# Patient Record
Sex: MALE | Race: WHITE
[De-identification: names, ages, dates, MRNs, and addresses within clinical notes are randomized per-mention and may not be internally consistent; named-entity substitution may affect disease eponyms.]

---

## 2017-07-28 ENCOUNTER — RX RENEWAL (OUTPATIENT)
Age: 67
End: 2017-07-28

## 2018-03-26 ENCOUNTER — RX RENEWAL (OUTPATIENT)
Age: 68
End: 2018-03-26

## 2018-03-26 DIAGNOSIS — K51.90 ULCERATIVE COLITIS, UNSPECIFIED, W/OUT COMPLICATIONS: ICD-10-CM

## 2019-01-21 ENCOUNTER — RX RENEWAL (OUTPATIENT)
Age: 69
End: 2019-01-21

## 2019-11-23 ENCOUNTER — APPOINTMENT (OUTPATIENT)
Dept: CARDIOTHORACIC SURGERY | Facility: HOSPITAL | Age: 69
End: 2019-11-23
Payer: COMMERCIAL

## 2019-11-23 ENCOUNTER — RESULT REVIEW (OUTPATIENT)
Age: 69
End: 2019-11-23

## 2019-11-23 ENCOUNTER — INPATIENT (INPATIENT)
Facility: HOSPITAL | Age: 69
LOS: 32 days | Discharge: NURSING, REHAB OR LONG-TERM CARE | DRG: 219 | End: 2019-12-26
Attending: THORACIC SURGERY (CARDIOTHORACIC VASCULAR SURGERY) | Admitting: THORACIC SURGERY (CARDIOTHORACIC VASCULAR SURGERY)
Payer: COMMERCIAL

## 2019-11-23 VITALS
TEMPERATURE: 98 F | OXYGEN SATURATION: 96 % | WEIGHT: 184.97 LBS | HEART RATE: 64 BPM | DIASTOLIC BLOOD PRESSURE: 60 MMHG | RESPIRATION RATE: 16 BRPM | SYSTOLIC BLOOD PRESSURE: 95 MMHG

## 2019-11-23 LAB
ALBUMIN SERPL ELPH-MCNC: 2 G/DL — LOW (ref 3.3–5)
ALBUMIN SERPL ELPH-MCNC: 3 G/DL — LOW (ref 3.3–5)
ALBUMIN SERPL ELPH-MCNC: 3.5 G/DL — SIGNIFICANT CHANGE UP (ref 3.3–5)
ALBUMIN SERPL ELPH-MCNC: 3.6 G/DL — SIGNIFICANT CHANGE UP (ref 3.3–5)
ALP SERPL-CCNC: 24 U/L — LOW (ref 40–120)
ALP SERPL-CCNC: 33 U/L — LOW (ref 40–120)
ALP SERPL-CCNC: 36 U/L — LOW (ref 40–120)
ALP SERPL-CCNC: 54 U/L — SIGNIFICANT CHANGE UP (ref 40–120)
ALT FLD-CCNC: 15 U/L — SIGNIFICANT CHANGE UP (ref 10–45)
ALT FLD-CCNC: 33 U/L — SIGNIFICANT CHANGE UP (ref 10–45)
ALT FLD-CCNC: 46 U/L — HIGH (ref 10–45)
ALT FLD-CCNC: 55 U/L — HIGH (ref 10–45)
AMPHET UR-MCNC: NEGATIVE — SIGNIFICANT CHANGE UP
ANION GAP SERPL CALC-SCNC: 10 MMOL/L — SIGNIFICANT CHANGE UP (ref 5–17)
ANION GAP SERPL CALC-SCNC: 11 MMOL/L — SIGNIFICANT CHANGE UP (ref 5–17)
ANION GAP SERPL CALC-SCNC: 12 MMOL/L — SIGNIFICANT CHANGE UP (ref 5–17)
ANION GAP SERPL CALC-SCNC: 12 MMOL/L — SIGNIFICANT CHANGE UP (ref 5–17)
APTT BLD: 35 SEC — SIGNIFICANT CHANGE UP (ref 27.5–36.3)
APTT BLD: 39 SEC — HIGH (ref 27.5–36.3)
APTT BLD: 40.8 SEC — HIGH (ref 27.5–36.3)
APTT BLD: 61.3 SEC — HIGH (ref 27.5–36.3)
AST SERPL-CCNC: 126 U/L — HIGH (ref 10–40)
AST SERPL-CCNC: 144 U/L — HIGH (ref 10–40)
AST SERPL-CCNC: 33 U/L — SIGNIFICANT CHANGE UP (ref 10–40)
AST SERPL-CCNC: 86 U/L — HIGH (ref 10–40)
BARBITURATES UR SCN-MCNC: NEGATIVE — SIGNIFICANT CHANGE UP
BASE EXCESS BLDA CALC-SCNC: -10.6 MMOL/L — LOW (ref -2–3)
BASE EXCESS BLDA CALC-SCNC: -8.6 MMOL/L — LOW (ref -2–3)
BASOPHILS # BLD AUTO: 0.04 K/UL — SIGNIFICANT CHANGE UP (ref 0–0.2)
BASOPHILS NFR BLD AUTO: 0.3 % — SIGNIFICANT CHANGE UP (ref 0–2)
BENZODIAZ UR-MCNC: NEGATIVE — SIGNIFICANT CHANGE UP
BILIRUB SERPL-MCNC: 0.2 MG/DL — SIGNIFICANT CHANGE UP (ref 0.2–1.2)
BILIRUB SERPL-MCNC: 0.6 MG/DL — SIGNIFICANT CHANGE UP (ref 0.2–1.2)
BILIRUB SERPL-MCNC: 0.8 MG/DL — SIGNIFICANT CHANGE UP (ref 0.2–1.2)
BILIRUB SERPL-MCNC: 0.9 MG/DL — SIGNIFICANT CHANGE UP (ref 0.2–1.2)
BLD GP AB SCN SERPL QL: NEGATIVE — SIGNIFICANT CHANGE UP
BUN SERPL-MCNC: 20 MG/DL — SIGNIFICANT CHANGE UP (ref 7–23)
BUN SERPL-MCNC: 21 MG/DL — SIGNIFICANT CHANGE UP (ref 7–23)
BUN SERPL-MCNC: 22 MG/DL — SIGNIFICANT CHANGE UP (ref 7–23)
BUN SERPL-MCNC: 22 MG/DL — SIGNIFICANT CHANGE UP (ref 7–23)
CALCIUM SERPL-MCNC: 7.8 MG/DL — LOW (ref 8.4–10.5)
CALCIUM SERPL-MCNC: 7.8 MG/DL — LOW (ref 8.4–10.5)
CALCIUM SERPL-MCNC: 8.1 MG/DL — LOW (ref 8.4–10.5)
CALCIUM SERPL-MCNC: 8.4 MG/DL — SIGNIFICANT CHANGE UP (ref 8.4–10.5)
CHLORIDE SERPL-SCNC: 105 MMOL/L — SIGNIFICANT CHANGE UP (ref 96–108)
CHLORIDE SERPL-SCNC: 110 MMOL/L — HIGH (ref 96–108)
CHLORIDE SERPL-SCNC: 113 MMOL/L — HIGH (ref 96–108)
CHLORIDE SERPL-SCNC: 116 MMOL/L — HIGH (ref 96–108)
CO2 SERPL-SCNC: 22 MMOL/L — SIGNIFICANT CHANGE UP (ref 22–31)
CO2 SERPL-SCNC: 23 MMOL/L — SIGNIFICANT CHANGE UP (ref 22–31)
COCAINE METAB.OTHER UR-MCNC: NEGATIVE — SIGNIFICANT CHANGE UP
CREAT SERPL-MCNC: 1.26 MG/DL — SIGNIFICANT CHANGE UP (ref 0.5–1.3)
CREAT SERPL-MCNC: 1.29 MG/DL — SIGNIFICANT CHANGE UP (ref 0.5–1.3)
CREAT SERPL-MCNC: 1.44 MG/DL — HIGH (ref 0.5–1.3)
CREAT SERPL-MCNC: 1.53 MG/DL — HIGH (ref 0.5–1.3)
EOSINOPHIL # BLD AUTO: 0.07 K/UL — SIGNIFICANT CHANGE UP (ref 0–0.5)
EOSINOPHIL NFR BLD AUTO: 0.5 % — SIGNIFICANT CHANGE UP (ref 0–6)
GAS PNL BLDA: SIGNIFICANT CHANGE UP
GLUCOSE BLDC GLUCOMTR-MCNC: 118 MG/DL — HIGH (ref 70–99)
GLUCOSE BLDC GLUCOMTR-MCNC: 170 MG/DL — HIGH (ref 70–99)
GLUCOSE SERPL-MCNC: 170 MG/DL — HIGH (ref 70–99)
GLUCOSE SERPL-MCNC: 179 MG/DL — HIGH (ref 70–99)
GLUCOSE SERPL-MCNC: 181 MG/DL — HIGH (ref 70–99)
GLUCOSE SERPL-MCNC: 225 MG/DL — HIGH (ref 70–99)
HCO3 BLDA-SCNC: 16 MMOL/L — LOW (ref 21–28)
HCO3 BLDA-SCNC: 18 MMOL/L — LOW (ref 21–28)
HCT VFR BLD CALC: 29.3 % — LOW (ref 39–50)
HCT VFR BLD CALC: 34.3 % — LOW (ref 39–50)
HCT VFR BLD CALC: 36.1 % — LOW (ref 39–50)
HCT VFR BLD CALC: 38.1 % — LOW (ref 39–50)
HGB BLD-MCNC: 10.4 G/DL — LOW (ref 13–17)
HGB BLD-MCNC: 11.2 G/DL — LOW (ref 13–17)
HGB BLD-MCNC: 12.2 G/DL — LOW (ref 13–17)
HGB BLD-MCNC: 9.5 G/DL — LOW (ref 13–17)
IMM GRANULOCYTES NFR BLD AUTO: 0.9 % — SIGNIFICANT CHANGE UP (ref 0–1.5)
INR BLD: 1.18 — HIGH (ref 0.88–1.16)
INR BLD: 1.22 — HIGH (ref 0.88–1.16)
INR BLD: 1.34 — HIGH (ref 0.88–1.16)
INR BLD: 1.45 — HIGH (ref 0.88–1.16)
LACTATE SERPL-SCNC: 3.8 MMOL/L — HIGH (ref 0.5–2)
LACTATE SERPL-SCNC: 4.3 MMOL/L — CRITICAL HIGH (ref 0.5–2)
LACTATE SERPL-SCNC: 4.7 MMOL/L — CRITICAL HIGH (ref 0.5–2)
LACTATE SERPL-SCNC: 5.3 MMOL/L — CRITICAL HIGH (ref 0.5–2)
LIDOCAIN IGE QN: 41 U/L — SIGNIFICANT CHANGE UP (ref 7–60)
LYMPHOCYTES # BLD AUTO: 18.1 % — SIGNIFICANT CHANGE UP (ref 13–44)
LYMPHOCYTES # BLD AUTO: 2.47 K/UL — SIGNIFICANT CHANGE UP (ref 1–3.3)
MAGNESIUM SERPL-MCNC: 2.1 MG/DL — SIGNIFICANT CHANGE UP (ref 1.6–2.6)
MAGNESIUM SERPL-MCNC: 2.3 MG/DL — SIGNIFICANT CHANGE UP (ref 1.6–2.6)
MAGNESIUM SERPL-MCNC: 2.4 MG/DL — SIGNIFICANT CHANGE UP (ref 1.6–2.6)
MCHC RBC-ENTMCNC: 27 PG — SIGNIFICANT CHANGE UP (ref 27–34)
MCHC RBC-ENTMCNC: 27.2 PG — SIGNIFICANT CHANGE UP (ref 27–34)
MCHC RBC-ENTMCNC: 27.9 PG — SIGNIFICANT CHANGE UP (ref 27–34)
MCHC RBC-ENTMCNC: 27.9 PG — SIGNIFICANT CHANGE UP (ref 27–34)
MCHC RBC-ENTMCNC: 30.3 GM/DL — LOW (ref 32–36)
MCHC RBC-ENTMCNC: 31 GM/DL — LOW (ref 32–36)
MCHC RBC-ENTMCNC: 32 GM/DL — SIGNIFICANT CHANGE UP (ref 32–36)
MCHC RBC-ENTMCNC: 32.4 GM/DL — SIGNIFICANT CHANGE UP (ref 32–36)
MCV RBC AUTO: 86.2 FL — SIGNIFICANT CHANGE UP (ref 80–100)
MCV RBC AUTO: 87 FL — SIGNIFICANT CHANGE UP (ref 80–100)
MCV RBC AUTO: 87.2 FL — SIGNIFICANT CHANGE UP (ref 80–100)
MCV RBC AUTO: 89.8 FL — SIGNIFICANT CHANGE UP (ref 80–100)
METHADONE UR-MCNC: NEGATIVE — SIGNIFICANT CHANGE UP
MONOCYTES # BLD AUTO: 0.77 K/UL — SIGNIFICANT CHANGE UP (ref 0–0.9)
MONOCYTES NFR BLD AUTO: 5.7 % — SIGNIFICANT CHANGE UP (ref 2–14)
NEUTROPHILS # BLD AUTO: 10.15 K/UL — HIGH (ref 1.8–7.4)
NEUTROPHILS NFR BLD AUTO: 74.5 % — SIGNIFICANT CHANGE UP (ref 43–77)
NRBC # BLD: 0 /100 WBCS — SIGNIFICANT CHANGE UP (ref 0–0)
OPIATES UR-MCNC: NEGATIVE — SIGNIFICANT CHANGE UP
PCO2 BLDA: 32 MMHG — LOW (ref 35–48)
PCO2 BLDA: 51 MMHG — HIGH (ref 35–48)
PCP SPEC-MCNC: SIGNIFICANT CHANGE UP
PCP UR-MCNC: NEGATIVE — SIGNIFICANT CHANGE UP
PH BLDA: 7.16 — CRITICAL LOW (ref 7.35–7.45)
PH BLDA: 7.33 — LOW (ref 7.35–7.45)
PHOSPHATE SERPL-MCNC: 2 MG/DL — LOW (ref 2.5–4.5)
PHOSPHATE SERPL-MCNC: 2.9 MG/DL — SIGNIFICANT CHANGE UP (ref 2.5–4.5)
PHOSPHATE SERPL-MCNC: 3.3 MG/DL — SIGNIFICANT CHANGE UP (ref 2.5–4.5)
PLATELET # BLD AUTO: 133 K/UL — LOW (ref 150–400)
PLATELET # BLD AUTO: 61 K/UL — LOW (ref 150–400)
PLATELET # BLD AUTO: 83 K/UL — LOW (ref 150–400)
PLATELET # BLD AUTO: 90 K/UL — LOW (ref 150–400)
PO2 BLDA: 58 MMHG — CRITICAL LOW (ref 83–108)
PO2 BLDA: 68 MMHG — LOW (ref 83–108)
POTASSIUM SERPL-MCNC: 3.6 MMOL/L — SIGNIFICANT CHANGE UP (ref 3.5–5.3)
POTASSIUM SERPL-MCNC: 4.3 MMOL/L — SIGNIFICANT CHANGE UP (ref 3.5–5.3)
POTASSIUM SERPL-MCNC: 4.5 MMOL/L — SIGNIFICANT CHANGE UP (ref 3.5–5.3)
POTASSIUM SERPL-MCNC: 4.8 MMOL/L — SIGNIFICANT CHANGE UP (ref 3.5–5.3)
POTASSIUM SERPL-SCNC: 3.6 MMOL/L — SIGNIFICANT CHANGE UP (ref 3.5–5.3)
POTASSIUM SERPL-SCNC: 4.3 MMOL/L — SIGNIFICANT CHANGE UP (ref 3.5–5.3)
POTASSIUM SERPL-SCNC: 4.5 MMOL/L — SIGNIFICANT CHANGE UP (ref 3.5–5.3)
POTASSIUM SERPL-SCNC: 4.8 MMOL/L — SIGNIFICANT CHANGE UP (ref 3.5–5.3)
PROT SERPL-MCNC: 3.2 G/DL — LOW (ref 6–8.3)
PROT SERPL-MCNC: 3.9 G/DL — LOW (ref 6–8.3)
PROT SERPL-MCNC: 4.3 G/DL — LOW (ref 6–8.3)
PROT SERPL-MCNC: 5.1 G/DL — LOW (ref 6–8.3)
PROTHROM AB SERPL-ACNC: 13.4 SEC — HIGH (ref 10–12.9)
PROTHROM AB SERPL-ACNC: 13.9 SEC — HIGH (ref 10–12.9)
PROTHROM AB SERPL-ACNC: 15.3 SEC — HIGH (ref 10–12.9)
PROTHROM AB SERPL-ACNC: 16.6 SEC — HIGH (ref 10–12.9)
RBC # BLD: 3.4 M/UL — LOW (ref 4.2–5.8)
RBC # BLD: 3.82 M/UL — LOW (ref 4.2–5.8)
RBC # BLD: 4.15 M/UL — LOW (ref 4.2–5.8)
RBC # BLD: 4.37 M/UL — SIGNIFICANT CHANGE UP (ref 4.2–5.8)
RBC # FLD: 15.3 % — HIGH (ref 10.3–14.5)
RBC # FLD: 15.7 % — HIGH (ref 10.3–14.5)
RBC # FLD: 15.9 % — HIGH (ref 10.3–14.5)
RBC # FLD: 16.1 % — HIGH (ref 10.3–14.5)
RH IG SCN BLD-IMP: POSITIVE — SIGNIFICANT CHANGE UP
RH IG SCN BLD-IMP: POSITIVE — SIGNIFICANT CHANGE UP
SAO2 % BLDA: 86 % — LOW (ref 95–100)
SAO2 % BLDA: 87 % — LOW (ref 95–100)
SODIUM SERPL-SCNC: 140 MMOL/L — SIGNIFICANT CHANGE UP (ref 135–145)
SODIUM SERPL-SCNC: 144 MMOL/L — SIGNIFICANT CHANGE UP (ref 135–145)
SODIUM SERPL-SCNC: 147 MMOL/L — HIGH (ref 135–145)
SODIUM SERPL-SCNC: 149 MMOL/L — HIGH (ref 135–145)
THC UR QL: NEGATIVE — SIGNIFICANT CHANGE UP
TROPONIN T SERPL-MCNC: <0.01 NG/ML — SIGNIFICANT CHANGE UP (ref 0–0.01)
WBC # BLD: 11.5 K/UL — HIGH (ref 3.8–10.5)
WBC # BLD: 12.72 K/UL — HIGH (ref 3.8–10.5)
WBC # BLD: 13.62 K/UL — HIGH (ref 3.8–10.5)
WBC # BLD: 8.06 K/UL — SIGNIFICANT CHANGE UP (ref 3.8–10.5)
WBC # FLD AUTO: 11.5 K/UL — HIGH (ref 3.8–10.5)
WBC # FLD AUTO: 12.72 K/UL — HIGH (ref 3.8–10.5)
WBC # FLD AUTO: 13.62 K/UL — HIGH (ref 3.8–10.5)
WBC # FLD AUTO: 8.06 K/UL — SIGNIFICANT CHANGE UP (ref 3.8–10.5)

## 2019-11-23 PROCEDURE — 33866 AORTIC HEMIARCH GRAFT: CPT | Mod: 59

## 2019-11-23 PROCEDURE — 33863 ASCENDING AORTIC GRAFT: CPT

## 2019-11-23 PROCEDURE — 99291 CRITICAL CARE FIRST HOUR: CPT | Mod: 25

## 2019-11-23 PROCEDURE — 99292 CRITICAL CARE ADDL 30 MIN: CPT

## 2019-11-23 PROCEDURE — 93010 ELECTROCARDIOGRAM REPORT: CPT

## 2019-11-23 PROCEDURE — 71045 X-RAY EXAM CHEST 1 VIEW: CPT | Mod: 26

## 2019-11-23 PROCEDURE — 36620 INSERTION CATHETER ARTERY: CPT

## 2019-11-23 PROCEDURE — 31500 INSERT EMERGENCY AIRWAY: CPT

## 2019-11-23 PROCEDURE — 74174 CTA ABD&PLVS W/CONTRAST: CPT | Mod: 26

## 2019-11-23 PROCEDURE — 33880 EVASC RPR TA NDGFT COV LSA: CPT

## 2019-11-23 PROCEDURE — 99291 CRITICAL CARE FIRST HOUR: CPT

## 2019-11-23 PROCEDURE — 33880 EVASC RPR TA NDGFT COV LSA: CPT | Mod: 80,59

## 2019-11-23 PROCEDURE — 88305 TISSUE EXAM BY PATHOLOGIST: CPT | Mod: 26

## 2019-11-23 PROCEDURE — 71045 X-RAY EXAM CHEST 1 VIEW: CPT | Mod: 26,77

## 2019-11-23 PROCEDURE — 70498 CT ANGIOGRAPHY NECK: CPT | Mod: 26

## 2019-11-23 PROCEDURE — 33863 ASCENDING AORTIC GRAFT: CPT | Mod: 80,22

## 2019-11-23 PROCEDURE — 71275 CT ANGIOGRAPHY CHEST: CPT | Mod: 26

## 2019-11-23 PROCEDURE — 93010 ELECTROCARDIOGRAM REPORT: CPT | Mod: 59

## 2019-11-23 RX ORDER — SODIUM BICARBONATE 1 MEQ/ML
50 SYRINGE (ML) INTRAVENOUS
Refills: 0 | Status: COMPLETED | OUTPATIENT
Start: 2019-11-23 | End: 2019-11-23

## 2019-11-23 RX ORDER — EPINEPHRINE 0.3 MG/.3ML
0.04 INJECTION INTRAMUSCULAR; SUBCUTANEOUS
Qty: 4 | Refills: 0 | Status: DISCONTINUED | OUTPATIENT
Start: 2019-11-23 | End: 2019-11-24

## 2019-11-23 RX ORDER — PROPOFOL 10 MG/ML
30 INJECTION, EMULSION INTRAVENOUS
Qty: 1000 | Refills: 0 | Status: DISCONTINUED | OUTPATIENT
Start: 2019-11-23 | End: 2019-11-25

## 2019-11-23 RX ORDER — VANCOMYCIN HCL 1 G
1000 VIAL (EA) INTRAVENOUS EVERY 12 HOURS
Refills: 0 | Status: COMPLETED | OUTPATIENT
Start: 2019-11-23 | End: 2019-11-25

## 2019-11-23 RX ORDER — ALBUMIN HUMAN 25 %
250 VIAL (ML) INTRAVENOUS
Refills: 0 | Status: COMPLETED | OUTPATIENT
Start: 2019-11-23 | End: 2019-11-23

## 2019-11-23 RX ORDER — NOREPINEPHRINE BITARTRATE/D5W 8 MG/250ML
0.05 PLASTIC BAG, INJECTION (ML) INTRAVENOUS
Qty: 8 | Refills: 0 | Status: DISCONTINUED | OUTPATIENT
Start: 2019-11-23 | End: 2019-11-24

## 2019-11-23 RX ORDER — PANTOPRAZOLE SODIUM 20 MG/1
40 TABLET, DELAYED RELEASE ORAL DAILY
Refills: 0 | Status: DISCONTINUED | OUTPATIENT
Start: 2019-11-23 | End: 2019-12-02

## 2019-11-23 RX ORDER — SODIUM CHLORIDE 9 MG/ML
1000 INJECTION INTRAMUSCULAR; INTRAVENOUS; SUBCUTANEOUS ONCE
Refills: 0 | Status: COMPLETED | OUTPATIENT
Start: 2019-11-23 | End: 2019-11-23

## 2019-11-23 RX ORDER — INSULIN HUMAN 100 [IU]/ML
1 INJECTION, SOLUTION SUBCUTANEOUS
Qty: 50 | Refills: 0 | Status: DISCONTINUED | OUTPATIENT
Start: 2019-11-23 | End: 2019-11-25

## 2019-11-23 RX ORDER — FUROSEMIDE 40 MG
20 TABLET ORAL ONCE
Refills: 0 | Status: COMPLETED | OUTPATIENT
Start: 2019-11-23 | End: 2019-11-23

## 2019-11-23 RX ORDER — ROCURONIUM BROMIDE 10 MG/ML
50 VIAL (ML) INTRAVENOUS ONCE
Refills: 0 | Status: COMPLETED | OUTPATIENT
Start: 2019-11-23 | End: 2019-11-23

## 2019-11-23 RX ORDER — HEPARIN SODIUM 5000 [USP'U]/ML
5000 INJECTION INTRAVENOUS; SUBCUTANEOUS EVERY 8 HOURS
Refills: 0 | Status: DISCONTINUED | OUTPATIENT
Start: 2019-11-23 | End: 2019-11-28

## 2019-11-23 RX ORDER — PROPOFOL 10 MG/ML
5 INJECTION, EMULSION INTRAVENOUS
Qty: 1000 | Refills: 0 | Status: DISCONTINUED | OUTPATIENT
Start: 2019-11-23 | End: 2019-11-25

## 2019-11-23 RX ORDER — SODIUM CHLORIDE 9 MG/ML
1000 INJECTION INTRAMUSCULAR; INTRAVENOUS; SUBCUTANEOUS
Refills: 0 | Status: DISCONTINUED | OUTPATIENT
Start: 2019-11-23 | End: 2019-12-26

## 2019-11-23 RX ORDER — CHLORHEXIDINE GLUCONATE 213 G/1000ML
1 SOLUTION TOPICAL DAILY
Refills: 0 | Status: DISCONTINUED | OUTPATIENT
Start: 2019-11-23 | End: 2019-12-26

## 2019-11-23 RX ORDER — MORPHINE SULFATE 50 MG/1
2 CAPSULE, EXTENDED RELEASE ORAL ONCE
Refills: 0 | Status: DISCONTINUED | OUTPATIENT
Start: 2019-11-23 | End: 2019-11-23

## 2019-11-23 RX ORDER — ASPIRIN/CALCIUM CARB/MAGNESIUM 324 MG
81 TABLET ORAL DAILY
Refills: 0 | Status: DISCONTINUED | OUTPATIENT
Start: 2019-11-23 | End: 2019-12-03

## 2019-11-23 RX ORDER — TAMSULOSIN HYDROCHLORIDE 0.4 MG/1
0.4 CAPSULE ORAL AT BEDTIME
Refills: 0 | Status: DISCONTINUED | OUTPATIENT
Start: 2019-11-23 | End: 2019-12-03

## 2019-11-23 RX ORDER — ROCURONIUM BROMIDE 10 MG/ML
70 VIAL (ML) INTRAVENOUS ONCE
Refills: 0 | Status: COMPLETED | OUTPATIENT
Start: 2019-11-23 | End: 2019-11-23

## 2019-11-23 RX ORDER — PHENYLEPHRINE HYDROCHLORIDE 10 MG/ML
0.2 INJECTION INTRAVENOUS
Qty: 40 | Refills: 0 | Status: DISCONTINUED | OUTPATIENT
Start: 2019-11-23 | End: 2019-11-26

## 2019-11-23 RX ORDER — DEXTROSE 50 % IN WATER 50 %
50 SYRINGE (ML) INTRAVENOUS
Refills: 0 | Status: DISCONTINUED | OUTPATIENT
Start: 2019-11-23 | End: 2019-12-19

## 2019-11-23 RX ORDER — PHENYLEPHRINE HYDROCHLORIDE 10 MG/ML
0.5 INJECTION INTRAVENOUS
Qty: 40 | Refills: 0 | Status: DISCONTINUED | OUTPATIENT
Start: 2019-11-23 | End: 2019-11-23

## 2019-11-23 RX ORDER — CHLORHEXIDINE GLUCONATE 213 G/1000ML
15 SOLUTION TOPICAL EVERY 12 HOURS
Refills: 0 | Status: DISCONTINUED | OUTPATIENT
Start: 2019-11-23 | End: 2019-12-02

## 2019-11-23 RX ORDER — VASOPRESSIN 20 [USP'U]/ML
0.03 INJECTION INTRAVENOUS
Qty: 50 | Refills: 0 | Status: COMPLETED | OUTPATIENT
Start: 2019-11-23 | End: 2019-11-23

## 2019-11-23 RX ORDER — CALCIUM GLUCONATE 100 MG/ML
2 VIAL (ML) INTRAVENOUS ONCE
Refills: 0 | Status: COMPLETED | OUTPATIENT
Start: 2019-11-23 | End: 2019-11-23

## 2019-11-23 RX ORDER — LIDOCAINE HCL 20 MG/ML
2 VIAL (ML) INJECTION
Qty: 2 | Refills: 0 | Status: DISCONTINUED | OUTPATIENT
Start: 2019-11-23 | End: 2019-11-24

## 2019-11-23 RX ORDER — ETOMIDATE 2 MG/ML
20 INJECTION INTRAVENOUS ONCE
Refills: 0 | Status: COMPLETED | OUTPATIENT
Start: 2019-11-23 | End: 2019-11-23

## 2019-11-23 RX ADMIN — Medication 20 MILLIGRAM(S): at 23:35

## 2019-11-23 RX ADMIN — MORPHINE SULFATE 2 MILLIGRAM(S): 50 CAPSULE, EXTENDED RELEASE ORAL at 02:40

## 2019-11-23 RX ADMIN — CHLORHEXIDINE GLUCONATE 15 MILLILITER(S): 213 SOLUTION TOPICAL at 19:02

## 2019-11-23 RX ADMIN — ETOMIDATE 20 MILLIGRAM(S): 2 INJECTION INTRAVENOUS at 03:05

## 2019-11-23 RX ADMIN — Medication 15 MG/MIN: at 16:32

## 2019-11-23 RX ADMIN — HEPARIN SODIUM 5000 UNIT(S): 5000 INJECTION INTRAVENOUS; SUBCUTANEOUS at 22:17

## 2019-11-23 RX ADMIN — PROPOFOL 15.1 MICROGRAM(S)/KG/MIN: 10 INJECTION, EMULSION INTRAVENOUS at 16:34

## 2019-11-23 RX ADMIN — Medication 125 MILLILITER(S): at 15:42

## 2019-11-23 RX ADMIN — HEPARIN SODIUM 5000 UNIT(S): 5000 INJECTION INTRAVENOUS; SUBCUTANEOUS at 16:30

## 2019-11-23 RX ADMIN — MORPHINE SULFATE 2 MILLIGRAM(S): 50 CAPSULE, EXTENDED RELEASE ORAL at 03:10

## 2019-11-23 RX ADMIN — Medication 125 MILLILITER(S): at 19:03

## 2019-11-23 RX ADMIN — Medication 125 MILLILITER(S): at 15:16

## 2019-11-23 RX ADMIN — SODIUM CHLORIDE 1000 MILLILITER(S): 9 INJECTION INTRAMUSCULAR; INTRAVENOUS; SUBCUTANEOUS at 02:10

## 2019-11-23 RX ADMIN — Medication 125 MILLILITER(S): at 19:14

## 2019-11-23 RX ADMIN — Medication 125 MILLILITER(S): at 15:43

## 2019-11-23 RX ADMIN — Medication 50 MILLIEQUIVALENT(S): at 04:06

## 2019-11-23 RX ADMIN — Medication 100 MILLIGRAM(S): at 17:26

## 2019-11-23 RX ADMIN — Medication 125 MILLILITER(S): at 19:39

## 2019-11-23 RX ADMIN — Medication 7.87 MICROGRAM(S)/KG/MIN: at 03:42

## 2019-11-23 RX ADMIN — PHENYLEPHRINE HYDROCHLORIDE 15.73 MICROGRAM(S)/KG/MIN: 10 INJECTION INTRAVENOUS at 03:42

## 2019-11-23 RX ADMIN — PROPOFOL 2.52 MICROGRAM(S)/KG/MIN: 10 INJECTION, EMULSION INTRAVENOUS at 03:42

## 2019-11-23 RX ADMIN — Medication 200 GRAM(S): at 21:00

## 2019-11-23 RX ADMIN — Medication 125 MILLILITER(S): at 19:00

## 2019-11-23 RX ADMIN — VASOPRESSIN 1.8 UNIT(S)/MIN: 20 INJECTION INTRAVENOUS at 16:34

## 2019-11-23 RX ADMIN — Medication 70 MILLIGRAM(S): at 03:07

## 2019-11-23 RX ADMIN — Medication 125 MILLILITER(S): at 15:41

## 2019-11-23 RX ADMIN — Medication 50 MILLIGRAM(S): at 07:53

## 2019-11-23 RX ADMIN — Medication 7.87 MICROGRAM(S)/KG/MIN: at 16:34

## 2019-11-23 NOTE — ED ADULT NURSE NOTE - CHPI ED NUR SYMPTOMS NEG
no syncope/no nausea/no vomiting/no diaphoresis/no fever/no congestion/no dizziness/no shortness of breath/no chills

## 2019-11-23 NOTE — PROGRESS NOTE ADULT - SUBJECTIVE AND OBJECTIVE BOX
History and exam noted.  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  Presented in extremis with systolic blood pressures recorded as low as in the 40s.  Now on high dose levophed and neosynephrine to maintain even a marginal blood pressure.  Emergently intubated in the ER for acute hypoxia and profund cyanosis.  No radiologic or clinical evidence of malperfusion at this point, although note that innominate artery, base of left common carotid artery, left subclavian artery all dissected on CT.    Long discussion with patient's wife.  Explained that, without surgery, mortality is 100%.  Explained that, even with urgent surgery, there is a high risk of perioperative mortality and major morbidity, not only including death, but also stroke, renal failure, other organ failure, massive transfusion, infection, etc.  Combined risk of perioperative mortality and major morbidity quoted as high as 40-50%.    Patient's wife understands gravity of clinical situation and consents for patient to proceed with emergent salvage surgical intervention.    Plan:    To OR for salvage type A dissection repair ASAP.

## 2019-11-23 NOTE — CONSULT NOTE ADULT - SUBJECTIVE AND OBJECTIVE BOX
Renal Consult placed for FELY risk reduction    69 year old male, with PMHx of colitis, prostate surgery @ [facility], BIBA to [facility] ED complaining of back pain and profound hypotension. Per patient's wife patient began complaining of left sided neck pain radiating to his back at around 1:00 AM with bilateral weakness and inability to get out of bed. Upon arriving to the ED patient was profoundly hypotensive SBP 40s and cyanotic. He was emergently rushed to  CT scan which confirmed Type A aortic dissection and CT surgery was called. Arterial line was placed and patient was stabilized on Levophed and Phenylephrine with marginal SBP 80-90s. He was intubated for hypoxia with cyanosis and emergently brought to the OR for salvage Type A dissection repair. (23 Nov 2019 05:08)    Patient was seen at bedside, s/p procedure. Patient remains intubated on vent.      PAST MEDICAL & SURGICAL HISTORY:  Benign prostatic hypertrophy without lower urinary tract symptoms: BPH (benign prostatic hypertrophy)  Ulcerative colitis: Ulcerative colitis  States following surgery of eye and adnexa: straightened right eye  Other postprocedural status: History of hernia repair  Hemorrhoids: Hemorrhoids  Acute appendicitis with generalized peritonitis: Ruptured appendix      Allergies    penicillin (Unknown)    Intolerances        FAMILY HISTORY:      SOCIAL HISTORY:      MEDICATIONS  (STANDING):  aspirin enteric coated 81 milliGRAM(s) Oral daily  chlorhexidine 0.12% Liquid 15 milliLiter(s) Oral Mucosa every 12 hours  chlorhexidine 2% Cloths 1 Application(s) Topical daily  dextrose 50% Injectable 50 milliLiter(s) IV Push every 15 minutes  heparin  Injectable 5000 Unit(s) SubCutaneous every 8 hours  insulin regular Infusion 1 Unit(s)/Hr (1 mL/Hr) IV Continuous <Continuous>  norepinephrine Infusion 0.05 MICROgram(s)/kG/Min (7.866 mL/Hr) IV Continuous <Continuous>  pantoprazole  Injectable 40 milliGRAM(s) IV Push daily  phenylephrine    Infusion 0.5 MICROgram(s)/kG/Min (15.731 mL/Hr) IV Continuous <Continuous>  propofol Infusion 5 MICROgram(s)/kG/Min (2.517 mL/Hr) IV Continuous <Continuous>  sodium bicarbonate  Injectable 50 milliEquivalent(s) IV Push every 5 minutes  sodium chloride 0.9%. 1000 milliLiter(s) (10 mL/Hr) IV Continuous <Continuous>  vancomycin  IVPB 1000 milliGRAM(s) IV Intermittent every 12 hours  vasopressin Infusion 0.03 Unit(s)/Min (1.8 mL/Hr) IV Continuous <Continuous>    MEDICATIONS  (PRN):      Vital Signs Last 24 Hrs  T(C): 36.1 (23 Nov 2019 02:52), Max: 36.4 (23 Nov 2019 02:06)  T(F): 96.9 (23 Nov 2019 02:52), Max: 97.6 (23 Nov 2019 02:06)  HR: 96 (23 Nov 2019 13:45) (64 - 117)  BP: 102/71 (23 Nov 2019 04:10) (49/30 - 130/71)  BP(mean): --  RR: 14 (23 Nov 2019 13:45) (14 - 22)  SpO2: 98% (23 Nov 2019 13:45) (83% - 99%)    REVIEW OF SYSTEMS:  sedated      PHYSICAL EXAM:  GENERAL: sedated, intubated on vent, chest drain in place  NECK: Neck supple, No JVD  CHEST/LUNG: Clear to auscultation bilaterally; No wheeze, no rales, no crepitations  HEART: Regular rate and rhythm. BEKAH II/VI at LPSB, No gallop, no rub   ABDOMEN: Soft, Nontender, BS+nt, No flank tenderness.   EXTREMITIES: No clubbing, cyanosis, or edema, no calf tenderness  Neurology: sedated  SKIN: No rashes or lesions      CAPILLARY BLOOD GLUCOSE      POCT Blood Glucose.: 125 mg/dL (23 Nov 2019 02:09)      I&O's Summary    23 Nov 2019 07:01  -  23 Nov 2019 13:58  --------------------------------------------------------  IN: 8.2 mL / OUT: 185 mL / NET: -176.8 mL          LABS:                            10.4   13.62 )-----------( 133      ( 23 Nov 2019 02:46 )             34.3       PT/INR - ( 23 Nov 2019 02:46 )   PT: 16.6 sec;   INR: 1.45          PTT - ( 23 Nov 2019 02:46 )  PTT:39.0 sec  CARDIAC MARKERS ( 23 Nov 2019 03:29 )  x     / <0.01 ng/mL / x     / x     / x              RADIOLOGY & ADDITIONAL TESTS:

## 2019-11-23 NOTE — ED PROVIDER NOTE - CLINICAL SUMMARY MEDICAL DECISION MAKING FREE TEXT BOX
upper back pain radiating to neck, pale/cyanotic, hypotense, levophed started in CT. pt taken immediately to CT for concern for dissection.  +wide mediastinum on CT .    +dissection on CT   -check labs, ekg  -CT Surg c/s  -ivf continued  -levophed

## 2019-11-23 NOTE — H&P ADULT - NSICDXPASTSURGICALHX_GEN_ALL_CORE_FT
PAST SURGICAL HISTORY:  Acute appendicitis with generalized peritonitis Ruptured appendix    Hemorrhoids Hemorrhoids    Other postprocedural status History of hernia repair    States following surgery of eye and adnexa straightened right eye

## 2019-11-23 NOTE — H&P ADULT - HISTORY OF PRESENT ILLNESS
69 year old male, with PMHx of colitis, prostate surgery @ [facility], BIBA to [facility] ED complaining of back pain and profound hypotension. Per patient's wife patient began complaining of left sided neck pain radiating to his back at around 1:00 AM with bilateral weakness and inability to get out of bed. Upon arriving to the ED patient was profoundly hypotensive SBP 40s and cyanotic. He was emergently rushed to  CT scan which confirmed Type A aortic dissection and CT surgery was called. Arterial line was placed and patient was stabilized on Levophed and Phenylephrine with marginal SBP 80-90s. He was intubated for hypoxia with cyanosis and emergently brought to the OR for salvage Type A dissection repair.

## 2019-11-23 NOTE — PROGRESS NOTE ADULT - SUBJECTIVE AND OBJECTIVE BOX
CTICU  CRITICAL  CARE  attending     Hand off received 					   Pertinent clinical, laboratory, radiographic, hemodynamic, echocardiographic, respiratory data, microbiologic data and chart were reviewed and analyzed frequently throughout the course of the day and night    Patient seen and examined with CTS/ SH attending at bedside  69 years old male with H/O colitis. He started having pain in the anterior cervical area around  1:00 AM. Simultaneously he had lower back pain. He felt dizzy and diaphoretic. No loss of consciousness.  He was brought in to [facility] emergency room by an ambulance. His BP was 60 by palpation and appeared cyanotic.     CT angio  showed   1. 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  2. Moderate hemopericardium.  3. Dissection extending into the right brachiocephalic artery though the right carotid artery and right subclavian artery remain patent off true lumen.  4. Dissection involving the proximal left subclavian artery which remains patent more distally.  5. The left carotid artery comes off the true lumen of the arch.      He was hypoxemic and hypotensive in the ER and he was  intubated.  OR team called.  Code fusion called.              FAMILY HISTORY:  PAST MEDICAL & SURGICAL HISTORY:  Benign prostatic hypertrophy without lower urinary tract symptoms: BPH (benign prostatic hypertrophy)  Ulcerative colitis: Ulcerative colitis  States following surgery of eye and adnexa: straightened right eye  Other postprocedural status: History of hernia repair  Hemorrhoids: Hemorrhoids  Acute appendicitis with generalized peritonitis: Ruptured appendix    Patient is a 69y old  Male who presents with  Acute Sung Type A dissection.    14 system review was unremarkable    Vital signs, hemodynamic and respiratory parameters were reviewed from the bedside nursing flow sheet.    ICU Vital Signs Last 24 Hrs  T(C): 36.4 (23 Nov 2019 02:06), Max: 36.4 (23 Nov 2019 02:06)  T(F): 97.6 (23 Nov 2019 02:06), Max: 97.6 (23 Nov 2019 02:06)  HR: 89 (23 Nov 2019 02:15) (64 - 89)  BP: 130/71 (23 Nov 2019 02:15) (49/30 - 130/71)  BP(mean): --  ABP: --  ABP(mean): --  RR: 20 (23 Nov 2019 02:15) (16 - 20)  SpO2: 99% (23 Nov 2019 02:15) (96% - 99%)    Adult Advanced Hemodynamics Last 24 Hrs  CVP(mm Hg): --  CVP(cm H2O): --  CO: --  CI: --  PA: --  PA(mean): --  PCWP: --  SVR: --  SVRI: --  PVR: --  PVRI: --, ABG - ( 23 Nov 2019 04:32 )  pH, Arterial: 7.25  pH, Blood: x     /  pCO2: 59    /  pO2: 89    / HCO3: 25    / Base Excess: -3.0  /  SaO2: 94          Neuro: Awake, Alert and Oriented. Reflexes 2+. Strength 5/5. Follows commands. Moves all 4 extremities.  Neck: Mild JVD. + Cyanosis of lips and oral mucosa  CVS: S1, S1, No S3. 3/6 systolic murmur.  Lungs: Good air entry bilaterally. No rales.  Abd: Soft. No tenderness. + Bowel sounds.  Vascular: Palpable  DP on both sides.  Extremities: No edema.  Lymphatic: Normal.  Skin: Peripheral cyanosis.      labs  CBC Full  -  ( 23 Nov 2019 02:46 )  WBC Count : 13.62 K/uL  RBC Count : 3.82 M/uL  Hemoglobin : 10.4 g/dL  Hematocrit : 34.3 %  Platelet Count - Automated : 133 K/uL  Mean Cell Volume : 89.8 fl  Mean Cell Hemoglobin : 27.2 pg  Mean Cell Hemoglobin Concentration : 30.3 gm/dL  Auto Neutrophil # : 10.15 K/uL  Auto Lymphocyte # : 2.47 K/uL  Auto Monocyte # : 0.77 K/uL  Auto Eosinophil # : 0.07 K/uL  Auto Basophil # : 0.04 K/uL  Auto Neutrophil % : 74.5 %  Auto Lymphocyte % : 18.1 %  Auto Monocyte % : 5.7 %  Auto Eosinophil % : 0.5 %  Auto Basophil % : 0.3 %    11-23    140  |  105  |  22  ----------------------------<  181<H>  3.6   |  23  |  1.26    Ca    8.4          TPro  5.1<L>  /  Alb  3.5  /  TBili  0.2  /  DBili  x   /  AST  33  /  ALT  15  /  AlkPhos  54  11-23    PT/INR - ( 23 Nov 2019 02:46 )   PT: 16.6 sec;   INR: 1.45          PTT - ( 23 Nov 2019 02:46 )  PTT:39.0 sec  The current medications were reviewed   MEDICATIONS  (STANDING):  norepinephrine Infusion 0.05 MICROgram(s)/kG/Min (7.866 mL/Hr) IV Continuous <Continuous>  phenylephrine    Infusion 0.5 MICROgram(s)/kG/Min (15.731 mL/Hr) IV Continuous <Continuous>  propofol Infusion 5 MICROgram(s)/kG/Min (2.517 mL/Hr) IV Continuous <Continuous>    69 years old male with Acute Type A aortic dissection starting from the aortic root and extending to the iliacs and carotid. Partial rupture and hemopericardium.  Patient intubated.  Arterial line and peripheral lines placed.  The patient needs emergent dissection repair and replacement of ascending aorta.  The family was updated about the course and plan.  OR team arrived.  Patient taken to the operating room.          CRITICAL CARE TIME SPENT in evaluation and management, reassessments, review and interpretation of labs and x-rays, ventilator and hemodynamic management, formulating a plan and coordinating care: ___90____ MIN.  Time does not include procedural time.  CTICU ATTENDING     					    Giovanni Reis MD

## 2019-11-23 NOTE — H&P ADULT - NSICDXPASTMEDICALHX_GEN_ALL_CORE_FT
PAST MEDICAL HISTORY:  Benign prostatic hypertrophy without lower urinary tract symptoms BPH (benign prostatic hypertrophy)    Ulcerative colitis Ulcerative colitis

## 2019-11-23 NOTE — BRIEF OPERATIVE NOTE - OPERATION/FINDINGS
Operation: Repair Type A Dissection: Aortic Root Replacement/Reconstruction (Biological Bentall - 23mm Magna in 32mm graft), Cabrol reconstruction of coronary ostia, Replacement Ascending Aorta/HemiArch, Frozen Elephant trunk     EF 60%  CPB: 297 min  Cross Clamp: 132 min  Circ Arrest: 28 min  ACP: 22 min    I first assisted for the entirety of the case, including but not limited to cannulation, reconstruction of aortic arch, ascending aorta, Cabrol procedure, Frozen elephant trunk procedure, decannulation, weaning from cardiopulmonary bypass, and closure of chest

## 2019-11-23 NOTE — PROGRESS NOTE ADULT - SUBJECTIVE AND OBJECTIVE BOX
CTICU  CRITICAL  CARE  attending     Hand off received 					   Pertinent clinical, laboratory, radiographic, hemodynamic, echocardiographic, respiratory data, microbiologic data and chart were reviewed and analyzed frequently throughout the course of the day and night    Patient seen and examined with CTS/ SH attending at bedside    Patient seen and examined with CTS/ SH attending at bedside  69 years old male with H/O colitis. He started having pain in the anterior cervical area around  1:00 AM. Simultaneously he had lower back pain. He felt dizzy and diaphoretic. He felt weakness in both lower extremities. No loss of consciousness.  He was brought in to [facility] emergency room by an ambulance. His BP was 60 by palpation and appeared cyanotic.     CT angio  showed   1. 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  2. Moderate hemopericardium.  3. Dissection extending into the right brachiocephalic artery though the right carotid artery and right subclavian artery remain patent off true lumen.  4. Dissection involving the proximal left subclavian artery which remains patent more distally.  5. The left carotid artery comes off the true lumen of the arch      FAMILY HISTORY:  PAST MEDICAL & SURGICAL HISTORY:  Benign prostatic hypertrophy without lower urinary tract symptoms: BPH (benign prostatic hypertrophy)  Ulcerative colitis: Ulcerative colitis  States following surgery of eye and adnexa: straightened right eye  Other postprocedural status: History of hernia repair  Hemorrhoids: Hemorrhoids  Acute appendicitis with generalized peritonitis: Ruptured appendix    Patient is a 69y old  Male who presented with Type A Aortic dissection and tamponade.    14 system review was unremarkable    Vital signs, hemodynamic and respiratory parameters were reviewed from the bedside nursing flow sheet.  ICU Vital Signs Last 24 Hrs  T(C): 36.6 (23 Nov 2019 17:00), Max: 36.6 (23 Nov 2019 17:00)  T(F): 97.9 (23 Nov 2019 17:00), Max: 97.9 (23 Nov 2019 17:00)  HR: 74 (23 Nov 2019 20:00) (64 - 117)  BP: 102/71 (23 Nov 2019 04:10) (49/30 - 130/71)  BP(mean): --  ABP: 103/63 (23 Nov 2019 20:00) (74/54 - 108/74)  ABP(mean): 75 (23 Nov 2019 20:00) (62 - 90)  RR: 24 (23 Nov 2019 20:00) (14 - 24)  SpO2: 96% (23 Nov 2019 20:00) (83% - 100%)    Adult Advanced Hemodynamics Last 24 Hrs  CVP(mm Hg): 25 (23 Nov 2019 20:00) (-5 - 25)  CVP(cm H2O): --  CO: --  CI: --  PA: --  PA(mean): --  PCWP: --  SVR: --  SVRI: --  PVR: --  PVRI: --, ABG - ( 23 Nov 2019 18:13 )  pH, Arterial: 7.37  pH, Blood: x     /  pCO2: 36    /  pO2: 115   / HCO3: 20    / Base Excess: -4.3  /  SaO2: 98                Mode: AC/ CMV (Assist Control/ Continuous Mandatory Ventilation)  RR (machine): 14  TV (machine): 650  FiO2: 100  PEEP: 8  ITime: 1  MAP: 12  PIP: 24    Intake and output was reviewed and the fluid balance was calculated  Daily     Daily   I&O's Summary    23 Nov 2019 07:01  -  23 Nov 2019 20:12  --------------------------------------------------------  IN: 3274.5 mL / OUT: 1525 mL / NET: 1749.5 mL        All lines and drain sites were assessed    Neuro: No change in the mental status from the baseline. Moves all 4 extremities.  Neck: No JVD.  CVS: S1, S1, No S3.  Lungs: Good air entry bilaterally.  Abd: Soft. Diminished distension. No tenderness. + Bowel sounds.  Vascular: + DP/PT.  Extremities: No edema.  Lymphatic: Normal.  Skin: No abnormalities.      labs  CBC Full  -  ( 23 Nov 2019 18:14 )  WBC Count : 11.50 K/uL  RBC Count : 4.15 M/uL  Hemoglobin : 11.2 g/dL  Hematocrit : 36.1 %  Platelet Count - Automated : 83 K/uL  Mean Cell Volume : 87.0 fl  Mean Cell Hemoglobin : 27.0 pg  Mean Cell Hemoglobin Concentration : 31.0 gm/dL  Auto Neutrophil # : x  Auto Lymphocyte # : x  Auto Monocyte # : x  Auto Eosinophil # : x  Auto Basophil # : x  Auto Neutrophil % : x  Auto Lymphocyte % : x  Auto Monocyte % : x  Auto Eosinophil % : x  Auto Basophil % : x    11-23    147<H>  |  113<H>  |  21  ----------------------------<  225<H>  4.5   |  22  |  1.44<H>    Ca    7.8<L>      23 Nov 2019 18:14  Phos  2.9     11-23  Mg     2.3     11-23    TPro  3.9<L>  /  Alb  3.0<L>  /  TBili  0.8  /  DBili  x   /  AST  126<H>  /  ALT  46<H>  /  AlkPhos  33<L>  11-23    PT/INR - ( 23 Nov 2019 18:14 )   PT: 13.9 sec;   INR: 1.22          PTT - ( 23 Nov 2019 18:14 )  PTT:40.8 sec  The current medications were reviewed   MEDICATIONS  (STANDING):  aspirin enteric coated 81 milliGRAM(s) Oral daily  chlorhexidine 0.12% Liquid 15 milliLiter(s) Oral Mucosa every 12 hours  chlorhexidine 2% Cloths 1 Application(s) Topical daily  dextrose 50% Injectable 50 milliLiter(s) IV Push every 15 minutes  EPINEPHrine    Infusion 0.04 MICROgram(s)/kG/Min (12.585 mL/Hr) IV Continuous <Continuous>  heparin  Injectable 5000 Unit(s) SubCutaneous every 8 hours  insulin regular Infusion 1 Unit(s)/Hr (1 mL/Hr) IV Continuous <Continuous>  lidocaine   Infusion 2 mG/Min (15 mL/Hr) IV Continuous <Continuous>  norepinephrine Infusion 0.05 MICROgram(s)/kG/Min (7.866 mL/Hr) IV Continuous <Continuous>  pantoprazole  Injectable 40 milliGRAM(s) IV Push daily  phenylephrine    Infusion 0.5 MICROgram(s)/kG/Min (15.731 mL/Hr) IV Continuous <Continuous>  propofol Infusion 5 MICROgram(s)/kG/Min (2.517 mL/Hr) IV Continuous <Continuous>  propofol Infusion 30 MICROgram(s)/kG/Min (15.102 mL/Hr) IV Continuous <Continuous>  sodium bicarbonate  Injectable 50 milliEquivalent(s) IV Push every 5 minutes  sodium chloride 0.9%. 1000 milliLiter(s) (10 mL/Hr) IV Continuous <Continuous>  vancomycin  IVPB 1000 milliGRAM(s) IV Intermittent every 12 hours      Patient is a 69y old  Male who presents with cardiogenic shock due to acute Type A aortic dissection with rupture and cardiac tamponade.  S/P Aortic Root Replacement  S/P BioBental and CABROL.  S/P replacement of ascending aorta and hemiarch.  Metabolic acidosis.  Hemodynamically stable.  Good oxygenation.  Diuresing well.      My plan includes :  WEAN Vent as tolerated.  D/C Nor epinephrine.  OPTIMIZE Glycemic control.  Close hemodynamic, ventilatory and drain monitoring and management  Monitor for arrhythmias and monitor parameters for organ perfusion  Monitor neurologic status  Monitor renal function.  Head of the bed should remain elevated to 45 deg .   Chest PT and IS will be encouraged  Monitor  adequacy of oxygenation and ventilation and attempt to wean oxygen  Nutritional goals will be met using po eventually , ensure adequate caloric intake and monitor the same  Stress ulcer and VTE prophylaxis will be achieved    Electrolytes have been repleted as necessary and wound care has been carried out. Pain control has been achieved.   Aggressive physical therapy and early mobility and ambulation goals will be met   The family was updated about the course and plan  CRITICAL CARE TIME SPENT in evaluation and management, reassessments, review and interpretation of labs and x-rays, ventilator and hemodynamic management, formulating a plan and coordinating care: ___60____ MIN.  Time does not include procedural time.  CTICU ATTENDING     					    Giovanni Reis MD

## 2019-11-23 NOTE — ED ADULT TRIAGE NOTE - CHIEF COMPLAINT QUOTE
BIBA with neck /back pain tonight; per EMS pt was pale and diaphoretic, initial BP was 60 palpatory, HR was 53; NS 1 L bolus in progress, 0.5 mg Atropine given by EMS

## 2019-11-23 NOTE — ED PROVIDER NOTE - PROGRESS NOTE DETAILS
CT surgery consulted - +dissection on CT scan   code fusion called PRBCs infusing, CT surgery PA at bedside - placed R radial a-line  awaiting OR team for acute surgical intervention phenylephrine added persistent cyanosis.  given RSI - intubated with glidescope, propofol for sedation.  Dr. Latif at bedside

## 2019-11-23 NOTE — BRIEF OPERATIVE NOTE - NSICDXBRIEFPROCEDURE_GEN_ALL_CORE_FT
PROCEDURES:  Cabrol procedure for replacement of aortic valve, aortic root, and ascending aorta 23-Nov-2019 14:09:01  Alex Lipscomb  Repair, aortic arch, using frozen elephant trunk technique 23-Nov-2019 14:03:08  Alex Lipscomb  Replacement, ascending aorta and hemiarch 23-Nov-2019 14:02:58  Alex Lipscomb  Aortic root replacement 23-Nov-2019 14:02:14 Biological Trish, 23mm Magna in 32mm Graft Alex Lipscomb

## 2019-11-23 NOTE — BRIEF OPERATIVE NOTE - NSICDXBRIEFPREOP_GEN_ALL_CORE_FT
PRE-OP DIAGNOSIS:  Cardiogenic shock 23-Nov-2019 14:04:06  Alex Lipscomb  Cardiac tamponade 23-Nov-2019 14:03:56  Alex Lipscomb  Aortic dissection 23-Nov-2019 14:03:46  Alex Lipscomb

## 2019-11-23 NOTE — ED ADULT NURSE NOTE - NSIMPLEMENTINTERV_GEN_ALL_ED
Implemented All Fall Risk Interventions:  [place] to call system. Call bell, personal items and telephone within reach. Instruct patient to call for assistance. Room bathroom lighting operational. Non-slip footwear when patient is off stretcher. Physically safe environment: no spills, clutter or unnecessary equipment. Stretcher in lowest position, wheels locked, appropriate side rails in place. Provide visual cue, wrist band, yellow gown, etc. Monitor gait and stability. Monitor for mental status changes and reorient to person, place, and time. Review medications for side effects contributing to fall risk. Reinforce activity limits and safety measures with patient and family.

## 2019-11-23 NOTE — BRIEF OPERATIVE NOTE - COMMENTS
Dr. Latif was the first assistant for this case including but not limited to cannulation, replacement of aortic root/ascending aorta, decannulation, and chest closure.    I was present for this procedure and participated as first assistant as described by the PA above, unless otherwise noted below.

## 2019-11-23 NOTE — PROGRESS NOTE ADULT - SUBJECTIVE AND OBJECTIVE BOX
CTICU  CRITICAL  CARE  attending     Hand off received 					   Pertinent clinical, laboratory, radiographic, hemodynamic, echocardiographic, respiratory data, microbiologic data and chart were reviewed and analyzed frequently throughout the course of the day and night  Patient seen and examined with CTS/ SH attending at bedside  Pt is a 69y , Male, HEALTH ISSUES - PROBLEM Dx:      , FAMILY HISTORY:  PAST MEDICAL & SURGICAL HISTORY:  Benign prostatic hypertrophy without lower urinary tract symptoms: BPH (benign prostatic hypertrophy)  Ulcerative colitis: Ulcerative colitis  States following surgery of eye and adnexa: straightened right eye  Other postprocedural status: History of hernia repair  Hemorrhoids: Hemorrhoids  Acute appendicitis with generalized peritonitis: Ruptured appendix    Patient is a 69y old  Male who presents with a chief complaint of Aortic dissection (23 Nov 2019 20:12)      14 system review was unremarkable    Vital signs, hemodynamic and respiratory parameters were reviewed from the bedside nursing flowsheet.  ICU Vital Signs Last 24 Hrs  T(C): 36.6 (23 Nov 2019 17:00), Max: 36.6 (23 Nov 2019 17:00)  T(F): 97.9 (23 Nov 2019 17:00), Max: 97.9 (23 Nov 2019 17:00)  HR: 74 (23 Nov 2019 20:00) (64 - 117)  BP: 102/71 (23 Nov 2019 04:10) (49/30 - 130/71)  BP(mean): --  ABP: 103/63 (23 Nov 2019 20:00) (74/54 - 108/74)  ABP(mean): 75 (23 Nov 2019 20:00) (62 - 90)  RR: 24 (23 Nov 2019 20:00) (14 - 24)  SpO2: 96% (23 Nov 2019 20:00) (83% - 100%)    Adult Advanced Hemodynamics Last 24 Hrs  CVP(mm Hg): 25 (23 Nov 2019 20:00) (-5 - 25)  CVP(cm H2O): --  CO: --  CI: --  PA: --  PA(mean): --  PCWP: --  SVR: --  SVRI: --  PVR: --  PVRI: --, ABG - ( 23 Nov 2019 20:11 )  pH, Arterial: 7.40  pH, Blood: x     /  pCO2: 35    /  pO2: 92    / HCO3: 21    / Base Excess: -3.1  /  SaO2: 97                Mode: AC/ CMV (Assist Control/ Continuous Mandatory Ventilation)  RR (machine): 14  TV (machine): 650  FiO2: 100  PEEP: 8  ITime: 1  MAP: 12  PIP: 24    Intake and output was reviewed and the fluid balance was calculated  Daily     Daily   I&O's Summary    23 Nov 2019 07:01  -  23 Nov 2019 20:53  --------------------------------------------------------  IN: 3274.5 mL / OUT: 1525 mL / NET: 1749.5 mL        All lines and drain sites were assessed  [facility]      POCT Blood Glucose.: 118 mg/dL (23 Nov 2019 16:40)    No acute change in mental status  Auscultation of the chest reveals equal bs  Abdomen is soft  Extremities are warm and well perfused  Wounds appear clean and unremarkable  Antibiotics are periop    labs  CBC Full  -  ( 23 Nov 2019 18:14 )  WBC Count : 11.50 K/uL  RBC Count : 4.15 M/uL  Hemoglobin : 11.2 g/dL  Hematocrit : 36.1 %  Platelet Count - Automated : 83 K/uL  Mean Cell Volume : 87.0 fl  Mean Cell Hemoglobin : 27.0 pg  Mean Cell Hemoglobin Concentration : 31.0 gm/dL  Auto Neutrophil # : x  Auto Lymphocyte # : x  Auto Monocyte # : x  Auto Eosinophil # : x  Auto Basophil # : x  Auto Neutrophil % : x  Auto Lymphocyte % : x  Auto Monocyte % : x  Auto Eosinophil % : x  Auto Basophil % : x    11-23    147<H>  |  113<H>  |  21  ----------------------------<  225<H>  4.5   |  22  |  1.44<H>    Ca    7.8<L>      23 Nov 2019 18:14  Phos  2.9     11-23  Mg     2.3     11-23    TPro  3.9<L>  /  Alb  3.0<L>  /  TBili  0.8  /  DBili  x   /  AST  126<H>  /  ALT  46<H>  /  AlkPhos  33<L>  11-23    PT/INR - ( 23 Nov 2019 18:14 )   PT: 13.9 sec;   INR: 1.22          PTT - ( 23 Nov 2019 18:14 )  PTT:40.8 sec  The current medications were reviewed   MEDICATIONS  (STANDING):  aspirin enteric coated 81 milliGRAM(s) Oral daily  calcium gluconate IVPB 2 Gram(s) IV Intermittent once  chlorhexidine 0.12% Liquid 15 milliLiter(s) Oral Mucosa every 12 hours  chlorhexidine 2% Cloths 1 Application(s) Topical daily  dextrose 50% Injectable 50 milliLiter(s) IV Push every 15 minutes  EPINEPHrine    Infusion 0.04 MICROgram(s)/kG/Min (12.585 mL/Hr) IV Continuous <Continuous>  heparin  Injectable 5000 Unit(s) SubCutaneous every 8 hours  insulin regular Infusion 1 Unit(s)/Hr (1 mL/Hr) IV Continuous <Continuous>  lidocaine   Infusion 2 mG/Min (15 mL/Hr) IV Continuous <Continuous>  norepinephrine Infusion 0.05 MICROgram(s)/kG/Min (7.866 mL/Hr) IV Continuous <Continuous>  pantoprazole  Injectable 40 milliGRAM(s) IV Push daily  phenylephrine    Infusion 0.5 MICROgram(s)/kG/Min (15.731 mL/Hr) IV Continuous <Continuous>  propofol Infusion 5 MICROgram(s)/kG/Min (2.517 mL/Hr) IV Continuous <Continuous>  propofol Infusion 30 MICROgram(s)/kG/Min (15.102 mL/Hr) IV Continuous <Continuous>  sodium bicarbonate  Injectable 50 milliEquivalent(s) IV Push every 5 minutes  sodium chloride 0.9%. 1000 milliLiter(s) (10 mL/Hr) IV Continuous <Continuous>  tamsulosin 0.4 milliGRAM(s) Oral at bedtime  vancomycin  IVPB 1000 milliGRAM(s) IV Intermittent every 12 hours    MEDICATIONS  (PRN):       PROBLEM LIST/ ASSESSMENT:  HEALTH ISSUES - PROBLEM Dx:      ,   Patient is a 69y old  Male who presents with a chief complaint of Aortic dissection (23 Nov 2019 20:12)     s/p cardiac surgery              My plan includes :  close hemodynamic, ventilatory and drain monitoring and management per post op routine    Monitor for arrhythmias and monitor parameters for organ perfusion  beta blockade not administered due to hemodynamic instability and bradycardia  monitor neurologic status  Head of the bed should remain elevated to 45 deg .   chest PT and IS will be encouraged  monitor adequacy of oxygenation and ventilation and attempt to wean oxygen  antibiotic regimen will be tailored to the clinical, laboratory and microbiologic data  Nutritional goals will be met using po eventually , ensure adequate caloric intake and montior the same  Stress ulcer and VTE prophylaxis will be achieved    Glycemic control is satisfactory  Electrolytes have been repleted as necessary and wound care has been carried out. Pain control has been achieved.   agressive physical therapy and early mobility and ambulation goals will be met   The family was updated about the course and plan  CRITICAL CARE TIME personally provided by me  in evaluation and management, reassessments, review and interpretation of labs and x-rays, ventilator and hemodynamic management, formulating a plan and coordinating care: ___90____ MIN.  Time does not include procedural time.  CTICU ATTENDING     					    Jaret Hebert MD

## 2019-11-23 NOTE — ED PROVIDER NOTE - OBJECTIVE STATEMENT
69M hx BPH, colitis, c/o upper back and neck pain.  pt states started suddenly tonight. no chest pain. no SOB. no vomiting, no HA. upon EMS arrival pt found to be hypotensive and bradycardic to 50s.  2L NS started and pt given atropine with improvement of heart rate to 80s.  upon ED arrival pt pale and diaphoretic. 69M hx BPH, colitis, mitral valve prolapse c/o upper back and neck pain.  pt states started suddenly tonight. no chest pain. no SOB. no vomiting, no HA. upon EMS arrival pt found to be hypotensive and bradycardic to 50s.  2L NS started and pt given atropine with improvement of heart rate to 80s.  upon ED arrival pt pale and diaphoretic.

## 2019-11-23 NOTE — PROGRESS NOTE ADULT - ATTENDING COMMENTS
As above.  Acute [place] type A dissection, in extremis, presented in circulatory collapse, now in profound shock, to OR for salvage surgical repair.

## 2019-11-23 NOTE — H&P ADULT - ASSESSMENT
A/P: 69 year old male, with PMHx of colitis, prostate surgery @ [facility], BIBA to [facility] ED complaining of back pain and profound hypotension. Per patient's wife patient began complaining of left sided neck pain radiating to his back at around 1:00 AM with bilateral weakness and inability to get out of bed. Upon arriving to the ED patient was profoundly hypotensive SBP 40s and cyanotic. He was emergently rushed to  CT scan which confirmed Type A aortic dissection and CT surgery was called. Arterial line was placed and patient was stabilized on Levophed and Phenylephrine with marginal SBP 80-90s. He was intubated for hypoxia with cyanosis and emergently brought to the OR for salvage Type A dissection repair.     - Consent obtained from patient's spouse. Quoted 50% intraop and post operatively morbidity and mortality   - Type & Screen x 2   - Bloods on hold   - OR for emergent type A dissection repair

## 2019-11-23 NOTE — ED ADULT NURSE NOTE - PMH
Benign prostatic hypertrophy without lower urinary tract symptoms  BPH (benign prostatic hypertrophy)  Ulcerative colitis  Ulcerative colitis

## 2019-11-23 NOTE — H&P ADULT - NSHPLABSRESULTS_GEN_ALL_CORE
< from: CT Angio Abdomen and Pelvis w/ IV Cont (11.23.19 @ 02:31) >    1. Dissection extending down the abdominal aorta and bilateral common   iliac arteries. There is  extension into the proximal left external iliac artery through the true   lumen markedly narrowed  however more distally fully patent.  2. Mild dissection extension to the proximal SMA which remains patent   more distally.  3. The right renal artery and DON come off the patent false lumen.  Thank you for allowing us to participate in the care of your patient.  Dictated and Authenticated by: Juliet Bhardwaj MD  11/23/2019 3:10 AM Eastern Time ([place] & [place])  The above report was submitted by the [facility] attending radiologist and   copied to PowerScribe by resident Dr. Saeed.    Resident addendum: The results of this examination were verbally   communicated with read back to the physician, Dr. Rubi, on 11/23/2019   at approximately 2:36 AM.  The right vertebral artery appears to arise from the true lumen    < end of copied text >

## 2019-11-23 NOTE — ED PROVIDER NOTE - PSH
Acute appendicitis with generalized peritonitis  Ruptured appendix  Hemorrhoids  Hemorrhoids  Other postprocedural status  History of hernia repair  States following surgery of eye and adnexa  straightened right eye

## 2019-11-23 NOTE — ED ADULT NURSE NOTE - OBJECTIVE STATEMENT
Patient brought in by EMS from home, patient c/o severe back/jaw pain, and dizziness/lightheadedness. EMS called and pt was hypotensive upon EMS arrival. Upgraded and was seen by MD.

## 2019-11-23 NOTE — H&P ADULT - NSHPPHYSICALEXAM_GEN_ALL_CORE
Physical Exam  CONSTITUTIONAL:   Patient lying in ED stretcher, tachypneic and in acute distress   NEURO:  Alert and oriented. UE and LE strength 5/5 bilaterally. Moves all extremities to command.                 EYES: WNL  ENMT:  WNL  CV:   S1S2, distant heart sounds   RESPIRATORY:  Tachypneic, shallow breath sounds appreciated bilaterally   GI: Abdomen soft, distended and non tender   : No solo, exam deferred   MUSKULOSKELETAL:  AVALOS, ROM intact   SKIN / BREAST: Peripheral and central cyanosis appreciated   Extremities: No edema bilaterally. bilateral UE and LE cyanosis   Vascular: 2+ R DP, Radial and PT pulses. 1+ L DP, PT and Radial  2+ bilateral femoral pulses

## 2019-11-24 DIAGNOSIS — N17.9 ACUTE KIDNEY FAILURE, UNSPECIFIED: ICD-10-CM

## 2019-11-24 LAB
ALBUMIN SERPL ELPH-MCNC: 2.9 G/DL — LOW (ref 3.3–5)
ALBUMIN SERPL ELPH-MCNC: 3.2 G/DL — LOW (ref 3.3–5)
ALBUMIN SERPL ELPH-MCNC: 3.4 G/DL — SIGNIFICANT CHANGE UP (ref 3.3–5)
ALBUMIN SERPL ELPH-MCNC: 3.6 G/DL — SIGNIFICANT CHANGE UP (ref 3.3–5)
ALBUMIN SERPL ELPH-MCNC: 3.8 G/DL — SIGNIFICANT CHANGE UP (ref 3.3–5)
ALP SERPL-CCNC: 25 U/L — LOW (ref 40–120)
ALP SERPL-CCNC: 26 U/L — LOW (ref 40–120)
ALP SERPL-CCNC: 27 U/L — LOW (ref 40–120)
ALP SERPL-CCNC: 30 U/L — LOW (ref 40–120)
ALP SERPL-CCNC: 39 U/L — LOW (ref 40–120)
ALT FLD-CCNC: 29 U/L — SIGNIFICANT CHANGE UP (ref 10–45)
ALT FLD-CCNC: 29 U/L — SIGNIFICANT CHANGE UP (ref 10–45)
ALT FLD-CCNC: 30 U/L — SIGNIFICANT CHANGE UP (ref 10–45)
ALT FLD-CCNC: 33 U/L — SIGNIFICANT CHANGE UP (ref 10–45)
ALT FLD-CCNC: 34 U/L — SIGNIFICANT CHANGE UP (ref 10–45)
AMYLASE P1 CFR SERPL: 29 U/L — SIGNIFICANT CHANGE UP (ref 25–125)
ANION GAP SERPL CALC-SCNC: 11 MMOL/L — SIGNIFICANT CHANGE UP (ref 5–17)
ANION GAP SERPL CALC-SCNC: 8 MMOL/L — SIGNIFICANT CHANGE UP (ref 5–17)
ANION GAP SERPL CALC-SCNC: 9 MMOL/L — SIGNIFICANT CHANGE UP (ref 5–17)
APTT BLD: 31.2 SEC — SIGNIFICANT CHANGE UP (ref 27.5–36.3)
APTT BLD: 31.3 SEC — SIGNIFICANT CHANGE UP (ref 27.5–36.3)
APTT BLD: 33 SEC — SIGNIFICANT CHANGE UP (ref 27.5–36.3)
APTT BLD: 33.1 SEC — SIGNIFICANT CHANGE UP (ref 27.5–36.3)
APTT BLD: 33.3 SEC — SIGNIFICANT CHANGE UP (ref 27.5–36.3)
AST SERPL-CCNC: 53 U/L — HIGH (ref 10–40)
AST SERPL-CCNC: 59 U/L — HIGH (ref 10–40)
AST SERPL-CCNC: 65 U/L — HIGH (ref 10–40)
AST SERPL-CCNC: 72 U/L — HIGH (ref 10–40)
AST SERPL-CCNC: 81 U/L — HIGH (ref 10–40)
BILIRUB SERPL-MCNC: 0.4 MG/DL — SIGNIFICANT CHANGE UP (ref 0.2–1.2)
BILIRUB SERPL-MCNC: 0.5 MG/DL — SIGNIFICANT CHANGE UP (ref 0.2–1.2)
BUN SERPL-MCNC: 23 MG/DL — SIGNIFICANT CHANGE UP (ref 7–23)
BUN SERPL-MCNC: 26 MG/DL — HIGH (ref 7–23)
BUN SERPL-MCNC: 28 MG/DL — HIGH (ref 7–23)
BUN SERPL-MCNC: 30 MG/DL — HIGH (ref 7–23)
BUN SERPL-MCNC: 33 MG/DL — HIGH (ref 7–23)
CALCIUM SERPL-MCNC: 8.4 MG/DL — SIGNIFICANT CHANGE UP (ref 8.4–10.5)
CALCIUM SERPL-MCNC: 8.6 MG/DL — SIGNIFICANT CHANGE UP (ref 8.4–10.5)
CALCIUM SERPL-MCNC: 8.8 MG/DL — SIGNIFICANT CHANGE UP (ref 8.4–10.5)
CHLORIDE SERPL-SCNC: 109 MMOL/L — HIGH (ref 96–108)
CHLORIDE SERPL-SCNC: 110 MMOL/L — HIGH (ref 96–108)
CHLORIDE SERPL-SCNC: 111 MMOL/L — HIGH (ref 96–108)
CHLORIDE SERPL-SCNC: 111 MMOL/L — HIGH (ref 96–108)
CHLORIDE SERPL-SCNC: 112 MMOL/L — HIGH (ref 96–108)
CK MB CFR SERPL CALC: 22.8 NG/ML — HIGH (ref 0–6.7)
CK MB CFR SERPL CALC: 24.5 NG/ML — HIGH (ref 0–6.7)
CK SERPL-CCNC: 614 U/L — HIGH (ref 30–200)
CK SERPL-CCNC: 622 U/L — HIGH (ref 30–200)
CO2 SERPL-SCNC: 25 MMOL/L — SIGNIFICANT CHANGE UP (ref 22–31)
CO2 SERPL-SCNC: 26 MMOL/L — SIGNIFICANT CHANGE UP (ref 22–31)
CO2 SERPL-SCNC: 27 MMOL/L — SIGNIFICANT CHANGE UP (ref 22–31)
CREAT SERPL-MCNC: 1.73 MG/DL — HIGH (ref 0.5–1.3)
CREAT SERPL-MCNC: 1.88 MG/DL — HIGH (ref 0.5–1.3)
CREAT SERPL-MCNC: 1.95 MG/DL — HIGH (ref 0.5–1.3)
CREAT SERPL-MCNC: 2.14 MG/DL — HIGH (ref 0.5–1.3)
CREAT SERPL-MCNC: 2.27 MG/DL — HIGH (ref 0.5–1.3)
FIBRINOGEN PPP-MCNC: 242 MG/DL — LOW (ref 258–438)
GAS PNL BLDA: SIGNIFICANT CHANGE UP
GAS PNL BLDV: SIGNIFICANT CHANGE UP
GGT SERPL-CCNC: 15 U/L — SIGNIFICANT CHANGE UP (ref 9–50)
GLUCOSE BLDC GLUCOMTR-MCNC: 100 MG/DL — HIGH (ref 70–99)
GLUCOSE BLDC GLUCOMTR-MCNC: 121 MG/DL — HIGH (ref 70–99)
GLUCOSE BLDC GLUCOMTR-MCNC: 123 MG/DL — HIGH (ref 70–99)
GLUCOSE BLDC GLUCOMTR-MCNC: 151 MG/DL — HIGH (ref 70–99)
GLUCOSE BLDC GLUCOMTR-MCNC: 85 MG/DL — SIGNIFICANT CHANGE UP (ref 70–99)
GLUCOSE BLDC GLUCOMTR-MCNC: 94 MG/DL — SIGNIFICANT CHANGE UP (ref 70–99)
GLUCOSE BLDC GLUCOMTR-MCNC: 97 MG/DL — SIGNIFICANT CHANGE UP (ref 70–99)
GLUCOSE SERPL-MCNC: 101 MG/DL — HIGH (ref 70–99)
GLUCOSE SERPL-MCNC: 103 MG/DL — HIGH (ref 70–99)
GLUCOSE SERPL-MCNC: 107 MG/DL — HIGH (ref 70–99)
GLUCOSE SERPL-MCNC: 121 MG/DL — HIGH (ref 70–99)
GLUCOSE SERPL-MCNC: 126 MG/DL — HIGH (ref 70–99)
HCT VFR BLD CALC: 26.8 % — LOW (ref 39–50)
HCT VFR BLD CALC: 27 % — LOW (ref 39–50)
HCT VFR BLD CALC: 28.5 % — LOW (ref 39–50)
HCT VFR BLD CALC: 30.2 % — LOW (ref 39–50)
HCT VFR BLD CALC: 31 % — LOW (ref 39–50)
HCV AB S/CO SERPL IA: 0.08 S/CO — SIGNIFICANT CHANGE UP
HCV AB SERPL-IMP: SIGNIFICANT CHANGE UP
HGB BLD-MCNC: 10.1 G/DL — LOW (ref 13–17)
HGB BLD-MCNC: 8.9 G/DL — LOW (ref 13–17)
HGB BLD-MCNC: 9 G/DL — LOW (ref 13–17)
HGB BLD-MCNC: 9.1 G/DL — LOW (ref 13–17)
HGB BLD-MCNC: 9.7 G/DL — LOW (ref 13–17)
INR BLD: 1.3 — HIGH (ref 0.88–1.16)
INR BLD: 1.32 — HIGH (ref 0.88–1.16)
INR BLD: 1.32 — HIGH (ref 0.88–1.16)
INR BLD: 1.36 — HIGH (ref 0.88–1.16)
INR BLD: 1.37 — HIGH (ref 0.88–1.16)
LACTATE SERPL-SCNC: 1.2 MMOL/L — SIGNIFICANT CHANGE UP (ref 0.5–2)
LACTATE SERPL-SCNC: 1.3 MMOL/L — SIGNIFICANT CHANGE UP (ref 0.5–2)
LACTATE SERPL-SCNC: 1.5 MMOL/L — SIGNIFICANT CHANGE UP (ref 0.5–2)
LACTATE SERPL-SCNC: 1.6 MMOL/L — SIGNIFICANT CHANGE UP (ref 0.5–2)
LACTATE SERPL-SCNC: 2.3 MMOL/L — HIGH (ref 0.5–2)
LACTATE SERPL-SCNC: 3.1 MMOL/L — HIGH (ref 0.5–2)
LIDOCAIN IGE QN: 20 U/L — SIGNIFICANT CHANGE UP (ref 7–60)
MAGNESIUM SERPL-MCNC: 2.2 MG/DL — SIGNIFICANT CHANGE UP (ref 1.6–2.6)
MAGNESIUM SERPL-MCNC: 2.2 MG/DL — SIGNIFICANT CHANGE UP (ref 1.6–2.6)
MAGNESIUM SERPL-MCNC: 2.3 MG/DL — SIGNIFICANT CHANGE UP (ref 1.6–2.6)
MCHC RBC-ENTMCNC: 27.7 PG — SIGNIFICANT CHANGE UP (ref 27–34)
MCHC RBC-ENTMCNC: 27.8 PG — SIGNIFICANT CHANGE UP (ref 27–34)
MCHC RBC-ENTMCNC: 28.1 PG — SIGNIFICANT CHANGE UP (ref 27–34)
MCHC RBC-ENTMCNC: 28.2 PG — SIGNIFICANT CHANGE UP (ref 27–34)
MCHC RBC-ENTMCNC: 28.8 PG — SIGNIFICANT CHANGE UP (ref 27–34)
MCHC RBC-ENTMCNC: 31.9 GM/DL — LOW (ref 32–36)
MCHC RBC-ENTMCNC: 32.1 GM/DL — SIGNIFICANT CHANGE UP (ref 32–36)
MCHC RBC-ENTMCNC: 32.6 GM/DL — SIGNIFICANT CHANGE UP (ref 32–36)
MCHC RBC-ENTMCNC: 33 GM/DL — SIGNIFICANT CHANGE UP (ref 32–36)
MCHC RBC-ENTMCNC: 33.6 GM/DL — SIGNIFICANT CHANGE UP (ref 32–36)
MCV RBC AUTO: 85.4 FL — SIGNIFICANT CHANGE UP (ref 80–100)
MCV RBC AUTO: 85.4 FL — SIGNIFICANT CHANGE UP (ref 80–100)
MCV RBC AUTO: 85.6 FL — SIGNIFICANT CHANGE UP (ref 80–100)
MCV RBC AUTO: 86.3 FL — SIGNIFICANT CHANGE UP (ref 80–100)
MCV RBC AUTO: 88 FL — SIGNIFICANT CHANGE UP (ref 80–100)
NRBC # BLD: 0 /100 WBCS — SIGNIFICANT CHANGE UP (ref 0–0)
PHOSPHATE SERPL-MCNC: 3.1 MG/DL — SIGNIFICANT CHANGE UP (ref 2.5–4.5)
PHOSPHATE SERPL-MCNC: 3.3 MG/DL — SIGNIFICANT CHANGE UP (ref 2.5–4.5)
PHOSPHATE SERPL-MCNC: 3.4 MG/DL — SIGNIFICANT CHANGE UP (ref 2.5–4.5)
PHOSPHATE SERPL-MCNC: 3.6 MG/DL — SIGNIFICANT CHANGE UP (ref 2.5–4.5)
PHOSPHATE SERPL-MCNC: 3.6 MG/DL — SIGNIFICANT CHANGE UP (ref 2.5–4.5)
PLATELET # BLD AUTO: 61 K/UL — LOW (ref 150–400)
PLATELET # BLD AUTO: 64 K/UL — LOW (ref 150–400)
PLATELET # BLD AUTO: 65 K/UL — LOW (ref 150–400)
PLATELET # BLD AUTO: 69 K/UL — LOW (ref 150–400)
PLATELET # BLD AUTO: 72 K/UL — LOW (ref 150–400)
POTASSIUM SERPL-MCNC: 3.9 MMOL/L — SIGNIFICANT CHANGE UP (ref 3.5–5.3)
POTASSIUM SERPL-MCNC: 4 MMOL/L — SIGNIFICANT CHANGE UP (ref 3.5–5.3)
POTASSIUM SERPL-MCNC: 4.4 MMOL/L — SIGNIFICANT CHANGE UP (ref 3.5–5.3)
POTASSIUM SERPL-MCNC: 4.7 MMOL/L — SIGNIFICANT CHANGE UP (ref 3.5–5.3)
POTASSIUM SERPL-MCNC: 4.7 MMOL/L — SIGNIFICANT CHANGE UP (ref 3.5–5.3)
POTASSIUM SERPL-SCNC: 3.9 MMOL/L — SIGNIFICANT CHANGE UP (ref 3.5–5.3)
POTASSIUM SERPL-SCNC: 4 MMOL/L — SIGNIFICANT CHANGE UP (ref 3.5–5.3)
POTASSIUM SERPL-SCNC: 4.4 MMOL/L — SIGNIFICANT CHANGE UP (ref 3.5–5.3)
POTASSIUM SERPL-SCNC: 4.7 MMOL/L — SIGNIFICANT CHANGE UP (ref 3.5–5.3)
POTASSIUM SERPL-SCNC: 4.7 MMOL/L — SIGNIFICANT CHANGE UP (ref 3.5–5.3)
PROT SERPL-MCNC: 4.2 G/DL — LOW (ref 6–8.3)
PROT SERPL-MCNC: 4.5 G/DL — LOW (ref 6–8.3)
PROT SERPL-MCNC: 4.7 G/DL — LOW (ref 6–8.3)
PROTHROM AB SERPL-ACNC: 14.8 SEC — HIGH (ref 10–12.9)
PROTHROM AB SERPL-ACNC: 15 SEC — HIGH (ref 10–12.9)
PROTHROM AB SERPL-ACNC: 15.1 SEC — HIGH (ref 10–12.9)
PROTHROM AB SERPL-ACNC: 15.5 SEC — HIGH (ref 10–12.9)
PROTHROM AB SERPL-ACNC: 15.6 SEC — HIGH (ref 10–12.9)
RBC # BLD: 3.13 M/UL — LOW (ref 4.2–5.8)
RBC # BLD: 3.16 M/UL — LOW (ref 4.2–5.8)
RBC # BLD: 3.24 M/UL — LOW (ref 4.2–5.8)
RBC # BLD: 3.5 M/UL — LOW (ref 4.2–5.8)
RBC # BLD: 3.63 M/UL — LOW (ref 4.2–5.8)
RBC # FLD: 16.1 % — HIGH (ref 10.3–14.5)
RBC # FLD: 16.2 % — HIGH (ref 10.3–14.5)
RBC # FLD: 16.5 % — HIGH (ref 10.3–14.5)
SODIUM SERPL-SCNC: 145 MMOL/L — SIGNIFICANT CHANGE UP (ref 135–145)
SODIUM SERPL-SCNC: 145 MMOL/L — SIGNIFICANT CHANGE UP (ref 135–145)
SODIUM SERPL-SCNC: 146 MMOL/L — HIGH (ref 135–145)
SODIUM SERPL-SCNC: 146 MMOL/L — HIGH (ref 135–145)
SODIUM SERPL-SCNC: 147 MMOL/L — HIGH (ref 135–145)
TROPONIN T SERPL-MCNC: 2.88 NG/ML — CRITICAL HIGH (ref 0–0.01)
TROPONIN T SERPL-MCNC: 2.89 NG/ML — CRITICAL HIGH (ref 0–0.01)
WBC # BLD: 10.66 K/UL — HIGH (ref 3.8–10.5)
WBC # BLD: 11.02 K/UL — HIGH (ref 3.8–10.5)
WBC # BLD: 12.1 K/UL — HIGH (ref 3.8–10.5)
WBC # BLD: 12.11 K/UL — HIGH (ref 3.8–10.5)
WBC # BLD: 8.52 K/UL — SIGNIFICANT CHANGE UP (ref 3.8–10.5)
WBC # FLD AUTO: 10.66 K/UL — HIGH (ref 3.8–10.5)
WBC # FLD AUTO: 11.02 K/UL — HIGH (ref 3.8–10.5)
WBC # FLD AUTO: 12.1 K/UL — HIGH (ref 3.8–10.5)
WBC # FLD AUTO: 12.11 K/UL — HIGH (ref 3.8–10.5)
WBC # FLD AUTO: 8.52 K/UL — SIGNIFICANT CHANGE UP (ref 3.8–10.5)

## 2019-11-24 PROCEDURE — 99233 SBSQ HOSP IP/OBS HIGH 50: CPT

## 2019-11-24 PROCEDURE — 99291 CRITICAL CARE FIRST HOUR: CPT

## 2019-11-24 PROCEDURE — ZZZZZ: CPT

## 2019-11-24 PROCEDURE — 43752 NASAL/OROGASTRIC W/TUBE PLMT: CPT

## 2019-11-24 PROCEDURE — 71045 X-RAY EXAM CHEST 1 VIEW: CPT | Mod: 26,77,76

## 2019-11-24 PROCEDURE — 74018 RADEX ABDOMEN 1 VIEW: CPT | Mod: 26

## 2019-11-24 PROCEDURE — 71045 X-RAY EXAM CHEST 1 VIEW: CPT | Mod: 26,59

## 2019-11-24 RX ORDER — FUROSEMIDE 40 MG
20 TABLET ORAL ONCE
Refills: 0 | Status: COMPLETED | OUTPATIENT
Start: 2019-11-24 | End: 2019-11-24

## 2019-11-24 RX ORDER — ACETAMINOPHEN 500 MG
1000 TABLET ORAL ONCE
Refills: 0 | Status: COMPLETED | OUTPATIENT
Start: 2019-11-24 | End: 2019-11-24

## 2019-11-24 RX ORDER — FUROSEMIDE 40 MG
5 TABLET ORAL
Qty: 500 | Refills: 0 | Status: DISCONTINUED | OUTPATIENT
Start: 2019-11-24 | End: 2019-11-26

## 2019-11-24 RX ORDER — IPRATROPIUM/ALBUTEROL SULFATE 18-103MCG
3 AEROSOL WITH ADAPTER (GRAM) INHALATION ONCE
Refills: 0 | Status: COMPLETED | OUTPATIENT
Start: 2019-11-24 | End: 2019-11-24

## 2019-11-24 RX ORDER — BUDESONIDE, MICRONIZED 100 %
0.5 POWDER (GRAM) MISCELLANEOUS ONCE
Refills: 0 | Status: COMPLETED | OUTPATIENT
Start: 2019-11-24 | End: 2019-11-24

## 2019-11-24 RX ORDER — FUROSEMIDE 40 MG
20 TABLET ORAL ONCE
Refills: 0 | Status: DISCONTINUED | OUTPATIENT
Start: 2019-11-24 | End: 2019-11-24

## 2019-11-24 RX ADMIN — HEPARIN SODIUM 5000 UNIT(S): 5000 INJECTION INTRAVENOUS; SUBCUTANEOUS at 14:00

## 2019-11-24 RX ADMIN — CHLORHEXIDINE GLUCONATE 15 MILLILITER(S): 213 SOLUTION TOPICAL at 06:55

## 2019-11-24 RX ADMIN — PROPOFOL 15.1 MICROGRAM(S)/KG/MIN: 10 INJECTION, EMULSION INTRAVENOUS at 21:42

## 2019-11-24 RX ADMIN — Medication 0.5 MILLIGRAM(S): at 09:27

## 2019-11-24 RX ADMIN — PANTOPRAZOLE SODIUM 40 MILLIGRAM(S): 20 TABLET, DELAYED RELEASE ORAL at 11:27

## 2019-11-24 RX ADMIN — Medication 20 MILLIGRAM(S): at 11:24

## 2019-11-24 RX ADMIN — Medication 1000 MILLIGRAM(S): at 22:45

## 2019-11-24 RX ADMIN — Medication 100 MILLIGRAM(S): at 18:34

## 2019-11-24 RX ADMIN — Medication 100 MILLIGRAM(S): at 06:54

## 2019-11-24 RX ADMIN — Medication 400 MILLIGRAM(S): at 22:35

## 2019-11-24 RX ADMIN — HEPARIN SODIUM 5000 UNIT(S): 5000 INJECTION INTRAVENOUS; SUBCUTANEOUS at 21:41

## 2019-11-24 RX ADMIN — Medication 81 MILLIGRAM(S): at 11:27

## 2019-11-24 RX ADMIN — Medication 3 MILLILITER(S): at 09:27

## 2019-11-24 RX ADMIN — Medication 2.5 MG/HR: at 19:56

## 2019-11-24 RX ADMIN — TAMSULOSIN HYDROCHLORIDE 0.4 MILLIGRAM(S): 0.4 CAPSULE ORAL at 21:41

## 2019-11-24 RX ADMIN — CHLORHEXIDINE GLUCONATE 1 APPLICATION(S): 213 SOLUTION TOPICAL at 11:29

## 2019-11-24 RX ADMIN — CHLORHEXIDINE GLUCONATE 15 MILLILITER(S): 213 SOLUTION TOPICAL at 18:35

## 2019-11-24 RX ADMIN — PROPOFOL 15.1 MICROGRAM(S)/KG/MIN: 10 INJECTION, EMULSION INTRAVENOUS at 13:00

## 2019-11-24 RX ADMIN — PROPOFOL 15.1 MICROGRAM(S)/KG/MIN: 10 INJECTION, EMULSION INTRAVENOUS at 06:32

## 2019-11-24 RX ADMIN — PHENYLEPHRINE HYDROCHLORIDE 6.29 MICROGRAM(S)/KG/MIN: 10 INJECTION INTRAVENOUS at 09:00

## 2019-11-24 NOTE — CHART NOTE - NSCHARTNOTEFT_GEN_A_CORE
12Fr DuoTube placed via right nares without incident or complication.  Indication: intubation, respiratory distress, nutrition.  Patient tolerated procedure well.  CXR performed post procedure and DuoTube in stomach.

## 2019-11-24 NOTE — PROGRESS NOTE ADULT - SUBJECTIVE AND OBJECTIVE BOX
CTICU  CRITICAL  CARE  attending     Hand off received 					   Pertinent clinical, laboratory, radiographic, hemodynamic, echocardiographic, respiratory data, microbiologic data and chart were reviewed and analyzed frequently throughout the course of the day and night  Patient seen and examined with CTS/ SH attending at bedside    Pt is a 69y , Male, post op day # 1 s/p emergent repair of Type A Dissection: Aortic Root Replacement/Reconstruction (Biological Bentall - 23mm Magna in 32mm graft), Cabrol reconstruction of coronary ostia, Replacement of Ascending Aorta/HemiArch, Frozen Elephant trunk     post op:    remains fully vent supported  on low dose pressor support  hypoxemia; A-a gradient  Fluid overloaded  Acute post H'gic anemia  Thrombocytopenia  FELY    FELY (acute kidney injury): FELY (acute kidney injury)      , FAMILY HISTORY:  PAST MEDICAL & SURGICAL HISTORY:  Benign prostatic hypertrophy without lower urinary tract symptoms: BPH (benign prostatic hypertrophy)  Ulcerative colitis: Ulcerative colitis  States following surgery of eye and adnexa: straightened right eye  Other postprocedural status: History of hernia repair  Hemorrhoids: Hemorrhoids  Acute appendicitis with generalized peritonitis: Ruptured appendix    Patient is a 69y old  Male who presents with a chief complaint of Aortic dissection (24 Nov 2019 07:36)      14 system review was unremarkable  acute changes include acute respiratory failure  Vital signs, hemodynamic and respiratory parameters were reviewed from the bedside nursing flowsheet.  ICU Vital Signs Last 24 Hrs  T(C): 37.1 (24 Nov 2019 13:30), Max: 37.5 (24 Nov 2019 05:01)  T(F): 98.8 (24 Nov 2019 13:30), Max: 99.5 (24 Nov 2019 05:01)  HR: 68 (24 Nov 2019 14:00) (68 - 92)  BP: --  BP(mean): --  ABP: 114/52 (24 Nov 2019 14:00) (80/60 - 124/64)  ABP(mean): 68 (24 Nov 2019 14:00) (62 - 90)  RR: 12 (24 Nov 2019 13:00) (12 - 24)  SpO2: 99% (24 Nov 2019 14:00) (90% - 100%)    Adult Advanced Hemodynamics Last 24 Hrs  CVP(mm Hg): 12 (24 Nov 2019 14:00) (11 - 27)  CVP(cm H2O): --  CO: --  CI: --  PA: --  PA(mean): --  PCWP: --  SVR: --  SVRI: --  PVR: --  PVRI: --, ABG - ( 24 Nov 2019 10:07 )  pH, Arterial: 7.49  pH, Blood: x     /  pCO2: 36    /  pO2: 72    / HCO3: 27    / Base Excess: 3.3   /  SaO2: 94                Mode: AC/ CMV (Assist Control/ Continuous Mandatory Ventilation)  RR (machine): 12  TV (machine): 700  FiO2: 100  PEEP: 8  ITime: 1  MAP: 11  PIP: 23    Intake and output was reviewed and the fluid balance was calculated  Daily     Daily   I&O's Summary    23 Nov 2019 07:01  -  24 Nov 2019 07:00  --------------------------------------------------------  IN: 4748.6 mL / OUT: 3555 mL / NET: 1193.6 mL    24 Nov 2019 07:01  -  24 Nov 2019 14:38  --------------------------------------------------------  IN: 375 mL / OUT: 1138 mL / NET: -763 mL        All lines and drain sites were assessed  Glycemic trend was reviewedCAPILLARY BLOOD GLUCOSE      POCT Blood Glucose.: 94 mg/dL (24 Nov 2019 11:09)    No acute change in mental status  Auscultation of the chest reveals equal bs  Abdomen is soft  Extremities are warm and well perfused  Wounds appear clean and unremarkable  Antibiotics are periop    labs  CBC Full  -  ( 24 Nov 2019 10:10 )  WBC Count : 12.11 K/uL  RBC Count : 3.16 M/uL  Hemoglobin : 8.9 g/dL  Hematocrit : 27.0 %  Platelet Count - Automated : 64 K/uL  Mean Cell Volume : 85.4 fl  Mean Cell Hemoglobin : 28.2 pg  Mean Cell Hemoglobin Concentration : 33.0 gm/dL  Auto Neutrophil # : x  Auto Lymphocyte # : x  Auto Monocyte # : x  Auto Eosinophil # : x  Auto Basophil # : x  Auto Neutrophil % : x  Auto Lymphocyte % : x  Auto Monocyte % : x  Auto Eosinophil % : x  Auto Basophil % : x    11-24    147<H>  |  112<H>  |  28<H>  ----------------------------<  103<H>  4.4   |  26  |  1.95<H>    Ca    8.4      24 Nov 2019 10:10  Phos  3.3     11-24  Mg     2.3     11-24    TPro  4.2<L>  /  Alb  3.4  /  TBili  0.4  /  DBili  x   /  AST  65<H>  /  ALT  30  /  AlkPhos  27<L>  11-24    PT/INR - ( 24 Nov 2019 10:10 )   PT: 15.6 sec;   INR: 1.37          PTT - ( 24 Nov 2019 10:10 )  PTT:33.0 sec  The current medications were reviewed   MEDICATIONS  (STANDING):  aspirin enteric coated 81 milliGRAM(s) Oral daily  chlorhexidine 0.12% Liquid 15 milliLiter(s) Oral Mucosa every 12 hours  chlorhexidine 2% Cloths 1 Application(s) Topical daily  dextrose 50% Injectable 50 milliLiter(s) IV Push every 15 minutes  EPINEPHrine    Infusion 0.04 MICROgram(s)/kG/Min (12.585 mL/Hr) IV Continuous <Continuous>  heparin  Injectable 5000 Unit(s) SubCutaneous every 8 hours  insulin regular Infusion 1 Unit(s)/Hr (1 mL/Hr) IV Continuous <Continuous>  lidocaine   Infusion 2 mG/Min (15 mL/Hr) IV Continuous <Continuous>  norepinephrine Infusion 0.05 MICROgram(s)/kG/Min (7.866 mL/Hr) IV Continuous <Continuous>  pantoprazole  Injectable 40 milliGRAM(s) IV Push daily  phenylephrine    Infusion 0.2 MICROgram(s)/kG/Min (6.293 mL/Hr) IV Continuous <Continuous>  propofol Infusion 5 MICROgram(s)/kG/Min (2.517 mL/Hr) IV Continuous <Continuous>  propofol Infusion 30 MICROgram(s)/kG/Min (15.102 mL/Hr) IV Continuous <Continuous>  sodium chloride 0.9%. 1000 milliLiter(s) (10 mL/Hr) IV Continuous <Continuous>  tamsulosin 0.4 milliGRAM(s) Oral at bedtime  vancomycin  IVPB 1000 milliGRAM(s) IV Intermittent every 12 hours    MEDICATIONS  (PRN):       PROBLEM LIST/ ASSESSMENT:  HEALTH ISSUES - PROBLEM Dx:    acute resp failure with hypoxia  hemodynamic instability  hypoxemia; A-a gradient  Fluid overloaded  Acute post H'gic anemia  Thrombocytopenia  FELY  type A dissection repair      FELY (acute kidney injury): FELY (acute kidney injury)      ,   Patient is a 69y old  Male who presents with a chief complaint of Aortic dissection (24 Nov 2019 07:36)     s/p  emergent repair of Type A Dissection: Aortic Root Replacement/Reconstruction (Biological Bentall - 23mm Magna in 32mm graft), Cabrol reconstruction of coronary ostia, Replacement of Ascending Aorta/HemiArch, Frozen Elephant trunk     acute changes include acute respiratory failure    My plan includes :  close hemodynamic, ventilatory and drain monitoring and management per post op routine    Monitor for arrhythmias and monitor parameters for organ perfusion  monitor neurologic status  Head of the bed should remain elevated to 45 deg .   chest PT and IS will be encouraged  monitor adequacy of oxygenation and ventilation and attempt to wean oxygen  Nutritional goals will be met using po eventually , ensure adequate caloric intake and montior the same  Stress ulcer and VTE prophylaxis will be achieved    Glycemic control is satisfactory  Electrolytes have been repleted as necessary and wound care has been carried out. Pain control has been achieved.   agressive physical therapy and early mobility and ambulation goals will be met   The family was updated about the course and plan  CRITICAL CARE TIME SPENT in evaluation and management, reassessments, review and interpretation of labs and x-rays, ventilator and hemodynamic management, formulating a plan and coordinating care: ___90____ MIN.  Time does not include procedural time.  CTICU ATTENDING     					    Luis Loving MD

## 2019-11-24 NOTE — PROGRESS NOTE ADULT - ASSESSMENT
69 year old male, with PMHx of colitis, prostate surgery @ [facility], BIBA to [facility] ED complaining of back pain and profound hypotension, emergently brought to the OR for salvage Type A dissection repair  s/p aortic arch repair and ascending arch replacement

## 2019-11-24 NOTE — PROGRESS NOTE ADULT - SUBJECTIVE AND OBJECTIVE BOX
CTICU  CRITICAL  CARE  attending     Hand off received 					   Pertinent clinical, laboratory, radiographic, hemodynamic, echocardiographic, respiratory data, microbiologic data and chart were reviewed and analyzed frequently throughout the course of the day and night    Patient seen and examined with CTS/ SH attending at bedside    69 years old male with H/O colitis. He started having pain in the anterior cervical area around  1:00 AM. Simultaneously he had lower back pain. He felt dizzy and diaphoretic. He felt weakness in both lower extremities. No loss of consciousness.  He was brought in to [facility] emergency room by an ambulance. His BP was 60 by palpation and appeared cyanotic.     CT angio  showed   1. 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  2. Moderate hemopericardium.  3. Dissection extending into the right brachiocephalic artery though the right carotid artery and right subclavian artery remain patent off true lumen.  4. Dissection involving the proximal left subclavian artery which remains patent more distally.  5. The left carotid artery comes off the true lumen of the arch  He was emergently taken to the operating room and cardiopulmonary bypass instituted. Blood evacuated from the pericardium.  Ascending aorta and aortic root were replaced and coronary buttons were reimplanted.      FAMILY HISTORY:  PAST MEDICAL & SURGICAL HISTORY:  Benign prostatic hypertrophy without lower urinary tract symptoms: BPH (benign prostatic hypertrophy)  Ulcerative colitis: Ulcerative colitis  States following surgery of eye and adnexa: straightened right eye  Other postprocedural status: History of hernia repair  Hemorrhoids: Hemorrhoids  Acute appendicitis with generalized peritonitis: Ruptured appendix      14 system review was unremarkable    Vital signs, hemodynamic and respiratory parameters were reviewed from the bedside nursing flow sheet.  ICU Vital Signs Last 24 Hrs  T(C): 37.7 (24 Nov 2019 18:01), Max: 37.7 (24 Nov 2019 18:01)  T(F): 99.8 (24 Nov 2019 18:01), Max: 99.8 (24 Nov 2019 18:01)  HR: 70 (24 Nov 2019 21:00) (66 - 77)  BP: --  BP(mean): --  ABP: 114/48 (24 Nov 2019 21:00) (82/56 - 124/64)  ABP(mean): 64 (24 Nov 2019 21:00) (62 - 80)  RR: 17 (24 Nov 2019 21:00) (12 - 17)  SpO2: 98% (24 Nov 2019 21:00) (93% - 99%)    Adult Advanced Hemodynamics Last 24 Hrs  CVP(mm Hg): 12 (24 Nov 2019 21:00) (11 - 27)  CVP(cm H2O): --  CO: --  CI: --  PA: --  PA(mean): --  PCWP: --  SVR: --  SVRI: --  PVR: --  PVRI: --, ABG - ( 24 Nov 2019 16:03 )  pH, Arterial: 7.48  pH, Blood: x     /  pCO2: 32    /  pO2: 77    / HCO3: 24    / Base Excess: 0.8   /  SaO2: 95                Mode: AC/ CMV (Assist Control/ Continuous Mandatory Ventilation)  RR (machine): 12  TV (machine): 700  FiO2: 80  PEEP: 8  ITime: 1  MAP: 12  PIP: 24    Intake and output was reviewed and the fluid balance was calculated  Daily     Daily   I&O's Summary    23 Nov 2019 07:01  -  24 Nov 2019 07:00  --------------------------------------------------------  IN: 4748.6 mL / OUT: 3555 mL / NET: 1193.6 mL    24 Nov 2019 07:01  -  24 Nov 2019 22:00  --------------------------------------------------------  IN: 1186.4 mL / OUT: 1918 mL / NET: -731.6 mL        All lines and drain sites were assessed    Neuro: Pupils 3 mm (Reactive). Moves all 4 extremities.  Neck: No JVD.  CVS: S1, S1, No S3.  Lungs: Good air entry bilaterally.   Abd: Soft. Mild distension. No tenderness. + Bowel sounds.  Vascular: + DP/PT.  Extremities: No edema.  Lymphatic: Normal.  Skin: No abnormalities.      labs  CBC Full  -  ( 24 Nov 2019 16:04 )  WBC Count : 12.10 K/uL  RBC Count : 3.13 M/uL  Hemoglobin : 9.0 g/dL  Hematocrit : 26.8 %  Platelet Count - Automated : 61 K/uL  Mean Cell Volume : 85.6 fl  Mean Cell Hemoglobin : 28.8 pg  Mean Cell Hemoglobin Concentration : 33.6 gm/dL  Auto Neutrophil # : x  Auto Lymphocyte # : x  Auto Monocyte # : x  Auto Eosinophil # : x  Auto Basophil # : x  Auto Neutrophil % : x  Auto Lymphocyte % : x  Auto Monocyte % : x  Auto Eosinophil % : x  Auto Basophil % : x    11-24    145  |  110<H>  |  30<H>  ----------------------------<  107<H>  3.9   |  26  |  2.14<H>    Ca    8.4      24 Nov 2019 16:04  Phos  3.6     11-24  Mg     2.2     11-24    TPro  4.2<L>  /  Alb  3.2<L>  /  TBili  0.4  /  DBili  x   /  AST  59<H>  /  ALT  29  /  AlkPhos  30<L>  11-24    PT/INR - ( 24 Nov 2019 16:04 )   PT: 15.5 sec;   INR: 1.36          PTT - ( 24 Nov 2019 16:04 )  PTT:31.3 sec  The current medications were reviewed   MEDICATIONS  (STANDING):  aspirin enteric coated 81 milliGRAM(s) Oral daily  chlorhexidine 0.12% Liquid 15 milliLiter(s) Oral Mucosa every 12 hours  chlorhexidine 2% Cloths 1 Application(s) Topical daily  dextrose 50% Injectable 50 milliLiter(s) IV Push every 15 minutes  furosemide Infusion 5 mG/Hr (2.5 mL/Hr) IV Continuous <Continuous>  heparin  Injectable 5000 Unit(s) SubCutaneous every 8 hours  insulin regular Infusion 1 Unit(s)/Hr (1 mL/Hr) IV Continuous <Continuous>  pantoprazole  Injectable 40 milliGRAM(s) IV Push daily  phenylephrine    Infusion 0.2 MICROgram(s)/kG/Min (6.293 mL/Hr) IV Continuous <Continuous>  propofol Infusion 5 MICROgram(s)/kG/Min (2.517 mL/Hr) IV Continuous <Continuous>  propofol Infusion 30 MICROgram(s)/kG/Min (15.102 mL/Hr) IV Continuous <Continuous>  sodium chloride 0.9%. 1000 milliLiter(s) (10 mL/Hr) IV Continuous <Continuous>  tamsulosin 0.4 milliGRAM(s) Oral at bedtime  vancomycin  IVPB 1000 milliGRAM(s) IV Intermittent every 12 hours    MEDICATIONS  (PRN):        PROBLEM LIST/ ASSESSMENT:  HEALTH ISSUES - PROBLEM Dx:  FELY (acute kidney injury): FELY (acute kidney injury)          Patient is a 69y old  Male who presents with cardiogenic shock due to acute Type A aortic dissection with rupture and cardiac tamponade.  S/P Aortic Root Replacement  S/P BioBental and CABROL.  S/P replacement of ascending aorta and hemiarch.  Hemodynamically stable.  Good oxygenation.  Diuresing well.  Overall satisfactory progress.        My plan includes :  Decrease inhaled nitric oxide.  Wean off phenylephrine.  Continue vent support.  Close hemodynamic, ventilatory and drain monitoring and management  Monitor for arrhythmias and monitor parameters for organ perfusion  Monitor neurologic status  Monitor renal function.  Head of the bed should remain elevated to 45 deg .   Chest PT and IS will be encouraged  Monitor  adequacy of oxygenation and ventilation and attempt to wean oxygen  Nutritional goals will be met using po eventually , ensure adequate caloric intake and monitor the same  Stress ulcer and VTE prophylaxis will be achieved    Glycemic control is satisfactory  Electrolytes have been repleted as necessary and wound care has been carried out. Pain control has been achieved.   Aggressive physical therapy and early mobility and ambulation goals will be met   The family was updated about the course and plan  CRITICAL CARE TIME SPENT in evaluation and management, reassessments, review and interpretation of labs and x-rays, ventilator and hemodynamic management, formulating a plan and coordinating care: ___60____ MIN.  Time does not include procedural time.  CTICU ATTENDING     					    Giovanni Reis MD

## 2019-11-24 NOTE — CHART NOTE - NSCHARTNOTEFT_GEN_A_CORE
Left femoral venous Quintine catheter HD line removed and manual compression held x 30 minutes.  Hemostasis achieved   Left femoral arterial line removed and manual compression held x 30 minutes.  Hemostasis achieved   Patient tolerated procedure well

## 2019-11-24 NOTE — PROGRESS NOTE ADULT - SUBJECTIVE AND OBJECTIVE BOX
CC: DISSECTION AORTA      INTERVAL HISTORY:  intubated  weaned off pressors      ROS: No chest pain, no sob, no abd pain. No n/v/d    PAST MEDICAL & SURGICAL HISTORY:  Benign prostatic hypertrophy without lower urinary tract symptoms: BPH (benign prostatic hypertrophy)  Ulcerative colitis: Ulcerative colitis  States following surgery of eye and adnexa: straightened right eye  Other postprocedural status: History of hernia repair  Hemorrhoids: Hemorrhoids  Acute appendicitis with generalized peritonitis: Ruptured appendix      PHYSICAL EXAM:  T(C): 37.5 (11-24-19 @ 05:01), Max: 37.5 (11-24-19 @ 05:01)  HR: 72 (11-24-19 @ 07:00)  BP: --  RR: 12 (11-24-19 @ 07:00)  SpO2: 97% (11-24-19 @ 07:00)  Wt(kg): --  I&O's Summary    23 Nov 2019 07:01  -  24 Nov 2019 07:00  --------------------------------------------------------  IN: 4564.6 mL / OUT: 3555 mL / NET: 1009.6 mL      Weight 83.9 (11-23 @ 02:06)  General: ,  NAD.  HEENT: moist mucous membranes, no pallor/cyanosis.  Neck: no JVD visible.  Cardiac: S1, S2. RRR. No murmurs   Respratory:rhonchi; + chest tubes  Abdomen: soft. nontender. nondistended  Skin: no rashes.  Extremities: no LE edema b/l  Access:       DATA:                        9.7<L>  11.02<H> )-----------( 72<L>    ( 24 Nov 2019 06:03 )             30.2<L>        146<H>  |  111<H>  |  26<H>  ----------------------------<  101<H>  Ca:8.8   (24 Nov 2019 06:03)  4.7     |  27     |  1.88<H>      eGFR if Non : 36 <L>  eGFR if : 42 <L>    TPro  4.5<L>  /  Alb  3.6    /  TBili  0.4    /  DBili  x      /  AST  72<H>  /  ALT  33     /  AlkPhos  26<L>  24 Nov 2019 06:03                    MEDICATIONS  (STANDING):  albuterol/ipratropium for Nebulization. 3 milliLiter(s) Nebulizer once  aspirin enteric coated 81 milliGRAM(s) Oral daily  buDESOnide    Inhalation Suspension 0.5 milliGRAM(s) Inhalation once  chlorhexidine 0.12% Liquid 15 milliLiter(s) Oral Mucosa every 12 hours  chlorhexidine 2% Cloths 1 Application(s) Topical daily  dextrose 50% Injectable 50 milliLiter(s) IV Push every 15 minutes  EPINEPHrine    Infusion 0.04 MICROgram(s)/kG/Min (12.585 mL/Hr) IV Continuous <Continuous>  heparin  Injectable 5000 Unit(s) SubCutaneous every 8 hours  insulin regular Infusion 1 Unit(s)/Hr (1 mL/Hr) IV Continuous <Continuous>  lidocaine   Infusion 2 mG/Min (15 mL/Hr) IV Continuous <Continuous>  norepinephrine Infusion 0.05 MICROgram(s)/kG/Min (7.866 mL/Hr) IV Continuous <Continuous>  pantoprazole  Injectable 40 milliGRAM(s) IV Push daily  phenylephrine    Infusion 0.2 MICROgram(s)/kG/Min (6.293 mL/Hr) IV Continuous <Continuous>  propofol Infusion 5 MICROgram(s)/kG/Min (2.517 mL/Hr) IV Continuous <Continuous>  propofol Infusion 30 MICROgram(s)/kG/Min (15.102 mL/Hr) IV Continuous <Continuous>  sodium chloride 0.9%. 1000 milliLiter(s) (10 mL/Hr) IV Continuous <Continuous>  tamsulosin 0.4 milliGRAM(s) Oral at bedtime  vancomycin  IVPB 1000 milliGRAM(s) IV Intermittent every 12 hours    MEDICATIONS  (PRN):

## 2019-11-25 DIAGNOSIS — R50.82 POSTPROCEDURAL FEVER: ICD-10-CM

## 2019-11-25 LAB
ALBUMIN SERPL ELPH-MCNC: 2.9 G/DL — LOW (ref 3.3–5)
ALBUMIN SERPL ELPH-MCNC: 2.9 G/DL — LOW (ref 3.3–5)
ALBUMIN SERPL ELPH-MCNC: 3 G/DL — LOW (ref 3.3–5)
ALBUMIN SERPL ELPH-MCNC: 3 G/DL — LOW (ref 3.3–5)
ALP SERPL-CCNC: 42 U/L — SIGNIFICANT CHANGE UP (ref 40–120)
ALP SERPL-CCNC: 48 U/L — SIGNIFICANT CHANGE UP (ref 40–120)
ALP SERPL-CCNC: 50 U/L — SIGNIFICANT CHANGE UP (ref 40–120)
ALP SERPL-CCNC: 51 U/L — SIGNIFICANT CHANGE UP (ref 40–120)
ALT FLD-CCNC: 25 U/L — SIGNIFICANT CHANGE UP (ref 10–45)
ALT FLD-CCNC: 26 U/L — SIGNIFICANT CHANGE UP (ref 10–45)
ALT FLD-CCNC: 26 U/L — SIGNIFICANT CHANGE UP (ref 10–45)
ALT FLD-CCNC: 28 U/L — SIGNIFICANT CHANGE UP (ref 10–45)
ANION GAP SERPL CALC-SCNC: 11 MMOL/L — SIGNIFICANT CHANGE UP (ref 5–17)
ANION GAP SERPL CALC-SCNC: 9 MMOL/L — SIGNIFICANT CHANGE UP (ref 5–17)
APTT BLD: 30.1 SEC — SIGNIFICANT CHANGE UP (ref 27.5–36.3)
APTT BLD: 31.5 SEC — SIGNIFICANT CHANGE UP (ref 27.5–36.3)
APTT BLD: 31.8 SEC — SIGNIFICANT CHANGE UP (ref 27.5–36.3)
APTT BLD: 32.2 SEC — SIGNIFICANT CHANGE UP (ref 27.5–36.3)
AST SERPL-CCNC: 35 U/L — SIGNIFICANT CHANGE UP (ref 10–40)
AST SERPL-CCNC: 39 U/L — SIGNIFICANT CHANGE UP (ref 10–40)
AST SERPL-CCNC: 42 U/L — HIGH (ref 10–40)
AST SERPL-CCNC: 49 U/L — HIGH (ref 10–40)
BILIRUB SERPL-MCNC: 0.4 MG/DL — SIGNIFICANT CHANGE UP (ref 0.2–1.2)
BILIRUB SERPL-MCNC: 0.5 MG/DL — SIGNIFICANT CHANGE UP (ref 0.2–1.2)
BILIRUB SERPL-MCNC: 0.5 MG/DL — SIGNIFICANT CHANGE UP (ref 0.2–1.2)
BILIRUB SERPL-MCNC: 0.6 MG/DL — SIGNIFICANT CHANGE UP (ref 0.2–1.2)
BUN SERPL-MCNC: 34 MG/DL — HIGH (ref 7–23)
BUN SERPL-MCNC: 36 MG/DL — HIGH (ref 7–23)
BUN SERPL-MCNC: 39 MG/DL — HIGH (ref 7–23)
BUN SERPL-MCNC: 39 MG/DL — HIGH (ref 7–23)
CALCIUM SERPL-MCNC: 8.2 MG/DL — LOW (ref 8.4–10.5)
CALCIUM SERPL-MCNC: 8.3 MG/DL — LOW (ref 8.4–10.5)
CALCIUM SERPL-MCNC: 8.9 MG/DL — SIGNIFICANT CHANGE UP (ref 8.4–10.5)
CALCIUM SERPL-MCNC: 9.3 MG/DL — SIGNIFICANT CHANGE UP (ref 8.4–10.5)
CHLORIDE SERPL-SCNC: 108 MMOL/L — SIGNIFICANT CHANGE UP (ref 96–108)
CHLORIDE SERPL-SCNC: 110 MMOL/L — HIGH (ref 96–108)
CHLORIDE SERPL-SCNC: 110 MMOL/L — HIGH (ref 96–108)
CHLORIDE SERPL-SCNC: 111 MMOL/L — HIGH (ref 96–108)
CO2 SERPL-SCNC: 26 MMOL/L — SIGNIFICANT CHANGE UP (ref 22–31)
CO2 SERPL-SCNC: 26 MMOL/L — SIGNIFICANT CHANGE UP (ref 22–31)
CO2 SERPL-SCNC: 27 MMOL/L — SIGNIFICANT CHANGE UP (ref 22–31)
CO2 SERPL-SCNC: 27 MMOL/L — SIGNIFICANT CHANGE UP (ref 22–31)
CREAT SERPL-MCNC: 2.33 MG/DL — HIGH (ref 0.5–1.3)
CREAT SERPL-MCNC: 2.39 MG/DL — HIGH (ref 0.5–1.3)
CREAT SERPL-MCNC: 2.42 MG/DL — HIGH (ref 0.5–1.3)
CREAT SERPL-MCNC: 2.43 MG/DL — HIGH (ref 0.5–1.3)
GAS PNL BLDA: SIGNIFICANT CHANGE UP
GAS PNL BLDV: SIGNIFICANT CHANGE UP
GLUCOSE BLDC GLUCOMTR-MCNC: 135 MG/DL — HIGH (ref 70–99)
GLUCOSE SERPL-MCNC: 132 MG/DL — HIGH (ref 70–99)
GLUCOSE SERPL-MCNC: 132 MG/DL — HIGH (ref 70–99)
GLUCOSE SERPL-MCNC: 133 MG/DL — HIGH (ref 70–99)
GLUCOSE SERPL-MCNC: 140 MG/DL — HIGH (ref 70–99)
HBA1C BLD-MCNC: 4.9 % — SIGNIFICANT CHANGE UP (ref 4–5.6)
HCT VFR BLD CALC: 28.6 % — LOW (ref 39–50)
HCT VFR BLD CALC: 29.9 % — LOW (ref 39–50)
HCT VFR BLD CALC: 30 % — LOW (ref 39–50)
HCT VFR BLD CALC: 30.2 % — LOW (ref 39–50)
HGB BLD-MCNC: 9.3 G/DL — LOW (ref 13–17)
HGB BLD-MCNC: 9.4 G/DL — LOW (ref 13–17)
HGB BLD-MCNC: 9.5 G/DL — LOW (ref 13–17)
HGB BLD-MCNC: 9.7 G/DL — LOW (ref 13–17)
INR BLD: 1.15 — SIGNIFICANT CHANGE UP (ref 0.88–1.16)
INR BLD: 1.18 — HIGH (ref 0.88–1.16)
INR BLD: 1.19 — HIGH (ref 0.88–1.16)
INR BLD: 1.32 — HIGH (ref 0.88–1.16)
LACTATE SERPL-SCNC: 1.1 MMOL/L — SIGNIFICANT CHANGE UP (ref 0.5–2)
LACTATE SERPL-SCNC: 1.2 MMOL/L — SIGNIFICANT CHANGE UP (ref 0.5–2)
LACTATE SERPL-SCNC: 1.4 MMOL/L — SIGNIFICANT CHANGE UP (ref 0.5–2)
LACTATE SERPL-SCNC: 1.5 MMOL/L — SIGNIFICANT CHANGE UP (ref 0.5–2)
MAGNESIUM SERPL-MCNC: 2.2 MG/DL — SIGNIFICANT CHANGE UP (ref 1.6–2.6)
MAGNESIUM SERPL-MCNC: 2.3 MG/DL — SIGNIFICANT CHANGE UP (ref 1.6–2.6)
MCHC RBC-ENTMCNC: 28.1 PG — SIGNIFICANT CHANGE UP (ref 27–34)
MCHC RBC-ENTMCNC: 28.3 PG — SIGNIFICANT CHANGE UP (ref 27–34)
MCHC RBC-ENTMCNC: 28.4 PG — SIGNIFICANT CHANGE UP (ref 27–34)
MCHC RBC-ENTMCNC: 28.8 PG — SIGNIFICANT CHANGE UP (ref 27–34)
MCHC RBC-ENTMCNC: 31.3 GM/DL — LOW (ref 32–36)
MCHC RBC-ENTMCNC: 31.8 GM/DL — LOW (ref 32–36)
MCHC RBC-ENTMCNC: 32.1 GM/DL — SIGNIFICANT CHANGE UP (ref 32–36)
MCHC RBC-ENTMCNC: 32.5 GM/DL — SIGNIFICANT CHANGE UP (ref 32–36)
MCV RBC AUTO: 88.5 FL — SIGNIFICANT CHANGE UP (ref 80–100)
MCV RBC AUTO: 88.6 FL — SIGNIFICANT CHANGE UP (ref 80–100)
MCV RBC AUTO: 89 FL — SIGNIFICANT CHANGE UP (ref 80–100)
MCV RBC AUTO: 89.8 FL — SIGNIFICANT CHANGE UP (ref 80–100)
NRBC # BLD: 0 /100 WBCS — SIGNIFICANT CHANGE UP (ref 0–0)
PHOSPHATE SERPL-MCNC: 3.3 MG/DL — SIGNIFICANT CHANGE UP (ref 2.5–4.5)
PHOSPHATE SERPL-MCNC: 3.4 MG/DL — SIGNIFICANT CHANGE UP (ref 2.5–4.5)
PHOSPHATE SERPL-MCNC: 3.5 MG/DL — SIGNIFICANT CHANGE UP (ref 2.5–4.5)
PHOSPHATE SERPL-MCNC: 3.6 MG/DL — SIGNIFICANT CHANGE UP (ref 2.5–4.5)
PLATELET # BLD AUTO: 59 K/UL — LOW (ref 150–400)
PLATELET # BLD AUTO: 62 K/UL — LOW (ref 150–400)
PLATELET # BLD AUTO: 66 K/UL — LOW (ref 150–400)
PLATELET # BLD AUTO: 67 K/UL — LOW (ref 150–400)
POTASSIUM SERPL-MCNC: 3.2 MMOL/L — LOW (ref 3.5–5.3)
POTASSIUM SERPL-MCNC: 3.4 MMOL/L — LOW (ref 3.5–5.3)
POTASSIUM SERPL-MCNC: 3.8 MMOL/L — SIGNIFICANT CHANGE UP (ref 3.5–5.3)
POTASSIUM SERPL-MCNC: 3.8 MMOL/L — SIGNIFICANT CHANGE UP (ref 3.5–5.3)
POTASSIUM SERPL-SCNC: 3.2 MMOL/L — LOW (ref 3.5–5.3)
POTASSIUM SERPL-SCNC: 3.4 MMOL/L — LOW (ref 3.5–5.3)
POTASSIUM SERPL-SCNC: 3.8 MMOL/L — SIGNIFICANT CHANGE UP (ref 3.5–5.3)
POTASSIUM SERPL-SCNC: 3.8 MMOL/L — SIGNIFICANT CHANGE UP (ref 3.5–5.3)
PROT SERPL-MCNC: 4.4 G/DL — LOW (ref 6–8.3)
PROT SERPL-MCNC: 4.7 G/DL — LOW (ref 6–8.3)
PROT SERPL-MCNC: 4.7 G/DL — LOW (ref 6–8.3)
PROT SERPL-MCNC: 4.8 G/DL — LOW (ref 6–8.3)
PROTHROM AB SERPL-ACNC: 13.1 SEC — HIGH (ref 10–12.9)
PROTHROM AB SERPL-ACNC: 13.4 SEC — HIGH (ref 10–12.9)
PROTHROM AB SERPL-ACNC: 13.5 SEC — HIGH (ref 10–12.9)
PROTHROM AB SERPL-ACNC: 15.1 SEC — HIGH (ref 10–12.9)
RBC # BLD: 3.23 M/UL — LOW (ref 4.2–5.8)
RBC # BLD: 3.34 M/UL — LOW (ref 4.2–5.8)
RBC # BLD: 3.36 M/UL — LOW (ref 4.2–5.8)
RBC # BLD: 3.41 M/UL — LOW (ref 4.2–5.8)
RBC # FLD: 16 % — HIGH (ref 10.3–14.5)
RBC # FLD: 16.2 % — HIGH (ref 10.3–14.5)
SODIUM SERPL-SCNC: 144 MMOL/L — SIGNIFICANT CHANGE UP (ref 135–145)
SODIUM SERPL-SCNC: 145 MMOL/L — SIGNIFICANT CHANGE UP (ref 135–145)
SODIUM SERPL-SCNC: 147 MMOL/L — HIGH (ref 135–145)
SODIUM SERPL-SCNC: 147 MMOL/L — HIGH (ref 135–145)
T3 SERPL-MCNC: 30 NG/DL — LOW (ref 80–200)
T4 AB SER-ACNC: <3 UG/DL — LOW (ref 3.17–11.72)
TSH SERPL-MCNC: 0.86 UIU/ML — SIGNIFICANT CHANGE UP (ref 0.35–4.94)
VANCOMYCIN TROUGH SERPL-MCNC: 23.9 UG/ML — HIGH (ref 10–20)
WBC # BLD: 10.27 K/UL — SIGNIFICANT CHANGE UP (ref 3.8–10.5)
WBC # BLD: 12.29 K/UL — HIGH (ref 3.8–10.5)
WBC # BLD: 8.24 K/UL — SIGNIFICANT CHANGE UP (ref 3.8–10.5)
WBC # BLD: 8.43 K/UL — SIGNIFICANT CHANGE UP (ref 3.8–10.5)
WBC # FLD AUTO: 10.27 K/UL — SIGNIFICANT CHANGE UP (ref 3.8–10.5)
WBC # FLD AUTO: 12.29 K/UL — HIGH (ref 3.8–10.5)
WBC # FLD AUTO: 8.24 K/UL — SIGNIFICANT CHANGE UP (ref 3.8–10.5)
WBC # FLD AUTO: 8.43 K/UL — SIGNIFICANT CHANGE UP (ref 3.8–10.5)

## 2019-11-25 PROCEDURE — 99292 CRITICAL CARE ADDL 30 MIN: CPT | Mod: 25

## 2019-11-25 PROCEDURE — 99232 SBSQ HOSP IP/OBS MODERATE 35: CPT | Mod: GC

## 2019-11-25 PROCEDURE — 99291 CRITICAL CARE FIRST HOUR: CPT | Mod: 25

## 2019-11-25 PROCEDURE — 31500 INSERT EMERGENCY AIRWAY: CPT

## 2019-11-25 PROCEDURE — ZZZZZ: CPT

## 2019-11-25 PROCEDURE — 71045 X-RAY EXAM CHEST 1 VIEW: CPT | Mod: 26

## 2019-11-25 RX ORDER — POTASSIUM CHLORIDE 20 MEQ
20 PACKET (EA) ORAL
Refills: 0 | Status: COMPLETED | OUTPATIENT
Start: 2019-11-25 | End: 2019-11-25

## 2019-11-25 RX ORDER — CISATRACURIUM BESYLATE 2 MG/ML
10 INJECTION INTRAVENOUS ONCE
Refills: 0 | Status: COMPLETED | OUTPATIENT
Start: 2019-11-25 | End: 2019-11-25

## 2019-11-25 RX ORDER — ACETAMINOPHEN 500 MG
1000 TABLET ORAL ONCE
Refills: 0 | Status: COMPLETED | OUTPATIENT
Start: 2019-11-25 | End: 2019-11-25

## 2019-11-25 RX ORDER — POTASSIUM CHLORIDE 20 MEQ
20 PACKET (EA) ORAL ONCE
Refills: 0 | Status: COMPLETED | OUTPATIENT
Start: 2019-11-25 | End: 2019-11-25

## 2019-11-25 RX ORDER — ALBUMIN HUMAN 25 %
50 VIAL (ML) INTRAVENOUS ONCE
Refills: 0 | Status: COMPLETED | OUTPATIENT
Start: 2019-11-25 | End: 2019-11-25

## 2019-11-25 RX ORDER — DEXMEDETOMIDINE HYDROCHLORIDE IN 0.9% SODIUM CHLORIDE 4 UG/ML
0.5 INJECTION INTRAVENOUS
Qty: 200 | Refills: 0 | Status: DISCONTINUED | OUTPATIENT
Start: 2019-11-25 | End: 2019-12-01

## 2019-11-25 RX ORDER — MEROPENEM 1 G/30ML
1000 INJECTION INTRAVENOUS EVERY 12 HOURS
Refills: 0 | Status: DISCONTINUED | OUTPATIENT
Start: 2019-11-25 | End: 2019-12-01

## 2019-11-25 RX ORDER — MEROPENEM 1 G/30ML
1000 INJECTION INTRAVENOUS ONCE
Refills: 0 | Status: COMPLETED | OUTPATIENT
Start: 2019-11-25 | End: 2019-11-25

## 2019-11-25 RX ORDER — CALCIUM GLUCONATE 100 MG/ML
2 VIAL (ML) INTRAVENOUS ONCE
Refills: 0 | Status: COMPLETED | OUTPATIENT
Start: 2019-11-25 | End: 2019-11-25

## 2019-11-25 RX ORDER — MEROPENEM 1 G/30ML
INJECTION INTRAVENOUS
Refills: 0 | Status: DISCONTINUED | OUTPATIENT
Start: 2019-11-25 | End: 2019-12-01

## 2019-11-25 RX ADMIN — Medication 50 MILLIEQUIVALENT(S): at 15:30

## 2019-11-25 RX ADMIN — DEXMEDETOMIDINE HYDROCHLORIDE IN 0.9% SODIUM CHLORIDE 10.49 MICROGRAM(S)/KG/HR: 4 INJECTION INTRAVENOUS at 12:20

## 2019-11-25 RX ADMIN — Medication 1 DROP(S): at 05:37

## 2019-11-25 RX ADMIN — DEXMEDETOMIDINE HYDROCHLORIDE IN 0.9% SODIUM CHLORIDE 10.49 MICROGRAM(S)/KG/HR: 4 INJECTION INTRAVENOUS at 14:45

## 2019-11-25 RX ADMIN — DEXMEDETOMIDINE HYDROCHLORIDE IN 0.9% SODIUM CHLORIDE 10.49 MICROGRAM(S)/KG/HR: 4 INJECTION INTRAVENOUS at 08:37

## 2019-11-25 RX ADMIN — HEPARIN SODIUM 5000 UNIT(S): 5000 INJECTION INTRAVENOUS; SUBCUTANEOUS at 13:17

## 2019-11-25 RX ADMIN — Medication 1 DROP(S): at 05:01

## 2019-11-25 RX ADMIN — Medication 1 DROP(S): at 21:53

## 2019-11-25 RX ADMIN — Medication 100 MILLIEQUIVALENT(S): at 21:35

## 2019-11-25 RX ADMIN — PROPOFOL 15.1 MICROGRAM(S)/KG/MIN: 10 INJECTION, EMULSION INTRAVENOUS at 02:15

## 2019-11-25 RX ADMIN — HEPARIN SODIUM 5000 UNIT(S): 5000 INJECTION INTRAVENOUS; SUBCUTANEOUS at 21:52

## 2019-11-25 RX ADMIN — Medication 50 MILLIEQUIVALENT(S): at 13:06

## 2019-11-25 RX ADMIN — HEPARIN SODIUM 5000 UNIT(S): 5000 INJECTION INTRAVENOUS; SUBCUTANEOUS at 05:03

## 2019-11-25 RX ADMIN — PHENYLEPHRINE HYDROCHLORIDE 6.29 MICROGRAM(S)/KG/MIN: 10 INJECTION INTRAVENOUS at 19:48

## 2019-11-25 RX ADMIN — Medication 50 MILLILITER(S): at 01:02

## 2019-11-25 RX ADMIN — CISATRACURIUM BESYLATE 10 MILLIGRAM(S): 2 INJECTION INTRAVENOUS at 12:30

## 2019-11-25 RX ADMIN — TAMSULOSIN HYDROCHLORIDE 0.4 MILLIGRAM(S): 0.4 CAPSULE ORAL at 21:52

## 2019-11-25 RX ADMIN — PANTOPRAZOLE SODIUM 40 MILLIGRAM(S): 20 TABLET, DELAYED RELEASE ORAL at 13:02

## 2019-11-25 RX ADMIN — Medication 1 DROP(S): at 08:00

## 2019-11-25 RX ADMIN — Medication 81 MILLIGRAM(S): at 13:02

## 2019-11-25 RX ADMIN — DEXMEDETOMIDINE HYDROCHLORIDE IN 0.9% SODIUM CHLORIDE 10.49 MICROGRAM(S)/KG/HR: 4 INJECTION INTRAVENOUS at 07:11

## 2019-11-25 RX ADMIN — Medication 1 DROP(S): at 02:36

## 2019-11-25 RX ADMIN — Medication 1 DROP(S): at 18:30

## 2019-11-25 RX ADMIN — MEROPENEM 100 MILLIGRAM(S): 1 INJECTION INTRAVENOUS at 13:07

## 2019-11-25 RX ADMIN — DEXMEDETOMIDINE HYDROCHLORIDE IN 0.9% SODIUM CHLORIDE 10.49 MICROGRAM(S)/KG/HR: 4 INJECTION INTRAVENOUS at 18:30

## 2019-11-25 RX ADMIN — Medication 1 DROP(S): at 14:00

## 2019-11-25 RX ADMIN — Medication 100 MILLIEQUIVALENT(S): at 05:03

## 2019-11-25 RX ADMIN — Medication 100 MILLIGRAM(S): at 05:53

## 2019-11-25 RX ADMIN — Medication 1 DROP(S): at 12:00

## 2019-11-25 RX ADMIN — Medication 200 GRAM(S): at 15:09

## 2019-11-25 RX ADMIN — Medication 1 DROP(S): at 10:00

## 2019-11-25 RX ADMIN — Medication 100 MILLIEQUIVALENT(S): at 20:31

## 2019-11-25 RX ADMIN — CHLORHEXIDINE GLUCONATE 15 MILLILITER(S): 213 SOLUTION TOPICAL at 05:37

## 2019-11-25 RX ADMIN — Medication 1000 MILLIGRAM(S): at 18:31

## 2019-11-25 RX ADMIN — Medication 1 DROP(S): at 16:28

## 2019-11-25 RX ADMIN — Medication 1 DROP(S): at 19:48

## 2019-11-25 RX ADMIN — CHLORHEXIDINE GLUCONATE 15 MILLILITER(S): 213 SOLUTION TOPICAL at 17:29

## 2019-11-25 RX ADMIN — PROPOFOL 15.1 MICROGRAM(S)/KG/MIN: 10 INJECTION, EMULSION INTRAVENOUS at 12:20

## 2019-11-25 RX ADMIN — Medication 400 MILLIGRAM(S): at 17:54

## 2019-11-25 NOTE — PROCEDURE NOTE - ADDITIONAL PROCEDURE DETAILS
CTICU  CRITICAL  CARE  PROCEDURE  NOTE      				     Name of procedure – Flexible fiberoptic bronchoscopy    Indication –   RUL atelectasis     Flexible fiberoptic bronchoscopy was performed under propofol sedation while the patient was intubated for controlled mechanical ventilation  Pre-procedural assessment reveals no risk of Tb  Ventilator settings were adjusted to reduce airway pressures to reduce the risk of ptx  The scope was advanced past the ett which was noted to be 2 cm the lorie   The lorie was sharp  Right LL and RML air way were patent with minimal secretions   RUL noted to be closed, despite irrigation unable to pass bronchoscope   Left UL and LLL airways patent with minimal secretions   CXR confirmed no pneumothorax post bronch  There were no complications  The patient tolerated the procedure well
ett exchanged from 7.5 to 8.5 for cuff leak and ease of ventilation

## 2019-11-25 NOTE — CONSULT NOTE ADULT - SUBJECTIVE AND OBJECTIVE BOX
HPI:  69 year old male, with PMHx of colitis, prostate surgery @ [facility], BIBA to [facility] ED complaining of back pain and profound hypotension. Per patient's wife patient began complaining of left sided neck pain radiating to his back at around 1:00 AM with bilateral weakness and inability to get out of bed. Upon arriving to the ED patient was profoundly hypotensive SBP 40s and cyanotic. He was emergently rushed to  CT scan which confirmed Type A aortic dissection and CT surgery was called. Arterial line was placed and patient was stabilized on Levophed and Phenylephrine with marginal SBP 80-90s. He was intubated for hypoxia with cyanosis and emergently brought to the OR for salvage Type A dissection repair. (23 Nov 2019 05:08)    Unable to obtain ROS, patient intubated, sedated, in ICU, off pressors, febrile.      PAST MEDICAL & SURGICAL HISTORY:  Benign prostatic hypertrophy without lower urinary tract symptoms: BPH (benign prostatic hypertrophy)  Ulcerative colitis: Ulcerative colitis  States following surgery of eye and adnexa: straightened right eye  Other postprocedural status: History of hernia repair  Hemorrhoids: Hemorrhoids  Acute appendicitis with generalized peritonitis: Ruptured appendix        ANTIBIOTICS:  MEDICATIONS  (STANDING):  artificial  tears Solution 1 Drop(s) Both EYES every 2 hours  aspirin enteric coated 81 milliGRAM(s) Oral daily  chlorhexidine 0.12% Liquid 15 milliLiter(s) Oral Mucosa every 12 hours  chlorhexidine 2% Cloths 1 Application(s) Topical daily  dexMEDEtomidine Infusion 0.5 MICROgram(s)/kG/Hr (10.488 mL/Hr) IV Continuous <Continuous>  dextrose 50% Injectable 50 milliLiter(s) IV Push every 15 minutes  furosemide Infusion 5 mG/Hr (2.5 mL/Hr) IV Continuous <Continuous>  heparin  Injectable 5000 Unit(s) SubCutaneous every 8 hours  meropenem  IVPB 1000 milliGRAM(s) IV Intermittent every 12 hours  meropenem  IVPB      pantoprazole  Injectable 40 milliGRAM(s) IV Push daily  phenylephrine    Infusion 0.2 MICROgram(s)/kG/Min (6.293 mL/Hr) IV Continuous <Continuous>  sodium chloride 0.9%. 1000 milliLiter(s) (10 mL/Hr) IV Continuous <Continuous>  tamsulosin 0.4 milliGRAM(s) Oral at bedtime    MEDICATIONS  (PRN):      Allergies: penicillin (Unknown)      SOCIAL HISTORY:no ETOH, no smoking    FAMILY HISTORY: n/c      Vital Signs Last 24 Hrs  T(C): 36.7 (25 Nov 2019 21:11), Max: 38.6 (24 Nov 2019 22:20)  T(F): 98.1 (25 Nov 2019 21:11), Max: 101.4 (24 Nov 2019 22:20)  HR: 72 (25 Nov 2019 21:00) (68 - 78)  BP: 107/62 (25 Nov 2019 17:00) (107/62 - 119/71)  BP(mean): 77 (25 Nov 2019 17:00) (77 - 88)  RR: 13 (25 Nov 2019 21:00) (12 - 17)  SpO2: 96% (25 Nov 2019 21:00) (93% - 97%)    11-24-19 @ 07:01  -  11-25-19 @ 07:00  --------------------------------------------------------  IN: 2887 mL / OUT: 4273 mL / NET: -1386 mL    11-25-19 @ 07:01  -  11-25-19 @ 21:55  --------------------------------------------------------  IN: 1465.3 mL / OUT: 3270 mL / NET: -1804.7 mL        PHYSICAL EXAM:  Constitutional: Intubated , sedated, off pressors  Eyes: SANTANA, EOMI  Ear/Nose/Throat: no oral lesion, no sinus tenderness on percussion	  Neck: no JVD, no lymphadenopathy, supple  Respiratory: decreased BS adilene bases,  Midsternal wound with dressing  Cardiovascular: S1S2 RRR, no murmurs  Gastrointestinal: soft, (+) BS, no HSM  Extremities: no e/e/c  Vascular: DP Pulse: right normal; left normal      LABS:  Vancomycin Level, Trough (11.25.19 @ 19:15)    Vancomycin Level, Trough: 23.9:             9.5    8.43  )-----------( 66       ( 25 Nov 2019 21:19 )             29.9     11-25    147<H>  |  111<H>  |  39<H>  ----------------------------<  140<H>  3.8   |  27  |  2.39<H>    Ca    8.9      25 Nov 2019 21:19  Phos  3.5     11-25  Mg     2.2     11-25    TPro  4.8<L>  /  Alb  2.9<L>  /  TBili  0.6  /  DBili  x   /  AST  35  /  ALT  25  /  AlkPhos  51  11-25    PT/INR - ( 25 Nov 2019 21:19 )   PT: 13.1 sec;   INR: 1.15          PTT - ( 25 Nov 2019 21:19 )  PTT:30.1 sec      MICROBIOLOGY:    RADIOLOGY & ADDITIONAL STUDIES:   Xray Chest 1 View-PORTABLE IMMEDIATE (11.25.19 @ 12:40)     EXAM:  XR CHEST PORTABLE IMMED 1V                          PROCEDURE DATE:  11/25/2019          INTERPRETATION:  Clinical history/reason for exam: Postop.    2 frontal views.    Comparison: November 25, 2019 time 3:41 AM.    Findings/impression: Support devices in place. Heart size within normal limits   status post median sternotomy, heart valve replacement and aortic stent.   Stable lung pathology.

## 2019-11-25 NOTE — PROGRESS NOTE ADULT - SUBJECTIVE AND OBJECTIVE BOX
CTICU  CRITICAL  CARE  attending     Hand off received 					   Pertinent clinical, laboratory, radiographic, hemodynamic, echocardiographic, respiratory data, microbiologic data and chart were reviewed and analyzed frequently throughout the course of the day and night  Patient seen and examined with CTS/ SH attending at bedside  Pt is a 69y , Male, HEALTH ISSUES - PROBLEM Dx:  FELY (acute kidney injury): FELY (acute kidney injury)      , FAMILY HISTORY:  PAST MEDICAL & SURGICAL HISTORY:  Benign prostatic hypertrophy without lower urinary tract symptoms: BPH (benign prostatic hypertrophy)  Ulcerative colitis: Ulcerative colitis  States following surgery of eye and adnexa: straightened right eye  Other postprocedural status: History of hernia repair  Hemorrhoids: Hemorrhoids  Acute appendicitis with generalized peritonitis: Ruptured appendix    Patient is a 69y old  Male who presents with a chief complaint of Aortic dissection (25 Nov 2019 15:28)      14 system review was unremarkable    Vital signs, hemodynamic and respiratory parameters were reviewed from the bedside nursing flowsheet.  ICU Vital Signs Last 24 Hrs  T(C): 38.4 (25 Nov 2019 14:08), Max: 38.6 (24 Nov 2019 22:20)  T(F): 101.2 (25 Nov 2019 14:08), Max: 101.4 (24 Nov 2019 22:20)  HR: 74 (25 Nov 2019 17:00) (68 - 78)  BP: 107/62 (25 Nov 2019 17:00) (107/62 - 119/71)  BP(mean): 77 (25 Nov 2019 17:00) (77 - 88)  ABP: 110/60 (25 Nov 2019 17:00) (90/40 - 134/60)  ABP(mean): 78 (25 Nov 2019 17:00) (54 - 88)  RR: 14 (25 Nov 2019 17:00) (12 - 17)  SpO2: 94% (25 Nov 2019 17:00) (93% - 99%)    Adult Advanced Hemodynamics Last 24 Hrs  CVP(mm Hg): 12 (25 Nov 2019 17:00) (4 - 23)  CVP(cm H2O): --  CO: --  CI: --  PA: --  PA(mean): --  PCWP: --  SVR: --  SVRI: --  PVR: --  PVRI: --, ABG - ( 25 Nov 2019 16:50 )  pH, Arterial: 7.49  pH, Blood: x     /  pCO2: 38    /  pO2: 83    / HCO3: 28    / Base Excess: 4.5   /  SaO2: 96                Mode: AC/ CMV (Assist Control/ Continuous Mandatory Ventilation)  RR (machine): 12  TV (machine): 700  FiO2: 100  PEEP: 8  ITime: 1  MAP: 13  PIP: 24    Intake and output was reviewed and the fluid balance was calculated  Daily     Daily   I&O's Summary    24 Nov 2019 07:01  -  25 Nov 2019 07:00  --------------------------------------------------------  IN: 2887 mL / OUT: 4273 mL / NET: -1386 mL    25 Nov 2019 07:01  -  25 Nov 2019 17:29  --------------------------------------------------------  IN: 876.8 mL / OUT: 2450 mL / NET: -1573.2 mL        All lines and drain sites were assessed  [facility]      POCT Blood Glucose.: 100 mg/dL (24 Nov 2019 15:57)    No acute change in mental status  Auscultation of the chest reveals equal bs  Abdomen is soft  Extremities are warm and well perfused  Wounds appear clean and unremarkable  Antibiotics are periop    labs  CBC Full  -  ( 25 Nov 2019 16:49 )  WBC Count : 8.24 K/uL  RBC Count : 3.23 M/uL  Hemoglobin : 9.3 g/dL  Hematocrit : 28.6 %  Platelet Count - Automated : 59 K/uL  Mean Cell Volume : 88.5 fl  Mean Cell Hemoglobin : 28.8 pg  Mean Cell Hemoglobin Concentration : 32.5 gm/dL  Auto Neutrophil # : x  Auto Lymphocyte # : x  Auto Monocyte # : x  Auto Eosinophil # : x  Auto Basophil # : x  Auto Neutrophil % : x  Auto Lymphocyte % : x  Auto Monocyte % : x  Auto Eosinophil % : x  Auto Basophil % : x    11-25    144  |  108  |  36<H>  ----------------------------<  132<H>  3.4<L>   |  27  |  2.42<H>    Ca    8.3<L>      25 Nov 2019 10:51  Phos  3.3     11-25  Mg     2.2     11-25    TPro  4.7<L>  /  Alb  3.0<L>  /  TBili  0.5  /  DBili  x   /  AST  42<H>  /  ALT  26  /  AlkPhos  48  11-25    PT/INR - ( 25 Nov 2019 16:49 )   PT: 13.4 sec;   INR: 1.18          PTT - ( 25 Nov 2019 16:49 )  PTT:31.8 sec  The current medications were reviewed   MEDICATIONS  (STANDING):  acetaminophen  IVPB .. 1000 milliGRAM(s) IV Intermittent once  artificial  tears Solution 1 Drop(s) Both EYES every 2 hours  aspirin enteric coated 81 milliGRAM(s) Oral daily  chlorhexidine 0.12% Liquid 15 milliLiter(s) Oral Mucosa every 12 hours  chlorhexidine 2% Cloths 1 Application(s) Topical daily  dexMEDEtomidine Infusion 0.5 MICROgram(s)/kG/Hr (10.488 mL/Hr) IV Continuous <Continuous>  dextrose 50% Injectable 50 milliLiter(s) IV Push every 15 minutes  furosemide Infusion 5 mG/Hr (2.5 mL/Hr) IV Continuous <Continuous>  heparin  Injectable 5000 Unit(s) SubCutaneous every 8 hours  meropenem  IVPB 1000 milliGRAM(s) IV Intermittent every 12 hours  meropenem  IVPB      pantoprazole  Injectable 40 milliGRAM(s) IV Push daily  phenylephrine    Infusion 0.2 MICROgram(s)/kG/Min (6.293 mL/Hr) IV Continuous <Continuous>  sodium chloride 0.9%. 1000 milliLiter(s) (10 mL/Hr) IV Continuous <Continuous>  tamsulosin 0.4 milliGRAM(s) Oral at bedtime    MEDICATIONS  (PRN):       PROBLEM LIST/ ASSESSMENT:  HEALTH ISSUES - PROBLEM Dx:  FELY (acute kidney injury): FELY (acute kidney injury)      ,   Patient is a 69y old  Male who presents with a chief complaint of Aortic dissection (25 Nov 2019 15:28)     s/p cardiac surgery              My plan includes :  close hemodynamic, ventilatory and drain monitoring and management per post op routine    Monitor for arrhythmias and monitor parameters for organ perfusion  beta blockade not administered due to hemodynamic instability and bradycardia  monitor neurologic status  Head of the bed should remain elevated to 45 deg .   chest PT and IS will be encouraged  monitor adequacy of oxygenation and ventilation and attempt to wean oxygen  antibiotic regimen will be tailored to the clinical, laboratory and microbiologic data  Nutritional goals will be met using po eventually , ensure adequate caloric intake and montior the same  Stress ulcer and VTE prophylaxis will be achieved    Glycemic control is satisfactory  Electrolytes have been repleted as necessary and wound care has been carried out. Pain control has been achieved.   agressive physical therapy and early mobility and ambulation goals will be met   The family was updated about the course and plan  CRITICAL CARE TIME personally provided by me  in evaluation and management, reassessments, review and interpretation of labs and x-rays, ventilator and hemodynamic management, formulating a plan and coordinating care: ___90____ MIN.  Time does not include procedural time.  CTICU ATTENDING     					    Jaret Hebert MD

## 2019-11-25 NOTE — PROGRESS NOTE ADULT - SUBJECTIVE AND OBJECTIVE BOX
O/N Events: SAFIA  Subjective:  intubated and sedated/ still on low dose of kenya, Diuresis titrated down to 2mg/hr of lasix   Cr continues to go up 2.4 today  low 3.4 otherwise no electrolyte abnormalities, UOP ~ 3 L with FB -1.5 L for the hospital stay  requires High FiO2 80% on inhaled NO    VITALS  Vital Signs Last 24 Hrs  T(C): 38.4 (25 Nov 2019 14:08), Max: 38.6 (24 Nov 2019 22:20)  T(F): 101.2 (25 Nov 2019 14:08), Max: 101.4 (24 Nov 2019 22:20)  HR: 72 (25 Nov 2019 15:00) (68 - 78)  BP: 114/68 (25 Nov 2019 10:00) (114/68 - 119/71)  BP(mean): 85 (25 Nov 2019 10:00) (84 - 88)  RR: 14 (25 Nov 2019 14:00) (12 - 17)  SpO2: 96% (25 Nov 2019 15:00) (93% - 99%)    PHYSICAL EXAM  General: intubated and sedated   Neck: enlarged neck habitus hard to assess for JVP  Cardiac: S1, S2. RRR. No murmurs   Respiratory: coarse breathing sounds diffuse, anterior filed, CT present   Abdomen: soft, distended, non tympanic   Extremities: no LE edema b/l, UE and sacral edema present     MEDICATIONS  (STANDING):  artificial  tears Solution 1 Drop(s) Both EYES every 2 hours  aspirin enteric coated 81 milliGRAM(s) Oral daily  chlorhexidine 0.12% Liquid 15 milliLiter(s) Oral Mucosa every 12 hours  chlorhexidine 2% Cloths 1 Application(s) Topical daily  dexMEDEtomidine Infusion 0.5 MICROgram(s)/kG/Hr (10.488 mL/Hr) IV Continuous <Continuous>  dextrose 50% Injectable 50 milliLiter(s) IV Push every 15 minutes  furosemide Infusion 5 mG/Hr (2.5 mL/Hr) IV Continuous <Continuous>  heparin  Injectable 5000 Unit(s) SubCutaneous every 8 hours  meropenem  IVPB 1000 milliGRAM(s) IV Intermittent every 12 hours  meropenem  IVPB      pantoprazole  Injectable 40 milliGRAM(s) IV Push daily  phenylephrine    Infusion 0.2 MICROgram(s)/kG/Min (6.293 mL/Hr) IV Continuous <Continuous>  potassium chloride  20 mEq/100 mL IVPB 20 milliEquivalent(s) IV Intermittent every 2 hours  sodium chloride 0.9%. 1000 milliLiter(s) (10 mL/Hr) IV Continuous <Continuous>  tamsulosin 0.4 milliGRAM(s) Oral at bedtime    LABS                        9.4    10.27 )-----------( 62       ( 25 Nov 2019 10:51 )             30.0     11-25    144  |  108  |  36<H>  ----------------------------<  132<H>  3.4<L>   |  27  |  2.42<H>    Ca    8.3<L>      25 Nov 2019 10:51  Phos  3.4     11-25  Mg     2.3     11-25    TPro  4.7<L>  /  Alb  3.0<L>  /  TBili  0.5  /  DBili  x   /  AST  42<H>  /  ALT  26  /  AlkPhos  48  11-25    LIVER FUNCTIONS - ( 25 Nov 2019 10:51 )  Alb: 3.0 g/dL / Pro: 4.7 g/dL / ALK PHOS: 48 U/L / ALT: 26 U/L / AST: 42 U/L / GGT: x           PT/INR - ( 25 Nov 2019 10:51 )   PT: 13.5 sec;   INR: 1.19          PTT - ( 25 Nov 2019 10:51 )  PTT:31.5 sec    CARDIAC MARKERS ( 24 Nov 2019 06:03 )  x     / 2.88 ng/mL / 614 U/L / x     / 22.8 ng/mL  CARDIAC MARKERS ( 24 Nov 2019 04:59 )  x     / 2.89 ng/mL / 622 U/L / x     / 24.5 ng/mL

## 2019-11-25 NOTE — CONSULT NOTE ADULT - PROBLEM SELECTOR RECOMMENDATION 9
1) If persistent fever will send Blood , Urine and Sputum culture in AM  2) Continue Meropenem 1gr IV q12h  3) Hold Vancomycin until trough < 17, then re-dose

## 2019-11-25 NOTE — PROGRESS NOTE ADULT - ATTENDING COMMENTS
FELY due to hypoperfusion -- now with excellent uo. slight increase in creat still  agree with wean lasix drip -- keep  negative though as still fluid overload   replete K   follow uo, chem

## 2019-11-25 NOTE — PROGRESS NOTE ADULT - ASSESSMENT
A/p  70 y/o M with PMHx of Ulcerative colitis BIBA to [facility] ED complaining of back pain and profound hypotension, emergently brought to the OR for salvage Type A dissection repair  s/p aortic arch repair and ascending arch replacement. nephrology consulted for FELY

## 2019-11-25 NOTE — CHART NOTE - NSCHARTNOTEFT_GEN_A_CORE
CTICU  CRITICAL  CARE  PROCEDURE  NOTE      				     Name of procedure – Flexible fiberoptic bronchoscopy    Indication –   Respiratory failure    Flexible fiberoptic bronchoscopy was performed under propofol and fentanyl sedation while the patient was intubated for controlled mechanical ventilation  Pre-procedural assessment reveals no risk of Tb  Ventilator settings were adjusted to reduce airway pressures to reduce the risk of ptx  The scope was advanced past the ett which was noted to be 2 cm the lorie   The lorie was sharp  Right LL and RML air way were patent , thin secretions noted throuhgout.    Lavaged and sent for culture  Left LL air way  with thin secretions, lavaged, and sent for culture  CXR confirmed no pneumothorax post bronch  There were no complications  The patient tolerated the procedure well

## 2019-11-25 NOTE — PROGRESS NOTE ADULT - SUBJECTIVE AND OBJECTIVE BOX
CTICU  CRITICAL  CARE  attending     Hand off received 					   Pertinent clinical, laboratory, radiographic, hemodynamic, echocardiographic, respiratory data, microbiologic data and chart were reviewed and analyzed frequently throughout the course of the day and night  Patient seen and examined with CTS/ SH attending at bedside    Pt is a 69y , Male, post op day # 2 s/p emergent repair of Type A Dissection: Aortic Root Replacement/Reconstruction (Biological Bentall - 23mm Magna in 32mm graft), Cabrol reconstruction of coronary ostia, Replacement of Ascending Aorta/HemiArch, Frozen Elephant trunk     post op:    remains fully vent supported  on low dose pressor support  hypoxemia; A-a gradient  Fluid overloaded  Acute post H'gic anemia  Thrombocytopenia  FELY      Fever, postprocedural: Fever, postprocedural  FELY (acute kidney injury): FELY (acute kidney injury)      , FAMILY HISTORY:  PAST MEDICAL & SURGICAL HISTORY:  Benign prostatic hypertrophy without lower urinary tract symptoms: BPH (benign prostatic hypertrophy)  Ulcerative colitis: Ulcerative colitis  States following surgery of eye and adnexa: straightened right eye  Other postprocedural status: History of hernia repair  Hemorrhoids: Hemorrhoids  Acute appendicitis with generalized peritonitis: Ruptured appendix    Patient is a 69y old  Male who presents with a chief complaint of Aortic dissection (25 Nov 2019 21:54)      14 system review was unremarkable  acute changes include acute respiratory failure  Vital signs, hemodynamic and respiratory parameters were reviewed from the bedside nursing flowsheet.  ICU Vital Signs Last 24 Hrs  T(C): 36.7 (25 Nov 2019 21:11), Max: 38.4 (25 Nov 2019 14:08)  T(F): 98.1 (25 Nov 2019 21:11), Max: 101.2 (25 Nov 2019 14:08)  HR: 70 (26 Nov 2019 01:00) (68 - 78)  BP: 107/62 (25 Nov 2019 17:00) (107/62 - 119/71)  BP(mean): 77 (25 Nov 2019 17:00) (77 - 88)  ABP: 132/66 (26 Nov 2019 01:00) (104/56 - 134/66)  ABP(mean): 90 (26 Nov 2019 01:00) (70 - 90)  RR: 13 (26 Nov 2019 01:00) (12 - 17)  SpO2: 98% (26 Nov 2019 01:00) (93% - 98%)    Adult Advanced Hemodynamics Last 24 Hrs  CVP(mm Hg): 7 (26 Nov 2019 01:00) (4 - 14)  CVP(cm H2O): --  CO: --  CI: --  PA: --  PA(mean): --  PCWP: --  SVR: --  SVRI: --  PVR: --  PVRI: --, ABG - ( 25 Nov 2019 21:19 )  pH, Arterial: 7.51  pH, Blood: x     /  pCO2: 35    /  pO2: 100   / HCO3: 27    / Base Excess: 4.3   /  SaO2: 98                Mode: AC/ CMV (Assist Control/ Continuous Mandatory Ventilation)  RR (machine): 12  TV (machine): 700  FiO2: 70  PEEP: 8  ITime: 1  MAP: 13  PIP: 24    Intake and output was reviewed and the fluid balance was calculated  Daily     Daily   I&O's Summary    24 Nov 2019 07:01  -  25 Nov 2019 07:00  --------------------------------------------------------  IN: 2887 mL / OUT: 4273 mL / NET: -1386 mL    25 Nov 2019 07:01  -  26 Nov 2019 01:28  --------------------------------------------------------  IN: 1705.2 mL / OUT: 3975 mL / NET: -2269.8 mL        All lines and drain sites were assessed  [facility]      POCT Blood Glucose.: 135 mg/dL (25 Nov 2019 23:07)    No acute change in mental status  Auscultation of the chest reveals equal bs  Abdomen is soft  Extremities are warm and well perfused  Wounds appear clean and unremarkable  Antibiotics are periop    labs  CBC Full  -  ( 25 Nov 2019 21:19 )  WBC Count : 8.43 K/uL  RBC Count : 3.36 M/uL  Hemoglobin : 9.5 g/dL  Hematocrit : 29.9 %  Platelet Count - Automated : 66 K/uL  Mean Cell Volume : 89.0 fl  Mean Cell Hemoglobin : 28.3 pg  Mean Cell Hemoglobin Concentration : 31.8 gm/dL  Auto Neutrophil # : x  Auto Lymphocyte # : x  Auto Monocyte # : x  Auto Eosinophil # : x  Auto Basophil # : x  Auto Neutrophil % : x  Auto Lymphocyte % : x  Auto Monocyte % : x  Auto Eosinophil % : x  Auto Basophil % : x    11-25    147<H>  |  111<H>  |  39<H>  ----------------------------<  140<H>  3.8   |  27  |  2.39<H>    Ca    8.9      25 Nov 2019 21:19  Phos  3.5     11-25  Mg     2.2     11-25    TPro  4.8<L>  /  Alb  2.9<L>  /  TBili  0.6  /  DBili  x   /  AST  35  /  ALT  25  /  AlkPhos  51  11-25    PT/INR - ( 25 Nov 2019 21:19 )   PT: 13.1 sec;   INR: 1.15          PTT - ( 25 Nov 2019 21:19 )  PTT:30.1 sec  The current medications were reviewed   MEDICATIONS  (STANDING):  artificial  tears Solution 1 Drop(s) Both EYES every 2 hours  aspirin enteric coated 81 milliGRAM(s) Oral daily  chlorhexidine 0.12% Liquid 15 milliLiter(s) Oral Mucosa every 12 hours  chlorhexidine 2% Cloths 1 Application(s) Topical daily  dexMEDEtomidine Infusion 0.5 MICROgram(s)/kG/Hr (10.488 mL/Hr) IV Continuous <Continuous>  dextrose 50% Injectable 50 milliLiter(s) IV Push every 15 minutes  furosemide Infusion 5 mG/Hr (2.5 mL/Hr) IV Continuous <Continuous>  heparin  Injectable 5000 Unit(s) SubCutaneous every 8 hours  meropenem  IVPB 1000 milliGRAM(s) IV Intermittent every 12 hours  meropenem  IVPB      pantoprazole  Injectable 40 milliGRAM(s) IV Push daily  phenylephrine    Infusion 0.2 MICROgram(s)/kG/Min (6.293 mL/Hr) IV Continuous <Continuous>  sodium chloride 0.9%. 1000 milliLiter(s) (10 mL/Hr) IV Continuous <Continuous>  tamsulosin 0.4 milliGRAM(s) Oral at bedtime    MEDICATIONS  (PRN):       PROBLEM LIST/ ASSESSMENT:  HEALTH ISSUES - PROBLEM Dx:  acute resp failure with hypoxia  hemodynamic instability  hypoxemia; A-a gradient  Fluid overloaded  Acute post H'gic anemia  Thrombocytopenia  FELY  type A dissection repair      Fever, postprocedural: Fever, postprocedural  FELY (acute kidney injury): FELY (acute kidney injury)      ,   Patient is a 69y old  Male who presents with a chief complaint of Aortic dissection (25 Nov 2019 21:54)     s/p emergent repair of Type A Dissection: Aortic Root Replacement/Reconstruction (Biological Bentall - 23mm Magna in 32mm graft), Cabrol reconstruction of coronary ostia, Replacement of Ascending Aorta/HemiArch, Frozen Elephant trunk       acute changes include acute respiratory failure    My plan includes :  close hemodynamic, ventilatory and drain monitoring and management per post op routine    Monitor for arrhythmias and monitor parameters for organ perfusion  monitor neurologic status  Head of the bed should remain elevated to 45 deg .   chest PT and IS will be encouraged  monitor adequacy of oxygenation and ventilation and attempt to wean oxygen  Nutritional goals will be met using po eventually , ensure adequate caloric intake and montior the same  Stress ulcer and VTE prophylaxis will be achieved    Glycemic control is satisfactory  Electrolytes have been repleted as necessary and wound care has been carried out. Pain control has been achieved.   agressive physical therapy and early mobility and ambulation goals will be met   The family was updated about the course and plan  CRITICAL CARE TIME SPENT in evaluation and management, reassessments, review and interpretation of labs and x-rays, ventilator and hemodynamic management, formulating a plan and coordinating care: __30___ MIN.  Time does not include procedural time.  CTICU ATTENDING     					    Luis Loving MD

## 2019-11-26 LAB
ALBUMIN SERPL ELPH-MCNC: 2.6 G/DL — LOW (ref 3.3–5)
ALBUMIN SERPL ELPH-MCNC: 2.7 G/DL — LOW (ref 3.3–5)
ALBUMIN SERPL ELPH-MCNC: 2.8 G/DL — LOW (ref 3.3–5)
ALBUMIN SERPL ELPH-MCNC: 2.8 G/DL — LOW (ref 3.3–5)
ALP SERPL-CCNC: 45 U/L — SIGNIFICANT CHANGE UP (ref 40–120)
ALP SERPL-CCNC: 45 U/L — SIGNIFICANT CHANGE UP (ref 40–120)
ALP SERPL-CCNC: 46 U/L — SIGNIFICANT CHANGE UP (ref 40–120)
ALP SERPL-CCNC: 56 U/L — SIGNIFICANT CHANGE UP (ref 40–120)
ALT FLD-CCNC: 20 U/L — SIGNIFICANT CHANGE UP (ref 10–45)
ALT FLD-CCNC: 23 U/L — SIGNIFICANT CHANGE UP (ref 10–45)
ANION GAP SERPL CALC-SCNC: 10 MMOL/L — SIGNIFICANT CHANGE UP (ref 5–17)
ANION GAP SERPL CALC-SCNC: 10 MMOL/L — SIGNIFICANT CHANGE UP (ref 5–17)
ANION GAP SERPL CALC-SCNC: 8 MMOL/L — SIGNIFICANT CHANGE UP (ref 5–17)
ANION GAP SERPL CALC-SCNC: 9 MMOL/L — SIGNIFICANT CHANGE UP (ref 5–17)
APTT BLD: 27.2 SEC — LOW (ref 27.5–36.3)
APTT BLD: 29.2 SEC — SIGNIFICANT CHANGE UP (ref 27.5–36.3)
APTT BLD: 30.1 SEC — SIGNIFICANT CHANGE UP (ref 27.5–36.3)
APTT BLD: 31.4 SEC — SIGNIFICANT CHANGE UP (ref 27.5–36.3)
AST SERPL-CCNC: 27 U/L — SIGNIFICANT CHANGE UP (ref 10–40)
AST SERPL-CCNC: 28 U/L — SIGNIFICANT CHANGE UP (ref 10–40)
AST SERPL-CCNC: 29 U/L — SIGNIFICANT CHANGE UP (ref 10–40)
AST SERPL-CCNC: 29 U/L — SIGNIFICANT CHANGE UP (ref 10–40)
BILIRUB SERPL-MCNC: 0.4 MG/DL — SIGNIFICANT CHANGE UP (ref 0.2–1.2)
BILIRUB SERPL-MCNC: 0.5 MG/DL — SIGNIFICANT CHANGE UP (ref 0.2–1.2)
BILIRUB SERPL-MCNC: 0.5 MG/DL — SIGNIFICANT CHANGE UP (ref 0.2–1.2)
BILIRUB SERPL-MCNC: 0.6 MG/DL — SIGNIFICANT CHANGE UP (ref 0.2–1.2)
BUN SERPL-MCNC: 42 MG/DL — HIGH (ref 7–23)
BUN SERPL-MCNC: 44 MG/DL — HIGH (ref 7–23)
BUN SERPL-MCNC: 45 MG/DL — HIGH (ref 7–23)
BUN SERPL-MCNC: 49 MG/DL — HIGH (ref 7–23)
CALCIUM SERPL-MCNC: 8.3 MG/DL — LOW (ref 8.4–10.5)
CALCIUM SERPL-MCNC: 8.6 MG/DL — SIGNIFICANT CHANGE UP (ref 8.4–10.5)
CHLORIDE SERPL-SCNC: 111 MMOL/L — HIGH (ref 96–108)
CHLORIDE SERPL-SCNC: 111 MMOL/L — HIGH (ref 96–108)
CHLORIDE SERPL-SCNC: 113 MMOL/L — HIGH (ref 96–108)
CHLORIDE SERPL-SCNC: 113 MMOL/L — HIGH (ref 96–108)
CO2 SERPL-SCNC: 28 MMOL/L — SIGNIFICANT CHANGE UP (ref 22–31)
CO2 SERPL-SCNC: 28 MMOL/L — SIGNIFICANT CHANGE UP (ref 22–31)
CO2 SERPL-SCNC: 29 MMOL/L — SIGNIFICANT CHANGE UP (ref 22–31)
CO2 SERPL-SCNC: 30 MMOL/L — SIGNIFICANT CHANGE UP (ref 22–31)
CREAT SERPL-MCNC: 2 MG/DL — HIGH (ref 0.5–1.3)
CREAT SERPL-MCNC: 2.09 MG/DL — HIGH (ref 0.5–1.3)
CREAT SERPL-MCNC: 2.16 MG/DL — HIGH (ref 0.5–1.3)
CREAT SERPL-MCNC: 2.41 MG/DL — HIGH (ref 0.5–1.3)
GAS PNL BLDA: SIGNIFICANT CHANGE UP
GLUCOSE BLDC GLUCOMTR-MCNC: 142 MG/DL — HIGH (ref 70–99)
GLUCOSE SERPL-MCNC: 124 MG/DL — HIGH (ref 70–99)
GLUCOSE SERPL-MCNC: 134 MG/DL — HIGH (ref 70–99)
GLUCOSE SERPL-MCNC: 142 MG/DL — HIGH (ref 70–99)
GLUCOSE SERPL-MCNC: 143 MG/DL — HIGH (ref 70–99)
GRAM STN FLD: SIGNIFICANT CHANGE UP
HCT VFR BLD CALC: 26.9 % — LOW (ref 39–50)
HCT VFR BLD CALC: 28.1 % — LOW (ref 39–50)
HCT VFR BLD CALC: 28.2 % — LOW (ref 39–50)
HCT VFR BLD CALC: 28.3 % — LOW (ref 39–50)
HGB BLD-MCNC: 8.4 G/DL — LOW (ref 13–17)
HGB BLD-MCNC: 8.6 G/DL — LOW (ref 13–17)
HGB BLD-MCNC: 8.8 G/DL — LOW (ref 13–17)
HGB BLD-MCNC: 8.8 G/DL — LOW (ref 13–17)
INR BLD: 1.09 — SIGNIFICANT CHANGE UP (ref 0.88–1.16)
INR BLD: 1.1 — SIGNIFICANT CHANGE UP (ref 0.88–1.16)
INR BLD: 1.13 — SIGNIFICANT CHANGE UP (ref 0.88–1.16)
INR BLD: 1.18 — HIGH (ref 0.88–1.16)
LACTATE SERPL-SCNC: 0.8 MMOL/L — SIGNIFICANT CHANGE UP (ref 0.5–2)
LACTATE SERPL-SCNC: 1.1 MMOL/L — SIGNIFICANT CHANGE UP (ref 0.5–2)
MAGNESIUM SERPL-MCNC: 2.2 MG/DL — SIGNIFICANT CHANGE UP (ref 1.6–2.6)
MAGNESIUM SERPL-MCNC: 2.3 MG/DL — SIGNIFICANT CHANGE UP (ref 1.6–2.6)
MAGNESIUM SERPL-MCNC: 2.4 MG/DL — SIGNIFICANT CHANGE UP (ref 1.6–2.6)
MAGNESIUM SERPL-MCNC: 2.5 MG/DL — SIGNIFICANT CHANGE UP (ref 1.6–2.6)
MCHC RBC-ENTMCNC: 27.7 PG — SIGNIFICANT CHANGE UP (ref 27–34)
MCHC RBC-ENTMCNC: 27.8 PG — SIGNIFICANT CHANGE UP (ref 27–34)
MCHC RBC-ENTMCNC: 28.1 PG — SIGNIFICANT CHANGE UP (ref 27–34)
MCHC RBC-ENTMCNC: 28.6 PG — SIGNIFICANT CHANGE UP (ref 27–34)
MCHC RBC-ENTMCNC: 30.5 GM/DL — LOW (ref 32–36)
MCHC RBC-ENTMCNC: 31.1 GM/DL — LOW (ref 32–36)
MCHC RBC-ENTMCNC: 31.2 GM/DL — LOW (ref 32–36)
MCHC RBC-ENTMCNC: 31.3 GM/DL — LOW (ref 32–36)
MCV RBC AUTO: 89.3 FL — SIGNIFICANT CHANGE UP (ref 80–100)
MCV RBC AUTO: 89.8 FL — SIGNIFICANT CHANGE UP (ref 80–100)
MCV RBC AUTO: 91 FL — SIGNIFICANT CHANGE UP (ref 80–100)
MCV RBC AUTO: 91.5 FL — SIGNIFICANT CHANGE UP (ref 80–100)
NRBC # BLD: 0 /100 WBCS — SIGNIFICANT CHANGE UP (ref 0–0)
PHOSPHATE SERPL-MCNC: 3.1 MG/DL — SIGNIFICANT CHANGE UP (ref 2.5–4.5)
PHOSPHATE SERPL-MCNC: 3.8 MG/DL — SIGNIFICANT CHANGE UP (ref 2.5–4.5)
PHOSPHATE SERPL-MCNC: 3.8 MG/DL — SIGNIFICANT CHANGE UP (ref 2.5–4.5)
PHOSPHATE SERPL-MCNC: 3.9 MG/DL — SIGNIFICANT CHANGE UP (ref 2.5–4.5)
PLATELET # BLD AUTO: 59 K/UL — LOW (ref 150–400)
PLATELET # BLD AUTO: 64 K/UL — LOW (ref 150–400)
PLATELET # BLD AUTO: 69 K/UL — LOW (ref 150–400)
PLATELET # BLD AUTO: 78 K/UL — LOW (ref 150–400)
POTASSIUM SERPL-MCNC: 3.4 MMOL/L — LOW (ref 3.5–5.3)
POTASSIUM SERPL-MCNC: 3.6 MMOL/L — SIGNIFICANT CHANGE UP (ref 3.5–5.3)
POTASSIUM SERPL-MCNC: 3.6 MMOL/L — SIGNIFICANT CHANGE UP (ref 3.5–5.3)
POTASSIUM SERPL-MCNC: 3.9 MMOL/L — SIGNIFICANT CHANGE UP (ref 3.5–5.3)
POTASSIUM SERPL-SCNC: 3.4 MMOL/L — LOW (ref 3.5–5.3)
POTASSIUM SERPL-SCNC: 3.6 MMOL/L — SIGNIFICANT CHANGE UP (ref 3.5–5.3)
POTASSIUM SERPL-SCNC: 3.6 MMOL/L — SIGNIFICANT CHANGE UP (ref 3.5–5.3)
POTASSIUM SERPL-SCNC: 3.9 MMOL/L — SIGNIFICANT CHANGE UP (ref 3.5–5.3)
PROT SERPL-MCNC: 4.6 G/DL — LOW (ref 6–8.3)
PROT SERPL-MCNC: 4.7 G/DL — LOW (ref 6–8.3)
PROTHROM AB SERPL-ACNC: 12.3 SEC — SIGNIFICANT CHANGE UP (ref 10–12.9)
PROTHROM AB SERPL-ACNC: 12.5 SEC — SIGNIFICANT CHANGE UP (ref 10–12.9)
PROTHROM AB SERPL-ACNC: 12.8 SEC — SIGNIFICANT CHANGE UP (ref 10–12.9)
PROTHROM AB SERPL-ACNC: 13.4 SEC — HIGH (ref 10–12.9)
RBC # BLD: 2.94 M/UL — LOW (ref 4.2–5.8)
RBC # BLD: 3.1 M/UL — LOW (ref 4.2–5.8)
RBC # BLD: 3.13 M/UL — LOW (ref 4.2–5.8)
RBC # BLD: 3.17 M/UL — LOW (ref 4.2–5.8)
RBC # FLD: 16 % — HIGH (ref 10.3–14.5)
RBC # FLD: 16.1 % — HIGH (ref 10.3–14.5)
RBC # FLD: 16.2 % — HIGH (ref 10.3–14.5)
RBC # FLD: 16.3 % — HIGH (ref 10.3–14.5)
SODIUM SERPL-SCNC: 148 MMOL/L — HIGH (ref 135–145)
SODIUM SERPL-SCNC: 149 MMOL/L — HIGH (ref 135–145)
SODIUM SERPL-SCNC: 151 MMOL/L — HIGH (ref 135–145)
SODIUM SERPL-SCNC: 152 MMOL/L — HIGH (ref 135–145)
SPECIMEN SOURCE: SIGNIFICANT CHANGE UP
WBC # BLD: 7.07 K/UL — SIGNIFICANT CHANGE UP (ref 3.8–10.5)
WBC # BLD: 7.24 K/UL — SIGNIFICANT CHANGE UP (ref 3.8–10.5)
WBC # BLD: 7.68 K/UL — SIGNIFICANT CHANGE UP (ref 3.8–10.5)
WBC # BLD: 7.94 K/UL — SIGNIFICANT CHANGE UP (ref 3.8–10.5)
WBC # FLD AUTO: 7.07 K/UL — SIGNIFICANT CHANGE UP (ref 3.8–10.5)
WBC # FLD AUTO: 7.24 K/UL — SIGNIFICANT CHANGE UP (ref 3.8–10.5)
WBC # FLD AUTO: 7.68 K/UL — SIGNIFICANT CHANGE UP (ref 3.8–10.5)
WBC # FLD AUTO: 7.94 K/UL — SIGNIFICANT CHANGE UP (ref 3.8–10.5)

## 2019-11-26 PROCEDURE — 71045 X-RAY EXAM CHEST 1 VIEW: CPT | Mod: 26

## 2019-11-26 PROCEDURE — 99291 CRITICAL CARE FIRST HOUR: CPT

## 2019-11-26 PROCEDURE — 99292 CRITICAL CARE ADDL 30 MIN: CPT

## 2019-11-26 PROCEDURE — 99232 SBSQ HOSP IP/OBS MODERATE 35: CPT | Mod: GC

## 2019-11-26 RX ORDER — FENTANYL CITRATE 50 UG/ML
25 INJECTION INTRAVENOUS ONCE
Refills: 0 | Status: DISCONTINUED | OUTPATIENT
Start: 2019-11-26 | End: 2019-11-26

## 2019-11-26 RX ORDER — FUROSEMIDE 40 MG
40 TABLET ORAL ONCE
Refills: 0 | Status: COMPLETED | OUTPATIENT
Start: 2019-11-26 | End: 2019-11-26

## 2019-11-26 RX ORDER — POTASSIUM CHLORIDE 20 MEQ
20 PACKET (EA) ORAL
Refills: 0 | Status: COMPLETED | OUTPATIENT
Start: 2019-11-26 | End: 2019-11-26

## 2019-11-26 RX ORDER — CALCIUM GLUCONATE 100 MG/ML
2 VIAL (ML) INTRAVENOUS ONCE
Refills: 0 | Status: COMPLETED | OUTPATIENT
Start: 2019-11-26 | End: 2019-11-26

## 2019-11-26 RX ORDER — ACETAMINOPHEN 500 MG
1000 TABLET ORAL ONCE
Refills: 0 | Status: COMPLETED | OUTPATIENT
Start: 2019-11-26 | End: 2019-11-26

## 2019-11-26 RX ORDER — LIDOCAINE 4 G/100G
1 CREAM TOPICAL DAILY
Refills: 0 | Status: DISCONTINUED | OUTPATIENT
Start: 2019-11-26 | End: 2019-12-26

## 2019-11-26 RX ORDER — POTASSIUM CHLORIDE 20 MEQ
20 PACKET (EA) ORAL ONCE
Refills: 0 | Status: COMPLETED | OUTPATIENT
Start: 2019-11-26 | End: 2019-11-26

## 2019-11-26 RX ADMIN — Medication 81 MILLIGRAM(S): at 12:26

## 2019-11-26 RX ADMIN — Medication 1 DROP(S): at 12:26

## 2019-11-26 RX ADMIN — HEPARIN SODIUM 5000 UNIT(S): 5000 INJECTION INTRAVENOUS; SUBCUTANEOUS at 14:32

## 2019-11-26 RX ADMIN — Medication 1 DROP(S): at 10:00

## 2019-11-26 RX ADMIN — LIDOCAINE 1 PATCH: 4 CREAM TOPICAL at 20:07

## 2019-11-26 RX ADMIN — Medication 1 DROP(S): at 00:43

## 2019-11-26 RX ADMIN — FENTANYL CITRATE 25 MICROGRAM(S): 50 INJECTION INTRAVENOUS at 12:24

## 2019-11-26 RX ADMIN — MEROPENEM 100 MILLIGRAM(S): 1 INJECTION INTRAVENOUS at 13:10

## 2019-11-26 RX ADMIN — Medication 1000 MILLIGRAM(S): at 03:10

## 2019-11-26 RX ADMIN — Medication 1 DROP(S): at 08:23

## 2019-11-26 RX ADMIN — CHLORHEXIDINE GLUCONATE 15 MILLILITER(S): 213 SOLUTION TOPICAL at 06:00

## 2019-11-26 RX ADMIN — PANTOPRAZOLE SODIUM 40 MILLIGRAM(S): 20 TABLET, DELAYED RELEASE ORAL at 12:26

## 2019-11-26 RX ADMIN — HEPARIN SODIUM 5000 UNIT(S): 5000 INJECTION INTRAVENOUS; SUBCUTANEOUS at 06:37

## 2019-11-26 RX ADMIN — FENTANYL CITRATE 25 MICROGRAM(S): 50 INJECTION INTRAVENOUS at 13:03

## 2019-11-26 RX ADMIN — FENTANYL CITRATE 25 MICROGRAM(S): 50 INJECTION INTRAVENOUS at 18:20

## 2019-11-26 RX ADMIN — Medication 1 DROP(S): at 14:33

## 2019-11-26 RX ADMIN — Medication 200 GRAM(S): at 06:15

## 2019-11-26 RX ADMIN — Medication 1 DROP(S): at 04:30

## 2019-11-26 RX ADMIN — DEXMEDETOMIDINE HYDROCHLORIDE IN 0.9% SODIUM CHLORIDE 10.49 MICROGRAM(S)/KG/HR: 4 INJECTION INTRAVENOUS at 09:48

## 2019-11-26 RX ADMIN — FENTANYL CITRATE 25 MICROGRAM(S): 50 INJECTION INTRAVENOUS at 19:21

## 2019-11-26 RX ADMIN — Medication 1 DROP(S): at 18:29

## 2019-11-26 RX ADMIN — Medication 1 DROP(S): at 06:38

## 2019-11-26 RX ADMIN — Medication 1 DROP(S): at 22:13

## 2019-11-26 RX ADMIN — FENTANYL CITRATE 25 MICROGRAM(S): 50 INJECTION INTRAVENOUS at 17:19

## 2019-11-26 RX ADMIN — Medication 1 DROP(S): at 03:00

## 2019-11-26 RX ADMIN — Medication 1 DROP(S): at 20:45

## 2019-11-26 RX ADMIN — HEPARIN SODIUM 5000 UNIT(S): 5000 INJECTION INTRAVENOUS; SUBCUTANEOUS at 22:13

## 2019-11-26 RX ADMIN — MEROPENEM 100 MILLIGRAM(S): 1 INJECTION INTRAVENOUS at 00:43

## 2019-11-26 RX ADMIN — Medication 400 MILLIGRAM(S): at 02:13

## 2019-11-26 RX ADMIN — Medication 40 MILLIGRAM(S): at 23:31

## 2019-11-26 RX ADMIN — CHLORHEXIDINE GLUCONATE 15 MILLILITER(S): 213 SOLUTION TOPICAL at 18:29

## 2019-11-26 RX ADMIN — Medication 50 MILLIEQUIVALENT(S): at 17:11

## 2019-11-26 RX ADMIN — FENTANYL CITRATE 25 MICROGRAM(S): 50 INJECTION INTRAVENOUS at 17:47

## 2019-11-26 RX ADMIN — Medication 50 MILLIEQUIVALENT(S): at 18:29

## 2019-11-26 RX ADMIN — CHLORHEXIDINE GLUCONATE 1 APPLICATION(S): 213 SOLUTION TOPICAL at 06:00

## 2019-11-26 RX ADMIN — FENTANYL CITRATE 25 MICROGRAM(S): 50 INJECTION INTRAVENOUS at 23:30

## 2019-11-26 RX ADMIN — Medication 100 MILLIEQUIVALENT(S): at 04:30

## 2019-11-26 RX ADMIN — Medication 1 DROP(S): at 16:05

## 2019-11-26 NOTE — PROGRESS NOTE ADULT - SUBJECTIVE AND OBJECTIVE BOX
INTERVAL HPI/OVERNIGHT EVENTS: Patient still intubated, on minimal Epi, Pleural and mediastinal tube in place  Per his nurse when off sedation moves all extremities  ANTIBIOTICS/RELEVANT:    MEDICATIONS  (STANDING):  artificial  tears Solution 1 Drop(s) Both EYES every 2 hours  aspirin enteric coated 81 milliGRAM(s) Oral daily  chlorhexidine 0.12% Liquid 15 milliLiter(s) Oral Mucosa every 12 hours  chlorhexidine 2% Cloths 1 Application(s) Topical daily  dexMEDEtomidine Infusion 0.5 MICROgram(s)/kG/Hr (10.488 mL/Hr) IV Continuous <Continuous>  dextrose 50% Injectable 50 milliLiter(s) IV Push every 15 minutes  fentaNYL    Injectable 25 MICROGram(s) IV Push once  heparin  Injectable 5000 Unit(s) SubCutaneous every 8 hours  lidocaine   Patch 1 Patch Transdermal daily  meropenem  IVPB 1000 milliGRAM(s) IV Intermittent every 12 hours  meropenem  IVPB      pantoprazole  Injectable 40 milliGRAM(s) IV Push daily  potassium chloride  20 mEq/100 mL IVPB 20 milliEquivalent(s) IV Intermittent every 2 hours  sodium chloride 0.9%. 1000 milliLiter(s) (10 mL/Hr) IV Continuous <Continuous>  tamsulosin 0.4 milliGRAM(s) Oral at bedtime    MEDICATIONS  (PRN):        Vital Signs Last 24 Hrs  T(C): 37.1 (26 Nov 2019 17:44), Max: 38.4 (26 Nov 2019 05:01)  T(F): 98.8 (26 Nov 2019 17:44), Max: 101.1 (26 Nov 2019 05:01)  HR: 72 (26 Nov 2019 18:00) (64 - 74)  BP: --  BP(mean): --  RR: 17 (26 Nov 2019 18:00) (12 - 18)  SpO2: 96% (26 Nov 2019 18:00) (94% - 100%)    11-25-19 @ 07:01  -  11-26-19 @ 07:00  --------------------------------------------------------  IN: 2401.8 mL / OUT: 4835 mL / NET: -2433.2 mL    11-26-19 @ 07:01  -  11-26-19 @ 18:17  --------------------------------------------------------  IN: 892.8 mL / OUT: 1110 mL / NET: -217.2 mL      PHYSICAL EXAM:  Constitutional: Intubated , sedated, on/off pressors  Eyes: SANTANA, EOMI  Ear/Nose/Throat: no oral lesion, no sinus tenderness on percussion	  Neck: no JVD, no lymphadenopathy, supple  Respiratory: decreased BS adilene bases,  Midsternal wound with dressing. Pleural and mediastinal drains in place  Cardiovascular: S1S2 RRR, no murmurs  Gastrointestinal: soft, (+) BS, no HSM  Extremities: no e/e/c  Vascular: DP Pulse: right normal; left normal    LABS:                        8.6    7.94  )-----------( 69       ( 26 Nov 2019 15:38 )             28.2     11-26    148<H>  |  111<H>  |  45<H>  ----------------------------<  143<H>  3.4<L>   |  29  |  2.09<H>    Ca    8.3<L>      26 Nov 2019 15:37  Phos  3.8     11-26  Mg     2.3     11-26    TPro  4.7<L>  /  Alb  2.7<L>  /  TBili  0.5  /  DBili  x   /  AST  28  /  ALT  20  /  AlkPhos  46  11-26    PT/INR - ( 26 Nov 2019 15:38 )   PT: 12.5 sec;   INR: 1.10          PTT - ( 26 Nov 2019 15:38 )  PTT:29.2 sec    Vancomycin Level, Trough (11.25.19 @ 19:15)    Vancomycin Level, Trough: 23.9:     MICROBIOLOGY:  Culture - Sputum . (11.25.19 @ 16:51)    Gram Stain:   Rare epithelial cells  Numerous White blood cells  Few Gram Negative Rods  Few Gram positive cocci in pairs  Few Gram Negative Diplococci    Specimen Source: .Sputum Sputum    Culture Results:   Few-moderate Klebsiella species  Identification and susceptibility to follow.  Accompanied by normal respiratory jazmín        RADIOLOGY & ADDITIONAL STUDIES:

## 2019-11-26 NOTE — PROGRESS NOTE ADULT - SUBJECTIVE AND OBJECTIVE BOX
O/N Events: remained on lasix gtt 2 mg/hr   Subjective:  Cr trending down slowly, UOP ~ 4 L FB -2.5 L for the past 24 hrs/ CVP 5  still high O2 requirement, FiO2 ~ 70%    VITALS  Vital Signs Last 24 Hrs  T(C): 37.1 (26 Nov 2019 17:44), Max: 38.4 (26 Nov 2019 05:01)  T(F): 98.8 (26 Nov 2019 17:44), Max: 101.1 (26 Nov 2019 05:01)  HR: 72 (26 Nov 2019 18:00) (64 - 73)  BP: 107/72 mmhg  RR: 17 (26 Nov 2019 18:00) (12 - 18)  SpO2: 96% (26 Nov 2019 18:00) (95% - 100%)    PHYSICAL EXAM  General: intubated and sedated   Neck: enlarged neck habitus hard to assess for JVP  Cardiac: S1, S2. RRR. No murmurs   Respiratory: coarse breathing sounds diffuse, anterior filed, blakes x 3 present   Abdomen: soft, distended, non tympanic   Extremities: no LE edema b/l, UE edema improving     MEDICATIONS  (STANDING):  artificial  tears Solution 1 Drop(s) Both EYES every 2 hours  aspirin enteric coated 81 milliGRAM(s) Oral daily  chlorhexidine 0.12% Liquid 15 milliLiter(s) Oral Mucosa every 12 hours  chlorhexidine 2% Cloths 1 Application(s) Topical daily  dexMEDEtomidine Infusion 0.5 MICROgram(s)/kG/Hr (10.488 mL/Hr) IV Continuous <Continuous>  dextrose 50% Injectable 50 milliLiter(s) IV Push every 15 minutes  fentaNYL    Injectable 25 MICROGram(s) IV Push once  heparin  Injectable 5000 Unit(s) SubCutaneous every 8 hours  lidocaine   Patch 1 Patch Transdermal daily  meropenem  IVPB 1000 milliGRAM(s) IV Intermittent every 12 hours  meropenem  IVPB      pantoprazole  Injectable 40 milliGRAM(s) IV Push daily  potassium chloride  20 mEq/100 mL IVPB 20 milliEquivalent(s) IV Intermittent every 2 hours  sodium chloride 0.9%. 1000 milliLiter(s) (10 mL/Hr) IV Continuous <Continuous>  tamsulosin 0.4 milliGRAM(s) Oral at bedtime    MEDICATIONS  (PRN):      LABS                        8.6    7.94  )-----------( 69       ( 26 Nov 2019 15:38 )             28.2     11-26    148<H>  |  111<H>  |  45<H>  ----------------------------<  143<H>  3.4<L>   |  29  |  2.09<H>    Ca    8.3<L>      26 Nov 2019 15:37  Phos  3.8     11-26  Mg     2.3     11-26    TPro  4.7<L>  /  Alb  2.7<L>  /  TBili  0.5  /  DBili  x   /  AST  28  /  ALT  20  /  AlkPhos  46  11-26    LIVER FUNCTIONS - ( 26 Nov 2019 15:37 )  Alb: 2.7 g/dL / Pro: 4.7 g/dL / ALK PHOS: 46 U/L / ALT: 20 U/L / AST: 28 U/L / GGT: x           PT/INR - ( 26 Nov 2019 15:38 )   PT: 12.5 sec;   INR: 1.10          PTT - ( 26 Nov 2019 15:38 )  PTT:29.2 sec

## 2019-11-26 NOTE — PROGRESS NOTE ADULT - ATTENDING COMMENTS
FELY -- creat plateaued and diuresed well   no longer seems overloaded and can use diuretics PRN only   unclear cause of high FIO2 requirement as doesnt seem to be volume?-- d/w team. if needs contrast may be necessary

## 2019-11-26 NOTE — PROGRESS NOTE ADULT - ASSESSMENT
A/p  68 y/o M with PMHx of Ulcerative colitis BIBA to [facility] ED complaining of back pain and profound hypotension, emergently brought to the OR for salvage Type A dissection repair  s/p aortic arch repair and ascending arch replacement. nephrology consulted for FELY

## 2019-11-26 NOTE — PROGRESS NOTE ADULT - SUBJECTIVE AND OBJECTIVE BOX
CTICU  CRITICAL  CARE  attending     Hand off received 					   Pertinent clinical, laboratory, radiographic, hemodynamic, echocardiographic, respiratory data, microbiologic data and chart were reviewed and analyzed frequently throughout the course of the day and night  Patient seen and examined with CTS/ SH attending at bedside    Pt is a 69y , Male,  post op day # 3 s/p emergent repair of Type A Dissection: Aortic Root Replacement/Reconstruction (Biological Bentall - 23mm Magna in 32mm graft), Cabrol reconstruction of coronary ostia, Replacement of Ascending Aorta/HemiArch, Frozen Elephant trunk     post op:    remains fully vent supported  on low dose pressor support  hypoxemia; A-a gradient  Fluid overloaded  Acute post H'gic anemia  Thrombocytopenia  FELY      Fever, postprocedural: Fever, postprocedural  FELY (acute kidney injury): FELY (acute kidney injury)      , FAMILY HISTORY:  PAST MEDICAL & SURGICAL HISTORY:  Benign prostatic hypertrophy without lower urinary tract symptoms: BPH (benign prostatic hypertrophy)  Ulcerative colitis: Ulcerative colitis  States following surgery of eye and adnexa: straightened right eye  Other postprocedural status: History of hernia repair  Hemorrhoids: Hemorrhoids  Acute appendicitis with generalized peritonitis: Ruptured appendix    Patient is a 69y old  Male who presents with a chief complaint of Aortic dissection (26 Nov 2019 18:25)      14 system review was unremarkable  acute changes include acute respiratory failure  Vital signs, hemodynamic and respiratory parameters were reviewed from the bedside nursing flowsheet.  ICU Vital Signs Last 24 Hrs  T(C): 37.7 (27 Nov 2019 01:01), Max: 38.4 (26 Nov 2019 05:01)  T(F): 99.8 (27 Nov 2019 01:01), Max: 101.1 (26 Nov 2019 05:01)  HR: 72 (27 Nov 2019 01:00) (64 - 78)  BP: --  BP(mean): --  ABP: 96/50 (27 Nov 2019 01:00) (96/50 - 150/66)  ABP(mean): 68 (27 Nov 2019 01:00) (68 - 96)  RR: 19 (27 Nov 2019 01:00) (12 - 19)  SpO2: 96% (27 Nov 2019 01:00) (95% - 100%)    Adult Advanced Hemodynamics Last 24 Hrs  CVP(mm Hg): 7 (27 Nov 2019 01:00) (5 - 12)  CVP(cm H2O): --  CO: --  CI: --  PA: --  PA(mean): --  PCWP: --  SVR: --  SVRI: --  PVR: --  PVRI: --, ABG - ( 26 Nov 2019 22:30 )  pH, Arterial: 7.47  pH, Blood: x     /  pCO2: 43    /  pO2: 80    / HCO3: 31    / Base Excess: 6.5   /  SaO2: 96                Mode: AC/ CMV (Assist Control/ Continuous Mandatory Ventilation)  RR (machine): 12  TV (machine): 550  FiO2: 70  PEEP: 8  ITime: 1  MAP: 11  PIP: 17    Intake and output was reviewed and the fluid balance was calculated  Daily     Daily   I&O's Summary    25 Nov 2019 07:01  -  26 Nov 2019 07:00  --------------------------------------------------------  IN: 2401.8 mL / OUT: 4835 mL / NET: -2433.2 mL    26 Nov 2019 07:01  -  27 Nov 2019 01:57  --------------------------------------------------------  IN: 1553.3 mL / OUT: 2620 mL / NET: -1066.7 mL        All lines and drain sites were assessed  Glycemic trend was reviewedCAPILLARY BLOOD GLUCOSE      POCT Blood Glucose.: 142 mg/dL (26 Nov 2019 06:32)    No acute change in mental status  Auscultation of the chest reveals equal bs  Abdomen is soft  Extremities are warm and well perfused  Wounds appear clean and unremarkable  Antibiotics are periop    labs  CBC Full  -  ( 26 Nov 2019 22:29 )  WBC Count : 7.68 K/uL  RBC Count : 2.94 M/uL  Hemoglobin : 8.4 g/dL  Hematocrit : 26.9 %  Platelet Count - Automated : 78 K/uL  Mean Cell Volume : 91.5 fl  Mean Cell Hemoglobin : 28.6 pg  Mean Cell Hemoglobin Concentration : 31.2 gm/dL  Auto Neutrophil # : x  Auto Lymphocyte # : x  Auto Monocyte # : x  Auto Eosinophil # : x  Auto Basophil # : x  Auto Neutrophil % : x  Auto Lymphocyte % : x  Auto Monocyte % : x  Auto Eosinophil % : x  Auto Basophil % : x    11-26    152<H>  |  113<H>  |  49<H>  ----------------------------<  134<H>  3.9   |  30  |  2.00<H>    Ca    8.6      26 Nov 2019 22:29  Phos  3.1     11-26  Mg     2.5     11-26    TPro  4.6<L>  /  Alb  2.6<L>  /  TBili  0.4  /  DBili  x   /  AST  27  /  ALT  20  /  AlkPhos  45  11-26    PT/INR - ( 26 Nov 2019 22:29 )   PT: 12.3 sec;   INR: 1.09          PTT - ( 26 Nov 2019 22:29 )  PTT:27.2 sec  The current medications were reviewed   MEDICATIONS  (STANDING):  artificial  tears Solution 1 Drop(s) Both EYES every 2 hours  aspirin enteric coated 81 milliGRAM(s) Oral daily  chlorhexidine 0.12% Liquid 15 milliLiter(s) Oral Mucosa every 12 hours  chlorhexidine 2% Cloths 1 Application(s) Topical daily  dexMEDEtomidine Infusion 0.5 MICROgram(s)/kG/Hr (10.488 mL/Hr) IV Continuous <Continuous>  dextrose 50% Injectable 50 milliLiter(s) IV Push every 15 minutes  heparin  Injectable 5000 Unit(s) SubCutaneous every 8 hours  lidocaine   Patch 1 Patch Transdermal daily  meropenem  IVPB 1000 milliGRAM(s) IV Intermittent every 12 hours  meropenem  IVPB      pantoprazole  Injectable 40 milliGRAM(s) IV Push daily  potassium chloride  20 mEq/100 mL IVPB 20 milliEquivalent(s) IV Intermittent once  propofol Infusion 5 MICROgram(s)/kG/Min (2.517 mL/Hr) IV Continuous <Continuous>  sodium chloride 0.9%. 1000 milliLiter(s) (10 mL/Hr) IV Continuous <Continuous>  tamsulosin 0.4 milliGRAM(s) Oral at bedtime    MEDICATIONS  (PRN):       PROBLEM LIST/ ASSESSMENT:  HEALTH ISSUES - PROBLEM Dx:  acute resp failure with hypoxia  hemodynamic instability  hypoxemia; A-a gradient  Fluid overloaded  Acute post H'gic anemia  Thrombocytopenia  FELY  type A dissection repair    Fever, postprocedural: Fever, postprocedural  FELY (acute kidney injury): FELY (acute kidney injury)      ,   Patient is a 69y old  Male who presents with a chief complaint of Aortic dissection (26 Nov 2019 18:25)     s/p  emergent repair of Type A Dissection: Aortic Root Replacement/Reconstruction (Biological Bentall - 23mm Magna in 32mm graft), Cabrol reconstruction of coronary ostia, Replacement of Ascending Aorta/HemiArch, Frozen Elephant trunk       acute changes include acute respiratory failure    My plan includes :  close hemodynamic, ventilatory and drain monitoring and management per post op routine    Monitor for arrhythmias and monitor parameters for organ perfusion  monitor neurologic status  Head of the bed should remain elevated to 45 deg .   chest PT and IS will be encouraged  monitor adequacy of oxygenation and ventilation and attempt to wean oxygen  Nutritional goals will be met using po eventually , ensure adequate caloric intake and montior the same  Stress ulcer and VTE prophylaxis will be achieved    Glycemic control is satisfactory  Electrolytes have been repleted as necessary and wound care has been carried out. Pain control has been achieved.   agressive physical therapy and early mobility and ambulation goals will be met   The family was updated about the course and plan  CRITICAL CARE TIME SPENT in evaluation and management, reassessments, review and interpretation of labs and x-rays, ventilator and hemodynamic management, formulating a plan and coordinating care: _30___ MIN.  Time does not include procedural time.  CTICU ATTENDING     					    Luis Loving MD

## 2019-11-26 NOTE — PROGRESS NOTE ADULT - SUBJECTIVE AND OBJECTIVE BOX
INTERVAL HPI/OVERNIGHT EVENTS:    11/23: emergent salvage AVR(Bio)/root-hemiarch replacement  EF 60%    70yo Male with Hx Ulcerative colitis, BPH/prostate surgery presenting with back pain/neck pain and weakness  and profound weakness     found profoundly hypotensive on assessment - SBP 40's with cyanosis     CT scan: Type A aortic dissection  pressors started/intubated - CT surgery was called and emergently transferred - taken to OR    to OR 11/23:   intraop: 2.5 L Crystalloid/6 U pRBC/4 FFP/10 Cryo/1000 FEIBA  arrived on Epi/levo/vaso    atel on xray - bronch performed 11/24  LA normalized  and pressors titrate down     fever noted post-op - Cx sent - started presumptively on Meropenem 11/25. ID (Zlantanic)  ongoing diuresis - lasix infusion started - held this am     remains on low dose Pepe 0.2  Bronch performed overnight for atelectatic RLL - hyperemic airways reported     tolerating enteral feeds and tolerating    PMHx includes but is not limited to: Benign prostatic hypertrophy without lower urinary tract symptoms: BPH (benign prostatic hypertrophy)  Ulcerative colitis: Ulcerative colitis  States following surgery of eye and adnexa: straightened right eye  Other postprocedural status: History of hernia repair  Hemorrhoids: Hemorrhoids  Acute appendicitis with generalized peritonitis: Ruptured appendix    ICU Vital Signs Last 24 Hrs  T(C): 38.4 (26 Nov 2019 05:01), Max: 38.4 (25 Nov 2019 14:08)  T(F): 101.1 (26 Nov 2019 05:01), Max: 101.2 (25 Nov 2019 14:08)  HR: 64 (26 Nov 2019 10:00) (64 - 78) sinus  BP: 107/62 (25 Nov 2019 17:00) (107/62 - 107/62)  BP(mean): 77 (25 Nov 2019 17:00) (77 - 77)  ABP: 102/52 (26 Nov 2019 10:00) (102/52 - 138/66)  ABP(mean): 70 (26 Nov 2019 10:00) (70 - 92)  SpO2: 96% (26 Nov 2019 10:00) (93% - 99%) Fi02 70%/PEEP 8; Juan M 15PPM  CVP 5    Qtts:   lasix qtt - held    I&O's Summary    25 Nov 2019 07:01  -  26 Nov 2019 07:00  --------------------------------------------------------  IN: 2401.8 mL / OUT: 4835 mL / NET: -2433.2 mL    26 Nov 2019 07:01  -  26 Nov 2019 10:14  --------------------------------------------------------  IN: 235.6 mL / OUT: 425 mL / NET: -189.4 mL    Vent settings: AV 12/550/70/8    Physical Exam    Heart  Lungs  Abd  Ext  Chest  Neuro  Skin    LABS:                        8.8    7.07  )-----------( 64       ( 26 Nov 2019 09:51 )             28.1     11-26    149<H>  |  111<H>  |  42<H>  ----------------------------<  124<H>  3.6   |  28  |  2.41<H>    Ca    8.6      26 Nov 2019 03:27  Phos  3.8     11-26  Mg     2.2     11-26    TPro  4.6<L>  /  Alb  2.8<L>  /  TBili  0.5  /  DBili  x   /  AST  29  /  ALT  23  /  AlkPhos  56  11-26    PT/INR - ( 26 Nov 2019 09:51 )   PT: 12.8 sec;   INR: 1.13          PTT - ( 26 Nov 2019 09:51 )  PTT:30.1 sec    ABG - ( 26 Nov 2019 09:49 )  pH, Arterial: 7.46  pH, Blood: x     /  pCO2: 38    /  pO2: 97    / HCO3: 26    / Base Excess: 2.2   /  SaO2: 97                  RADIOLOGY & ADDITIONAL STUDIES:    I have spent/provided stated minutes of critical care time to this patient: INTERVAL HPI/OVERNIGHT EVENTS:    11/23: emergent salvage AVR(Bio)/root-hemiarch replacement  EF 60%    68yo Male with Hx Ulcerative colitis, BPH/prostate surgery presenting with back pain/neck pain and weakness  and profound weakness     found profoundly hypotensive on assessment - SBP 40's with cyanosis     CT scan: Type A aortic dissection  pressors started/intubated - CT surgery was called and emergently transferred - taken to OR    to OR 11/23:   intraop: 2.5 L Crystalloid/6 U pRBC/4 FFP/10 Cryo/1000 FEIBA  arrived on Epi/levo/vaso    atel on xray - bronch performed 11/24  LA normalized  and pressors titrate down     fever noted post-op - Cx sent - started presumptively on Meropenem 11/25. ID (Zlantanic)  ongoing diuresis - lasix infusion started - held this am     remains on low dose Pepe 0.2  Bronch performed overnight for atelectatic RLL - hyperemic airways reported     tolerating enteral feeds and tolerating    PMHx includes but is not limited to: Benign prostatic hypertrophy without lower urinary tract symptoms: BPH (benign prostatic hypertrophy)  Ulcerative colitis: Ulcerative colitis  States following surgery of eye and adnexa: straightened right eye  Other postprocedural status: History of hernia repair  Hemorrhoids: Hemorrhoids  Acute appendicitis with generalized peritonitis: Ruptured appendix    ICU Vital Signs Last 24 Hrs  T(C): 38.4 (26 Nov 2019 05:01), Max: 38.4 (25 Nov 2019 14:08)  T(F): 101.1 (26 Nov 2019 05:01), Max: 101.2 (25 Nov 2019 14:08)  HR: 64 (26 Nov 2019 10:00) (64 - 78) sinus  BP: 107/62 (25 Nov 2019 17:00) (107/62 - 107/62)  BP(mean): 77 (25 Nov 2019 17:00) (77 - 77)  ABP: 102/52 (26 Nov 2019 10:00) (102/52 - 138/66)  ABP(mean): 70 (26 Nov 2019 10:00) (70 - 92)  SpO2: 96% (26 Nov 2019 10:00) (93% - 99%) Fi02 70%/PEEP 8; Juan M 15PPM  CVP 5    Qtts:   lasix qtt - held  Pepe 0.1  Precedex    I&O's Summary    25 Nov 2019 07:01  -  26 Nov 2019 07:00  --------------------------------------------------------  IN: 2401.8 mL / OUT: 4835 mL / NET: -2433.2 mL    26 Nov 2019 07:01  -  26 Nov 2019 10:14  --------------------------------------------------------  IN: 235.6 mL / OUT: 425 mL / NET: -189.4 mL    Vent settings: AV 12/550/70/8    Physical Exam    Heart - regular (-)rub/gallop  Lungs - BS appreciated bilaterally - dec BS bases - no rhonchi/wheeze  Abd - (+)BS soft NTND (-)r/r/g  Ext - warm to touch - no cyanosis/clubbing   Chest - op bandage in place - to be taken down - POD#3 - no erythema/discharge  Neuro - pupils reactive - otherwise unable at this time  Skin - no rash     LABS:                        8.8    7.07  )-----------( 64       ( 26 Nov 2019 09:51 )             28.1     11-26    149<H>  |  111<H>  |  42<H>  ----------------------------<  124<H>  3.6   |  28  |  2.41<H>    Ca    8.6      26 Nov 2019 03:27  Phos  3.8     11-26  Mg     2.2     11-26    TPro  4.6<L>  /  Alb  2.8<L>  /  TBili  0.5  /  DBili  x   /  AST  29  /  ALT  23  /  AlkPhos  56  11-26    PT/INR - ( 26 Nov 2019 09:51 )   PT: 12.8 sec;   INR: 1.13     PTT - ( 26 Nov 2019 09:51 )  PTT:30.1 sec    ABG - ( 26 Nov 2019 09:49 ) 7.46/38/97/97    RADIOLOGY & ADDITIONAL STUDIES: reviewed     Patient s/p emergent/salvage aortic dissection repair - now POD# 3    1. CV  sinus rhythm   pressors being titrated down - on minimal Pepe 0.1 at this time  LA normal 1.1    2. Pulm   persistent atelectasis RLL -   several bronch performed without improvement  plan recruitment maneuvers/PEEP valve. suctioning and repeat xray  adjust TV to lung protective strategy - dec to 550  serial ABG to optimize oxygenation and ventilation  titrate vent and Juan M as able  anticipate some chest tube removal today based on output    3. Renal       I have spent/provided stated minutes of critical care time to this patient: 90 min INTERVAL HPI/OVERNIGHT EVENTS:    11/23: emergent salvage AVR(Bio)/root-hemiarch replacement  EF 60%    70yo Male with Hx Ulcerative colitis, BPH/prostate surgery presenting with back pain/neck pain and weakness  and profound weakness     found profoundly hypotensive on assessment - SBP 40's with cyanosis     CT scan: Type A aortic dissection  pressors started/intubated - CT surgery was called and emergently transferred - taken to OR    to OR 11/23:   intraop: 2.5 L Crystalloid/6 U pRBC/4 FFP/10 Cryo/1000 FEIBA  arrived on Epi/levo/vaso    atel on xray - bronch performed 11/24  LA normalized  and pressors titrate down     fever noted post-op - Cx sent - started presumptively on Meropenem 11/25. ID (Zlantanic)  ongoing diuresis - lasix infusion started - held this am     remains on low dose Pepe 0.2  Bronch performed overnight for atelectatic RLL - hyperemic airways reported     tolerating enteral feeds and tolerating    PMHx includes but is not limited to: Benign prostatic hypertrophy without lower urinary tract symptoms: BPH (benign prostatic hypertrophy)  Ulcerative colitis: Ulcerative colitis  States following surgery of eye and adnexa: straightened right eye  Other postprocedural status: History of hernia repair  Hemorrhoids: Hemorrhoids  Acute appendicitis with generalized peritonitis: Ruptured appendix    ICU Vital Signs Last 24 Hrs  T(C): 38.4 (26 Nov 2019 05:01), Max: 38.4 (25 Nov 2019 14:08)  T(F): 101.1 (26 Nov 2019 05:01), Max: 101.2 (25 Nov 2019 14:08)  HR: 64 (26 Nov 2019 10:00) (64 - 78) sinus  BP: 107/62 (25 Nov 2019 17:00) (107/62 - 107/62)  BP(mean): 77 (25 Nov 2019 17:00) (77 - 77)  ABP: 102/52 (26 Nov 2019 10:00) (102/52 - 138/66)  ABP(mean): 70 (26 Nov 2019 10:00) (70 - 92)  SpO2: 96% (26 Nov 2019 10:00) (93% - 99%) Fi02 70%/PEEP 8; Juan M 15PPM  CVP 5    Qtts:   lasix qtt - held  Pepe 0.1  Precedex    I&O's Summary    25 Nov 2019 07:01  -  26 Nov 2019 07:00  --------------------------------------------------------  IN: 2401.8 mL / OUT: 4835 mL / NET: -2433.2 mL    26 Nov 2019 07:01  -  26 Nov 2019 10:14  --------------------------------------------------------  IN: 235.6 mL / OUT: 425 mL / NET: -189.4 mL    Vent settings: AV 12/550/70/8    Physical Exam    Heart - regular (-)rub/gallop  Lungs - BS appreciated bilaterally - dec BS bases - no rhonchi/wheeze  Abd - (+)BS soft NTND (-)r/r/g  Ext - warm to touch - no cyanosis/clubbing   Chest - op bandage in place - to be taken down - POD#3 - no erythema/discharge  Neuro - pupils reactive - otherwise unable at this time  Skin - no rash     LABS:                        8.8    7.07  )-----------( 64       ( 26 Nov 2019 09:51 )             28.1     11-26    149<H>  |  111<H>  |  42<H>  ----------------------------<  124<H>  3.6   |  28  |  2.41<H>    Ca    8.6      26 Nov 2019 03:27  Phos  3.8     11-26  Mg     2.2     11-26    TPro  4.6<L>  /  Alb  2.8<L>  /  TBili  0.5  /  DBili  x   /  AST  29  /  ALT  23  /  AlkPhos  56  11-26    PT/INR - ( 26 Nov 2019 09:51 )   PT: 12.8 sec;   INR: 1.13     PTT - ( 26 Nov 2019 09:51 )  PTT:30.1 sec    ABG - ( 26 Nov 2019 09:49 ) 7.46/38/97/97    RADIOLOGY & ADDITIONAL STUDIES: reviewed   Hep C NR    Patient s/p emergent/salvage aortic dissection repair - now POD# 3    1. CV  sinus rhythm   pressors being titrated down - on minimal Pepe 0.1 at this time - shut off - maintain MAP 65; SBP less than 120  LA normal 1.1    2. Pulm   persistent atelectasis RLL -   several bronch performed without improvement  plan recruitment maneuvers/PEEP valve. suctioning and repeat xray  adjust TV to lung protective strategy - dec to 550  serial ABG to optimize oxygenation and ventilation  titrate vent and Juan M as able  anticipate some chest tube removal today based on output    3. Renal   emergent type A repair  Cr 2.16  lasix infusion held this am (2.2 L negative)  CVP 5 and hypernatremia 151 - start free water  monitor UO/Lytes and Cr   CTa Abd 11/23: Right renal artery: Originates from the false lumen, diminished opacification. No occlusive thrombus versus dissection extending into the origin. Left renal artery: Originates from the true lumen and patent.    4. Neuro  post-op assessment - responsive and following simple commands  non-focal    5. ID  fever noted  ID following   meropenem presumptively started pending culture results in light of temp - WBC 7  sputum Cx 11/25 - polymicrobial - may herald normal jazmín - f/u Cx    DVT and GI prophylaxis    d/w family present at bedside; staff; renal attending; ID attending and CTS    I have spent/provided stated minutes of critical care time to this patient: 90 min

## 2019-11-26 NOTE — PROGRESS NOTE ADULT - ASSESSMENT
69 year old male, with PMHx of colitis, prostate surgery @ [facility], BIBA to [facility] ED complaining of back pain and profound hypotension, emergently brought to the OR for salvage Type A dissection repair ,s/p AVR, aortic arch repair and ascending arch replacement, now with fever, susp Aspiration pneumonia

## 2019-11-27 LAB
-  AMPICILLIN/SULBACTAM: SIGNIFICANT CHANGE UP
-  AMPICILLIN: SIGNIFICANT CHANGE UP
-  CEFAZOLIN: SIGNIFICANT CHANGE UP
-  CEFTRIAXONE: SIGNIFICANT CHANGE UP
-  GENTAMICIN: SIGNIFICANT CHANGE UP
-  PIPERACILLIN/TAZOBACTAM: SIGNIFICANT CHANGE UP
-  TOBRAMYCIN: SIGNIFICANT CHANGE UP
-  TRIMETHOPRIM/SULFAMETHOXAZOLE: SIGNIFICANT CHANGE UP
ALBUMIN SERPL ELPH-MCNC: 2.7 G/DL — LOW (ref 3.3–5)
ALBUMIN SERPL ELPH-MCNC: 2.7 G/DL — LOW (ref 3.3–5)
ALBUMIN SERPL ELPH-MCNC: 2.8 G/DL — LOW (ref 3.3–5)
ALP SERPL-CCNC: 48 U/L — SIGNIFICANT CHANGE UP (ref 40–120)
ALP SERPL-CCNC: 49 U/L — SIGNIFICANT CHANGE UP (ref 40–120)
ALP SERPL-CCNC: 49 U/L — SIGNIFICANT CHANGE UP (ref 40–120)
ALT FLD-CCNC: 23 U/L — SIGNIFICANT CHANGE UP (ref 10–45)
ALT FLD-CCNC: 23 U/L — SIGNIFICANT CHANGE UP (ref 10–45)
ALT FLD-CCNC: 25 U/L — SIGNIFICANT CHANGE UP (ref 10–45)
ANION GAP SERPL CALC-SCNC: 9 MMOL/L — SIGNIFICANT CHANGE UP (ref 5–17)
APTT BLD: 25.9 SEC — LOW (ref 27.5–36.3)
APTT BLD: 27.2 SEC — LOW (ref 27.5–36.3)
APTT BLD: 28.5 SEC — SIGNIFICANT CHANGE UP (ref 27.5–36.3)
AST SERPL-CCNC: 28 U/L — SIGNIFICANT CHANGE UP (ref 10–40)
AST SERPL-CCNC: 29 U/L — SIGNIFICANT CHANGE UP (ref 10–40)
AST SERPL-CCNC: 30 U/L — SIGNIFICANT CHANGE UP (ref 10–40)
BASE EXCESS BLDA CALC-SCNC: 0.5 MMOL/L — SIGNIFICANT CHANGE UP (ref -2–3)
BASE EXCESS BLDA CALC-SCNC: 2.6 MMOL/L — SIGNIFICANT CHANGE UP (ref -2–3)
BILIRUB SERPL-MCNC: 0.4 MG/DL — SIGNIFICANT CHANGE UP (ref 0.2–1.2)
BILIRUB SERPL-MCNC: 0.4 MG/DL — SIGNIFICANT CHANGE UP (ref 0.2–1.2)
BILIRUB SERPL-MCNC: 0.5 MG/DL — SIGNIFICANT CHANGE UP (ref 0.2–1.2)
BUN SERPL-MCNC: 47 MG/DL — HIGH (ref 7–23)
BUN SERPL-MCNC: 47 MG/DL — HIGH (ref 7–23)
BUN SERPL-MCNC: 49 MG/DL — HIGH (ref 7–23)
CALCIUM SERPL-MCNC: 8.2 MG/DL — LOW (ref 8.4–10.5)
CALCIUM SERPL-MCNC: 8.3 MG/DL — LOW (ref 8.4–10.5)
CALCIUM SERPL-MCNC: 8.6 MG/DL — SIGNIFICANT CHANGE UP (ref 8.4–10.5)
CHLORIDE SERPL-SCNC: 111 MMOL/L — HIGH (ref 96–108)
CHLORIDE SERPL-SCNC: 114 MMOL/L — HIGH (ref 96–108)
CHLORIDE SERPL-SCNC: 115 MMOL/L — HIGH (ref 96–108)
CO2 SERPL-SCNC: 27 MMOL/L — SIGNIFICANT CHANGE UP (ref 22–31)
CO2 SERPL-SCNC: 29 MMOL/L — SIGNIFICANT CHANGE UP (ref 22–31)
CO2 SERPL-SCNC: 30 MMOL/L — SIGNIFICANT CHANGE UP (ref 22–31)
CREAT SERPL-MCNC: 1.68 MG/DL — HIGH (ref 0.5–1.3)
CREAT SERPL-MCNC: 1.75 MG/DL — HIGH (ref 0.5–1.3)
CREAT SERPL-MCNC: 1.99 MG/DL — HIGH (ref 0.5–1.3)
CULTURE RESULTS: SIGNIFICANT CHANGE UP
GAS PNL BLDA: SIGNIFICANT CHANGE UP
GLUCOSE SERPL-MCNC: 130 MG/DL — HIGH (ref 70–99)
GLUCOSE SERPL-MCNC: 135 MG/DL — HIGH (ref 70–99)
GLUCOSE SERPL-MCNC: 143 MG/DL — HIGH (ref 70–99)
HCO3 BLDA-SCNC: 24 MMOL/L — SIGNIFICANT CHANGE UP (ref 21–28)
HCO3 BLDA-SCNC: 27 MMOL/L — SIGNIFICANT CHANGE UP (ref 21–28)
HCT VFR BLD CALC: 29.3 % — LOW (ref 39–50)
HCT VFR BLD CALC: 29.4 % — LOW (ref 39–50)
HCT VFR BLD CALC: 29.8 % — LOW (ref 39–50)
HGB BLD-MCNC: 8.8 G/DL — LOW (ref 13–17)
HGB BLD-MCNC: 8.9 G/DL — LOW (ref 13–17)
HGB BLD-MCNC: 9 G/DL — LOW (ref 13–17)
INR BLD: 0.99 — SIGNIFICANT CHANGE UP (ref 0.88–1.16)
INR BLD: 1.03 — SIGNIFICANT CHANGE UP (ref 0.88–1.16)
INR BLD: 1.07 — SIGNIFICANT CHANGE UP (ref 0.88–1.16)
LACTATE SERPL-SCNC: 0.8 MMOL/L — SIGNIFICANT CHANGE UP (ref 0.5–2)
LACTATE SERPL-SCNC: 0.8 MMOL/L — SIGNIFICANT CHANGE UP (ref 0.5–2)
LACTATE SERPL-SCNC: 1.1 MMOL/L — SIGNIFICANT CHANGE UP (ref 0.5–2)
MAGNESIUM SERPL-MCNC: 2.4 MG/DL — SIGNIFICANT CHANGE UP (ref 1.6–2.6)
MAGNESIUM SERPL-MCNC: 2.5 MG/DL — SIGNIFICANT CHANGE UP (ref 1.6–2.6)
MAGNESIUM SERPL-MCNC: 2.5 MG/DL — SIGNIFICANT CHANGE UP (ref 1.6–2.6)
MCHC RBC-ENTMCNC: 27.7 PG — SIGNIFICANT CHANGE UP (ref 27–34)
MCHC RBC-ENTMCNC: 27.8 PG — SIGNIFICANT CHANGE UP (ref 27–34)
MCHC RBC-ENTMCNC: 27.9 PG — SIGNIFICANT CHANGE UP (ref 27–34)
MCHC RBC-ENTMCNC: 29.5 GM/DL — LOW (ref 32–36)
MCHC RBC-ENTMCNC: 30.3 GM/DL — LOW (ref 32–36)
MCHC RBC-ENTMCNC: 30.7 GM/DL — LOW (ref 32–36)
MCV RBC AUTO: 90.7 FL — SIGNIFICANT CHANGE UP (ref 80–100)
MCV RBC AUTO: 91.9 FL — SIGNIFICANT CHANGE UP (ref 80–100)
MCV RBC AUTO: 93.7 FL — SIGNIFICANT CHANGE UP (ref 80–100)
METHOD TYPE: SIGNIFICANT CHANGE UP
NRBC # BLD: 0 /100 WBCS — SIGNIFICANT CHANGE UP (ref 0–0)
ORGANISM # SPEC MICROSCOPIC CNT: SIGNIFICANT CHANGE UP
ORGANISM # SPEC MICROSCOPIC CNT: SIGNIFICANT CHANGE UP
PCO2 BLDA: 35 MMHG — SIGNIFICANT CHANGE UP (ref 35–48)
PCO2 BLDA: 38 MMHG — SIGNIFICANT CHANGE UP (ref 35–48)
PH BLDA: 7.46 — HIGH (ref 7.35–7.45)
PH BLDA: 7.46 — HIGH (ref 7.35–7.45)
PHOSPHATE SERPL-MCNC: 2.9 MG/DL — SIGNIFICANT CHANGE UP (ref 2.5–4.5)
PHOSPHATE SERPL-MCNC: 3 MG/DL — SIGNIFICANT CHANGE UP (ref 2.5–4.5)
PHOSPHATE SERPL-MCNC: 3 MG/DL — SIGNIFICANT CHANGE UP (ref 2.5–4.5)
PLATELET # BLD AUTO: 101 K/UL — LOW (ref 150–400)
PLATELET # BLD AUTO: 94 K/UL — LOW (ref 150–400)
PLATELET # BLD AUTO: 98 K/UL — LOW (ref 150–400)
PO2 BLDA: 65 MMHG — LOW (ref 83–108)
PO2 BLDA: 80 MMHG — LOW (ref 83–108)
POTASSIUM SERPL-MCNC: 3.5 MMOL/L — SIGNIFICANT CHANGE UP (ref 3.5–5.3)
POTASSIUM SERPL-MCNC: 3.6 MMOL/L — SIGNIFICANT CHANGE UP (ref 3.5–5.3)
POTASSIUM SERPL-MCNC: 3.8 MMOL/L — SIGNIFICANT CHANGE UP (ref 3.5–5.3)
POTASSIUM SERPL-SCNC: 3.5 MMOL/L — SIGNIFICANT CHANGE UP (ref 3.5–5.3)
POTASSIUM SERPL-SCNC: 3.6 MMOL/L — SIGNIFICANT CHANGE UP (ref 3.5–5.3)
POTASSIUM SERPL-SCNC: 3.8 MMOL/L — SIGNIFICANT CHANGE UP (ref 3.5–5.3)
PROT SERPL-MCNC: 4.8 G/DL — LOW (ref 6–8.3)
PROT SERPL-MCNC: 4.8 G/DL — LOW (ref 6–8.3)
PROT SERPL-MCNC: 5 G/DL — LOW (ref 6–8.3)
PROTHROM AB SERPL-ACNC: 11.2 SEC — SIGNIFICANT CHANGE UP (ref 10–12.9)
PROTHROM AB SERPL-ACNC: 11.6 SEC — SIGNIFICANT CHANGE UP (ref 10–12.9)
PROTHROM AB SERPL-ACNC: 12.1 SEC — SIGNIFICANT CHANGE UP (ref 10–12.9)
RBC # BLD: 3.18 M/UL — LOW (ref 4.2–5.8)
RBC # BLD: 3.2 M/UL — LOW (ref 4.2–5.8)
RBC # BLD: 3.23 M/UL — LOW (ref 4.2–5.8)
RBC # FLD: 16 % — HIGH (ref 10.3–14.5)
RBC # FLD: 16.1 % — HIGH (ref 10.3–14.5)
RBC # FLD: 16.2 % — HIGH (ref 10.3–14.5)
SAO2 % BLDA: 92 % — LOW (ref 95–100)
SAO2 % BLDA: 95 % — SIGNIFICANT CHANGE UP (ref 95–100)
SODIUM SERPL-SCNC: 150 MMOL/L — HIGH (ref 135–145)
SODIUM SERPL-SCNC: 150 MMOL/L — HIGH (ref 135–145)
SODIUM SERPL-SCNC: 153 MMOL/L — HIGH (ref 135–145)
SPECIMEN SOURCE: SIGNIFICANT CHANGE UP
VANCOMYCIN FLD-MCNC: 11.9 UG/ML — SIGNIFICANT CHANGE UP
WBC # BLD: 8.98 K/UL — SIGNIFICANT CHANGE UP (ref 3.8–10.5)
WBC # BLD: 9.85 K/UL — SIGNIFICANT CHANGE UP (ref 3.8–10.5)
WBC # BLD: 9.89 K/UL — SIGNIFICANT CHANGE UP (ref 3.8–10.5)
WBC # FLD AUTO: 8.98 K/UL — SIGNIFICANT CHANGE UP (ref 3.8–10.5)
WBC # FLD AUTO: 9.85 K/UL — SIGNIFICANT CHANGE UP (ref 3.8–10.5)
WBC # FLD AUTO: 9.89 K/UL — SIGNIFICANT CHANGE UP (ref 3.8–10.5)

## 2019-11-27 PROCEDURE — 99233 SBSQ HOSP IP/OBS HIGH 50: CPT

## 2019-11-27 PROCEDURE — 99292 CRITICAL CARE ADDL 30 MIN: CPT

## 2019-11-27 PROCEDURE — 93971 EXTREMITY STUDY: CPT | Mod: 26,LT

## 2019-11-27 PROCEDURE — 99291 CRITICAL CARE FIRST HOUR: CPT

## 2019-11-27 PROCEDURE — 71045 X-RAY EXAM CHEST 1 VIEW: CPT | Mod: 26

## 2019-11-27 PROCEDURE — 99232 SBSQ HOSP IP/OBS MODERATE 35: CPT | Mod: GC

## 2019-11-27 RX ORDER — ALBUMIN HUMAN 25 %
50 VIAL (ML) INTRAVENOUS ONCE
Refills: 0 | Status: COMPLETED | OUTPATIENT
Start: 2019-11-27 | End: 2019-11-27

## 2019-11-27 RX ORDER — PROPOFOL 10 MG/ML
5 INJECTION, EMULSION INTRAVENOUS
Qty: 1000 | Refills: 0 | Status: DISCONTINUED | OUTPATIENT
Start: 2019-11-27 | End: 2019-11-28

## 2019-11-27 RX ORDER — FENTANYL CITRATE 50 UG/ML
25 INJECTION INTRAVENOUS ONCE
Refills: 0 | Status: DISCONTINUED | OUTPATIENT
Start: 2019-11-27 | End: 2019-11-27

## 2019-11-27 RX ORDER — ACETAZOLAMIDE 250 MG/1
250 TABLET ORAL
Refills: 0 | Status: DISCONTINUED | OUTPATIENT
Start: 2019-11-27 | End: 2019-11-30

## 2019-11-27 RX ORDER — POTASSIUM CHLORIDE 20 MEQ
20 PACKET (EA) ORAL ONCE
Refills: 0 | Status: COMPLETED | OUTPATIENT
Start: 2019-11-27 | End: 2019-11-27

## 2019-11-27 RX ORDER — PHENYLEPHRINE HYDROCHLORIDE 10 MG/ML
0.5 INJECTION INTRAVENOUS
Qty: 160 | Refills: 0 | Status: DISCONTINUED | OUTPATIENT
Start: 2019-11-27 | End: 2019-11-29

## 2019-11-27 RX ORDER — ACETAMINOPHEN 500 MG
1000 TABLET ORAL ONCE
Refills: 0 | Status: COMPLETED | OUTPATIENT
Start: 2019-11-27 | End: 2019-11-27

## 2019-11-27 RX ORDER — POTASSIUM CHLORIDE 20 MEQ
20 PACKET (EA) ORAL
Refills: 0 | Status: COMPLETED | OUTPATIENT
Start: 2019-11-27 | End: 2019-11-28

## 2019-11-27 RX ORDER — FUROSEMIDE 40 MG
20 TABLET ORAL ONCE
Refills: 0 | Status: COMPLETED | OUTPATIENT
Start: 2019-11-27 | End: 2019-11-27

## 2019-11-27 RX ADMIN — Medication 400 MILLIGRAM(S): at 22:37

## 2019-11-27 RX ADMIN — Medication 1 DROP(S): at 00:07

## 2019-11-27 RX ADMIN — CHLORHEXIDINE GLUCONATE 15 MILLILITER(S): 213 SOLUTION TOPICAL at 06:24

## 2019-11-27 RX ADMIN — Medication 81 MILLIGRAM(S): at 11:38

## 2019-11-27 RX ADMIN — CHLORHEXIDINE GLUCONATE 1 APPLICATION(S): 213 SOLUTION TOPICAL at 06:25

## 2019-11-27 RX ADMIN — FENTANYL CITRATE 25 MICROGRAM(S): 50 INJECTION INTRAVENOUS at 00:00

## 2019-11-27 RX ADMIN — Medication 20 MILLIGRAM(S): at 16:30

## 2019-11-27 RX ADMIN — Medication 1 DROP(S): at 21:02

## 2019-11-27 RX ADMIN — Medication 50 MILLILITER(S): at 21:16

## 2019-11-27 RX ADMIN — LIDOCAINE 1 PATCH: 4 CREAM TOPICAL at 06:18

## 2019-11-27 RX ADMIN — Medication 1 DROP(S): at 02:30

## 2019-11-27 RX ADMIN — Medication 1000 MILLIGRAM(S): at 23:08

## 2019-11-27 RX ADMIN — ACETAZOLAMIDE 105 MILLIGRAM(S): 250 TABLET ORAL at 19:17

## 2019-11-27 RX ADMIN — DEXMEDETOMIDINE HYDROCHLORIDE IN 0.9% SODIUM CHLORIDE 10.49 MICROGRAM(S)/KG/HR: 4 INJECTION INTRAVENOUS at 00:03

## 2019-11-27 RX ADMIN — LIDOCAINE 1 PATCH: 4 CREAM TOPICAL at 11:38

## 2019-11-27 RX ADMIN — HEPARIN SODIUM 5000 UNIT(S): 5000 INJECTION INTRAVENOUS; SUBCUTANEOUS at 14:12

## 2019-11-27 RX ADMIN — PANTOPRAZOLE SODIUM 40 MILLIGRAM(S): 20 TABLET, DELAYED RELEASE ORAL at 11:35

## 2019-11-27 RX ADMIN — Medication 1 DROP(S): at 10:00

## 2019-11-27 RX ADMIN — Medication 1 DROP(S): at 16:09

## 2019-11-27 RX ADMIN — MEROPENEM 100 MILLIGRAM(S): 1 INJECTION INTRAVENOUS at 11:36

## 2019-11-27 RX ADMIN — Medication 100 MILLIEQUIVALENT(S): at 03:51

## 2019-11-27 RX ADMIN — TAMSULOSIN HYDROCHLORIDE 0.4 MILLIGRAM(S): 0.4 CAPSULE ORAL at 21:02

## 2019-11-27 RX ADMIN — Medication 50 MILLILITER(S): at 21:11

## 2019-11-27 RX ADMIN — CHLORHEXIDINE GLUCONATE 15 MILLILITER(S): 213 SOLUTION TOPICAL at 17:41

## 2019-11-27 RX ADMIN — Medication 100 MILLIEQUIVALENT(S): at 02:30

## 2019-11-27 RX ADMIN — LIDOCAINE 1 PATCH: 4 CREAM TOPICAL at 23:08

## 2019-11-27 RX ADMIN — Medication 1 DROP(S): at 08:13

## 2019-11-27 RX ADMIN — PHENYLEPHRINE HYDROCHLORIDE 7.87 MICROGRAM(S)/KG/MIN: 10 INJECTION INTRAVENOUS at 21:29

## 2019-11-27 RX ADMIN — HEPARIN SODIUM 5000 UNIT(S): 5000 INJECTION INTRAVENOUS; SUBCUTANEOUS at 21:02

## 2019-11-27 RX ADMIN — Medication 50 MILLIEQUIVALENT(S): at 23:09

## 2019-11-27 RX ADMIN — Medication 1 DROP(S): at 20:45

## 2019-11-27 RX ADMIN — LIDOCAINE 1 PATCH: 4 CREAM TOPICAL at 19:15

## 2019-11-27 RX ADMIN — DEXMEDETOMIDINE HYDROCHLORIDE IN 0.9% SODIUM CHLORIDE 10.49 MICROGRAM(S)/KG/HR: 4 INJECTION INTRAVENOUS at 19:17

## 2019-11-27 RX ADMIN — Medication 4 MILLIGRAM(S): at 19:16

## 2019-11-27 RX ADMIN — Medication 100 MILLIEQUIVALENT(S): at 14:11

## 2019-11-27 RX ADMIN — Medication 1 DROP(S): at 14:11

## 2019-11-27 RX ADMIN — Medication 1 DROP(S): at 11:33

## 2019-11-27 RX ADMIN — ACETAZOLAMIDE 105 MILLIGRAM(S): 250 TABLET ORAL at 06:24

## 2019-11-27 RX ADMIN — FENTANYL CITRATE 25 MICROGRAM(S): 50 INJECTION INTRAVENOUS at 05:27

## 2019-11-27 RX ADMIN — MEROPENEM 100 MILLIGRAM(S): 1 INJECTION INTRAVENOUS at 00:07

## 2019-11-27 RX ADMIN — FENTANYL CITRATE 25 MICROGRAM(S): 50 INJECTION INTRAVENOUS at 04:57

## 2019-11-27 RX ADMIN — Medication 1 DROP(S): at 23:30

## 2019-11-27 RX ADMIN — LIDOCAINE 1 PATCH: 4 CREAM TOPICAL at 08:00

## 2019-11-27 RX ADMIN — Medication 1 DROP(S): at 06:25

## 2019-11-27 RX ADMIN — HEPARIN SODIUM 5000 UNIT(S): 5000 INJECTION INTRAVENOUS; SUBCUTANEOUS at 06:25

## 2019-11-27 RX ADMIN — Medication 1 DROP(S): at 17:41

## 2019-11-27 RX ADMIN — Medication 1 DROP(S): at 04:53

## 2019-11-27 RX ADMIN — DEXMEDETOMIDINE HYDROCHLORIDE IN 0.9% SODIUM CHLORIDE 10.49 MICROGRAM(S)/KG/HR: 4 INJECTION INTRAVENOUS at 14:11

## 2019-11-27 NOTE — PROGRESS NOTE ADULT - ASSESSMENT
69 year old male, with PMHx of colitis, prostate surgery @ [facility], BIBA to [facility] ED complaining of back pain and profound hypotension, emergently brought to the OR for salvage Type A dissection repair ,s/p AVR, aortic arch repair and ascending arch replacement, now with fever, susp Aspiration pneumonia, Sputum culture grew Klebsiella

## 2019-11-27 NOTE — DIETITIAN INITIAL EVALUATION ADULT. - ADD RECOMMEND
Recommend Jevity 1.5 @47ml/hr +prostat 3x/day via NGT (1128ml TV, 1992kcal, 116g pro, 857ml FW). Monitor for s/s intolerance.Maintain aspiration precautions at all times. If able to safely extubate, perform dysphagia screen vs formal S&S prior to PO. Please update height in EMR.

## 2019-11-27 NOTE — DIETITIAN INITIAL EVALUATION ADULT. - OTHER INFO
68yo M w/ PMH ulcerative colitis, BIBA to [facility] w/ c/o back pain and profound hypotension. Was emergently taken to the OR for salvage Type A dissection repair. Pt now s/p aortic arch repair and ascending arch replacement, Cabrol reconstruction of coronary ostia, replacement of ascending aorta/maynor arch, and frozen elephant trunk (11/23). Nephrology following for FELY likely perfusion related- including ATN in the setting of cardiogenic shock. Pt seen in room, intubated on CMV mode. Propofol d/c'd this am, started on precedex gtt. Requiring Pepe for for BP support- MAP 92. Tubefeeds running at ordered goal, receiving Glucerna 1.2 @40ml/hr x24hrs via NGT. Pt is tolerating feeds well per RN, however has yet to have a post-op BM. No weight documented in EMR, RN plans to ask Wife later today. Estimated height: 5'10" to be used for EN calculations. No family at bedside- unable to obtain subjective information at this time. Skin: beth score 14, MSI; GI: distended, no BM per RN. RD to follow. See EN recs below.

## 2019-11-27 NOTE — PROGRESS NOTE ADULT - SUBJECTIVE AND OBJECTIVE BOX
O/N Events: SAFIA  Subjective:  off kenya, continued to have great UOP off diuretics ~ 3.2 L/ FB -1.2 L for the past 24 hr  becoming more hypernatremic SNa 150, currently on 60 % FiO2, awake and alert, follows commands   edema in improving     VITALS  Vital Signs Last 24 Hrs  T(C): 37.2 (27 Nov 2019 13:34), Max: 37.7 (27 Nov 2019 01:01)  T(F): 99 (27 Nov 2019 13:34), Max: 99.8 (27 Nov 2019 01:01)  HR: 64 (27 Nov 2019 13:00) (62 - 78)  BP: 112/56 mmhg  MAP: 72 mmhg  RR: 20 (27 Nov 2019 13:00) (12 - 20)  SpO2: 96% (27 Nov 2019 13:00) (92% - 99%)    PHYSICAL EXAM  General: intubated, off sedation awake  Neck: enlarged neck habitus hard to assess for JVP  Cardiac: S1, S2. RRR. No murmurs   Respiratory: coarse breathing sounds diffuse, anterior filed, blakes x 3 present   Abdomen: soft, distended, non tympanic   Extremities: no LE edema b/l, UE edema improving   Neuro: alert and awake, eyes open, follows commands, AVALOS    MEDICATIONS  (STANDING):  acetaZOLAMIDE  IVPB 250 milliGRAM(s) IV Intermittent two times a day  artificial  tears Solution 1 Drop(s) Both EYES every 2 hours  aspirin enteric coated 81 milliGRAM(s) Oral daily  chlorhexidine 0.12% Liquid 15 milliLiter(s) Oral Mucosa every 12 hours  chlorhexidine 2% Cloths 1 Application(s) Topical daily  dexMEDEtomidine Infusion 0.5 MICROgram(s)/kG/Hr (10.488 mL/Hr) IV Continuous <Continuous>  dextrose 50% Injectable 50 milliLiter(s) IV Push every 15 minutes  heparin  Injectable 5000 Unit(s) SubCutaneous every 8 hours  lidocaine   Patch 1 Patch Transdermal daily  meropenem  IVPB 1000 milliGRAM(s) IV Intermittent every 12 hours  meropenem  IVPB      pantoprazole  Injectable 40 milliGRAM(s) IV Push daily  phenylephrine    Infusion 0.5 MICROgram(s)/kG/Min (7.866 mL/Hr) IV Continuous <Continuous>  potassium chloride  20 mEq/100 mL IVPB 20 milliEquivalent(s) IV Intermittent once  propofol Infusion 5 MICROgram(s)/kG/Min (2.517 mL/Hr) IV Continuous <Continuous>  sodium chloride 0.9%. 1000 milliLiter(s) (10 mL/Hr) IV Continuous <Continuous>  tamsulosin 0.4 milliGRAM(s) Oral at bedtime    MEDICATIONS  (PRN):      LABS                        8.9    8.98  )-----------( 94       ( 27 Nov 2019 12:36 )             29.4     11-27    153<H>  |  115<H>  |  47<H>  ----------------------------<  143<H>  3.5   |  29  |  1.75<H>    Ca    8.2<L>      27 Nov 2019 12:36  Phos  2.9     11-27  Mg     2.5     11-27    TPro  4.8<L>  /  Alb  2.7<L>  /  TBili  0.4  /  DBili  x   /  AST  29  /  ALT  23  /  AlkPhos  48  11-27    LIVER FUNCTIONS - ( 27 Nov 2019 12:36 )  Alb: 2.7 g/dL / Pro: 4.8 g/dL / ALK PHOS: 48 U/L / ALT: 23 U/L / AST: 29 U/L / GGT: x           PT/INR - ( 27 Nov 2019 12:36 )   PT: 11.2 sec;   INR: 0.99          PTT - ( 27 Nov 2019 12:36 )  PTT:25.9 sec

## 2019-11-27 NOTE — PROGRESS NOTE ADULT - ASSESSMENT
A/p  70 y/o M with PMHx of Ulcerative colitis BIBA to [facility] ED complaining of back pain and profound hypotension, emergently brought to the OR for salvage Type A dissection repair  s/p aortic arch repair and ascending arch replacement. nephrology consulted for FELY

## 2019-11-27 NOTE — PROGRESS NOTE ADULT - SUBJECTIVE AND OBJECTIVE BOX
CTICU  CRITICAL  CARE  attending     Hand off received 					   Pertinent clinical, laboratory, radiographic, hemodynamic, echocardiographic, respiratory data, microbiologic data and chart were reviewed and analyzed frequently throughout the course of the day and night  Patient seen and examined with CTS/ SH attending at bedside  Pt is a 69y , Male, HEALTH ISSUES - PROBLEM Dx:  Fever, postprocedural: Fever, postprocedural  FELY (acute kidney injury): FELY (acute kidney injury)      , FAMILY HISTORY:  PAST MEDICAL & SURGICAL HISTORY:  Benign prostatic hypertrophy without lower urinary tract symptoms: BPH (benign prostatic hypertrophy)  Ulcerative colitis: Ulcerative colitis  States following surgery of eye and adnexa: straightened right eye  Other postprocedural status: History of hernia repair  Hemorrhoids: Hemorrhoids  Acute appendicitis with generalized peritonitis: Ruptured appendix    Patient is a 69y old  Male who presents with a chief complaint of Aortic dissection (2019 13:54)      14 system review was unremarkable    Vital signs, hemodynamic and respiratory parameters were reviewed from the bedside nursing flowsheet.  ICU Vital Signs Last 24 Hrs  T(C): 37.7 (2019 17:34), Max: 37.7 (2019 01:01)  T(F): 99.8 (2019 17:34), Max: 99.8 (2019 01:01)  HR: 72 (2019 20:00) (62 - 78)  BP: --  BP(mean): --  ABP: 122/58 (2019 20:00) (96/50 - 144/66)  ABP(mean): 80 (2019 20:00) (68 - 94)  RR: 16 (2019 20:00) (15 - 20)  SpO2: 90% (2019 20:00) (89% - 99%)    Adult Advanced Hemodynamics Last 24 Hrs  CVP(mm Hg): 4 (2019 20:00) (2 - 11)  CVP(cm H2O): --  CO: --  CI: --  PA: --  PA(mean): --  PCWP: --  SVR: --  SVRI: --  PVR: --  PVRI: --, ABG - ( 2019 20:43 )  pH, Arterial: 7.47  pH, Blood: x     /  pCO2: 37    /  pO2: 66    / HCO3: 26    / Base Excess: 2.5   /  SaO2: 93                Mode: AC/ CMV (Assist Control/ Continuous Mandatory Ventilation)  RR (machine): 12  TV (machine): 650  FiO2: 60  PEEP: 8  ITime: 1.4  MAP: 11  PIP: 18    Intake and output was reviewed and the fluid balance was calculated  Daily     Daily Weight in k.2 (2019 10:17)  I&O's Summary    2019 07:  -  2019 07:00  --------------------------------------------------------  IN: 2254.7 mL / OUT: 3505 mL / NET: -1250.3 mL    2019 07:  -  2019 20:46  --------------------------------------------------------  IN: 1424.7 mL / OUT: 2375 mL / NET: -950.3 mL        All lines and drain sites were assessed  [facility]      POCT Blood Glucose.: 142 mg/dL (2019 06:32)    No acute change in mental status  Auscultation of the chest reveals equal bs  Abdomen is soft  Extremities are warm and well perfused  Wounds appear clean and unremarkable  Antibiotics are periop    labs  CBC Full  -  ( 2019 12:36 )  WBC Count : 8.98 K/uL  RBC Count : 3.20 M/uL  Hemoglobin : 8.9 g/dL  Hematocrit : 29.4 %  Platelet Count - Automated : 94 K/uL  Mean Cell Volume : 91.9 fl  Mean Cell Hemoglobin : 27.8 pg  Mean Cell Hemoglobin Concentration : 30.3 gm/dL  Auto Neutrophil # : x  Auto Lymphocyte # : x  Auto Monocyte # : x  Auto Eosinophil # : x  Auto Basophil # : x  Auto Neutrophil % : x  Auto Lymphocyte % : x  Auto Monocyte % : x  Auto Eosinophil % : x  Auto Basophil % : x        153<H>  |  115<H>  |  47<H>  ----------------------------<  143<H>  3.5   |  29  |  1.75<H>    Ca    8.2<L>      2019 12:36  Phos  2.9       Mg     2.5         TPro  4.8<L>  /  Alb  2.7<L>  /  TBili  0.4  /  DBili  x   /  AST  29  /  ALT  23  /  AlkPhos  48  11-    PT/INR - ( 2019 12:36 )   PT: 11.2 sec;   INR: 0.99          PTT - ( 2019 12:36 )  PTT:25.9 sec  The current medications were reviewed   MEDICATIONS  (STANDING):  acetaZOLAMIDE  IVPB 250 milliGRAM(s) IV Intermittent two times a day  artificial  tears Solution 1 Drop(s) Both EYES every 2 hours  aspirin enteric coated 81 milliGRAM(s) Oral daily  chlorhexidine 0.12% Liquid 15 milliLiter(s) Oral Mucosa every 12 hours  chlorhexidine 2% Cloths 1 Application(s) Topical daily  dexMEDEtomidine Infusion 0.5 MICROgram(s)/kG/Hr (10.488 mL/Hr) IV Continuous <Continuous>  dextrose 50% Injectable 50 milliLiter(s) IV Push every 15 minutes  heparin  Injectable 5000 Unit(s) SubCutaneous every 8 hours  lidocaine   Patch 1 Patch Transdermal daily  meropenem  IVPB 1000 milliGRAM(s) IV Intermittent every 12 hours  meropenem  IVPB      pantoprazole  Injectable 40 milliGRAM(s) IV Push daily  phenylephrine    Infusion 0.5 MICROgram(s)/kG/Min (7.866 mL/Hr) IV Continuous <Continuous>  propofol Infusion 5 MICROgram(s)/kG/Min (2.517 mL/Hr) IV Continuous <Continuous>  sodium chloride 0.9%. 1000 milliLiter(s) (10 mL/Hr) IV Continuous <Continuous>  tamsulosin 0.4 milliGRAM(s) Oral at bedtime    MEDICATIONS  (PRN):       PROBLEM LIST/ ASSESSMENT:  HEALTH ISSUES - PROBLEM Dx:  Fever, postprocedural: Fever, postprocedural  FELY (acute kidney injury): FELY (acute kidney injury)      ,   Patient is a 69y old  Male who presents with a chief complaint of Aortic dissection (2019 13:54)     s/p cardiac surgery              My plan includes :  close hemodynamic, ventilatory and drain monitoring and management per post op routine    Monitor for arrhythmias and monitor parameters for organ perfusion  beta blockade not administered due to hemodynamic instability and bradycardia  monitor neurologic status  Head of the bed should remain elevated to 45 deg .   chest PT and IS will be encouraged  monitor adequacy of oxygenation and ventilation and attempt to wean oxygen  antibiotic regimen will be tailored to the clinical, laboratory and microbiologic data  Nutritional goals will be met using po eventually , ensure adequate caloric intake and montior the same  Stress ulcer and VTE prophylaxis will be achieved    Glycemic control is satisfactory  Electrolytes have been repleted as necessary and wound care has been carried out. Pain control has been achieved.   agressive physical therapy and early mobility and ambulation goals will be met   The family was updated about the course and plan  CRITICAL CARE TIME personally provided by me  in evaluation and management, reassessments, review and interpretation of labs and x-rays, ventilator and hemodynamic management, formulating a plan and coordinating care: ___90____ MIN.  Time does not include procedural time.  CTICU ATTENDING     					    Jaret Hebert MD

## 2019-11-27 NOTE — PROGRESS NOTE ADULT - ATTENDING COMMENTS
FELY improving  polyuric likely post ATN-- and free waterr depleted  suggest D5W  as above -- follow lytes  does not appear to need acetazolamide

## 2019-11-27 NOTE — PROGRESS NOTE ADULT - SUBJECTIVE AND OBJECTIVE BOX
INTERVAL HPI/OVERNIGHT EVENTS: Today afebrile, off pressors        ANTIBIOTICS/RELEVANT:    MEDICATIONS  (STANDING):  acetaZOLAMIDE  IVPB 250 milliGRAM(s) IV Intermittent two times a day  artificial  tears Solution 1 Drop(s) Both EYES every 2 hours  aspirin enteric coated 81 milliGRAM(s) Oral daily  chlorhexidine 0.12% Liquid 15 milliLiter(s) Oral Mucosa every 12 hours  chlorhexidine 2% Cloths 1 Application(s) Topical daily  dexMEDEtomidine Infusion 0.5 MICROgram(s)/kG/Hr (10.488 mL/Hr) IV Continuous <Continuous>  dextrose 50% Injectable 50 milliLiter(s) IV Push every 15 minutes  heparin  Injectable 5000 Unit(s) SubCutaneous every 8 hours  lidocaine   Patch 1 Patch Transdermal daily  meropenem  IVPB 1000 milliGRAM(s) IV Intermittent every 12 hours  meropenem  IVPB      pantoprazole  Injectable 40 milliGRAM(s) IV Push daily  phenylephrine    Infusion 0.5 MICROgram(s)/kG/Min (7.866 mL/Hr) IV Continuous <Continuous>  propofol Infusion 5 MICROgram(s)/kG/Min (2.517 mL/Hr) IV Continuous <Continuous>  sodium chloride 0.9%. 1000 milliLiter(s) (10 mL/Hr) IV Continuous <Continuous>  tamsulosin 0.4 milliGRAM(s) Oral at bedtime    MEDICATIONS  (PRN):        Vital Signs Last 24 Hrs  T(C): 37.2 (27 Nov 2019 10:20), Max: 37.7 (27 Nov 2019 01:01)  T(F): 99 (27 Nov 2019 10:20), Max: 99.8 (27 Nov 2019 01:01)  HR: 64 (27 Nov 2019 13:00) (62 - 78)  BP: --  BP(mean): --  RR: 20 (27 Nov 2019 13:00) (12 - 20)  SpO2: 96% (27 Nov 2019 13:00) (92% - 99%)    11-26-19 @ 07:01  -  11-27-19 @ 07:00  --------------------------------------------------------  IN: 2254.7 mL / OUT: 3505 mL / NET: -1250.3 mL    11-27-19 @ 07:01  -  11-27-19 @ 13:00  --------------------------------------------------------  IN: 657.5 mL / OUT: 800 mL / NET: -142.5 mL      PHYSICAL EXAM:  Constitutional: Intubated , sedated, off pressors  Eyes: SANTANA, EOMI  Ear/Nose/Throat: no oral lesion, no sinus tenderness on percussion	  Neck: no JVD, no lymphadenopathy, supple  Respiratory: decreased BS adilene bases,  Midsternal wound with dressing. Pleural and mediastinal drains in place  Cardiovascular: S1S2 RRR, no murmurs  Gastrointestinal: soft, (+) BS, no HSM  Extremities: no e/e/c  Vascular: DP Pulse: right normal; left normal    LABS:  Vancomycin Level, Random (11.27.19 @ 03:00)    Vancomycin Level, Random: 11.9          	             8.9    8.98  )-----------( 94       ( 27 Nov 2019 12:36 )             29.4     11-27    150<H>  |  111<H>  |  49<H>  ----------------------------<  130<H>  3.8   |  30  |  1.99<H>    Ca    8.6      27 Nov 2019 03:00  Phos  3.0     11-27  Mg     2.4     11-27    TPro  4.8<L>  /  Alb  2.7<L>  /  TBili  0.4  /  DBili  x   /  AST  30  /  ALT  23  /  AlkPhos  49  11-27    PT/INR - ( 27 Nov 2019 12:36 )   PT: 11.2 sec;   INR: 0.99          PTT - ( 27 Nov 2019 12:36 )  PTT:25.9 sec      MICROBIOLOGY:  Culture - Sputum . (11.25.19 @ 16:51)    Gram Stain:   Rare epithelial cells  Numerous White blood cells  Few Gram Negative Rods  Few Gram positive cocci in pairs  Few Gram Negative Diplococci    -  Ampicillin: R >16 These ampicillin results predict results for amoxicillin    -  Ampicillin/Sulbactam: S 8/4 Enterobacter, Citrobacter, and Serratia may develop resistance during prolonged therapy (3-4 days)    -  Cefazolin: R >16 Enterobacter, Citrobacter, and Serratia may develop resistance during prolonged therapy (3-4 days)    -  Ceftriaxone: S <=1 Enterobacter, Citrobacter, and Serratia may develop resistance during prolonged therapy    -  Gentamicin: S <=1    -  Piperacillin/Tazobactam: S <=8    -  Tobramycin: S <=2    -  Trimethoprim/Sulfamethoxazole: S <=0.5/9.5    Specimen Source: .Sputum Sputum    Culture Results:   Few-moderate Klebsiella oxytoca  Accompanied by normal respiratory jazmín    Organism Identification: Klebsiella oxytoca    Organism: Klebsiella oxytoca    Method Type: CHERY      RADIOLOGY & ADDITIONAL STUDIES:

## 2019-11-27 NOTE — DIETITIAN INITIAL EVALUATION ADULT. - ENERGY NEEDS
Height: 5'10" (estimated), Weight: 185lbs, IBW 166lbs+/-10%, %%, BMI 26  IBW used for calculations as pt 2/2 vent dependant

## 2019-11-27 NOTE — PROGRESS NOTE ADULT - SUBJECTIVE AND OBJECTIVE BOX
CTICU  CRITICAL  CARE  attending     Hand off received 					   Pertinent clinical, laboratory, radiographic, hemodynamic, echocardiographic, respiratory data, microbiologic data and chart were reviewed and analyzed frequently throughout the course of the day and night      Patient seen and examined with CTS/ SH attending at bedside    69 years old male with H/O colitis. He started having pain in the anterior cervical area around  1:00 AM. Simultaneously he had lower back pain. He felt dizzy and diaphoretic. He felt weakness in both lower extremities. No loss of consciousness.  He was brought in to [facility] emergency room by an ambulance. His BP was 60 by palpation and appeared cyanotic.     CT angio  showed   1. 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  2. Moderate hemopericardium.  3. Dissection extending into the right brachiocephalic artery though the right carotid artery and right subclavian artery remain patent off true lumen.  4. Dissection involving the proximal left subclavian artery which remains patent more distally.  5. The left carotid artery comes off the true lumen of the arch  He was emergently taken to the operating room and cardiopulmonary bypass instituted. Blood evacuated from the pericardium.  Ascending aorta and aortic root were replaced and coronary buttons were reimplanted.      FAMILY HISTORY:  PAST MEDICAL & SURGICAL HISTORY:  Benign prostatic hypertrophy without lower urinary tract symptoms: BPH (benign prostatic hypertrophy)  Ulcerative colitis: Ulcerative colitis  States following surgery of eye and adnexa: straightened right eye  Other postprocedural status: History of hernia repair  Hemorrhoids: Hemorrhoids  Acute appendicitis with generalized peritonitis: Ruptured appendix    Patient is a 69y old  Male who presents with a chief complaint of Aortic dissection (2019 20:46)    14 system review was unremarkable    Vital signs, hemodynamic and respiratory parameters were reviewed from the bedside nursing flow sheet.  ICU Vital Signs Last 24 Hrs  T(C): 37.9 (2019 23:00), Max: 38.1 (2019 21:18)  T(F): 100.3 (2019 23:00), Max: 100.6 (2019 21:18)  HR: 68 (2019 23:00) (62 - 78)  BP: --  BP(mean): --  ABP: 118/52 (2019 23:00) (92/48 - 144/66)  ABP(mean): 76 (2019 23:00) (64 - 94)  RR: 15 (2019 23:00) (14 - 20)  SpO2: 91% (2019 23:00) (89% - 99%)    Adult Advanced Hemodynamics Last 24 Hrs  CVP(mm Hg): 4 (2019 23:00) (2 - 17)  CVP(cm H2O): --  CO: --  CI: --  PA: --  PA(mean): --  PCWP: --  SVR: --  SVRI: --  PVR: --  PVRI: --, ABG - ( 2019 22:23 )  pH, Arterial: 7.50  pH, Blood: x     /  pCO2: 38    /  pO2: 85    / HCO3: 28    / Base Excess: 4.9   /  SaO2: 97            Mode: AC/ CMV (Assist Control/ Continuous Mandatory Ventilation)  RR (machine): 12  TV (machine): 650  FiO2: 60  PEEP: 8  ITime: 1.4  MAP: 11  PIP: 18    Intake and output was reviewed and the fluid balance was calculated  Daily     Daily Weight in k.2 (2019 10:17)  I&O's Summary    2019 07:  -  2019 07:00  --------------------------------------------------------  IN: 2254.7 mL / OUT: 3505 mL / NET: -1250.3 mL    2019 07:01  -  2019 23:58  --------------------------------------------------------  IN: 1955.1 mL / OUT: 2745 mL / NET: -789.9 mL        All lines and drain sites were assessed    Neuro: Pupils 2-3 mm (Reactive). Moves all 4 extremities. Follows commands.  Neck: No JVD.  CVS: S1, S1, No S3.  Lungs: Good air entry bilaterally.   Abd: Soft. No tenderness. + Bowel sounds.  Vascular: + DP/PT.  Extremities: No edema.  Lymphatic: Normal.  Skin: No abnormalities.      labs  CBC Full  -  ( 2019 22:25 )  WBC Count : 9.89 K/uL  RBC Count : 3.18 M/uL  Hemoglobin : 8.8 g/dL  Hematocrit : 29.8 %  Platelet Count - Automated : 98 K/uL  Mean Cell Volume : 93.7 fl  Mean Cell Hemoglobin : 27.7 pg  Mean Cell Hemoglobin Concentration : 29.5 gm/dL  Auto Neutrophil # : x  Auto Lymphocyte # : x  Auto Monocyte # : x  Auto Eosinophil # : x  Auto Basophil # : x  Auto Neutrophil % : x  Auto Lymphocyte % : x  Auto Monocyte % : x  Auto Eosinophil % : x  Auto Basophil % : x    11-    150<H>  |  114<H>  |  47<H>  ----------------------------<  135<H>  3.6   |  27  |  1.68<H>    Ca    8.3<L>      2019 22:24  Phos  3.0       Mg     2.5         TPro  5.0<L>  /  Alb  2.8<L>  /  TBili  0.5  /  DBili  x   /  AST  28  /  ALT  25  /  AlkPhos  49      PT/INR - ( 2019 22:24 )   PT: 11.6 sec;   INR: 1.03          PTT - ( 2019 22:24 )  PTT:27.2 sec  The current medications were reviewed   MEDICATIONS  (STANDING):  acetaZOLAMIDE  IVPB 250 milliGRAM(s) IV Intermittent two times a day  artificial  tears Solution 1 Drop(s) Both EYES every 2 hours  aspirin enteric coated 81 milliGRAM(s) Oral daily  chlorhexidine 0.12% Liquid 15 milliLiter(s) Oral Mucosa every 12 hours  chlorhexidine 2% Cloths 1 Application(s) Topical daily  dexMEDEtomidine Infusion 0.5 MICROgram(s)/kG/Hr (10.488 mL/Hr) IV Continuous <Continuous>  dextrose 50% Injectable 50 milliLiter(s) IV Push every 15 minutes  heparin  Injectable 5000 Unit(s) SubCutaneous every 8 hours  lidocaine   Patch 1 Patch Transdermal daily  meropenem  IVPB 1000 milliGRAM(s) IV Intermittent every 12 hours  meropenem  IVPB      pantoprazole  Injectable 40 milliGRAM(s) IV Push daily  phenylephrine    Infusion 0.5 MICROgram(s)/kG/Min (7.866 mL/Hr) IV Continuous <Continuous>  potassium chloride  20 mEq/100 mL IVPB 20 milliEquivalent(s) IV Intermittent every 2 hours  propofol Infusion 5 MICROgram(s)/kG/Min (2.517 mL/Hr) IV Continuous <Continuous>  sodium chloride 0.9%. 1000 milliLiter(s) (10 mL/Hr) IV Continuous <Continuous>  tamsulosin 0.4 milliGRAM(s) Oral at bedtime      PROBLEM LIST/ ASSESSMENT:  HEALTH ISSUES - PROBLEM Dx:  Fever, postprocedural: Fever, postprocedural  FELY (acute kidney injury): FELY (acute kidney injury)        Patient is a 69y old  Male who presents with cardiogenic shock due to acute Type A aortic dissection with rupture and cardiac tamponade.  S/P Aortic Root Replacement  S/P BioBental and CABROL.  S/P replacement of ascending aorta and hemiarch.  Resolving renal insufficiency.  Metabolic alkalosis.  Hypernatremia.  Hemodynamically stable.  Good oxygenation.  Fair urine out put.  Overall doing well.      My plan includes :  WEAN to Extubate.  Statin and Betablocker.  Close hemodynamic, ventilatory and drain monitoring and management  Monitor for arrhythmias and monitor parameters for organ perfusion  Monitor neurologic status  Monitor renal function.  Head of the bed should remain elevated to 45 deg .   Chest PT and IS will be encouraged  Monitor adequacy of oxygenation and ventilation and attempt to wean oxygen  Nutritional goals will be met using po eventually , ensure adequate caloric intake and monitor the same  Stress ulcer and VTE prophylaxis will be achieved    Glycemic control is satisfactory  Electrolytes have been repleted as necessary and wound care has been carried out. Pain control has been achieved.   Aggressive physical therapy and early mobility and ambulation goals will be met   The family was updated about the course and plan  CRITICAL CARE TIME SPENT in evaluation and management, reassessments, review and interpretation of labs and x-rays, ventilator and hemodynamic management, formulating a plan and coordinating care: ___60____ MIN.  Time does not include procedural time.  CTICU ATTENDING     					    Giovanni Reis MD

## 2019-11-28 LAB
ALBUMIN SERPL ELPH-MCNC: 2.9 G/DL — LOW (ref 3.3–5)
ALBUMIN SERPL ELPH-MCNC: 3 G/DL — LOW (ref 3.3–5)
ALBUMIN SERPL ELPH-MCNC: 3.1 G/DL — LOW (ref 3.3–5)
ALP SERPL-CCNC: 49 U/L — SIGNIFICANT CHANGE UP (ref 40–120)
ALP SERPL-CCNC: 51 U/L — SIGNIFICANT CHANGE UP (ref 40–120)
ALP SERPL-CCNC: 68 U/L — SIGNIFICANT CHANGE UP (ref 40–120)
ALT FLD-CCNC: 26 U/L — SIGNIFICANT CHANGE UP (ref 10–45)
ALT FLD-CCNC: 29 U/L — SIGNIFICANT CHANGE UP (ref 10–45)
ALT FLD-CCNC: 33 U/L — SIGNIFICANT CHANGE UP (ref 10–45)
ANION GAP SERPL CALC-SCNC: 11 MMOL/L — SIGNIFICANT CHANGE UP (ref 5–17)
ANION GAP SERPL CALC-SCNC: 9 MMOL/L — SIGNIFICANT CHANGE UP (ref 5–17)
ANION GAP SERPL CALC-SCNC: 9 MMOL/L — SIGNIFICANT CHANGE UP (ref 5–17)
APTT BLD: 23.8 SEC — LOW (ref 27.5–36.3)
APTT BLD: 28 SEC — SIGNIFICANT CHANGE UP (ref 27.5–36.3)
APTT BLD: 33.2 SEC — SIGNIFICANT CHANGE UP (ref 27.5–36.3)
AST SERPL-CCNC: 30 U/L — SIGNIFICANT CHANGE UP (ref 10–40)
AST SERPL-CCNC: 31 U/L — SIGNIFICANT CHANGE UP (ref 10–40)
AST SERPL-CCNC: 36 U/L — SIGNIFICANT CHANGE UP (ref 10–40)
BASE EXCESS BLDA CALC-SCNC: 2.5 MMOL/L — SIGNIFICANT CHANGE UP (ref -2–3)
BILIRUB SERPL-MCNC: 0.4 MG/DL — SIGNIFICANT CHANGE UP (ref 0.2–1.2)
BILIRUB SERPL-MCNC: 0.5 MG/DL — SIGNIFICANT CHANGE UP (ref 0.2–1.2)
BILIRUB SERPL-MCNC: 0.6 MG/DL — SIGNIFICANT CHANGE UP (ref 0.2–1.2)
BUN SERPL-MCNC: 46 MG/DL — HIGH (ref 7–23)
BUN SERPL-MCNC: 47 MG/DL — HIGH (ref 7–23)
BUN SERPL-MCNC: 50 MG/DL — HIGH (ref 7–23)
CALCIUM SERPL-MCNC: 8.5 MG/DL — SIGNIFICANT CHANGE UP (ref 8.4–10.5)
CALCIUM SERPL-MCNC: 8.6 MG/DL — SIGNIFICANT CHANGE UP (ref 8.4–10.5)
CALCIUM SERPL-MCNC: 8.7 MG/DL — SIGNIFICANT CHANGE UP (ref 8.4–10.5)
CHLORIDE SERPL-SCNC: 116 MMOL/L — HIGH (ref 96–108)
CHLORIDE SERPL-SCNC: 119 MMOL/L — HIGH (ref 96–108)
CHLORIDE SERPL-SCNC: 121 MMOL/L — HIGH (ref 96–108)
CO2 SERPL-SCNC: 23 MMOL/L — SIGNIFICANT CHANGE UP (ref 22–31)
CO2 SERPL-SCNC: 26 MMOL/L — SIGNIFICANT CHANGE UP (ref 22–31)
CO2 SERPL-SCNC: 26 MMOL/L — SIGNIFICANT CHANGE UP (ref 22–31)
CREAT SERPL-MCNC: 1.54 MG/DL — HIGH (ref 0.5–1.3)
CREAT SERPL-MCNC: 1.61 MG/DL — HIGH (ref 0.5–1.3)
CREAT SERPL-MCNC: 1.69 MG/DL — HIGH (ref 0.5–1.3)
GAS PNL BLDA: SIGNIFICANT CHANGE UP
GLUCOSE BLDC GLUCOMTR-MCNC: 130 MG/DL — HIGH (ref 70–99)
GLUCOSE SERPL-MCNC: 139 MG/DL — HIGH (ref 70–99)
GLUCOSE SERPL-MCNC: 140 MG/DL — HIGH (ref 70–99)
GLUCOSE SERPL-MCNC: 147 MG/DL — HIGH (ref 70–99)
HCO3 BLDA-SCNC: 26 MMOL/L — SIGNIFICANT CHANGE UP (ref 21–28)
HCT VFR BLD CALC: 26.7 % — LOW (ref 39–50)
HCT VFR BLD CALC: 28.4 % — LOW (ref 39–50)
HCT VFR BLD CALC: 29.9 % — LOW (ref 39–50)
HGB BLD-MCNC: 8.1 G/DL — LOW (ref 13–17)
HGB BLD-MCNC: 8.4 G/DL — LOW (ref 13–17)
HGB BLD-MCNC: 8.9 G/DL — LOW (ref 13–17)
INR BLD: 1.06 — SIGNIFICANT CHANGE UP (ref 0.88–1.16)
INR BLD: 1.14 — SIGNIFICANT CHANGE UP (ref 0.88–1.16)
INR BLD: 1.19 — HIGH (ref 0.88–1.16)
LACTATE SERPL-SCNC: 0.8 MMOL/L — SIGNIFICANT CHANGE UP (ref 0.5–2)
LACTATE SERPL-SCNC: 1.2 MMOL/L — SIGNIFICANT CHANGE UP (ref 0.5–2)
MAGNESIUM SERPL-MCNC: 2.5 MG/DL — SIGNIFICANT CHANGE UP (ref 1.6–2.6)
MAGNESIUM SERPL-MCNC: 2.6 MG/DL — SIGNIFICANT CHANGE UP (ref 1.6–2.6)
MAGNESIUM SERPL-MCNC: 2.7 MG/DL — HIGH (ref 1.6–2.6)
MCHC RBC-ENTMCNC: 27.5 PG — SIGNIFICANT CHANGE UP (ref 27–34)
MCHC RBC-ENTMCNC: 27.8 PG — SIGNIFICANT CHANGE UP (ref 27–34)
MCHC RBC-ENTMCNC: 28.1 PG — SIGNIFICANT CHANGE UP (ref 27–34)
MCHC RBC-ENTMCNC: 29.6 GM/DL — LOW (ref 32–36)
MCHC RBC-ENTMCNC: 29.8 GM/DL — LOW (ref 32–36)
MCHC RBC-ENTMCNC: 30.3 GM/DL — LOW (ref 32–36)
MCV RBC AUTO: 91.8 FL — SIGNIFICANT CHANGE UP (ref 80–100)
MCV RBC AUTO: 93.1 FL — SIGNIFICANT CHANGE UP (ref 80–100)
MCV RBC AUTO: 94.3 FL — SIGNIFICANT CHANGE UP (ref 80–100)
NRBC # BLD: 0 /100 WBCS — SIGNIFICANT CHANGE UP (ref 0–0)
PCO2 BLDA: 36 MMHG — SIGNIFICANT CHANGE UP (ref 35–48)
PH BLDA: 7.48 — HIGH (ref 7.35–7.45)
PHOSPHATE SERPL-MCNC: 2.9 MG/DL — SIGNIFICANT CHANGE UP (ref 2.5–4.5)
PHOSPHATE SERPL-MCNC: 3 MG/DL — SIGNIFICANT CHANGE UP (ref 2.5–4.5)
PHOSPHATE SERPL-MCNC: 3.1 MG/DL — SIGNIFICANT CHANGE UP (ref 2.5–4.5)
PLATELET # BLD AUTO: 112 K/UL — LOW (ref 150–400)
PLATELET # BLD AUTO: 140 K/UL — LOW (ref 150–400)
PLATELET # BLD AUTO: 96 K/UL — LOW (ref 150–400)
PO2 BLDA: 78 MMHG — LOW (ref 83–108)
POTASSIUM SERPL-MCNC: 3.6 MMOL/L — SIGNIFICANT CHANGE UP (ref 3.5–5.3)
POTASSIUM SERPL-MCNC: 3.8 MMOL/L — SIGNIFICANT CHANGE UP (ref 3.5–5.3)
POTASSIUM SERPL-MCNC: 3.9 MMOL/L — SIGNIFICANT CHANGE UP (ref 3.5–5.3)
POTASSIUM SERPL-SCNC: 3.6 MMOL/L — SIGNIFICANT CHANGE UP (ref 3.5–5.3)
POTASSIUM SERPL-SCNC: 3.8 MMOL/L — SIGNIFICANT CHANGE UP (ref 3.5–5.3)
POTASSIUM SERPL-SCNC: 3.9 MMOL/L — SIGNIFICANT CHANGE UP (ref 3.5–5.3)
PROT SERPL-MCNC: 4.8 G/DL — LOW (ref 6–8.3)
PROT SERPL-MCNC: 4.9 G/DL — LOW (ref 6–8.3)
PROT SERPL-MCNC: 5.1 G/DL — LOW (ref 6–8.3)
PROTHROM AB SERPL-ACNC: 12 SEC — SIGNIFICANT CHANGE UP (ref 10–12.9)
PROTHROM AB SERPL-ACNC: 12.9 SEC — SIGNIFICANT CHANGE UP (ref 10–12.9)
PROTHROM AB SERPL-ACNC: 13.5 SEC — HIGH (ref 10–12.9)
RBC # BLD: 2.91 M/UL — LOW (ref 4.2–5.8)
RBC # BLD: 3.05 M/UL — LOW (ref 4.2–5.8)
RBC # BLD: 3.17 M/UL — LOW (ref 4.2–5.8)
RBC # FLD: 16.1 % — HIGH (ref 10.3–14.5)
RBC # FLD: 16.2 % — HIGH (ref 10.3–14.5)
RBC # FLD: 16.4 % — HIGH (ref 10.3–14.5)
SAO2 % BLDA: 95 % — SIGNIFICANT CHANGE UP (ref 95–100)
SODIUM SERPL-SCNC: 151 MMOL/L — HIGH (ref 135–145)
SODIUM SERPL-SCNC: 154 MMOL/L — HIGH (ref 135–145)
SODIUM SERPL-SCNC: 155 MMOL/L — HIGH (ref 135–145)
WBC # BLD: 11.94 K/UL — HIGH (ref 3.8–10.5)
WBC # BLD: 8.8 K/UL — SIGNIFICANT CHANGE UP (ref 3.8–10.5)
WBC # BLD: 9.33 K/UL — SIGNIFICANT CHANGE UP (ref 3.8–10.5)
WBC # FLD AUTO: 11.94 K/UL — HIGH (ref 3.8–10.5)
WBC # FLD AUTO: 8.8 K/UL — SIGNIFICANT CHANGE UP (ref 3.8–10.5)
WBC # FLD AUTO: 9.33 K/UL — SIGNIFICANT CHANGE UP (ref 3.8–10.5)

## 2019-11-28 PROCEDURE — 71045 X-RAY EXAM CHEST 1 VIEW: CPT | Mod: 26

## 2019-11-28 PROCEDURE — 99292 CRITICAL CARE ADDL 30 MIN: CPT

## 2019-11-28 PROCEDURE — 99291 CRITICAL CARE FIRST HOUR: CPT

## 2019-11-28 PROCEDURE — 99232 SBSQ HOSP IP/OBS MODERATE 35: CPT | Mod: GC

## 2019-11-28 RX ORDER — ACETAMINOPHEN 500 MG
1000 TABLET ORAL ONCE
Refills: 0 | Status: DISCONTINUED | OUTPATIENT
Start: 2019-11-28 | End: 2019-11-28

## 2019-11-28 RX ORDER — POTASSIUM CHLORIDE 20 MEQ
20 PACKET (EA) ORAL
Refills: 0 | Status: COMPLETED | OUTPATIENT
Start: 2019-11-28 | End: 2019-11-28

## 2019-11-28 RX ORDER — ACETAMINOPHEN 500 MG
650 TABLET ORAL ONCE
Refills: 0 | Status: COMPLETED | OUTPATIENT
Start: 2019-11-28 | End: 2019-11-28

## 2019-11-28 RX ORDER — POTASSIUM CHLORIDE 20 MEQ
20 PACKET (EA) ORAL ONCE
Refills: 0 | Status: COMPLETED | OUTPATIENT
Start: 2019-11-28 | End: 2019-11-28

## 2019-11-28 RX ORDER — HEPARIN SODIUM 5000 [USP'U]/ML
600 INJECTION INTRAVENOUS; SUBCUTANEOUS
Qty: 25000 | Refills: 0 | Status: DISCONTINUED | OUTPATIENT
Start: 2019-11-28 | End: 2019-11-29

## 2019-11-28 RX ORDER — CHLOROTHIAZIDE 500 MG
500 TABLET ORAL EVERY 12 HOURS
Refills: 0 | Status: COMPLETED | OUTPATIENT
Start: 2019-11-28 | End: 2019-11-29

## 2019-11-28 RX ADMIN — LIDOCAINE 1 PATCH: 4 CREAM TOPICAL at 12:49

## 2019-11-28 RX ADMIN — Medication 50 MILLIEQUIVALENT(S): at 17:17

## 2019-11-28 RX ADMIN — Medication 10 MILLIGRAM(S): at 12:51

## 2019-11-28 RX ADMIN — DEXMEDETOMIDINE HYDROCHLORIDE IN 0.9% SODIUM CHLORIDE 10.49 MICROGRAM(S)/KG/HR: 4 INJECTION INTRAVENOUS at 20:02

## 2019-11-28 RX ADMIN — Medication 1 DROP(S): at 01:45

## 2019-11-28 RX ADMIN — HEPARIN SODIUM 5000 UNIT(S): 5000 INJECTION INTRAVENOUS; SUBCUTANEOUS at 05:19

## 2019-11-28 RX ADMIN — ACETAZOLAMIDE 105 MILLIGRAM(S): 250 TABLET ORAL at 05:23

## 2019-11-28 RX ADMIN — PANTOPRAZOLE SODIUM 40 MILLIGRAM(S): 20 TABLET, DELAYED RELEASE ORAL at 12:49

## 2019-11-28 RX ADMIN — Medication 1 DROP(S): at 12:48

## 2019-11-28 RX ADMIN — Medication 1 DROP(S): at 14:26

## 2019-11-28 RX ADMIN — Medication 650 MILLIGRAM(S): at 20:03

## 2019-11-28 RX ADMIN — Medication 1 DROP(S): at 05:15

## 2019-11-28 RX ADMIN — Medication 50 MILLIEQUIVALENT(S): at 01:08

## 2019-11-28 RX ADMIN — MEROPENEM 100 MILLIGRAM(S): 1 INJECTION INTRAVENOUS at 00:15

## 2019-11-28 RX ADMIN — Medication 1 DROP(S): at 18:00

## 2019-11-28 RX ADMIN — DEXMEDETOMIDINE HYDROCHLORIDE IN 0.9% SODIUM CHLORIDE 10.49 MICROGRAM(S)/KG/HR: 4 INJECTION INTRAVENOUS at 01:31

## 2019-11-28 RX ADMIN — CHLORHEXIDINE GLUCONATE 15 MILLILITER(S): 213 SOLUTION TOPICAL at 17:17

## 2019-11-28 RX ADMIN — DEXMEDETOMIDINE HYDROCHLORIDE IN 0.9% SODIUM CHLORIDE 10.49 MICROGRAM(S)/KG/HR: 4 INJECTION INTRAVENOUS at 17:08

## 2019-11-28 RX ADMIN — DEXMEDETOMIDINE HYDROCHLORIDE IN 0.9% SODIUM CHLORIDE 10.49 MICROGRAM(S)/KG/HR: 4 INJECTION INTRAVENOUS at 14:27

## 2019-11-28 RX ADMIN — Medication 4 MILLIGRAM(S): at 19:17

## 2019-11-28 RX ADMIN — Medication 1 DROP(S): at 07:43

## 2019-11-28 RX ADMIN — Medication 650 MILLIGRAM(S): at 20:49

## 2019-11-28 RX ADMIN — CHLORHEXIDINE GLUCONATE 15 MILLILITER(S): 213 SOLUTION TOPICAL at 05:16

## 2019-11-28 RX ADMIN — Medication 4 MILLIGRAM(S): at 07:43

## 2019-11-28 RX ADMIN — MEROPENEM 100 MILLIGRAM(S): 1 INJECTION INTRAVENOUS at 14:26

## 2019-11-28 RX ADMIN — Medication 50 MILLIEQUIVALENT(S): at 19:25

## 2019-11-28 RX ADMIN — Medication 1 DROP(S): at 10:45

## 2019-11-28 RX ADMIN — Medication 100 MILLIEQUIVALENT(S): at 05:18

## 2019-11-28 RX ADMIN — LIDOCAINE 1 PATCH: 4 CREAM TOPICAL at 19:18

## 2019-11-28 RX ADMIN — Medication 81 MILLIGRAM(S): at 12:49

## 2019-11-28 RX ADMIN — Medication 1 DROP(S): at 17:18

## 2019-11-28 RX ADMIN — CHLORHEXIDINE GLUCONATE 1 APPLICATION(S): 213 SOLUTION TOPICAL at 05:16

## 2019-11-28 RX ADMIN — Medication 1 DROP(S): at 05:16

## 2019-11-28 RX ADMIN — Medication 1 DROP(S): at 19:29

## 2019-11-28 RX ADMIN — HEPARIN SODIUM 600 UNIT(S)/HR: 5000 INJECTION INTRAVENOUS; SUBCUTANEOUS at 14:26

## 2019-11-28 RX ADMIN — Medication 100 MILLIGRAM(S): at 19:25

## 2019-11-28 RX ADMIN — DEXMEDETOMIDINE HYDROCHLORIDE IN 0.9% SODIUM CHLORIDE 10.49 MICROGRAM(S)/KG/HR: 4 INJECTION INTRAVENOUS at 06:24

## 2019-11-28 NOTE — PROGRESS NOTE ADULT - SUBJECTIVE AND OBJECTIVE BOX
CTICU  CRITICAL  CARE  attending     Hand off received 					   Pertinent clinical, laboratory, radiographic, hemodynamic, echocardiographic, respiratory data, microbiologic data and chart were reviewed and analyzed frequently throughout the course of the day and night    Patient seen and examined with CTS/ SH attending at bedside    ars old male with H/O colitis. He started having pain in the anterior cervical area around  1:00 AM. Simultaneously he had lower back pain. He felt dizzy and diaphoretic. He felt weakness in both lower extremities. No loss of consciousness.  He was brought in to [facility] emergency room by an ambulance. His BP was 60 by palpation and appeared cyanotic.     CT angio  showed   1. 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  2. Moderate hemopericardium.  3. Dissection extending into the right brachiocephalic artery though the right carotid artery and right subclavian artery remain patent off true lumen.  4. Dissection involving the proximal left subclavian artery which remains patent more distally.  5. The left carotid artery comes off the true lumen of the arch  He was emergently taken to the operating room and cardiopulmonary bypass instituted. Blood evacuated from the pericardium.  Ascending aorta and aortic root were replaced and coronary buttons were reimplanted.    FAMILY HISTORY:  PAST MEDICAL & SURGICAL HISTORY:  Benign prostatic hypertrophy without lower urinary tract symptoms: BPH (benign prostatic hypertrophy)  Ulcerative colitis: Ulcerative colitis  States following surgery of eye and adnexa: straightened right eye  Other postprocedural status: History of hernia repair  Hemorrhoids: Hemorrhoids  Acute appendicitis with generalized peritonitis: Ruptured appendix         14 system review was unremarkable    Vital signs, hemodynamic and respiratory parameters were reviewed from the bedside nursing flow sheet.  ICU Vital Signs Last 24 Hrs  T(C): 38.3 (28 Nov 2019 21:25), Max: 38.4 (28 Nov 2019 17:32)  T(F): 100.9 (28 Nov 2019 21:25), Max: 101.1 (28 Nov 2019 17:32)  HR: 71 (29 Nov 2019 00:21) (64 - 80)  BP: --  BP(mean): --  ABP: 110/52 (28 Nov 2019 23:00) (104/50 - 154/62)  ABP(mean): 72 (28 Nov 2019 23:00) (70 - 100)  RR: 16 (28 Nov 2019 23:00) (13 - 18)  SpO2: 93% (29 Nov 2019 00:21) (90% - 94%)    Adult Advanced Hemodynamics Last 24 Hrs  CVP(mm Hg): 7 (28 Nov 2019 23:00) (3 - 12)  CVP(cm H2O): --  CO: --  CI: --  PA: --  PA(mean): --  PCWP: --  SVR: --  SVRI: --  PVR: --  PVRI: --, ABG - ( 28 Nov 2019 21:23 )  pH, Arterial: 7.47  pH, Blood: x     /  pCO2: 35    /  pO2: 115   / HCO3: 25    / Base Excess: 1.0   /  SaO2: 98                Mode: AC/ CMV (Assist Control/ Continuous Mandatory Ventilation)  RR (machine): 12  TV (machine): 650  FiO2: 50  PEEP: 8  ITime: 1.4  MAP: 12  PIP: 23    Intake and output was reviewed and the fluid balance was calculated  Daily     Daily   I&O's Summary    27 Nov 2019 07:01  -  28 Nov 2019 07:00  --------------------------------------------------------  IN: 2974.4 mL / OUT: 3870 mL / NET: -895.6 mL    28 Nov 2019 07:01  -  29 Nov 2019 00:54  --------------------------------------------------------  IN: 2222.5 mL / OUT: 2750 mL / NET: -527.5 mL        All lines and drain sites were assessed    Neuro: No change in the mental status from the baseline. Moves all 4 extremities. Follows command.  Neck: No JVD.  CVS: S1, S1, No S3.  Lungs: Good air entry bilaterally.   Abd: Soft. No tenderness. + Bowel sounds.  Vascular: + DP/PT.  Extremities: No edema.  Lymphatic: Normal.  Skin: No abnormalities.      labs  CBC Full  -  ( 28 Nov 2019 21:25 )  WBC Count : 11.94 K/uL  RBC Count : 3.17 M/uL  Hemoglobin : 8.9 g/dL  Hematocrit : 29.9 %  Platelet Count - Automated : 140 K/uL  Mean Cell Volume : 94.3 fl  Mean Cell Hemoglobin : 28.1 pg  Mean Cell Hemoglobin Concentration : 29.8 gm/dL  Auto Neutrophil # : x  Auto Lymphocyte # : x  Auto Monocyte # : x  Auto Eosinophil # : x  Auto Basophil # : x  Auto Neutrophil % : x  Auto Lymphocyte % : x  Auto Monocyte % : x  Auto Eosinophil % : x  Auto Basophil % : x    11-28    155<H>  |  121<H>  |  46<H>  ----------------------------<  147<H>  3.8   |  23  |  1.54<H>    Ca    8.7      28 Nov 2019 21:25  Phos  3.1     11-28  Mg     2.6     11-28    TPro  5.1<L>  /  Alb  3.0<L>  /  TBili  0.6  /  DBili  x   /  AST  36  /  ALT  33  /  AlkPhos  68  11-28    PT/INR - ( 28 Nov 2019 21:25 )   PT: 13.5 sec;   INR: 1.19          PTT - ( 28 Nov 2019 21:25 )  PTT:33.2 sec  The current medications were reviewed   MEDICATIONS  (STANDING):  acetaZOLAMIDE  IVPB 250 milliGRAM(s) IV Intermittent two times a day  artificial  tears Solution 1 Drop(s) Both EYES every 2 hours  aspirin enteric coated 81 milliGRAM(s) Oral daily  chlorhexidine 0.12% Liquid 15 milliLiter(s) Oral Mucosa every 12 hours  chlorhexidine 2% Cloths 1 Application(s) Topical daily  chlorothiazide IVPB 500 milliGRAM(s) IV Intermittent every 12 hours  dexMEDEtomidine Infusion 0.5 MICROgram(s)/kG/Hr (10.488 mL/Hr) IV Continuous <Continuous>  dextrose 50% Injectable 50 milliLiter(s) IV Push every 15 minutes  heparin  Infusion. 750 Unit(s)/Hr (7.5 mL/Hr) IV Continuous <Continuous>  lidocaine   Patch 1 Patch Transdermal daily  meropenem  IVPB 1000 milliGRAM(s) IV Intermittent every 12 hours  meropenem  IVPB      pantoprazole  Injectable 40 milliGRAM(s) IV Push daily  phenylephrine    Infusion 0.5 MICROgram(s)/kG/Min (7.866 mL/Hr) IV Continuous <Continuous>  sodium chloride 0.9%. 1000 milliLiter(s) (10 mL/Hr) IV Continuous <Continuous>  tamsulosin 0.4 milliGRAM(s) Oral at bedtime    MEDICATIONS  (PRN):  bisacodyl Suppository 10 milliGRAM(s) Rectal daily PRN Constipation        Patient is a 69y old  Male who presents with cardiogenic shock due to acute Type A aortic dissection with rupture and cardiac tamponade.  S/P Aortic Root Replacement  S/P BioBental and CABROL.  S/P replacement of ascending aorta and hemiarch.  Post operative course complicated by  renal insufficiency, Metabolic alkalosis, hypernatremia.   Hemodynamically stable.  Good oxygenation.  Fair urine out put.  Overall doing well.    My plan includes :  WEAN Vent support as tolerated.  Close hemodynamic, ventilatory and drain monitoring and management  Monitor for arrhythmias and monitor parameters for organ perfusion  Monitor neurologic status  Monitor renal function.  Head of the bed should remain elevated to 45 deg .   Chest PT and IS will be encouraged  Monitor  adequacy of oxygenation and ventilation and attempt to wean oxygen  Nutritional goals will be met using po eventually , ensure adequate caloric intake and monitor the same  Stress ulcer and VTE prophylaxis will be achieved    Glycemic control is satisfactory  Electrolytes have been repleted as necessary and wound care has been carried out. Pain control has been achieved.   Aggressive physical therapy and early mobility and ambulation goals will be met   The family was updated about the course and plan  CRITICAL CARE TIME SPENT in evaluation and management, reassessments, review and interpretation of labs and x-rays, ventilator and hemodynamic management, formulating a plan and coordinating care: ___90____ MIN.  Time does not include procedural time.  CTICU ATTENDING     					    Giovanni Reis MD

## 2019-11-28 NOTE — PROGRESS NOTE ADULT - SUBJECTIVE AND OBJECTIVE BOX
Patient is a 69y Male seen and evaluated at bedside.   continued to have great UOP 3.8 L/ 24 hr, off diuretics  becoming more hypernatremic Na 154, FWD 4.2 L  currently on 60 % FiO2, stable  awake and alert, following commands   edema improving       Meds:  acetaZOLAMIDE  IVPB 250 two times a day  artificial  tears Solution 1 every 2 hours  aspirin enteric coated 81 daily  bisacodyl Suppository 10 daily PRN  chlorhexidine 0.12% Liquid 15 every 12 hours  chlorhexidine 2% Cloths 1 daily  chlorothiazide IVPB 500 every 12 hours  dexMEDEtomidine Infusion 0.5 <Continuous>  dextrose 50% Injectable 50 every 15 minutes  heparin  Infusion. 600 <Continuous>  lidocaine   Patch 1 daily  meropenem  IVPB 1000 every 12 hours  meropenem  IVPB    pantoprazole  Injectable 40 daily  phenylephrine    Infusion 0.5 <Continuous>  potassium chloride  20 mEq/100 mL IVPB 20 every 2 hours  propofol Infusion 5 <Continuous>  sodium chloride 0.9%. 1000 <Continuous>  tamsulosin 0.4 at bedtime      T(C): , Max: 38.1 (11-27-19 @ 21:18)  T(F): , Max: 100.6 (11-27-19 @ 21:18)  HR: 66 (11-28-19 @ 15:00)  BP: 126/58  BP(mean): --  RR: 18 (11-28-19 @ 14:00)  SpO2: 93% (11-28-19 @ 15:00)  Wt(kg): --    11-27 @ 07:01  -  11-28 @ 07:00  --------------------------------------------------------  IN: 2974.4 mL / OUT: 3870 mL / NET: -895.6 mL    11-28 @ 07:01  -  11-28 @ 15:28  --------------------------------------------------------  IN: 735.8 mL / OUT: 1075 mL / NET: -339.2 mL          PHYSICAL EXAM  General: intubated, off sedation awake  Neck: enlarged neck habitus hard to assess for JVP  Cardiac: S1, S2. RRR. No murmurs   Respiratory: coarse breathing sounds diffuse  Abdomen: soft, distended, non tympanic, +BS  Extremities: no LE edema b/l, UE edema improving   Neuro: alert and awake, eyes open, follows commands, AVALOS        LABS:                        8.4    9.33  )-----------( 112      ( 28 Nov 2019 10:56 )             28.4     11-28    154<H>  |  119<H>  |  47<H>  ----------------------------<  139<H>  3.6   |  26  |  1.61<H>    Ca    8.6      28 Nov 2019 10:56  Phos  3.0     11-28  Mg     2.7     11-28    TPro  4.9<L>  /  Alb  3.1<L>  /  TBili  0.4  /  DBili  x   /  AST  31  /  ALT  29  /  AlkPhos  49  11-28      PT/INR - ( 28 Nov 2019 10:56 )   PT: 12.9 sec;   INR: 1.14          PTT - ( 28 Nov 2019 10:56 )  PTT:28.0 sec          RADIOLOGY & ADDITIONAL STUDIES:      < from: Xray Chest 1 View- PORTABLE-Routine (11.28.19 @ 04:41) >    EXAM:  XR CHEST PORTABLE ROUTINE 1V                          PROCEDURE DATE:  11/28/2019          INTERPRETATION:  CHEST AP PORTABLE    HISTORY: For follow-up    Comparison: 11/27/2019    FINDINGS AND   IMPRESSION:    Support Devices:  Unchanged.  Lungs/Airways: Possible layering of small pleural effusion bilaterally.   Bibasilar atelectases. Left retrocardiac opacity, unchanged since prior   study which may reflect atelectasis/pleural effusion versus   consolidation. Prominent vascular markings, unchanged.  Cardiomediastinal: Stable.  Bones and soft tissues: Unchanged.    < end of copied text >

## 2019-11-28 NOTE — PROGRESS NOTE ADULT - ASSESSMENT
68 y/o M with PMHx of Ulcerative colitis BIBA to [facility] ED complaining of back pain and profound hypotension, emergently brought to the OR for salvage Type A dissection repair, s/p aortic arch repair and ascending arch replacement.   Nephrology consulted for FELY.      # Non oliguric FELY  - most likely perfusion related, including ATN in the setting of cardiogenic shock  - Cr plateaued with good UOP, off diuretics  - volume status improving, does not appear to need acetazolamide   - reaching polyuria associated with hypernatremia, FWD 4.2 L   - please start D5W IV at  cc/hr   - increase 250 ml of free water to Q4hr via NGT  - follow lytes       Discussed with attending Dr Jones.

## 2019-11-28 NOTE — PROGRESS NOTE ADULT - ATTENDING COMMENTS
FELY resolving   polyuric likely due to post ATN diuresis   free water depleted  free water increased via NGT -- would also add IV d5w at 50 -100 cc/hour and follow na (and can give bolus 500 cc)

## 2019-11-29 DIAGNOSIS — J15.9 UNSPECIFIED BACTERIAL PNEUMONIA: ICD-10-CM

## 2019-11-29 LAB
ALBUMIN SERPL ELPH-MCNC: 2.6 G/DL — LOW (ref 3.3–5)
ALBUMIN SERPL ELPH-MCNC: 2.9 G/DL — LOW (ref 3.3–5)
ALBUMIN SERPL ELPH-MCNC: 3 G/DL — LOW (ref 3.3–5)
ALP SERPL-CCNC: 65 U/L — SIGNIFICANT CHANGE UP (ref 40–120)
ALP SERPL-CCNC: 65 U/L — SIGNIFICANT CHANGE UP (ref 40–120)
ALP SERPL-CCNC: 66 U/L — SIGNIFICANT CHANGE UP (ref 40–120)
ALT FLD-CCNC: 36 U/L — SIGNIFICANT CHANGE UP (ref 10–45)
ALT FLD-CCNC: 38 U/L — SIGNIFICANT CHANGE UP (ref 10–45)
ALT FLD-CCNC: 38 U/L — SIGNIFICANT CHANGE UP (ref 10–45)
ANION GAP SERPL CALC-SCNC: 10 MMOL/L — SIGNIFICANT CHANGE UP (ref 5–17)
ANION GAP SERPL CALC-SCNC: 11 MMOL/L — SIGNIFICANT CHANGE UP (ref 5–17)
ANION GAP SERPL CALC-SCNC: 12 MMOL/L — SIGNIFICANT CHANGE UP (ref 5–17)
ANION GAP SERPL CALC-SCNC: 9 MMOL/L — SIGNIFICANT CHANGE UP (ref 5–17)
APTT BLD: 36.3 SEC — SIGNIFICANT CHANGE UP (ref 27.5–36.3)
APTT BLD: 39.9 SEC — HIGH (ref 27.5–36.3)
APTT BLD: 40.6 SEC — HIGH (ref 27.5–36.3)
APTT BLD: 43.1 SEC — HIGH (ref 27.5–36.3)
AST SERPL-CCNC: 35 U/L — SIGNIFICANT CHANGE UP (ref 10–40)
AST SERPL-CCNC: 36 U/L — SIGNIFICANT CHANGE UP (ref 10–40)
AST SERPL-CCNC: 37 U/L — SIGNIFICANT CHANGE UP (ref 10–40)
BILIRUB SERPL-MCNC: 0.4 MG/DL — SIGNIFICANT CHANGE UP (ref 0.2–1.2)
BILIRUB SERPL-MCNC: 0.5 MG/DL — SIGNIFICANT CHANGE UP (ref 0.2–1.2)
BILIRUB SERPL-MCNC: 0.5 MG/DL — SIGNIFICANT CHANGE UP (ref 0.2–1.2)
BUN SERPL-MCNC: 37 MG/DL — HIGH (ref 7–23)
BUN SERPL-MCNC: 39 MG/DL — HIGH (ref 7–23)
BUN SERPL-MCNC: 43 MG/DL — HIGH (ref 7–23)
BUN SERPL-MCNC: 44 MG/DL — HIGH (ref 7–23)
CALCIUM SERPL-MCNC: 8.4 MG/DL — SIGNIFICANT CHANGE UP (ref 8.4–10.5)
CALCIUM SERPL-MCNC: 8.6 MG/DL — SIGNIFICANT CHANGE UP (ref 8.4–10.5)
CALCIUM SERPL-MCNC: 8.7 MG/DL — SIGNIFICANT CHANGE UP (ref 8.4–10.5)
CALCIUM SERPL-MCNC: 8.7 MG/DL — SIGNIFICANT CHANGE UP (ref 8.4–10.5)
CHLORIDE SERPL-SCNC: 117 MMOL/L — HIGH (ref 96–108)
CHLORIDE SERPL-SCNC: 118 MMOL/L — HIGH (ref 96–108)
CHLORIDE SERPL-SCNC: 118 MMOL/L — HIGH (ref 96–108)
CHLORIDE SERPL-SCNC: 120 MMOL/L — HIGH (ref 96–108)
CO2 SERPL-SCNC: 21 MMOL/L — LOW (ref 22–31)
CO2 SERPL-SCNC: 22 MMOL/L — SIGNIFICANT CHANGE UP (ref 22–31)
CO2 SERPL-SCNC: 22 MMOL/L — SIGNIFICANT CHANGE UP (ref 22–31)
CO2 SERPL-SCNC: 24 MMOL/L — SIGNIFICANT CHANGE UP (ref 22–31)
CREAT SERPL-MCNC: 1.34 MG/DL — HIGH (ref 0.5–1.3)
CREAT SERPL-MCNC: 1.35 MG/DL — HIGH (ref 0.5–1.3)
CREAT SERPL-MCNC: 1.42 MG/DL — HIGH (ref 0.5–1.3)
CREAT SERPL-MCNC: 1.48 MG/DL — HIGH (ref 0.5–1.3)
GAS PNL BLDA: SIGNIFICANT CHANGE UP
GLUCOSE SERPL-MCNC: 134 MG/DL — HIGH (ref 70–99)
GLUCOSE SERPL-MCNC: 142 MG/DL — HIGH (ref 70–99)
GLUCOSE SERPL-MCNC: 147 MG/DL — HIGH (ref 70–99)
GLUCOSE SERPL-MCNC: 151 MG/DL — HIGH (ref 70–99)
HCT VFR BLD CALC: 28.4 % — LOW (ref 39–50)
HCT VFR BLD CALC: 28.9 % — LOW (ref 39–50)
HCT VFR BLD CALC: 30 % — LOW (ref 39–50)
HCT VFR BLD CALC: 30 % — LOW (ref 39–50)
HGB BLD-MCNC: 8.6 G/DL — LOW (ref 13–17)
HGB BLD-MCNC: 8.8 G/DL — LOW (ref 13–17)
HGB BLD-MCNC: 9.1 G/DL — LOW (ref 13–17)
HGB BLD-MCNC: 9.1 G/DL — LOW (ref 13–17)
INR BLD: 1.18 — HIGH (ref 0.88–1.16)
INR BLD: 1.19 — HIGH (ref 0.88–1.16)
INR BLD: 1.2 — HIGH (ref 0.88–1.16)
INR BLD: 1.23 — HIGH (ref 0.88–1.16)
LACTATE SERPL-SCNC: 0.7 MMOL/L — SIGNIFICANT CHANGE UP (ref 0.5–2)
LACTATE SERPL-SCNC: 0.8 MMOL/L — SIGNIFICANT CHANGE UP (ref 0.5–2)
LACTATE SERPL-SCNC: 0.8 MMOL/L — SIGNIFICANT CHANGE UP (ref 0.5–2)
LACTATE SERPL-SCNC: 1.6 MMOL/L — SIGNIFICANT CHANGE UP (ref 0.5–2)
MAGNESIUM SERPL-MCNC: 2.5 MG/DL — SIGNIFICANT CHANGE UP (ref 1.6–2.6)
MAGNESIUM SERPL-MCNC: 2.5 MG/DL — SIGNIFICANT CHANGE UP (ref 1.6–2.6)
MAGNESIUM SERPL-MCNC: 2.6 MG/DL — SIGNIFICANT CHANGE UP (ref 1.6–2.6)
MAGNESIUM SERPL-MCNC: 2.6 MG/DL — SIGNIFICANT CHANGE UP (ref 1.6–2.6)
MCHC RBC-ENTMCNC: 28.1 PG — SIGNIFICANT CHANGE UP (ref 27–34)
MCHC RBC-ENTMCNC: 28.2 PG — SIGNIFICANT CHANGE UP (ref 27–34)
MCHC RBC-ENTMCNC: 28.2 PG — SIGNIFICANT CHANGE UP (ref 27–34)
MCHC RBC-ENTMCNC: 28.5 PG — SIGNIFICANT CHANGE UP (ref 27–34)
MCHC RBC-ENTMCNC: 29.8 GM/DL — LOW (ref 32–36)
MCHC RBC-ENTMCNC: 30.3 GM/DL — LOW (ref 32–36)
MCHC RBC-ENTMCNC: 30.3 GM/DL — LOW (ref 32–36)
MCHC RBC-ENTMCNC: 31 GM/DL — LOW (ref 32–36)
MCV RBC AUTO: 91.9 FL — SIGNIFICANT CHANGE UP (ref 80–100)
MCV RBC AUTO: 92.9 FL — SIGNIFICANT CHANGE UP (ref 80–100)
MCV RBC AUTO: 92.9 FL — SIGNIFICANT CHANGE UP (ref 80–100)
MCV RBC AUTO: 94.4 FL — SIGNIFICANT CHANGE UP (ref 80–100)
NRBC # BLD: 0 /100 WBCS — SIGNIFICANT CHANGE UP (ref 0–0)
PHOSPHATE SERPL-MCNC: 2.7 MG/DL — SIGNIFICANT CHANGE UP (ref 2.5–4.5)
PHOSPHATE SERPL-MCNC: 2.9 MG/DL — SIGNIFICANT CHANGE UP (ref 2.5–4.5)
PHOSPHATE SERPL-MCNC: 3 MG/DL — SIGNIFICANT CHANGE UP (ref 2.5–4.5)
PHOSPHATE SERPL-MCNC: 3.1 MG/DL — SIGNIFICANT CHANGE UP (ref 2.5–4.5)
PLATELET # BLD AUTO: 147 K/UL — LOW (ref 150–400)
PLATELET # BLD AUTO: 164 K/UL — SIGNIFICANT CHANGE UP (ref 150–400)
PLATELET # BLD AUTO: 244 K/UL — SIGNIFICANT CHANGE UP (ref 150–400)
PLATELET # BLD AUTO: 264 K/UL — SIGNIFICANT CHANGE UP (ref 150–400)
POTASSIUM SERPL-MCNC: 3.4 MMOL/L — LOW (ref 3.5–5.3)
POTASSIUM SERPL-MCNC: 3.6 MMOL/L — SIGNIFICANT CHANGE UP (ref 3.5–5.3)
POTASSIUM SERPL-MCNC: 3.8 MMOL/L — SIGNIFICANT CHANGE UP (ref 3.5–5.3)
POTASSIUM SERPL-MCNC: 4.1 MMOL/L — SIGNIFICANT CHANGE UP (ref 3.5–5.3)
POTASSIUM SERPL-SCNC: 3.4 MMOL/L — LOW (ref 3.5–5.3)
POTASSIUM SERPL-SCNC: 3.6 MMOL/L — SIGNIFICANT CHANGE UP (ref 3.5–5.3)
POTASSIUM SERPL-SCNC: 3.8 MMOL/L — SIGNIFICANT CHANGE UP (ref 3.5–5.3)
POTASSIUM SERPL-SCNC: 4.1 MMOL/L — SIGNIFICANT CHANGE UP (ref 3.5–5.3)
PROT SERPL-MCNC: 5 G/DL — LOW (ref 6–8.3)
PROT SERPL-MCNC: 5.1 G/DL — LOW (ref 6–8.3)
PROT SERPL-MCNC: 5.2 G/DL — LOW (ref 6–8.3)
PROTHROM AB SERPL-ACNC: 13.4 SEC — HIGH (ref 10–12.9)
PROTHROM AB SERPL-ACNC: 13.5 SEC — HIGH (ref 10–12.9)
PROTHROM AB SERPL-ACNC: 13.6 SEC — HIGH (ref 10–12.9)
PROTHROM AB SERPL-ACNC: 14 SEC — HIGH (ref 10–12.9)
RBC # BLD: 3.06 M/UL — LOW (ref 4.2–5.8)
RBC # BLD: 3.09 M/UL — LOW (ref 4.2–5.8)
RBC # BLD: 3.23 M/UL — LOW (ref 4.2–5.8)
RBC # BLD: 3.23 M/UL — LOW (ref 4.2–5.8)
RBC # FLD: 15.9 % — HIGH (ref 10.3–14.5)
RBC # FLD: 16.1 % — HIGH (ref 10.3–14.5)
RBC # FLD: 16.3 % — HIGH (ref 10.3–14.5)
RBC # FLD: 16.4 % — HIGH (ref 10.3–14.5)
SODIUM SERPL-SCNC: 149 MMOL/L — HIGH (ref 135–145)
SODIUM SERPL-SCNC: 150 MMOL/L — HIGH (ref 135–145)
SODIUM SERPL-SCNC: 151 MMOL/L — HIGH (ref 135–145)
SODIUM SERPL-SCNC: 154 MMOL/L — HIGH (ref 135–145)
WBC # BLD: 11.68 K/UL — HIGH (ref 3.8–10.5)
WBC # BLD: 12.28 K/UL — HIGH (ref 3.8–10.5)
WBC # BLD: 15.01 K/UL — HIGH (ref 3.8–10.5)
WBC # BLD: 16.29 K/UL — HIGH (ref 3.8–10.5)
WBC # FLD AUTO: 11.68 K/UL — HIGH (ref 3.8–10.5)
WBC # FLD AUTO: 12.28 K/UL — HIGH (ref 3.8–10.5)
WBC # FLD AUTO: 15.01 K/UL — HIGH (ref 3.8–10.5)
WBC # FLD AUTO: 16.29 K/UL — HIGH (ref 3.8–10.5)

## 2019-11-29 PROCEDURE — 71045 X-RAY EXAM CHEST 1 VIEW: CPT | Mod: 26

## 2019-11-29 PROCEDURE — 99232 SBSQ HOSP IP/OBS MODERATE 35: CPT | Mod: GC

## 2019-11-29 PROCEDURE — 93010 ELECTROCARDIOGRAM REPORT: CPT

## 2019-11-29 PROCEDURE — 36569 INSJ PICC 5 YR+ W/O IMAGING: CPT

## 2019-11-29 PROCEDURE — 99291 CRITICAL CARE FIRST HOUR: CPT

## 2019-11-29 PROCEDURE — 76937 US GUIDE VASCULAR ACCESS: CPT | Mod: 26,59

## 2019-11-29 PROCEDURE — 99292 CRITICAL CARE ADDL 30 MIN: CPT

## 2019-11-29 RX ORDER — HEPARIN SODIUM 5000 [USP'U]/ML
750 INJECTION INTRAVENOUS; SUBCUTANEOUS
Qty: 25000 | Refills: 0 | Status: DISCONTINUED | OUTPATIENT
Start: 2019-11-29 | End: 2019-11-29

## 2019-11-29 RX ORDER — POTASSIUM CHLORIDE 20 MEQ
20 PACKET (EA) ORAL ONCE
Refills: 0 | Status: COMPLETED | OUTPATIENT
Start: 2019-11-29 | End: 2019-11-29

## 2019-11-29 RX ORDER — AMIODARONE HYDROCHLORIDE 400 MG/1
150 TABLET ORAL ONCE
Refills: 0 | Status: COMPLETED | OUTPATIENT
Start: 2019-11-29 | End: 2019-11-29

## 2019-11-29 RX ORDER — HEPARIN SODIUM 5000 [USP'U]/ML
950 INJECTION INTRAVENOUS; SUBCUTANEOUS
Qty: 25000 | Refills: 0 | Status: DISCONTINUED | OUTPATIENT
Start: 2019-11-29 | End: 2019-11-30

## 2019-11-29 RX ADMIN — DEXMEDETOMIDINE HYDROCHLORIDE IN 0.9% SODIUM CHLORIDE 10.49 MICROGRAM(S)/KG/HR: 4 INJECTION INTRAVENOUS at 07:26

## 2019-11-29 RX ADMIN — LIDOCAINE 1 PATCH: 4 CREAM TOPICAL at 00:00

## 2019-11-29 RX ADMIN — AMIODARONE HYDROCHLORIDE 600 MILLIGRAM(S): 400 TABLET ORAL at 19:00

## 2019-11-29 RX ADMIN — HEPARIN SODIUM 1050 UNIT(S)/HR: 5000 INJECTION INTRAVENOUS; SUBCUTANEOUS at 18:58

## 2019-11-29 RX ADMIN — MEROPENEM 100 MILLIGRAM(S): 1 INJECTION INTRAVENOUS at 00:03

## 2019-11-29 RX ADMIN — ACETAZOLAMIDE 105 MILLIGRAM(S): 250 TABLET ORAL at 05:16

## 2019-11-29 RX ADMIN — Medication 1 DROP(S): at 22:00

## 2019-11-29 RX ADMIN — TAMSULOSIN HYDROCHLORIDE 0.4 MILLIGRAM(S): 0.4 CAPSULE ORAL at 21:54

## 2019-11-29 RX ADMIN — Medication 1 DROP(S): at 14:30

## 2019-11-29 RX ADMIN — MEROPENEM 100 MILLIGRAM(S): 1 INJECTION INTRAVENOUS at 17:47

## 2019-11-29 RX ADMIN — Medication 1 DROP(S): at 03:09

## 2019-11-29 RX ADMIN — PANTOPRAZOLE SODIUM 40 MILLIGRAM(S): 20 TABLET, DELAYED RELEASE ORAL at 17:46

## 2019-11-29 RX ADMIN — Medication 100 MILLIEQUIVALENT(S): at 17:45

## 2019-11-29 RX ADMIN — CHLORHEXIDINE GLUCONATE 15 MILLILITER(S): 213 SOLUTION TOPICAL at 17:45

## 2019-11-29 RX ADMIN — Medication 1 DROP(S): at 07:26

## 2019-11-29 RX ADMIN — HEPARIN SODIUM 950 UNIT(S)/HR: 5000 INJECTION INTRAVENOUS; SUBCUTANEOUS at 09:39

## 2019-11-29 RX ADMIN — ACETAZOLAMIDE 105 MILLIGRAM(S): 250 TABLET ORAL at 17:45

## 2019-11-29 RX ADMIN — AMIODARONE HYDROCHLORIDE 600 MILLIGRAM(S): 400 TABLET ORAL at 17:00

## 2019-11-29 RX ADMIN — Medication 1 DROP(S): at 00:03

## 2019-11-29 RX ADMIN — Medication 1 DROP(S): at 19:09

## 2019-11-29 RX ADMIN — Medication 100 MILLIGRAM(S): at 05:16

## 2019-11-29 RX ADMIN — AMIODARONE HYDROCHLORIDE 600 MILLIGRAM(S): 400 TABLET ORAL at 18:00

## 2019-11-29 RX ADMIN — DEXMEDETOMIDINE HYDROCHLORIDE IN 0.9% SODIUM CHLORIDE 10.49 MICROGRAM(S)/KG/HR: 4 INJECTION INTRAVENOUS at 00:30

## 2019-11-29 RX ADMIN — CHLORHEXIDINE GLUCONATE 15 MILLILITER(S): 213 SOLUTION TOPICAL at 05:16

## 2019-11-29 RX ADMIN — TAMSULOSIN HYDROCHLORIDE 0.4 MILLIGRAM(S): 0.4 CAPSULE ORAL at 00:02

## 2019-11-29 RX ADMIN — Medication 1 DROP(S): at 05:15

## 2019-11-29 RX ADMIN — Medication 100 MILLIEQUIVALENT(S): at 05:15

## 2019-11-29 RX ADMIN — Medication 1 DROP(S): at 10:00

## 2019-11-29 RX ADMIN — Medication 1 DROP(S): at 17:45

## 2019-11-29 RX ADMIN — Medication 1 DROP(S): at 12:34

## 2019-11-29 RX ADMIN — Medication 1 DROP(S): at 21:54

## 2019-11-29 RX ADMIN — CHLORHEXIDINE GLUCONATE 1 APPLICATION(S): 213 SOLUTION TOPICAL at 05:17

## 2019-11-29 RX ADMIN — Medication 100 MILLIEQUIVALENT(S): at 00:27

## 2019-11-29 RX ADMIN — Medication 1 DROP(S): at 16:00

## 2019-11-29 NOTE — PROGRESS NOTE ADULT - SUBJECTIVE AND OBJECTIVE BOX
covering for Dr Graff    Consult Note Critical Care    HPI:  69 year old male, with PMHx of colitis, prostate surgery @ [facility], BIBA to [facility] ED complaining of back pain and profound hypotension. Per patient's wife patient began complaining of left sided neck pain radiating to his back at around 1:00 AM with bilateral weakness and inability to get out of bed. Upon arriving to the ED patient was profoundly hypotensive SBP 40s and cyanotic. He was emergently rushed to  CT scan which confirmed Type A aortic dissection and CT surgery was called. Arterial line was placed and patient was stabilized on Levophed and Phenylephrine with marginal SBP 80-90s. He was intubated for hypoxia with cyanosis and emergently brought to the OR for salvage Type A dissection repair. (23 Nov 2019 05:08)      PAST MEDICAL & SURGICAL HISTORY:  Benign prostatic hypertrophy without lower urinary tract symptoms: BPH (benign prostatic hypertrophy)  Ulcerative colitis: Ulcerative colitis  States following surgery of eye and adnexa: straightened right eye  Other postprocedural status: History of hernia repair  Hemorrhoids: Hemorrhoids  Acute appendicitis with generalized peritonitis: Ruptured appendix      REVIEW OF SYSTEMS:    patient unable to provide a review of systems  hie is currently intubated and sedated    MEDICATIONS  (STANDING):  acetaZOLAMIDE  IVPB 250 milliGRAM(s) IV Intermittent two times a day  artificial  tears Solution 1 Drop(s) Both EYES every 2 hours  aspirin enteric coated 81 milliGRAM(s) Oral daily  chlorhexidine 0.12% Liquid 15 milliLiter(s) Oral Mucosa every 12 hours  chlorhexidine 2% Cloths 1 Application(s) Topical daily  dexMEDEtomidine Infusion 0.5 MICROgram(s)/kG/Hr (10.488 mL/Hr) IV Continuous <Continuous>  dextrose 50% Injectable 50 milliLiter(s) IV Push every 15 minutes  heparin  Infusion. 950 Unit(s)/Hr (9.5 mL/Hr) IV Continuous <Continuous>  lidocaine   Patch 1 Patch Transdermal daily  meropenem  IVPB 1000 milliGRAM(s) IV Intermittent every 12 hours  meropenem  IVPB      pantoprazole  Injectable 40 milliGRAM(s) IV Push daily  phenylephrine    Infusion 0.5 MICROgram(s)/kG/Min (7.866 mL/Hr) IV Continuous <Continuous>  sodium chloride 0.9%. 1000 milliLiter(s) (10 mL/Hr) IV Continuous <Continuous>  tamsulosin 0.4 milliGRAM(s) Oral at bedtime    MEDICATIONS  (PRN):  bisacodyl Suppository 10 milliGRAM(s) Rectal daily PRN Constipation      meropenem  IVPB 1000 milliGRAM(s) IV Intermittent every 12 hours  meropenem  IVPB          Allergies    penicillin (Unknown)    Intolerances        SOCIAL HISTORY:    FAMILY HISTORY:      Vital Signs Last 24 Hrs  T(C): 37.7 (29 Nov 2019 09:00), Max: 38.4 (28 Nov 2019 17:32)  T(F): 99.8 (29 Nov 2019 09:00), Max: 101.1 (28 Nov 2019 17:32)  HR: 72 (29 Nov 2019 11:00) (64 - 80)  BP: --  BP(mean): --  RR: 18 (29 Nov 2019 10:00) (13 - 20)  SpO2: 94% (29 Nov 2019 11:00) (89% - 94%)    PHYSICAL EXAM:      Eyes: PERRL, EOM intact; conjunctiva and sclera clear  Head: Normocephalic; atraumatic  ENMT: No nasal discharge; airway clear  Neck: Supple; non tender; no masses  Respiratory: No wheezes, rales or rhonchi  Cardiovascular: Regular rate and rhythm. S1 and S2 Normal; No murmurs, gallops or rubs  Gastrointestinal: Soft non-tender non-distended; Normal bowel sounds; No hepatosplenomegaly  Genitourinary: No costovertebral angle tenderness  Extremities: Normal range of motion, No clubbing, cyanosis or edema  LABS:                        8.8    12.28 )-----------( 164      ( 29 Nov 2019 09:05 )             28.4     11-29    151<H>  |  118<H>  |  43<H>  ----------------------------<  142<H>  3.6   |  24  |  1.42<H>    Ca    8.4      29 Nov 2019 09:05  Phos  3.1     11-29  Mg     2.6     11-29    TPro  5.0<L>  /  Alb  2.6<L>  /  TBili  0.5  /  DBili  x   /  AST  37  /  ALT  36  /  AlkPhos  65  11-29    PT/INR - ( 29 Nov 2019 09:05 )   PT: 13.5 sec;   INR: 1.19          PTT - ( 29 Nov 2019 09:05 )  PTT:39.9 sec    Culture Results:   Few-moderate Klebsiella oxytoca  Accompanied by normal respiratory jazmín (11-25 @ 16:51)    RADIOLOGY & ADDITIONAL STUDIES:

## 2019-11-29 NOTE — PHYSICAL THERAPY INITIAL EVALUATION ADULT - GENERAL OBSERVATIONS, REHAB EVAL
Patient received semi-supine no acute distress +telemetry +IV heparin +SCDs +solo +temp PPM +CMV FiO2 50% PEEP 8 lip line 25 +b/l wrist restraints +A-line, cleared for PT by SOLE Tate and Dr. Hebert, agreeable to PT Eval, RT Brenda present to assist with session. Left supine all lines intact, RN and medical team present/aware. FIM 1

## 2019-11-29 NOTE — PHYSICAL THERAPY INITIAL EVALUATION ADULT - TRANSFER SAFETY CONCERNS NOTED: SIT/STAND, REHAB EVAL
losing balance/decreased step length/decreased weight-shifting ability/decreased safety awareness/decreased sequencing ability/decreased balance during turns

## 2019-11-29 NOTE — PHYSICAL THERAPY INITIAL EVALUATION ADULT - MODALITIES TREATMENT COMMENTS
upon entering room, patient with head turned to left positioned toward intubation; upon sitting, requires significant assist and encouragement to turn head to right side 2/2 tightness in left neck musculature

## 2019-11-29 NOTE — PROGRESS NOTE ADULT - SUBJECTIVE AND OBJECTIVE BOX
O/N Events: SAFIA  Subjective:  intubated on precedex, FiO2 down to 50%, Cr continues to improve 1.4/ Hypernatremia improving with Free water flushes   auto diuresing, UOP 3.8 L K 3.6    VITALS  Vital Signs Last 24 Hrs  T(C): 38.2 (29 Nov 2019 14:00), Max: 38.4 (28 Nov 2019 17:32)  T(F): 100.8 (29 Nov 2019 14:00), Max: 101.1 (28 Nov 2019 17:32)  HR: 78 (29 Nov 2019 13:00) (66 - 80)  BP: 128/66  MAP: 90   RR: 18 (29 Nov 2019 13:00) (13 - 20)  SpO2: 93% (29 Nov 2019 13:00) (89% - 94%)    PHYSICAL EXAM  General: intubated  Neck: enlarged neck habitus hard to assess for JVP  Cardiac: S1, S2. RRR. No murmurs   Respiratory: coarse breathing sounds diffuse, anterior filed  Abdomen: soft, mildly distended, non tympanic   Extremities: no LE edema b/l, UE edema improving   Neuro: deferred     MEDICATIONS  (STANDING):  acetaZOLAMIDE  IVPB 250 milliGRAM(s) IV Intermittent two times a day  artificial  tears Solution 1 Drop(s) Both EYES every 2 hours  aspirin enteric coated 81 milliGRAM(s) Oral daily  chlorhexidine 0.12% Liquid 15 milliLiter(s) Oral Mucosa every 12 hours  chlorhexidine 2% Cloths 1 Application(s) Topical daily  dexMEDEtomidine Infusion 0.5 MICROgram(s)/kG/Hr (10.488 mL/Hr) IV Continuous <Continuous>  dextrose 50% Injectable 50 milliLiter(s) IV Push every 15 minutes  heparin  Infusion. 950 Unit(s)/Hr (9.5 mL/Hr) IV Continuous <Continuous>  lidocaine   Patch 1 Patch Transdermal daily  meropenem  IVPB 1000 milliGRAM(s) IV Intermittent every 12 hours  meropenem  IVPB      pantoprazole  Injectable 40 milliGRAM(s) IV Push daily  sodium chloride 0.9%. 1000 milliLiter(s) (10 mL/Hr) IV Continuous <Continuous>  tamsulosin 0.4 milliGRAM(s) Oral at bedtime    MEDICATIONS  (PRN):  bisacodyl Suppository 10 milliGRAM(s) Rectal daily PRN Constipation      LABS                        8.8    12.28 )-----------( 164      ( 29 Nov 2019 09:05 )             28.4     11-29    151<H>  |  118<H>  |  43<H>  ----------------------------<  142<H>  3.6   |  24  |  1.42<H>    Ca    8.4      29 Nov 2019 09:05  Phos  3.1     11-29  Mg     2.6     11-29    TPro  5.0<L>  /  Alb  2.6<L>  /  TBili  0.5  /  DBili  x   /  AST  37  /  ALT  36  /  AlkPhos  65  11-29    LIVER FUNCTIONS - ( 29 Nov 2019 02:36 )  Alb: 2.6 g/dL / Pro: 5.0 g/dL / ALK PHOS: 65 U/L / ALT: 36 U/L / AST: 37 U/L / GGT: x           PT/INR - ( 29 Nov 2019 09:05 )   PT: 13.5 sec;   INR: 1.19          PTT - ( 29 Nov 2019 09:05 )  PTT:39.9 sec

## 2019-11-29 NOTE — PHYSICAL THERAPY INITIAL EVALUATION ADULT - BED MOBILITY LIMITATIONS, REHAB EVAL
decreased ability to use legs for bridging/pushing/decreased ability to use arms for pushing/pulling/impaired ability to control trunk for mobility
Declines

## 2019-11-29 NOTE — PROGRESS NOTE ADULT - ATTENDING COMMENTS
FELY resolving, polyuria with free wter depletion   can add d5W if Na does not improve--and also increase FWF to 350 cc q4

## 2019-11-29 NOTE — PHYSICAL THERAPY INITIAL EVALUATION ADULT - CRITERIA FOR SKILLED THERAPEUTIC INTERVENTIONS
rehab potential/impairments found/functional limitations in following categories/risk reduction/prevention/therapy frequency/anticipated equipment needs at discharge/anticipated discharge recommendation/predicted duration of therapy intervention

## 2019-11-29 NOTE — PHYSICAL THERAPY INITIAL EVALUATION ADULT - GAIT DEVIATIONS NOTED, PT EVAL
increased time in double stance/decreased step length/decreased stride length/decreased alessia/decreased weight-shifting ability

## 2019-11-29 NOTE — PHYSICAL THERAPY INITIAL EVALUATION ADULT - LEVEL OF CONSCIOUSNESS, REHAB EVAL
requires repetition to follow commands, consistently attempting to reach for tube requiring b/l wrist restraints to prevent self-extubation/alert

## 2019-11-29 NOTE — PHYSICAL THERAPY INITIAL EVALUATION ADULT - LIVES WITH, PROFILE
unable to gather social hx this session as patient intubated requiring sedation at end of session, f/u next session to gather social hx

## 2019-11-29 NOTE — PROGRESS NOTE ADULT - SUBJECTIVE AND OBJECTIVE BOX
CTICU  CRITICAL  CARE  attending     Hand off received 					   Pertinent clinical, laboratory, radiographic, hemodynamic, echocardiographic, respiratory data, microbiologic data and chart were reviewed and analyzed frequently throughout the course of the day and night  Patient seen and examined with CTS/ SH attending at bedside  Pt is a 69y , Male, HEALTH ISSUES - PROBLEM Dx:  Bacterial pneumonia: Bacterial pneumonia  Fever, postprocedural: Fever, postprocedural  FELY (acute kidney injury): FELY (acute kidney injury)      , FAMILY HISTORY:  PAST MEDICAL & SURGICAL HISTORY:  Benign prostatic hypertrophy without lower urinary tract symptoms: BPH (benign prostatic hypertrophy)  Ulcerative colitis: Ulcerative colitis  States following surgery of eye and adnexa: straightened right eye  Other postprocedural status: History of hernia repair  Hemorrhoids: Hemorrhoids  Acute appendicitis with generalized peritonitis: Ruptured appendix    Patient is a 69y old  Male who presents with a chief complaint of Aortic dissection (29 Nov 2019 14:16)      14 system review was unremarkable    Vital signs, hemodynamic and respiratory parameters were reviewed from the bedside nursing flowsheet.  ICU Vital Signs Last 24 Hrs  T(C): 36.6 (29 Nov 2019 17:48), Max: 38.3 (28 Nov 2019 21:25)  T(F): 97.9 (29 Nov 2019 17:48), Max: 100.9 (28 Nov 2019 21:25)  HR: 114 (29 Nov 2019 19:00) (66 - 138)  BP: --  BP(mean): --  ABP: 98/60 (29 Nov 2019 19:00) (94/62 - 152/68)  ABP(mean): 74 (29 Nov 2019 19:00) (64 - 100)  RR: 20 (29 Nov 2019 19:00) (13 - 20)  SpO2: 99% (29 Nov 2019 19:00) (89% - 99%)    Adult Advanced Hemodynamics Last 24 Hrs  CVP(mm Hg): 3 (29 Nov 2019 16:00) (3 - 12)  CVP(cm H2O): --  CO: --  CI: --  PA: --  PA(mean): --  PCWP: --  SVR: --  SVRI: --  PVR: --  PVRI: --, ABG - ( 29 Nov 2019 18:05 )  pH, Arterial: 7.46  pH, Blood: x     /  pCO2: 32    /  pO2: 86    / HCO3: 22    / Base Excess: -1.3  /  SaO2: 96                Mode: AC/ CMV (Assist Control/ Continuous Mandatory Ventilation)  RR (machine): 12  TV (machine): 650  FiO2: 50  PEEP: 5  ITime: 1.4  MAP: 11  PIP: 19    Intake and output was reviewed and the fluid balance was calculated  Daily     Daily   I&O's Summary    28 Nov 2019 07:01  -  29 Nov 2019 07:00  --------------------------------------------------------  IN: 3320.9 mL / OUT: 3825 mL / NET: -504.1 mL    29 Nov 2019 07:01  -  29 Nov 2019 19:02  --------------------------------------------------------  IN: 172.1 mL / OUT: 1550 mL / NET: -1377.9 mL        All lines and drain sites were assessed  [facility]      POCT Blood Glucose.: 130 mg/dL (28 Nov 2019 06:31)    No acute change in mental status  Auscultation of the chest reveals equal bs  Abdomen is soft  Extremities are warm and well perfused  Wounds appear clean and unremarkable  Antibiotics are periop    labs  CBC Full  -  ( 29 Nov 2019 18:03 )  WBC Count : 15.01 K/uL  RBC Count : 3.23 M/uL  Hemoglobin : 9.1 g/dL  Hematocrit : 30.0 %  Platelet Count - Automated : 244 K/uL  Mean Cell Volume : 92.9 fl  Mean Cell Hemoglobin : 28.2 pg  Mean Cell Hemoglobin Concentration : 30.3 gm/dL  Auto Neutrophil # : x  Auto Lymphocyte # : x  Auto Monocyte # : x  Auto Eosinophil # : x  Auto Basophil # : x  Auto Neutrophil % : x  Auto Lymphocyte % : x  Auto Monocyte % : x  Auto Eosinophil % : x  Auto Basophil % : x    11-29    150<H>  |  117<H>  |  39<H>  ----------------------------<  151<H>  3.4<L>   |  22  |  1.35<H>    Ca    8.7      29 Nov 2019 18:03  Phos  2.9     11-29  Mg     2.5     11-29    TPro  5.2<L>  /  Alb  3.0<L>  /  TBili  0.5  /  DBili  x   /  AST  35  /  ALT  38  /  AlkPhos  66  11-29    PT/INR - ( 29 Nov 2019 18:03 )   PT: 13.4 sec;   INR: 1.18          PTT - ( 29 Nov 2019 18:03 )  PTT:43.1 sec  The current medications were reviewed   MEDICATIONS  (STANDING):  acetaZOLAMIDE  IVPB 250 milliGRAM(s) IV Intermittent two times a day  artificial  tears Solution 1 Drop(s) Both EYES every 2 hours  aspirin enteric coated 81 milliGRAM(s) Oral daily  chlorhexidine 0.12% Liquid 15 milliLiter(s) Oral Mucosa every 12 hours  chlorhexidine 2% Cloths 1 Application(s) Topical daily  dexMEDEtomidine Infusion 0.5 MICROgram(s)/kG/Hr (10.488 mL/Hr) IV Continuous <Continuous>  dextrose 50% Injectable 50 milliLiter(s) IV Push every 15 minutes  heparin  Infusion. 950 Unit(s)/Hr (9.5 mL/Hr) IV Continuous <Continuous>  lidocaine   Patch 1 Patch Transdermal daily  meropenem  IVPB 1000 milliGRAM(s) IV Intermittent every 12 hours  meropenem  IVPB      pantoprazole  Injectable 40 milliGRAM(s) IV Push daily  sodium chloride 0.9%. 1000 milliLiter(s) (10 mL/Hr) IV Continuous <Continuous>  tamsulosin 0.4 milliGRAM(s) Oral at bedtime    MEDICATIONS  (PRN):  bisacodyl Suppository 10 milliGRAM(s) Rectal daily PRN Constipation       PROBLEM LIST/ ASSESSMENT:  HEALTH ISSUES - PROBLEM Dx:  Bacterial pneumonia: Bacterial pneumonia  Fever, postprocedural: Fever, postprocedural  FELY (acute kidney injury): FELY (acute kidney injury)      ,   Patient is a 69y old  Male who presents with a chief complaint of Aortic dissection (29 Nov 2019 14:16)     s/p cardiac surgery              My plan includes :  close hemodynamic, ventilatory and drain monitoring and management per post op routine    Monitor for arrhythmias and monitor parameters for organ perfusion  beta blockade not administered due to hemodynamic instability and bradycardia  monitor neurologic status  Head of the bed should remain elevated to 45 deg .   chest PT and IS will be encouraged  monitor adequacy of oxygenation and ventilation and attempt to wean oxygen  antibiotic regimen will be tailored to the clinical, laboratory and microbiologic data  Nutritional goals will be met using po eventually , ensure adequate caloric intake and montior the same  Stress ulcer and VTE prophylaxis will be achieved    Glycemic control is satisfactory  Electrolytes have been repleted as necessary and wound care has been carried out. Pain control has been achieved.   agressive physical therapy and early mobility and ambulation goals will be met   The family was updated about the course and plan  CRITICAL CARE TIME personally provided by me  in evaluation and management, reassessments, review and interpretation of labs and x-rays, ventilator and hemodynamic management, formulating a plan and coordinating care: ___90____ MIN.  Time does not include procedural time.  CTICU ATTENDING     					    Jaret Hebert MD

## 2019-11-29 NOTE — PHYSICAL THERAPY INITIAL EVALUATION ADULT - IMPAIRMENTS FOUND, PT EVAL
aerobic capacity/endurance/gait, locomotion, and balance/muscle strength/poor safety awareness/posture

## 2019-11-29 NOTE — PHYSICAL THERAPY INITIAL EVALUATION ADULT - PLANNED THERAPY INTERVENTIONS, PT EVAL
motor coordination training/bed mobility training/gait training/balance training/postural re-education/strengthening/transfer training

## 2019-11-29 NOTE — PROGRESS NOTE ADULT - ASSESSMENT
A/p  70 y/o M with PMHx of Ulcerative colitis BIBA to [facility] ED complaining of back pain and profound hypotension, emergently brought to the OR for salvage Type A dissection repair, s/p aortic arch repair and ascending arch replacement.   Nephrology consulted for FELY.

## 2019-11-30 LAB
ALBUMIN SERPL ELPH-MCNC: 2.8 G/DL — LOW (ref 3.3–5)
ALBUMIN SERPL ELPH-MCNC: 2.9 G/DL — LOW (ref 3.3–5)
ALBUMIN SERPL ELPH-MCNC: 3 G/DL — LOW (ref 3.3–5)
ALP SERPL-CCNC: 62 U/L — SIGNIFICANT CHANGE UP (ref 40–120)
ALP SERPL-CCNC: 62 U/L — SIGNIFICANT CHANGE UP (ref 40–120)
ALP SERPL-CCNC: 64 U/L — SIGNIFICANT CHANGE UP (ref 40–120)
ALT FLD-CCNC: 38 U/L — SIGNIFICANT CHANGE UP (ref 10–45)
ALT FLD-CCNC: 38 U/L — SIGNIFICANT CHANGE UP (ref 10–45)
ALT FLD-CCNC: 40 U/L — SIGNIFICANT CHANGE UP (ref 10–45)
ANION GAP SERPL CALC-SCNC: 11 MMOL/L — SIGNIFICANT CHANGE UP (ref 5–17)
ANION GAP SERPL CALC-SCNC: 9 MMOL/L — SIGNIFICANT CHANGE UP (ref 5–17)
ANION GAP SERPL CALC-SCNC: 9 MMOL/L — SIGNIFICANT CHANGE UP (ref 5–17)
APTT BLD: 44.6 SEC — HIGH (ref 27.5–36.3)
APTT BLD: 53 SEC — HIGH (ref 27.5–36.3)
APTT BLD: 54.3 SEC — HIGH (ref 27.5–36.3)
AST SERPL-CCNC: 29 U/L — SIGNIFICANT CHANGE UP (ref 10–40)
AST SERPL-CCNC: 33 U/L — SIGNIFICANT CHANGE UP (ref 10–40)
AST SERPL-CCNC: 34 U/L — SIGNIFICANT CHANGE UP (ref 10–40)
BILIRUB SERPL-MCNC: 0.4 MG/DL — SIGNIFICANT CHANGE UP (ref 0.2–1.2)
BILIRUB SERPL-MCNC: 0.4 MG/DL — SIGNIFICANT CHANGE UP (ref 0.2–1.2)
BILIRUB SERPL-MCNC: 0.5 MG/DL — SIGNIFICANT CHANGE UP (ref 0.2–1.2)
BUN SERPL-MCNC: 35 MG/DL — HIGH (ref 7–23)
BUN SERPL-MCNC: 36 MG/DL — HIGH (ref 7–23)
BUN SERPL-MCNC: 36 MG/DL — HIGH (ref 7–23)
CALCIUM SERPL-MCNC: 8.3 MG/DL — LOW (ref 8.4–10.5)
CALCIUM SERPL-MCNC: 8.4 MG/DL — SIGNIFICANT CHANGE UP (ref 8.4–10.5)
CALCIUM SERPL-MCNC: 8.5 MG/DL — SIGNIFICANT CHANGE UP (ref 8.4–10.5)
CHLORIDE SERPL-SCNC: 120 MMOL/L — HIGH (ref 96–108)
CHLORIDE SERPL-SCNC: 121 MMOL/L — HIGH (ref 96–108)
CHLORIDE SERPL-SCNC: 122 MMOL/L — HIGH (ref 96–108)
CO2 SERPL-SCNC: 20 MMOL/L — LOW (ref 22–31)
CO2 SERPL-SCNC: 21 MMOL/L — LOW (ref 22–31)
CO2 SERPL-SCNC: 22 MMOL/L — SIGNIFICANT CHANGE UP (ref 22–31)
CREAT SERPL-MCNC: 1.32 MG/DL — HIGH (ref 0.5–1.3)
CREAT SERPL-MCNC: 1.35 MG/DL — HIGH (ref 0.5–1.3)
CREAT SERPL-MCNC: 1.41 MG/DL — HIGH (ref 0.5–1.3)
GAS PNL BLDA: SIGNIFICANT CHANGE UP
GLUCOSE SERPL-MCNC: 138 MG/DL — HIGH (ref 70–99)
GLUCOSE SERPL-MCNC: 139 MG/DL — HIGH (ref 70–99)
GLUCOSE SERPL-MCNC: 147 MG/DL — HIGH (ref 70–99)
HCT VFR BLD CALC: 29.5 % — LOW (ref 39–50)
HCT VFR BLD CALC: 30.3 % — LOW (ref 39–50)
HCT VFR BLD CALC: 30.5 % — LOW (ref 39–50)
HGB BLD-MCNC: 8.9 G/DL — LOW (ref 13–17)
HGB BLD-MCNC: 9.2 G/DL — LOW (ref 13–17)
HGB BLD-MCNC: 9.3 G/DL — LOW (ref 13–17)
INR BLD: 1.27 — HIGH (ref 0.88–1.16)
INR BLD: 1.31 — HIGH (ref 0.88–1.16)
INR BLD: 1.35 — HIGH (ref 0.88–1.16)
LACTATE SERPL-SCNC: 0.7 MMOL/L — SIGNIFICANT CHANGE UP (ref 0.5–2)
LACTATE SERPL-SCNC: 0.7 MMOL/L — SIGNIFICANT CHANGE UP (ref 0.5–2)
MAGNESIUM SERPL-MCNC: 2.4 MG/DL — SIGNIFICANT CHANGE UP (ref 1.6–2.6)
MAGNESIUM SERPL-MCNC: 2.5 MG/DL — SIGNIFICANT CHANGE UP (ref 1.6–2.6)
MAGNESIUM SERPL-MCNC: 2.5 MG/DL — SIGNIFICANT CHANGE UP (ref 1.6–2.6)
MCHC RBC-ENTMCNC: 27.9 PG — SIGNIFICANT CHANGE UP (ref 27–34)
MCHC RBC-ENTMCNC: 28.3 PG — SIGNIFICANT CHANGE UP (ref 27–34)
MCHC RBC-ENTMCNC: 28.4 PG — SIGNIFICANT CHANGE UP (ref 27–34)
MCHC RBC-ENTMCNC: 30.2 GM/DL — LOW (ref 32–36)
MCHC RBC-ENTMCNC: 30.4 GM/DL — LOW (ref 32–36)
MCHC RBC-ENTMCNC: 30.5 GM/DL — LOW (ref 32–36)
MCV RBC AUTO: 92.5 FL — SIGNIFICANT CHANGE UP (ref 80–100)
MCV RBC AUTO: 93 FL — SIGNIFICANT CHANGE UP (ref 80–100)
MCV RBC AUTO: 93.2 FL — SIGNIFICANT CHANGE UP (ref 80–100)
NRBC # BLD: 0 /100 WBCS — SIGNIFICANT CHANGE UP (ref 0–0)
PHOSPHATE SERPL-MCNC: 2.5 MG/DL — SIGNIFICANT CHANGE UP (ref 2.5–4.5)
PHOSPHATE SERPL-MCNC: 2.6 MG/DL — SIGNIFICANT CHANGE UP (ref 2.5–4.5)
PHOSPHATE SERPL-MCNC: 2.6 MG/DL — SIGNIFICANT CHANGE UP (ref 2.5–4.5)
PLATELET # BLD AUTO: 284 K/UL — SIGNIFICANT CHANGE UP (ref 150–400)
PLATELET # BLD AUTO: 319 K/UL — SIGNIFICANT CHANGE UP (ref 150–400)
PLATELET # BLD AUTO: 319 K/UL — SIGNIFICANT CHANGE UP (ref 150–400)
POTASSIUM SERPL-MCNC: 3.4 MMOL/L — LOW (ref 3.5–5.3)
POTASSIUM SERPL-MCNC: 3.6 MMOL/L — SIGNIFICANT CHANGE UP (ref 3.5–5.3)
POTASSIUM SERPL-MCNC: 3.7 MMOL/L — SIGNIFICANT CHANGE UP (ref 3.5–5.3)
POTASSIUM SERPL-SCNC: 3.4 MMOL/L — LOW (ref 3.5–5.3)
POTASSIUM SERPL-SCNC: 3.6 MMOL/L — SIGNIFICANT CHANGE UP (ref 3.5–5.3)
POTASSIUM SERPL-SCNC: 3.7 MMOL/L — SIGNIFICANT CHANGE UP (ref 3.5–5.3)
PROT SERPL-MCNC: 5.1 G/DL — LOW (ref 6–8.3)
PROT SERPL-MCNC: 5.3 G/DL — LOW (ref 6–8.3)
PROT SERPL-MCNC: 5.3 G/DL — LOW (ref 6–8.3)
PROTHROM AB SERPL-ACNC: 14.4 SEC — HIGH (ref 10–12.9)
PROTHROM AB SERPL-ACNC: 14.9 SEC — HIGH (ref 10–12.9)
PROTHROM AB SERPL-ACNC: 15.4 SEC — HIGH (ref 10–12.9)
RBC # BLD: 3.19 M/UL — LOW (ref 4.2–5.8)
RBC # BLD: 3.25 M/UL — LOW (ref 4.2–5.8)
RBC # BLD: 3.28 M/UL — LOW (ref 4.2–5.8)
RBC # FLD: 15.9 % — HIGH (ref 10.3–14.5)
RBC # FLD: 15.9 % — HIGH (ref 10.3–14.5)
RBC # FLD: 16 % — HIGH (ref 10.3–14.5)
SODIUM SERPL-SCNC: 151 MMOL/L — HIGH (ref 135–145)
SODIUM SERPL-SCNC: 151 MMOL/L — HIGH (ref 135–145)
SODIUM SERPL-SCNC: 153 MMOL/L — HIGH (ref 135–145)
WBC # BLD: 16.57 K/UL — HIGH (ref 3.8–10.5)
WBC # BLD: 16.77 K/UL — HIGH (ref 3.8–10.5)
WBC # BLD: 17.88 K/UL — HIGH (ref 3.8–10.5)
WBC # FLD AUTO: 16.57 K/UL — HIGH (ref 3.8–10.5)
WBC # FLD AUTO: 16.77 K/UL — HIGH (ref 3.8–10.5)
WBC # FLD AUTO: 17.88 K/UL — HIGH (ref 3.8–10.5)

## 2019-11-30 PROCEDURE — 71045 X-RAY EXAM CHEST 1 VIEW: CPT | Mod: 26

## 2019-11-30 PROCEDURE — 99231 SBSQ HOSP IP/OBS SF/LOW 25: CPT | Mod: GC

## 2019-11-30 PROCEDURE — 99291 CRITICAL CARE FIRST HOUR: CPT

## 2019-11-30 PROCEDURE — 99233 SBSQ HOSP IP/OBS HIGH 50: CPT

## 2019-11-30 RX ORDER — AMIODARONE HYDROCHLORIDE 400 MG/1
1 TABLET ORAL
Qty: 900 | Refills: 0 | Status: DISCONTINUED | OUTPATIENT
Start: 2019-11-30 | End: 2019-12-01

## 2019-11-30 RX ORDER — HEPARIN SODIUM 5000 [USP'U]/ML
1150 INJECTION INTRAVENOUS; SUBCUTANEOUS
Qty: 25000 | Refills: 0 | Status: DISCONTINUED | OUTPATIENT
Start: 2019-11-30 | End: 2019-12-03

## 2019-11-30 RX ORDER — POTASSIUM CHLORIDE 20 MEQ
20 PACKET (EA) ORAL
Refills: 0 | Status: COMPLETED | OUTPATIENT
Start: 2019-11-30 | End: 2019-12-01

## 2019-11-30 RX ORDER — MIDAZOLAM HYDROCHLORIDE 1 MG/ML
5 INJECTION, SOLUTION INTRAMUSCULAR; INTRAVENOUS ONCE
Refills: 0 | Status: DISCONTINUED | OUTPATIENT
Start: 2019-11-30 | End: 2019-11-30

## 2019-11-30 RX ORDER — FUROSEMIDE 40 MG
20 TABLET ORAL ONCE
Refills: 0 | Status: COMPLETED | OUTPATIENT
Start: 2019-11-30 | End: 2019-11-30

## 2019-11-30 RX ORDER — POTASSIUM CHLORIDE 20 MEQ
20 PACKET (EA) ORAL
Refills: 0 | Status: COMPLETED | OUTPATIENT
Start: 2019-11-30 | End: 2019-11-30

## 2019-11-30 RX ORDER — POTASSIUM CHLORIDE 20 MEQ
20 PACKET (EA) ORAL ONCE
Refills: 0 | Status: COMPLETED | OUTPATIENT
Start: 2019-11-30 | End: 2019-11-30

## 2019-11-30 RX ORDER — PHENYLEPHRINE HYDROCHLORIDE 10 MG/ML
0.5 INJECTION INTRAVENOUS
Qty: 40 | Refills: 0 | Status: DISCONTINUED | OUTPATIENT
Start: 2019-11-30 | End: 2019-12-01

## 2019-11-30 RX ORDER — AMIODARONE HYDROCHLORIDE 400 MG/1
150 TABLET ORAL ONCE
Refills: 0 | Status: COMPLETED | OUTPATIENT
Start: 2019-11-30 | End: 2019-11-30

## 2019-11-30 RX ADMIN — Medication 1 DROP(S): at 06:28

## 2019-11-30 RX ADMIN — PANTOPRAZOLE SODIUM 40 MILLIGRAM(S): 20 TABLET, DELAYED RELEASE ORAL at 09:38

## 2019-11-30 RX ADMIN — Medication 100 MILLIEQUIVALENT(S): at 01:03

## 2019-11-30 RX ADMIN — Medication 1 DROP(S): at 00:06

## 2019-11-30 RX ADMIN — Medication 1 DROP(S): at 04:31

## 2019-11-30 RX ADMIN — TAMSULOSIN HYDROCHLORIDE 0.4 MILLIGRAM(S): 0.4 CAPSULE ORAL at 21:35

## 2019-11-30 RX ADMIN — DEXMEDETOMIDINE HYDROCHLORIDE IN 0.9% SODIUM CHLORIDE 10.49 MICROGRAM(S)/KG/HR: 4 INJECTION INTRAVENOUS at 00:05

## 2019-11-30 RX ADMIN — AMIODARONE HYDROCHLORIDE 200 MILLIGRAM(S): 400 TABLET ORAL at 01:02

## 2019-11-30 RX ADMIN — Medication 100 MILLIEQUIVALENT(S): at 12:02

## 2019-11-30 RX ADMIN — HEPARIN SODIUM 1150 UNIT(S)/HR: 5000 INJECTION INTRAVENOUS; SUBCUTANEOUS at 00:05

## 2019-11-30 RX ADMIN — Medication 100 MILLIEQUIVALENT(S): at 23:00

## 2019-11-30 RX ADMIN — Medication 100 MILLIEQUIVALENT(S): at 15:21

## 2019-11-30 RX ADMIN — Medication 20 MILLIGRAM(S): at 12:02

## 2019-11-30 RX ADMIN — MEROPENEM 100 MILLIGRAM(S): 1 INJECTION INTRAVENOUS at 00:07

## 2019-11-30 RX ADMIN — CHLORHEXIDINE GLUCONATE 1 APPLICATION(S): 213 SOLUTION TOPICAL at 06:27

## 2019-11-30 RX ADMIN — ACETAZOLAMIDE 105 MILLIGRAM(S): 250 TABLET ORAL at 06:27

## 2019-11-30 RX ADMIN — Medication 100 MILLIEQUIVALENT(S): at 01:02

## 2019-11-30 RX ADMIN — CHLORHEXIDINE GLUCONATE 15 MILLILITER(S): 213 SOLUTION TOPICAL at 16:33

## 2019-11-30 RX ADMIN — Medication 1 DROP(S): at 12:09

## 2019-11-30 RX ADMIN — Medication 1 DROP(S): at 21:34

## 2019-11-30 RX ADMIN — Medication 1 DROP(S): at 16:36

## 2019-11-30 RX ADMIN — CHLORHEXIDINE GLUCONATE 15 MILLILITER(S): 213 SOLUTION TOPICAL at 06:28

## 2019-11-30 RX ADMIN — LIDOCAINE 1 PATCH: 4 CREAM TOPICAL at 20:47

## 2019-11-30 RX ADMIN — MEROPENEM 100 MILLIGRAM(S): 1 INJECTION INTRAVENOUS at 14:53

## 2019-11-30 RX ADMIN — LIDOCAINE 1 PATCH: 4 CREAM TOPICAL at 15:35

## 2019-11-30 RX ADMIN — Medication 1 DROP(S): at 20:07

## 2019-11-30 RX ADMIN — Medication 100 MILLIEQUIVALENT(S): at 14:50

## 2019-11-30 RX ADMIN — Medication 81 MILLIGRAM(S): at 09:39

## 2019-11-30 RX ADMIN — Medication 1 DROP(S): at 02:03

## 2019-11-30 RX ADMIN — LIDOCAINE 1 PATCH: 4 CREAM TOPICAL at 09:39

## 2019-11-30 RX ADMIN — Medication 1 DROP(S): at 09:40

## 2019-11-30 RX ADMIN — DEXMEDETOMIDINE HYDROCHLORIDE IN 0.9% SODIUM CHLORIDE 10.49 MICROGRAM(S)/KG/HR: 4 INJECTION INTRAVENOUS at 04:38

## 2019-11-30 RX ADMIN — Medication 50 MILLIEQUIVALENT(S): at 22:20

## 2019-11-30 RX ADMIN — Medication 1 DROP(S): at 07:59

## 2019-11-30 NOTE — PROGRESS NOTE ADULT - ATTENDING COMMENTS
FELY resolving, still polyuric  hypernatremia worsened -- can increase free water to q4   D5W if needs fr NA  may also need NS for bP PRN-- though overall hypervolemic still FELY resolving, still polyuric  hypernatremia worsened -- can increase free water to q4   D5W if needs fr NA  may also need NS for bP PRN-- though overall hypervolemic still  does not appear to need diamox

## 2019-11-30 NOTE — PROGRESS NOTE ADULT - SUBJECTIVE AND OBJECTIVE BOX
Covering for Dr Graff      Consult Note Critical Care    HPI:  69 year old male, with PMHx of colitis, prostate surgery @ [facility], BIBA to [facility] ED complaining of back pain and profound hypotension. Per patient's wife patient began complaining of left sided neck pain radiating to his back at around 1:00 AM with bilateral weakness and inability to get out of bed. Upon arriving to the ED patient was profoundly hypotensive SBP 40s and cyanotic. He was emergently rushed to  CT scan which confirmed Type A aortic dissection and CT surgery was called. Arterial line was placed and patient was stabilized on Levophed and Phenylephrine with marginal SBP 80-90s. He was intubated for hypoxia with cyanosis and emergently brought to the OR for salvage Type A dissection repair. (23 Nov 2019 05:08)      PAST MEDICAL & SURGICAL HISTORY:  Benign prostatic hypertrophy without lower urinary tract symptoms: BPH (benign prostatic hypertrophy)  Ulcerative colitis: Ulcerative colitis  States following surgery of eye and adnexa: straightened right eye  Other postprocedural status: History of hernia repair  Hemorrhoids: Hemorrhoids  Acute appendicitis with generalized peritonitis: Ruptured appendix      REVIEW OF SYSTEMS:  patient unable to provide a review of systems    MEDICATIONS  (STANDING):  aMIOdarone Infusion 1 mG/Min (33.333 mL/Hr) IV Continuous <Continuous>  artificial  tears Solution 1 Drop(s) Both EYES every 2 hours  aspirin enteric coated 81 milliGRAM(s) Oral daily  chlorhexidine 0.12% Liquid 15 milliLiter(s) Oral Mucosa every 12 hours  chlorhexidine 2% Cloths 1 Application(s) Topical daily  dexMEDEtomidine Infusion 0.5 MICROgram(s)/kG/Hr (10.488 mL/Hr) IV Continuous <Continuous>  dextrose 50% Injectable 50 milliLiter(s) IV Push every 15 minutes  heparin  Infusion 1150 Unit(s)/Hr (11.5 mL/Hr) IV Continuous <Continuous>  lidocaine   Patch 1 Patch Transdermal daily  meropenem  IVPB 1000 milliGRAM(s) IV Intermittent every 12 hours  meropenem  IVPB      pantoprazole  Injectable 40 milliGRAM(s) IV Push daily  phenylephrine    Infusion 0.5 MICROgram(s)/kG/Min (15.731 mL/Hr) IV Continuous <Continuous>  sodium chloride 0.9%. 1000 milliLiter(s) (10 mL/Hr) IV Continuous <Continuous>  tamsulosin 0.4 milliGRAM(s) Oral at bedtime    MEDICATIONS  (PRN):  bisacodyl Suppository 10 milliGRAM(s) Rectal daily PRN Constipation      meropenem  IVPB 1000 milliGRAM(s) IV Intermittent every 12 hours  meropenem  IVPB          Allergies    penicillin (Unknown)    Intolerances        SOCIAL HISTORY:    FAMILY HISTORY:      Vital Signs Last 24 Hrs  T(C): 37.1 (30 Nov 2019 17:36), Max: 37.7 (30 Nov 2019 09:00)  T(F): 98.7 (30 Nov 2019 17:36), Max: 99.8 (30 Nov 2019 09:00)  HR: 84 (30 Nov 2019 18:00) (72 - 126)  BP: 84/61 (30 Nov 2019 01:48) (84/61 - 84/61)  BP(mean): 66 (30 Nov 2019 01:48) (66 - 66)  RR: 18 (30 Nov 2019 18:00) (14 - 23)  SpO2: 95% (30 Nov 2019 18:00) (94% - 100%)    PHYSICAL EXAM:      Eyes: PERRL, EOM intact; conjunctiva and sclera clear  Head: Normocephalic; atraumatic  ENMT: No nasal discharge; airway clear  Neck: Supple; non tender; no masses  Respiratory: No wheezes, rales or rhonchi  Cardiovascular: Regular rate and rhythm. S1 and S2 Normal; No murmurs, gallops or rubs  Gastrointestinal: Soft non-tender non-distended; Normal bowel sounds; No hepatosplenomegaly  Genitourinary: No costovertebral angle tenderness      LABS:                        9.3    17.88 )-----------( 319      ( 30 Nov 2019 11:46 )             30.5     11-30    151<H>  |  121<H>  |  36<H>  ----------------------------<  139<H>  3.6   |  21<L>  |  1.35<H>    Ca    8.4      30 Nov 2019 11:46  Phos  2.5     11-30  Mg     2.5     11-30    TPro  5.3<L>  /  Alb  3.0<L>  /  TBili  0.5  /  DBili  x   /  AST  34  /  ALT  40  /  AlkPhos  62  11-30    PT/INR - ( 30 Nov 2019 11:46 )   PT: 15.4 sec;   INR: 1.35          PTT - ( 30 Nov 2019 11:46 )  PTT:53.0 sec    Culture Results:   Few-moderate Klebsiella oxytoca  Accompanied by normal respiratory jazmín (11-25 @ 16:51)    RADIOLOGY & ADDITIONAL STUDIES:

## 2019-11-30 NOTE — PROGRESS NOTE ADULT - SUBJECTIVE AND OBJECTIVE BOX
CTICU  CRITICAL  CARE  ATTENDING:   				   Pertinent clinical, laboratory, radiographic, hemodynamic, echocardiographic, respiratory data, microbiologic data and chart were reviewed and analyzed frequently throughout the course of the day and night    Patient seen and examined with CTS/ SH attending at bedside    Pt is a 69y Male admitted for aortic dissection repair     HEALTH ISSUES - PROBLEM Dx:  Bacterial pneumonia: Bacterial pneumonia  Fever, postprocedural: Fever, postprocedural  FELY (acute kidney injury): FELY (acute kidney injury)         FAMILY HISTORY:  PAST MEDICAL & SURGICAL HISTORY:  Benign prostatic hypertrophy without lower urinary tract symptoms: BPH (benign prostatic hypertrophy)  Ulcerative colitis: Ulcerative colitis  States following surgery of eye and adnexa: straightened right eye  Other postprocedural status: History of hernia repair  Hemorrhoids: Hemorrhoids  Acute appendicitis with generalized peritonitis: Ruptured appendix      14 system review was unremarkable      Vital signs, hemodynamic and respiratory parameters were reviewed from the bedside nursing flowsheet.  ICU Vital Signs Last 24 Hrs  T(C): 37.7 (30 Nov 2019 09:00), Max: 37.7 (30 Nov 2019 09:00)  T(F): 99.8 (30 Nov 2019 09:00), Max: 99.8 (30 Nov 2019 09:00)  HR: 80 (30 Nov 2019 13:00) (72 - 138)  BP: 84/61 (30 Nov 2019 01:48) (84/61 - 84/61)  BP(mean): 66 (30 Nov 2019 01:48) (66 - 66)  ABP: 144/72 (30 Nov 2019 13:00) (86/64 - 152/68)  ABP(mean): 96 (30 Nov 2019 13:00) (67 - 98)  RR: 23 (30 Nov 2019 13:00) (14 - 23)  SpO2: 98% (30 Nov 2019 13:00) (93% - 100%)    Adult Advanced Hemodynamics Last 24 Hrs  CVP(mm Hg): 10 (30 Nov 2019 10:05) (3 - 16)  CVP(cm H2O): --  CO: --  CI: --  PA: --  PA(mean): --  PCWP: --  SVR: --  SVRI: --  PVR: --  PVRI: --, ABG - ( 30 Nov 2019 11:45 )  pH, Arterial: 7.47  pH, Blood: x     /  pCO2: 32    /  pO2: 80    / HCO3: 22    / Base Excess: -0.8  /  SaO2: 96                Mode: CPAP with PS  FiO2: 40  PEEP: 5  PS: 10  MAP: 9  PIP: 15    Intake and output was reviewed and the fluid balance was calculated  Daily     Daily   I&O's Summary    29 Nov 2019 07:01  -  30 Nov 2019 07:00  --------------------------------------------------------  IN: 3100.6 mL / OUT: 3525 mL / NET: -424.4 mL    30 Nov 2019 07:01  -  30 Nov 2019 14:14  --------------------------------------------------------  IN: 862.8 mL / OUT: 815 mL / NET: 47.8 mL        All lines and drain sites were assessed  Glycemic trend was reviewedCAPILLARY BLOOD GLUCOSE            Exam:   Gen: NAD   Neuro: Mental status  Lungs: Auscultation of the chest reveals equal bs  Abd: soft, nt/nd  Ext: warm and well perfused  Wound: appears clean and unremarkable  Antibiotics are periop      labs  CBC Full  -  ( 30 Nov 2019 11:46 )  WBC Count : 17.88 K/uL  RBC Count : 3.28 M/uL  Hemoglobin : 9.3 g/dL  Hematocrit : 30.5 %  Platelet Count - Automated : 319 K/uL  Mean Cell Volume : 93.0 fl  Mean Cell Hemoglobin : 28.4 pg  Mean Cell Hemoglobin Concentration : 30.5 gm/dL  Auto Neutrophil # : x  Auto Lymphocyte # : x  Auto Monocyte # : x  Auto Eosinophil # : x  Auto Basophil # : x  Auto Neutrophil % : x  Auto Lymphocyte % : x  Auto Monocyte % : x  Auto Eosinophil % : x  Auto Basophil % : x    11-30    151<H>  |  121<H>  |  36<H>  ----------------------------<  139<H>  3.6   |  21<L>  |  1.35<H>    Ca    8.4      30 Nov 2019 11:46  Phos  2.5     11-30  Mg     2.5     11-30    TPro  5.3<L>  /  Alb  3.0<L>  /  TBili  0.5  /  DBili  x   /  AST  34  /  ALT  40  /  AlkPhos  62  11-30    PT/INR - ( 30 Nov 2019 11:46 )   PT: 15.4 sec;   INR: 1.35          PTT - ( 30 Nov 2019 11:46 )  PTT:53.0 sec    The current medications were reviewed   MEDICATIONS  (STANDING):  aMIOdarone Infusion 1 mG/Min (33.333 mL/Hr) IV Continuous <Continuous>  artificial  tears Solution 1 Drop(s) Both EYES every 2 hours  aspirin enteric coated 81 milliGRAM(s) Oral daily  chlorhexidine 0.12% Liquid 15 milliLiter(s) Oral Mucosa every 12 hours  chlorhexidine 2% Cloths 1 Application(s) Topical daily  dexMEDEtomidine Infusion 0.5 MICROgram(s)/kG/Hr (10.488 mL/Hr) IV Continuous <Continuous>  dextrose 50% Injectable 50 milliLiter(s) IV Push every 15 minutes  heparin  Infusion 1150 Unit(s)/Hr (11.5 mL/Hr) IV Continuous <Continuous>  lidocaine   Patch 1 Patch Transdermal daily  meropenem  IVPB 1000 milliGRAM(s) IV Intermittent every 12 hours  meropenem  IVPB      pantoprazole  Injectable 40 milliGRAM(s) IV Push daily  phenylephrine    Infusion 0.5 MICROgram(s)/kG/Min (15.731 mL/Hr) IV Continuous <Continuous>  potassium chloride  20 mEq/100 mL IVPB 20 milliEquivalent(s) IV Intermittent once  potassium chloride  20 mEq/100 mL IVPB 20 milliEquivalent(s) IV Intermittent once  sodium chloride 0.9%. 1000 milliLiter(s) (10 mL/Hr) IV Continuous <Continuous>  tamsulosin 0.4 milliGRAM(s) Oral at bedtime    MEDICATIONS  (PRN):  bisacodyl Suppository 10 milliGRAM(s) Rectal daily PRN Constipation       PROBLEM LIST/ ASSESSMENT:  HEALTH ISSUES - PROBLEM Dx:  Bacterial pneumonia: Bacterial pneumonia  Fever, postprocedural: Fever, postprocedural  FELY (acute kidney injury): FELY (acute kidney injury)         Patient is a 69y old  Male who presents with a chief complaint of Aortic dissection (29 Nov 2019 19:02)    My plan includes :  -Close hemodynamic, ventilatory and drain monitoring and management per post op routine  -Monitor for arrhythmias and monitor parameters for organ perfusion  -Monitor neurologic status  -Head of the bed should remain elevated to 45 deg .   -Chest PT and IS will be encouraged  -Monitor adequacy of oxygenation and ventilation and attempt to wean oxygen  -Nutritional goals will be met using po eventually , ensure adequate caloric intake and monitor the same  -Stress ulcer and VTE prophylaxis will be achieved    -Glycemic control is satisfactory  -Electrolytes have been repleated as necessary and wound care has been carried out. Pain control has been achieved.   -Agressive physical therapy and early mobility and ambulation goals will be met   The family was updated about the course and plan    CRITICAL CARE TIME SPENT in evaluation and management, reassessments, review and interpretation of labs and x-rays, ventilator and hemodynamic management, formulating a plan and coordinating care: ___60____ MIN.  Time does not include procedural time.      CTICU ATTENDING:      		  Radha Rubio MD

## 2019-11-30 NOTE — PROGRESS NOTE ADULT - SUBJECTIVE AND OBJECTIVE BOX
Patient is a 69y Male seen and evaluated at bedside.   pt seen and examined  remains intubated, awake    aMIOdarone Infusion 1 <Continuous>  artificial  tears Solution 1 every 2 hours  aspirin enteric coated 81 daily  bisacodyl Suppository 10 daily PRN  chlorhexidine 0.12% Liquid 15 every 12 hours  chlorhexidine 2% Cloths 1 daily  dexMEDEtomidine Infusion 0.5 <Continuous>  dextrose 50% Injectable 50 every 15 minutes  heparin  Infusion 1150 <Continuous>  lidocaine   Patch 1 daily  meropenem  IVPB 1000 every 12 hours  meropenem  IVPB    pantoprazole  Injectable 40 daily  phenylephrine    Infusion 0.5 <Continuous>  sodium chloride 0.9%. 1000 <Continuous>  tamsulosin 0.4 at bedtime      Allergies    penicillin (Unknown)    Intolerances        T(C): , Max: 37.7 (11-30-19 @ 09:00)  T(F): , Max: 99.8 (11-30-19 @ 09:00)  HR: 82 (11-30-19 @ 15:00)  BP: 84/61 (11-30-19 @ 01:48)  BP(mean): 66 (11-30-19 @ 01:48)  RR: 20 (11-30-19 @ 15:00)  SpO2: 95% (11-30-19 @ 15:00)  Wt(kg): --    11-29 @ 07:01  -  11-30 @ 07:00  --------------------------------------------------------  IN: 3100.6 mL / OUT: 3525 mL / NET: -424.4 mL    11-30 @ 07:01  -  11-30 @ 15:48  --------------------------------------------------------  IN: 1276.5 mL / OUT: 1295 mL / NET: -18.5 mL          Review of Systems:  NA      PHYSICAL EXAM:  GENERAL: NAD, intubated, awake  HEAD:  Atraumatic, Normocephalic,   EYES: Bilateral conjuctiva and sclera normal   Oral cavity: ET in place  NECK: Neck supple, No JVD  CHEST/LUNG: Clear to auscultation bilaterally; No wheeze, no rales, no crepitations  HEART: Regular rate and rhythm. BEKAH II/VI at LPSB, No gallop, no rub   ABDOMEN: Soft, Nontender, BS+nt, No flank tenderness.   EXTREMITIES: swollen upper extremities, scrotal edema   Neurology: Awake  solo cath         LABS:                        9.3    17.88 )-----------( 319      ( 30 Nov 2019 11:46 )             30.5     11-30    151<H>  |  121<H>  |  36<H>  ----------------------------<  139<H>  3.6   |  21<L>  |  1.35<H>    Ca    8.4      30 Nov 2019 11:46  Phos  2.5     11-30  Mg     2.5     11-30    TPro  5.3<L>  /  Alb  3.0<L>  /  TBili  0.5  /  DBili  x   /  AST  34  /  ALT  40  /  AlkPhos  62  11-30      PT/INR - ( 30 Nov 2019 11:46 )   PT: 15.4 sec;   INR: 1.35          PTT - ( 30 Nov 2019 11:46 )  PTT:53.0 sec          RADIOLOGY & ADDITIONAL STUDIES:

## 2019-11-30 NOTE — CHART NOTE - NSCHARTNOTEFT_GEN_A_CORE
Exam:  US Chest    Procedure Date: 11/30/19    History: 69y Male whose CXR today shows a possible left sided pleural effusion.  Pt is POD #7 from complex type A dissection repair .       Findings:                    Evaluation of the Left side of the thoracic cavity demonstrates small pleural effusion <200 cc                      Impression:  small left pleural effusion, <200mL w/ no good window for intervention at this time

## 2019-11-30 NOTE — PROGRESS NOTE ADULT - SUBJECTIVE AND OBJECTIVE BOX
CTICU  CRITICAL  CARE  attending     Hand off received 					   Pertinent clinical, laboratory, radiographic, hemodynamic, echocardiographic, respiratory data, microbiologic data and chart were reviewed and analyzed frequently throughout the course of the day and night    Patient seen and examined with CTS/ SH attending at bedside  69 years old male with H/O colitis. He started having pain in the anterior cervical area around  1:00 AM. Simultaneously he had lower back pain. He felt dizzy and diaphoretic. He felt weakness in both lower extremities. No loss of consciousness.  He was brought in to [facility] emergency room by an ambulance. His BP was 60 by palpation and appeared cyanotic.     CT angio  showed   1. 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  2. Moderate hemopericardium.  3. Dissection extending into the right brachiocephalic artery though the right carotid artery and right subclavian artery remain patent off true lumen.  4. Dissection involving the proximal left subclavian artery which remains patent more distally.  5. The left carotid artery comes off the true lumen of the arch  He was emergently taken to the operating room and cardiopulmonary bypass instituted. Blood evacuated from the pericardium.  Ascending aorta and aortic root were replaced and coronary buttons were reimplanted.    HEALTH ISSUES - PROBLEM Dx:  Bacterial pneumonia: Bacterial pneumonia  Fever, postprocedural: Fever, postprocedural  FELY (acute kidney injury): FELY (acute kidney injury)      FAMILY HISTORY:  PAST MEDICAL & SURGICAL HISTORY:  Benign prostatic hypertrophy without lower urinary tract symptoms: BPH (benign prostatic hypertrophy)  Ulcerative colitis: Ulcerative colitis  States following surgery of eye and adnexa: straightened right eye  Other postprocedural status: History of hernia repair  Hemorrhoids: Hemorrhoids  Acute appendicitis with generalized peritonitis: Ruptured appendix        14 system review was unremarkable    Vital signs, hemodynamic and respiratory parameters were reviewed from the bedside nursing flow sheet.  ICU Vital Signs Last 24 Hrs  T(C): 37 (30 Nov 2019 21:29), Max: 37.7 (30 Nov 2019 09:00)  T(F): 98.6 (30 Nov 2019 21:29), Max: 99.8 (30 Nov 2019 09:00)  HR: 86 (30 Nov 2019 23:00) (72 - 126)  BP: 84/61 (30 Nov 2019 01:48) (84/61 - 84/61)  BP(mean): 66 (30 Nov 2019 01:48) (66 - 66)  ABP: 136/64 (30 Nov 2019 23:00) (86/64 - 148/68)  ABP(mean): 88 (30 Nov 2019 23:00) (67 - 96)  RR: 20 (30 Nov 2019 23:00) (14 - 23)  SpO2: 94% (30 Nov 2019 23:00) (93% - 100%)    Adult Advanced Hemodynamics Last 24 Hrs  CVP(mm Hg): 10 (30 Nov 2019 10:05) (8 - 11)  CVP(cm H2O): --  CO: --  CI: --  PA: --  PA(mean): --  PCWP: --  SVR: --  SVRI: --  PVR: --  PVRI: --, ABG - ( 30 Nov 2019 21:16 )  pH, Arterial: 7.49  pH, Blood: x     /  pCO2: 30    /  pO2: 78    / HCO3: 22    / Base Excess: -0.3  /  SaO2: 96                Mode: SIMV (Synchronized Intermittent Mandatory Ventilation)  RR (machine): 12  TV (machine): 650  FiO2: 40  PEEP: 5  PS: 14  ITime: 1  MAP: 10  PIP: 15    Intake and output was reviewed and the fluid balance was calculated  Daily     Daily   I&O's Summary    29 Nov 2019 07:01  -  30 Nov 2019 07:00  --------------------------------------------------------  IN: 3100.6 mL / OUT: 3525 mL / NET: -424.4 mL    30 Nov 2019 07:01  -  30 Nov 2019 23:49  --------------------------------------------------------  IN: 2670.4 mL / OUT: 2145 mL / NET: 525.4 mL        All lines and drain sites were assessed    Neuro: No change in the mental status from the baseline. Moves all 4 extremities.  Neck: No JVD.  CVS: S1, S1, No S3.  Lungs: Good air entry bilaterally.  Abd: Soft. No tenderness. + Bowel sounds.  Vascular: + DP/PT.  Extremities: No edema.  Lymphatic: Normal.  Skin: No abnormalities.      labs  CBC Full  -  ( 30 Nov 2019 21:30 )  WBC Count : 16.77 K/uL  RBC Count : 3.19 M/uL  Hemoglobin : 8.9 g/dL  Hematocrit : 29.5 %  Platelet Count - Automated : 319 K/uL  Mean Cell Volume : 92.5 fl  Mean Cell Hemoglobin : 27.9 pg  Mean Cell Hemoglobin Concentration : 30.2 gm/dL  Auto Neutrophil # : x  Auto Lymphocyte # : x  Auto Monocyte # : x  Auto Eosinophil # : x  Auto Basophil # : x  Auto Neutrophil % : x  Auto Lymphocyte % : x  Auto Monocyte % : x  Auto Eosinophil % : x  Auto Basophil % : x    11-30    151<H>  |  120<H>  |  35<H>  ----------------------------<  138<H>  3.4<L>   |  20<L>  |  1.41<H>    Ca    8.5      30 Nov 2019 21:30  Phos  2.6     11-30  Mg     2.4     11-30    TPro  5.3<L>  /  Alb  2.9<L>  /  TBili  0.4  /  DBili  x   /  AST  29  /  ALT  38  /  AlkPhos  64  11-30    PT/INR - ( 30 Nov 2019 21:30 )   PT: 14.9 sec;   INR: 1.31          PTT - ( 30 Nov 2019 21:30 )  PTT:54.3 sec  The current medications were reviewed   MEDICATIONS  (STANDING):  aMIOdarone Infusion 1 mG/Min (33.333 mL/Hr) IV Continuous <Continuous>  artificial  tears Solution 1 Drop(s) Both EYES every 2 hours  aspirin enteric coated 81 milliGRAM(s) Oral daily  chlorhexidine 0.12% Liquid 15 milliLiter(s) Oral Mucosa every 12 hours  chlorhexidine 2% Cloths 1 Application(s) Topical daily  dexMEDEtomidine Infusion 0.5 MICROgram(s)/kG/Hr (10.488 mL/Hr) IV Continuous <Continuous>  dextrose 50% Injectable 50 milliLiter(s) IV Push every 15 minutes  heparin  Infusion 1150 Unit(s)/Hr (11.5 mL/Hr) IV Continuous <Continuous>  lidocaine   Patch 1 Patch Transdermal daily  meropenem  IVPB 1000 milliGRAM(s) IV Intermittent every 12 hours  meropenem  IVPB      pantoprazole  Injectable 40 milliGRAM(s) IV Push daily  phenylephrine    Infusion 0.5 MICROgram(s)/kG/Min (15.731 mL/Hr) IV Continuous <Continuous>  potassium chloride  20 mEq/100 mL IVPB 20 milliEquivalent(s) IV Intermittent every 2 hours  sodium chloride 0.9%. 1000 milliLiter(s) (10 mL/Hr) IV Continuous <Continuous>  tamsulosin 0.4 milliGRAM(s) Oral at bedtime    MEDICATIONS  (PRN):  bisacodyl Suppository 10 milliGRAM(s) Rectal daily PRN Constipation        PROBLEM LIST/ ASSESSMENT:  HEALTH ISSUES - PROBLEM Dx:  Bacterial pneumonia: Bacterial pneumonia  Fever, postprocedural: Fever, postprocedural  FLEY (acute kidney injury): FELY (acute kidney injury)          Patient is a 69y old  Male who presents with cardiogenic shock due to acute Type A aortic dissection with rupture and cardiac tamponade.  S/P Aortic Root Replacement  S/P BioBental and CABROL.  S/P replacement of ascending aorta and hemiarch.  Post operative course complicated by  renal insufficiency, Hematuria,  hypokalemia,  hypernatremia, klebsiella pneumonia, LUE thrombosis.    ACTIVE  ISSUES  Vent dependent Respiratory Failure.  Hyponatremia  Hypokalemia  Metabolic acidosis   Hemodynamically stable.  Good oxygenation.  Diuresing  well.  Overall satisfactory progress.      My plan includes :  D/C Phenylephrine.   WEAN to EXTUBATE.  IV heparin.  Statin and Betablocker.  Close hemodynamic, ventilatory and drain monitoring and management  Monitor for arrhythmias and monitor parameters for organ perfusion  Monitor neurologic status  Monitor renal function.  Head of the bed should remain elevated to 45 deg .   Chest PT and IS will be encouraged  Monitor adequacy of oxygenation and ventilation and attempt to wean oxygen  Nutritional goals will be met using po eventually , ensure adequate caloric intake and monitor the same  Stress ulcer and VTE prophylaxis will be achieved    Glycemic control is satisfactory  Electrolytes have been repleted as necessary and wound care has been carried out. Pain control has been achieved.   Aggressive physical therapy and early mobility and ambulation goals will be met   The family was updated about the course and plan  CRITICAL CARE TIME SPENT in evaluation and management, reassessments, review and interpretation of labs and x-rays, ventilator and hemodynamic management, formulating a plan and coordinating care: ___60____ MIN.  Time does not include procedural time.  CTICU ATTENDING     					    Giovanni Reis MD

## 2019-11-30 NOTE — PROGRESS NOTE ADULT - ASSESSMENT
70 y/o M with PMHx of Ulcerative colitis BIBA to [facility] ED complaining of back pain and profound hypotension, emergently brought to the OR for salvage Type A dissection repair, s/p aortic arch repair and ascending arch replacement.   Nephrology consulted for ARNULFO.    Arnulfo vs ckd  non oliguric  cr stable  will continue monitoring  recommend discontinuation of diamox      hypernatremia  free water deficit @ 3.9 L  recommend free water 250 cc po q 4hrs via OGT

## 2019-11-30 NOTE — PROGRESS NOTE ADULT - ASSESSMENT
I note persistent leukocytosis. If this continues this may require further workup such as a a Galium scan or CAT Scan of CAp

## 2019-12-01 LAB
ALBUMIN SERPL ELPH-MCNC: 2.8 G/DL — LOW (ref 3.3–5)
ALBUMIN SERPL ELPH-MCNC: 3.1 G/DL — LOW (ref 3.3–5)
ALBUMIN SERPL ELPH-MCNC: 3.3 G/DL — SIGNIFICANT CHANGE UP (ref 3.3–5)
ALP SERPL-CCNC: 57 U/L — SIGNIFICANT CHANGE UP (ref 40–120)
ALP SERPL-CCNC: 59 U/L — SIGNIFICANT CHANGE UP (ref 40–120)
ALP SERPL-CCNC: 69 U/L — SIGNIFICANT CHANGE UP (ref 40–120)
ALT FLD-CCNC: 33 U/L — SIGNIFICANT CHANGE UP (ref 10–45)
ALT FLD-CCNC: 34 U/L — SIGNIFICANT CHANGE UP (ref 10–45)
ALT FLD-CCNC: 38 U/L — SIGNIFICANT CHANGE UP (ref 10–45)
ANION GAP SERPL CALC-SCNC: 11 MMOL/L — SIGNIFICANT CHANGE UP (ref 5–17)
ANION GAP SERPL CALC-SCNC: 12 MMOL/L — SIGNIFICANT CHANGE UP (ref 5–17)
ANION GAP SERPL CALC-SCNC: 9 MMOL/L — SIGNIFICANT CHANGE UP (ref 5–17)
APTT BLD: 50.9 SEC — HIGH (ref 27.5–36.3)
APTT BLD: 52.5 SEC — HIGH (ref 27.5–36.3)
APTT BLD: 54.8 SEC — HIGH (ref 27.5–36.3)
AST SERPL-CCNC: 26 U/L — SIGNIFICANT CHANGE UP (ref 10–40)
AST SERPL-CCNC: 27 U/L — SIGNIFICANT CHANGE UP (ref 10–40)
AST SERPL-CCNC: 28 U/L — SIGNIFICANT CHANGE UP (ref 10–40)
BASE EXCESS BLDA CALC-SCNC: -0.6 MMOL/L — SIGNIFICANT CHANGE UP (ref -2–3)
BASE EXCESS BLDA CALC-SCNC: 0.2 MMOL/L — SIGNIFICANT CHANGE UP (ref -2–3)
BILIRUB SERPL-MCNC: 0.4 MG/DL — SIGNIFICANT CHANGE UP (ref 0.2–1.2)
BILIRUB SERPL-MCNC: 0.4 MG/DL — SIGNIFICANT CHANGE UP (ref 0.2–1.2)
BILIRUB SERPL-MCNC: 0.5 MG/DL — SIGNIFICANT CHANGE UP (ref 0.2–1.2)
BLD GP AB SCN SERPL QL: NEGATIVE — SIGNIFICANT CHANGE UP
BUN SERPL-MCNC: 34 MG/DL — HIGH (ref 7–23)
BUN SERPL-MCNC: 35 MG/DL — HIGH (ref 7–23)
BUN SERPL-MCNC: 36 MG/DL — HIGH (ref 7–23)
CALCIUM SERPL-MCNC: 8.3 MG/DL — LOW (ref 8.4–10.5)
CALCIUM SERPL-MCNC: 8.3 MG/DL — LOW (ref 8.4–10.5)
CALCIUM SERPL-MCNC: 9 MG/DL — SIGNIFICANT CHANGE UP (ref 8.4–10.5)
CHLORIDE SERPL-SCNC: 117 MMOL/L — HIGH (ref 96–108)
CHLORIDE SERPL-SCNC: 118 MMOL/L — HIGH (ref 96–108)
CHLORIDE SERPL-SCNC: 119 MMOL/L — HIGH (ref 96–108)
CO2 SERPL-SCNC: 19 MMOL/L — LOW (ref 22–31)
CO2 SERPL-SCNC: 21 MMOL/L — LOW (ref 22–31)
CO2 SERPL-SCNC: 21 MMOL/L — LOW (ref 22–31)
CREAT SERPL-MCNC: 1.23 MG/DL — SIGNIFICANT CHANGE UP (ref 0.5–1.3)
CREAT SERPL-MCNC: 1.29 MG/DL — SIGNIFICANT CHANGE UP (ref 0.5–1.3)
CREAT SERPL-MCNC: 1.36 MG/DL — HIGH (ref 0.5–1.3)
GAS PNL BLDA: SIGNIFICANT CHANGE UP
GLUCOSE BLDC GLUCOMTR-MCNC: 114 MG/DL — HIGH (ref 70–99)
GLUCOSE SERPL-MCNC: 134 MG/DL — HIGH (ref 70–99)
GLUCOSE SERPL-MCNC: 141 MG/DL — HIGH (ref 70–99)
GLUCOSE SERPL-MCNC: 148 MG/DL — HIGH (ref 70–99)
HCO3 BLDA-SCNC: 22 MMOL/L — SIGNIFICANT CHANGE UP (ref 21–28)
HCO3 BLDA-SCNC: 23 MMOL/L — SIGNIFICANT CHANGE UP (ref 21–28)
HCT VFR BLD CALC: 27.2 % — LOW (ref 39–50)
HCT VFR BLD CALC: 29 % — LOW (ref 39–50)
HCT VFR BLD CALC: 32.4 % — LOW (ref 39–50)
HGB BLD-MCNC: 10 G/DL — LOW (ref 13–17)
HGB BLD-MCNC: 8.3 G/DL — LOW (ref 13–17)
HGB BLD-MCNC: 9.1 G/DL — LOW (ref 13–17)
INR BLD: 1.24 — HIGH (ref 0.88–1.16)
INR BLD: 1.26 — HIGH (ref 0.88–1.16)
INR BLD: 1.34 — HIGH (ref 0.88–1.16)
LACTATE SERPL-SCNC: 0.6 MMOL/L — SIGNIFICANT CHANGE UP (ref 0.5–2)
LACTATE SERPL-SCNC: 0.7 MMOL/L — SIGNIFICANT CHANGE UP (ref 0.5–2)
MAGNESIUM SERPL-MCNC: 2.3 MG/DL — SIGNIFICANT CHANGE UP (ref 1.6–2.6)
MAGNESIUM SERPL-MCNC: 2.4 MG/DL — SIGNIFICANT CHANGE UP (ref 1.6–2.6)
MAGNESIUM SERPL-MCNC: 2.4 MG/DL — SIGNIFICANT CHANGE UP (ref 1.6–2.6)
MCHC RBC-ENTMCNC: 27.9 PG — SIGNIFICANT CHANGE UP (ref 27–34)
MCHC RBC-ENTMCNC: 28.2 PG — SIGNIFICANT CHANGE UP (ref 27–34)
MCHC RBC-ENTMCNC: 28.2 PG — SIGNIFICANT CHANGE UP (ref 27–34)
MCHC RBC-ENTMCNC: 30.5 GM/DL — LOW (ref 32–36)
MCHC RBC-ENTMCNC: 30.9 GM/DL — LOW (ref 32–36)
MCHC RBC-ENTMCNC: 31.4 GM/DL — LOW (ref 32–36)
MCV RBC AUTO: 89.8 FL — SIGNIFICANT CHANGE UP (ref 80–100)
MCV RBC AUTO: 90.5 FL — SIGNIFICANT CHANGE UP (ref 80–100)
MCV RBC AUTO: 92.5 FL — SIGNIFICANT CHANGE UP (ref 80–100)
NRBC # BLD: 0 /100 WBCS — SIGNIFICANT CHANGE UP (ref 0–0)
PCO2 BLDA: 29 MMHG — LOW (ref 35–48)
PCO2 BLDA: 30 MMHG — LOW (ref 35–48)
PH BLDA: 7.5 — HIGH (ref 7.35–7.45)
PH BLDA: 7.5 — HIGH (ref 7.35–7.45)
PHOSPHATE SERPL-MCNC: 2.5 MG/DL — SIGNIFICANT CHANGE UP (ref 2.5–4.5)
PHOSPHATE SERPL-MCNC: 2.7 MG/DL — SIGNIFICANT CHANGE UP (ref 2.5–4.5)
PHOSPHATE SERPL-MCNC: 2.7 MG/DL — SIGNIFICANT CHANGE UP (ref 2.5–4.5)
PLATELET # BLD AUTO: 286 K/UL — SIGNIFICANT CHANGE UP (ref 150–400)
PLATELET # BLD AUTO: 327 K/UL — SIGNIFICANT CHANGE UP (ref 150–400)
PLATELET # BLD AUTO: 403 K/UL — HIGH (ref 150–400)
PO2 BLDA: 56 MMHG — CRITICAL LOW (ref 83–108)
PO2 BLDA: 76 MMHG — LOW (ref 83–108)
POTASSIUM SERPL-MCNC: 3.6 MMOL/L — SIGNIFICANT CHANGE UP (ref 3.5–5.3)
POTASSIUM SERPL-MCNC: 3.8 MMOL/L — SIGNIFICANT CHANGE UP (ref 3.5–5.3)
POTASSIUM SERPL-MCNC: 3.8 MMOL/L — SIGNIFICANT CHANGE UP (ref 3.5–5.3)
POTASSIUM SERPL-SCNC: 3.6 MMOL/L — SIGNIFICANT CHANGE UP (ref 3.5–5.3)
POTASSIUM SERPL-SCNC: 3.8 MMOL/L — SIGNIFICANT CHANGE UP (ref 3.5–5.3)
POTASSIUM SERPL-SCNC: 3.8 MMOL/L — SIGNIFICANT CHANGE UP (ref 3.5–5.3)
PROT SERPL-MCNC: 4.9 G/DL — LOW (ref 6–8.3)
PROT SERPL-MCNC: 5.1 G/DL — LOW (ref 6–8.3)
PROT SERPL-MCNC: 5.8 G/DL — LOW (ref 6–8.3)
PROTHROM AB SERPL-ACNC: 14.1 SEC — HIGH (ref 10–12.9)
PROTHROM AB SERPL-ACNC: 14.3 SEC — HIGH (ref 10–12.9)
PROTHROM AB SERPL-ACNC: 15.3 SEC — HIGH (ref 10–12.9)
RBC # BLD: 2.94 M/UL — LOW (ref 4.2–5.8)
RBC # BLD: 3.23 M/UL — LOW (ref 4.2–5.8)
RBC # BLD: 3.58 M/UL — LOW (ref 4.2–5.8)
RBC # FLD: 16 % — HIGH (ref 10.3–14.5)
RBC # FLD: 16.1 % — HIGH (ref 10.3–14.5)
RBC # FLD: 16.2 % — HIGH (ref 10.3–14.5)
RH IG SCN BLD-IMP: POSITIVE — SIGNIFICANT CHANGE UP
SAO2 % BLDA: 89 % — LOW (ref 95–100)
SAO2 % BLDA: 95 % — SIGNIFICANT CHANGE UP (ref 95–100)
SODIUM SERPL-SCNC: 148 MMOL/L — HIGH (ref 135–145)
SODIUM SERPL-SCNC: 149 MMOL/L — HIGH (ref 135–145)
SODIUM SERPL-SCNC: 150 MMOL/L — HIGH (ref 135–145)
WBC # BLD: 13.58 K/UL — HIGH (ref 3.8–10.5)
WBC # BLD: 13.88 K/UL — HIGH (ref 3.8–10.5)
WBC # BLD: 14 K/UL — HIGH (ref 3.8–10.5)
WBC # FLD AUTO: 13.58 K/UL — HIGH (ref 3.8–10.5)
WBC # FLD AUTO: 13.88 K/UL — HIGH (ref 3.8–10.5)
WBC # FLD AUTO: 14 K/UL — HIGH (ref 3.8–10.5)

## 2019-12-01 PROCEDURE — 99291 CRITICAL CARE FIRST HOUR: CPT

## 2019-12-01 PROCEDURE — 99292 CRITICAL CARE ADDL 30 MIN: CPT

## 2019-12-01 PROCEDURE — 99231 SBSQ HOSP IP/OBS SF/LOW 25: CPT | Mod: GC

## 2019-12-01 PROCEDURE — 71045 X-RAY EXAM CHEST 1 VIEW: CPT | Mod: 26

## 2019-12-01 PROCEDURE — 99233 SBSQ HOSP IP/OBS HIGH 50: CPT

## 2019-12-01 RX ORDER — FUROSEMIDE 40 MG
20 TABLET ORAL ONCE
Refills: 0 | Status: COMPLETED | OUTPATIENT
Start: 2019-12-01 | End: 2019-12-01

## 2019-12-01 RX ORDER — CEFTRIAXONE 500 MG/1
2000 INJECTION, POWDER, FOR SOLUTION INTRAMUSCULAR; INTRAVENOUS EVERY 24 HOURS
Refills: 0 | Status: COMPLETED | OUTPATIENT
Start: 2019-12-01 | End: 2019-12-03

## 2019-12-01 RX ORDER — POTASSIUM CHLORIDE 20 MEQ
20 PACKET (EA) ORAL ONCE
Refills: 0 | Status: COMPLETED | OUTPATIENT
Start: 2019-12-01 | End: 2019-12-01

## 2019-12-01 RX ORDER — AMIODARONE HYDROCHLORIDE 400 MG/1
400 TABLET ORAL EVERY 8 HOURS
Refills: 0 | Status: COMPLETED | OUTPATIENT
Start: 2019-12-01 | End: 2019-12-05

## 2019-12-01 RX ORDER — IPRATROPIUM/ALBUTEROL SULFATE 18-103MCG
3 AEROSOL WITH ADAPTER (GRAM) INHALATION ONCE
Refills: 0 | Status: COMPLETED | OUTPATIENT
Start: 2019-12-01 | End: 2019-12-01

## 2019-12-01 RX ORDER — AMIODARONE HYDROCHLORIDE 400 MG/1
200 TABLET ORAL DAILY
Refills: 0 | Status: DISCONTINUED | OUTPATIENT
Start: 2019-12-05 | End: 2019-12-06

## 2019-12-01 RX ORDER — FUROSEMIDE 40 MG
40 TABLET ORAL ONCE
Refills: 0 | Status: COMPLETED | OUTPATIENT
Start: 2019-12-01 | End: 2019-12-01

## 2019-12-01 RX ORDER — BUDESONIDE, MICRONIZED 100 %
0.5 POWDER (GRAM) MISCELLANEOUS EVERY 12 HOURS
Refills: 0 | Status: DISCONTINUED | OUTPATIENT
Start: 2019-12-01 | End: 2019-12-26

## 2019-12-01 RX ORDER — ACETAMINOPHEN 500 MG
1000 TABLET ORAL ONCE
Refills: 0 | Status: COMPLETED | OUTPATIENT
Start: 2019-12-01 | End: 2019-12-01

## 2019-12-01 RX ORDER — AMIODARONE HYDROCHLORIDE 400 MG/1
TABLET ORAL
Refills: 0 | Status: DISCONTINUED | OUTPATIENT
Start: 2019-12-01 | End: 2019-12-06

## 2019-12-01 RX ADMIN — CEFTRIAXONE 100 MILLIGRAM(S): 500 INJECTION, POWDER, FOR SOLUTION INTRAMUSCULAR; INTRAVENOUS at 12:44

## 2019-12-01 RX ADMIN — LIDOCAINE 1 PATCH: 4 CREAM TOPICAL at 11:03

## 2019-12-01 RX ADMIN — LIDOCAINE 1 PATCH: 4 CREAM TOPICAL at 16:05

## 2019-12-01 RX ADMIN — PANTOPRAZOLE SODIUM 40 MILLIGRAM(S): 20 TABLET, DELAYED RELEASE ORAL at 11:03

## 2019-12-01 RX ADMIN — LIDOCAINE 1 PATCH: 4 CREAM TOPICAL at 23:00

## 2019-12-01 RX ADMIN — MEROPENEM 100 MILLIGRAM(S): 1 INJECTION INTRAVENOUS at 00:08

## 2019-12-01 RX ADMIN — Medication 20 MILLIGRAM(S): at 11:58

## 2019-12-01 RX ADMIN — AMIODARONE HYDROCHLORIDE 400 MILLIGRAM(S): 400 TABLET ORAL at 22:00

## 2019-12-01 RX ADMIN — Medication 1 DROP(S): at 07:00

## 2019-12-01 RX ADMIN — CHLORHEXIDINE GLUCONATE 1 APPLICATION(S): 213 SOLUTION TOPICAL at 05:17

## 2019-12-01 RX ADMIN — Medication 1 DROP(S): at 11:01

## 2019-12-01 RX ADMIN — Medication 1 DROP(S): at 00:06

## 2019-12-01 RX ADMIN — Medication 1 DROP(S): at 20:00

## 2019-12-01 RX ADMIN — Medication 1 DROP(S): at 06:59

## 2019-12-01 RX ADMIN — AMIODARONE HYDROCHLORIDE 400 MILLIGRAM(S): 400 TABLET ORAL at 14:22

## 2019-12-01 RX ADMIN — Medication 100 MILLIEQUIVALENT(S): at 05:15

## 2019-12-01 RX ADMIN — Medication 81 MILLIGRAM(S): at 11:03

## 2019-12-01 RX ADMIN — Medication 1 DROP(S): at 11:00

## 2019-12-01 RX ADMIN — Medication 1 DROP(S): at 02:17

## 2019-12-01 RX ADMIN — Medication 50 MILLIEQUIVALENT(S): at 00:08

## 2019-12-01 RX ADMIN — Medication 400 MILLIGRAM(S): at 18:37

## 2019-12-01 RX ADMIN — Medication 1 DROP(S): at 04:11

## 2019-12-01 RX ADMIN — Medication 1 DROP(S): at 18:37

## 2019-12-01 RX ADMIN — Medication 40 MILLIGRAM(S): at 18:07

## 2019-12-01 RX ADMIN — Medication 3 MILLILITER(S): at 23:48

## 2019-12-01 RX ADMIN — Medication 1 DROP(S): at 22:00

## 2019-12-01 RX ADMIN — Medication 1 DROP(S): at 16:05

## 2019-12-01 RX ADMIN — Medication 1 DROP(S): at 14:22

## 2019-12-01 RX ADMIN — Medication 1000 MILLIGRAM(S): at 20:20

## 2019-12-01 RX ADMIN — HEPARIN SODIUM 11.5 UNIT(S)/HR: 5000 INJECTION INTRAVENOUS; SUBCUTANEOUS at 12:40

## 2019-12-01 RX ADMIN — Medication 40 MILLIGRAM(S): at 18:37

## 2019-12-01 RX ADMIN — CHLORHEXIDINE GLUCONATE 15 MILLILITER(S): 213 SOLUTION TOPICAL at 05:17

## 2019-12-01 NOTE — PROGRESS NOTE ADULT - SUBJECTIVE AND OBJECTIVE BOX
CTICU  CRITICAL  CARE  attending     Hand off received 					   Pertinent clinical, laboratory, radiographic, hemodynamic, echocardiographic, respiratory data, microbiologic data and chart were reviewed and analyzed frequently throughout the course of the day and night    Patient seen and examined with CTS/ SH attending at bedside    69 years old male with H/O colitis. He started having pain in the anterior cervical area around  1:00 AM. Simultaneously he had lower back pain. He felt dizzy and diaphoretic. He felt weakness in both lower extremities. No loss of consciousness.  He was brought in to [facility] emergency room by an ambulance. His BP was 60 by palpation and appeared cyanotic.   CT angio  showed   1. 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  2. Moderate hemopericardium.  3. Dissection extending into the right brachiocephalic artery though the right carotid artery and right subclavian artery remain patent off true lumen.  4. Dissection involving the proximal left subclavian artery which remains patent more distally.  5. The left carotid artery comes off the true lumen of the arch  He was emergently taken to the operating room and cardiopulmonary bypass instituted. Blood evacuated from the pericardium.  Ascending aorta and aortic root were replaced and coronary buttons were reimplanted.        HEALTH ISSUES - PROBLEM Dx:  Bacterial pneumonia: Bacterial pneumonia  Fever, postprocedural: Fever, postprocedural  FELY (acute kidney injury): FELY (acute kidney injury)      FAMILY HISTORY:  PAST MEDICAL & SURGICAL HISTORY:  Benign prostatic hypertrophy without lower urinary tract symptoms: BPH (benign prostatic hypertrophy)  Ulcerative colitis: Ulcerative colitis  States following surgery of eye and adnexa: straightened right eye  Other postprocedural status: History of hernia repair  Hemorrhoids: Hemorrhoids  Acute appendicitis with generalized peritonitis: Ruptured appendix    Patient is a 69y old  Male who presents with a chief complaint of Aortic dissection (01 Dec 2019 14:38)     14 system review was unremarkable    Vital signs, hemodynamic and respiratory parameters were reviewed from the bedside nursing flow sheet.  ICU Vital Signs Last 24 Hrs  T(C): 37.1 (01 Dec 2019 17:24), Max: 37.1 (01 Dec 2019 17:24)  T(F): 98.8 (01 Dec 2019 17:24), Max: 98.8 (01 Dec 2019 17:24)  HR: 82 (01 Dec 2019 20:00) (76 - 98)  BP: 141/79 (01 Dec 2019 12:08) (141/79 - 141/79)  BP(mean): 111 (01 Dec 2019 12:08) (111 - 111)  ABP: 154/84 (01 Dec 2019 20:00) (130/60 - 162/80)  ABP(mean): 108 (01 Dec 2019 20:00) (84 - 110)  RR: 22 (01 Dec 2019 20:00) (13 - 23)  SpO2: 96% (01 Dec 2019 20:00) (91% - 99%)    Adult Advanced Hemodynamics Last 24 Hrs  CVP(mm Hg): --  CVP(cm H2O): --  CO: --  CI: --  PA: --  PA(mean): --  PCWP: --  SVR: --  SVRI: --  PVR: --  PVRI: --, ABG - ( 01 Dec 2019 18:25 )  pH, Arterial: 7.50  pH, Blood: x     /  pCO2: 29    /  pO2: 76    / HCO3: 22    / Base Excess: -0.6  /  SaO2: 95                Mode: Spontaneous/ CPAP (Continuous Positive Airway Pressure)  RR (machine):   TV (machine):   FiO2: 40  PEEP: 5  PS: 14  ITime: 1  PIP: 18    Intake and output was reviewed and the fluid balance was calculated  Daily     Daily   I&O's Summary    30 Nov 2019 07:01  -  01 Dec 2019 07:00  --------------------------------------------------------  IN: 3864.7 mL / OUT: 2970 mL / NET: 894.7 mL    01 Dec 2019 07:01  -  01 Dec 2019 20:50  --------------------------------------------------------  IN: 1862.5 mL / OUT: 2725 mL / NET: -862.5 mL        All lines and drain sites were assessed    Neuro:Moves all 4 extremities. Expressive aphasia noted after extubation. He follows commands appropriately but he cannot verbalize the answers to any question he is asked. Replies in sign language.  After the wife brought his prescription glasses from home, he is surfing the web on his personal laptop computer and catching up with his friends on the face book.  Neck: No JVD.  CVS: S1, S1, No S3.  Lungs: Good air entry bilaterally.   Abd: Soft. No tenderness. + Bowel sounds.  Vascular: + DP/PT.  Extremities: No edema.  Lymphatic: Normal.  Skin: No abnormalities.      labs  CBC Full  -  ( 01 Dec 2019 16:40 )  WBC Count : 14.00 K/uL  RBC Count : 3.58 M/uL  Hemoglobin : 10.0 g/dL  Hematocrit : 32.4 %  Platelet Count - Automated : 403 K/uL  Mean Cell Volume : 90.5 fl  Mean Cell Hemoglobin : 27.9 pg  Mean Cell Hemoglobin Concentration : 30.9 gm/dL  Auto Neutrophil # : x  Auto Lymphocyte # : x  Auto Monocyte # : x  Auto Eosinophil # : x  Auto Basophil # : x  Auto Neutrophil % : x  Auto Lymphocyte % : x  Auto Monocyte % : x  Auto Eosinophil % : x  Auto Basophil % : x    12-01    150<H>  |  117<H>  |  35<H>  ----------------------------<  148<H>  3.6   |  21<L>  |  1.23    Ca    9.0      01 Dec 2019 16:40  Phos  2.7     12-01  Mg     2.4     12-01    TPro  5.8<L>  /  Alb  3.3  /  TBili  0.4  /  DBili  x   /  AST  28  /  ALT  38  /  AlkPhos  69  12-01    PT/INR - ( 01 Dec 2019 16:40 )   PT: 14.1 sec;   INR: 1.24          PTT - ( 01 Dec 2019 16:40 )  PTT:50.9 sec  The current medications were reviewed   MEDICATIONS  (STANDING):  albuterol/ipratropium for Nebulization. 3 milliLiter(s) Nebulizer once  aMIOdarone    Tablet   Oral   aMIOdarone    Tablet 400 milliGRAM(s) Oral every 8 hours  artificial  tears Solution 1 Drop(s) Both EYES every 2 hours  aspirin enteric coated 81 milliGRAM(s) Oral daily  buDESOnide    Inhalation Suspension 0.5 milliGRAM(s) Inhalation every 12 hours  cefTRIAXone   IVPB 2000 milliGRAM(s) IV Intermittent every 24 hours  chlorhexidine 0.12% Liquid 15 milliLiter(s) Oral Mucosa every 12 hours  chlorhexidine 2% Cloths 1 Application(s) Topical daily  dextrose 50% Injectable 50 milliLiter(s) IV Push every 15 minutes  heparin  Infusion 1150 Unit(s)/Hr (11.5 mL/Hr) IV Continuous <Continuous>  lidocaine   Patch 1 Patch Transdermal daily  pantoprazole  Injectable 40 milliGRAM(s) IV Push daily  sodium chloride 0.9%. 1000 milliLiter(s) (10 mL/Hr) IV Continuous <Continuous>  tamsulosin 0.4 milliGRAM(s) Oral at bedtime    MEDICATIONS  (PRN):  bisacodyl Suppository 10 milliGRAM(s) Rectal daily PRN Constipation        PROBLEM LIST/ ASSESSMENT:  HEALTH ISSUES - PROBLEM Dx:  Bacterial pneumonia: Bacterial pneumonia  Fever, postprocedural: Fever, postprocedural  FELY (acute kidney injury): FELY (acute kidney injury)          Patient is a 69y old  Male who presents with cardiogenic shock due to acute Type A aortic dissection with rupture and cardiac tamponade.  S/P Aortic Root Replacement  S/P BioBental and CABROL.  S/P replacement of ascending aorta and hemiarch.  Post operative course complicated by  renal insufficiency, Hematuria,  hypokalemia,  hypernatremia, klebsiella pneumonia, LUE thrombosis.    ACTIVE  ISSUES  Expressive aphasia  DVT.  Hypernatremia  Metabolic acidosis   Hemodynamically stable.  Fair oxygenation  Good urine output.          My plan includes :  CT head.  Neurology Evaluation.  IV heparin for DVT. Target PTT 55.  Close hemodynamic, ventilatory and drain monitoring and management  Monitor for arrhythmias and monitor parameters for organ perfusion  Monitor neurologic status  Monitor renal function.  Head of the bed should remain elevated to 45 deg .   Chest PT and IS will be encouraged  Monitor adequacy of oxygenation and ventilation and attempt to wean oxygen  Nutritional goals will be met using po eventually , ensure adequate caloric intake and monitor the same  Stress ulcer and VTE prophylaxis will be achieved    Glycemic control is satisfactory  Electrolytes have been repleted as necessary and wound care has been carried out. Pain control has been achieved.   Aggressive physical therapy and early mobility and ambulation goals will be met   The family was updated about the course and plan  CRITICAL CARE TIME SPENT in evaluation and management, reassessments, review and interpretation of labs and x-rays, ventilator and hemodynamic management, formulating a plan and coordinating care: ___60____ MIN.  Time does not include procedural time.  CTICU ATTENDING     					    Giovanni Reis MD

## 2019-12-01 NOTE — PROGRESS NOTE ADULT - ASSESSMENT
WBC decreased    Il defined PCN allergy   Risk of 3rd generation cephalosporin low given the fact the patient is intubated and his allergic reaction is distant  Discussed with pharmacy

## 2019-12-01 NOTE — PROVIDER CONTACT NOTE (CHANGE IN STATUS NOTIFICATION) - RECOMMENDATIONS
Neurochecks per protocol, primary RN aware of current neurological status; call bell within reach at all times, fall precautions maintained as necessary

## 2019-12-01 NOTE — PROGRESS NOTE ADULT - SUBJECTIVE AND OBJECTIVE BOX
INTERVAL HPI/OVERNIGHT EVENTS:    Meropenem D#7    11/23: emergent salvage AVR(Bio)/root-hemiarch replacement  EF 60%    70yo Male with Hx Ulcerative colitis, BPH/prostate surgery presenting with back pain/neck pain and weakness  and profound weakness     found profoundly hypotensive on assessment - SBP 40's with cyanosis     CT scan: Type A aortic dissection  pressors started/intubated - CT surgery was called and emergently transferred - taken to OR    to OR 11/23:   intraop: 2.5 L Crystalloid/6 U pRBC/4 FFP/10 Cryo/1000 FEIBA  arrived on Epi/levo/vaso    atel on xray - bronch performed 11/24  LA normalized  and pressors titrate down     fever noted post-op - Cx sent - started presumptively on Meropenem 11/25. ID (Zlantanic)  ongoing diuresis - lasix infusion started - held this am     11/26 - off pepe and Juan M off  diuresis with improvement in Fi02 requirements - currently 40%  tolerated some early mobility     11/27: Doppler LUE: Thrombus in the left cephalic vein protruding into the subclavian vein. Remainder of subclavian vein is patent. Thrombus in the left basilic with overlying soft tissue swelling.    11/29 - atrial fib/RVR - Amiodarone IV transitioned to po load  heparin infusion started for Fib and LUE clot    new midline placed 11/29  given 1 U pRBC overnight (Hct 27)  getting 100cc free water flushes hourly overnight   tolerating enteral feeds 40cc/h - inc to 50 this am    No acute events reported overnight   attempted PS this am - prolonged periods without taking breath - switched to IMV for patient safety     PMHx includes but is not limited to:   BPH   Ulcerative colitis: Ulcerative colitis  States following surgery of eye and adnexa: straightened right eye  Other postprocedural status: History of hernia repair  Hemorrhoids: Hemorrhoids  Acute appendicitis with generalized peritonitis: Ruptured appendix    ICU Vital Signs Last 24 Hrs  T(C): 36.8 (01 Dec 2019 09:00), Max: 37.5 (30 Nov 2019 14:00)  T(F): 98.3 (01 Dec 2019 09:00), Max: 99.5 (30 Nov 2019 14:00)  HR: 82 (01 Dec 2019 11:00) (76 - 87) sinus  ABP: 144/72 (01 Dec 2019 11:00) (130/60 - 150/76)  ABP(mean): 98 (01 Dec 2019 11:00) (84 - 104)  RR: 13 (01 Dec 2019 11:00) (13 - 23)  SpO2: 95% (01 Dec 2019 11:00) (93% - 100%)    Qtts:   Amiodarone - transitioned to PO load  Heparin 1250    I&O's Summary    30 Nov 2019 07:01  -  01 Dec 2019 07:00  --------------------------------------------------------  IN: 3864.7 mL / OUT: 2970 mL / NET: 894.7 mL    01 Dec 2019 07:01  -  01 Dec 2019 11:27  --------------------------------------------------------  IN: 1062.5 mL / OUT: 475 mL / NET: 587.5 mL    Vent settings: IMV 12/550/40/5    Physical Exam    Heart - regular (-)rub/gallop  Lungs - dec BS bases - no wheeze/rhonchi  Abd -(+)BS soft NTND (-)r/r/g  Ext -warm to touch; no cyanosis/clubbing  Chest - incision site clean and dry - no erythema  Neuro - arousable - following simple commands - non-focal   Skin - no rash    LABS:                        9.1    13.58 )-----------( 327      ( 01 Dec 2019 10:57 )             29.0     12-01    149<H>  |  119<H>  |  34<H>  ----------------------------<  134<H>  3.8   |  21<L>  |  1.29    Ca    8.3<L>      01 Dec 2019 10:57  Phos  2.7     12-01  Mg     2.3     12-01    TPro  5.1<L>  /  Alb  3.1<L>  /  TBili  0.5  /  DBili  x   /  AST  26  /  ALT  33  /  AlkPhos  57  12-01    PT/INR - ( 01 Dec 2019 10:57 )   PT: 14.3 sec;   INR: 1.26     PTT - ( 01 Dec 2019 10:57 )  PTT:54.8 sec    ABG - ( 01 Dec 2019 11:03 ) 7.46/29/82/96    RADIOLOGY & ADDITIONAL STUDIES: reviewed  Sputum Cx 11/25: Klebsiella oxytoca/normal resp jazmín    Patient s/p emergent/salvage aortic dissection repair - now POD#8    1. CV  sinus rhythm   hemo  pressors being titrated down - on minimal Pepe 0.1 at this time - shut off - maintain MAP 65; SBP less than 120  LA normal 1.1    2. Pulm   persistent atelectasis RLL -   several bronch performed without improvement  plan recruitment maneuvers/PEEP valve. suctioning and repeat xray  adjust TV to lung protective strategy - dec to 550  serial ABG to optimize oxygenation and ventilation  titrate vent and Juan M as able  anticipate some chest tube removal today based on output    3. Renal   emergent type A repair  Cr 2.16  lasix infusion held this am (2.2 L negative)  CVP 5 and hypernatremia 151 - start free water  monitor UO/Lytes and Cr   CTa Abd 11/23: Right renal artery: Originates from the false lumen, diminished opacification. No occlusive thrombus versus dissection extending into the origin. Left renal artery: Originates from the true lumen and patent.    4. Neuro  post-op assessment - responsive and following simple commands  non-focal    5. ID  fever noted  ID following   meropenem presumptively started pending culture results in light of temp - WBC 7  sputum Cx 11/25 - polymicrobial - may herald normal jazmín - f/u Cx    DVT and GI prophylaxis    d/w family present at bedside; staff; renal attending; ID attending and CTS      I have spent/provided stated minutes of critical care time to this patient: 90 min INTERVAL HPI/OVERNIGHT EVENTS:    Meropenem D#7    11/23: emergent salvage AVR(Bio)/root-hemiarch replacement  EF 60%    68yo Male with Hx Ulcerative colitis, BPH/prostate surgery presenting with back pain/neck pain and weakness  and profound weakness     found profoundly hypotensive on assessment - SBP 40's with cyanosis     CT scan: Type A aortic dissection  pressors started/intubated - CT surgery was called and emergently transferred - taken to OR    to OR 11/23:   intraop: 2.5 L Crystalloid/6 U pRBC/4 FFP/10 Cryo/1000 FEIBA  arrived on Epi/levo/vaso    atel on xray - bronch performed 11/24  LA normalized  and pressors titrate down     fever noted post-op - Cx sent - started presumptively on Meropenem 11/25. ID (Zlantanic)  ongoing diuresis - lasix infusion started - held this am     11/26 - off [place] and Juan M off  diuresis with improvement in Fi02 requirements - currently 40%  tolerated some early mobility     11/27: Doppler LUE: Thrombus in the left cephalic vein protruding into the subclavian vein. Remainder of subclavian vein is patent. Thrombus in the left basilic with overlying soft tissue swelling.    11/29 - atrial fib/RVR - Amiodarone IV transitioned to po load  heparin infusion started for Fib and LUE clot    new midline placed 11/29  given 1 U pRBC overnight (Hct 27)  getting 100cc free water flushes hourly overnight   tolerating enteral feeds 40cc/h - inc to 50 this am    No acute events reported overnight   attempted PS this am - prolonged periods without taking breath - switched to IMV for patient safety     PMHx includes but is not limited to:   BPH   Ulcerative colitis: Ulcerative colitis  States following surgery of eye and adnexa: straightened right eye  Other postprocedural status: History of hernia repair  Hemorrhoids: Hemorrhoids  Acute appendicitis with generalized peritonitis: Ruptured appendix    ICU Vital Signs Last 24 Hrs  T(C): 36.8 (01 Dec 2019 09:00), Max: 37.5 (30 Nov 2019 14:00)  T(F): 98.3 (01 Dec 2019 09:00), Max: 99.5 (30 Nov 2019 14:00)  HR: 82 (01 Dec 2019 11:00) (76 - 87) sinus  ABP: 144/72 (01 Dec 2019 11:00) (130/60 - 150/76)  ABP(mean): 98 (01 Dec 2019 11:00) (84 - 104)  RR: 13 (01 Dec 2019 11:00) (13 - 23)  SpO2: 95% (01 Dec 2019 11:00) (93% - 100%)    Qtts:   Amiodarone - transitioned to PO load  Heparin 1250    I&O's Summary    30 Nov 2019 07:01  -  01 Dec 2019 07:00  --------------------------------------------------------  IN: 3864.7 mL / OUT: 2970 mL / NET: 894.7 mL    01 Dec 2019 07:01  -  01 Dec 2019 11:27  --------------------------------------------------------  IN: 1062.5 mL / OUT: 475 mL / NET: 587.5 mL    Vent settings: IMV 12/550/40/5    Physical Exam    Heart - regular (-)rub/gallop  Lungs - dec BS bases - no wheeze/rhonchi  Abd -(+)BS soft NTND (-)r/r/g  Ext -warm to touch; no cyanosis/clubbing  Chest - incision site clean and dry - no erythema  Neuro - arousable - following simple commands - non-focal   Skin - no rash    LABS:                        9.1    13.58 )-----------( 327      ( 01 Dec 2019 10:57 )             29.0     12-01    149<H>  |  119<H>  |  34<H>  ----------------------------<  134<H>  3.8   |  21<L>  |  1.29    Ca    8.3<L>      01 Dec 2019 10:57  Phos  2.7     12-01  Mg     2.3     12-01    TPro  5.1<L>  /  Alb  3.1<L>  /  TBili  0.5  /  DBili  x   /  AST  26  /  ALT  33  /  AlkPhos  57  12-01    PT/INR - ( 01 Dec 2019 10:57 )   PT: 14.3 sec;   INR: 1.26     PTT - ( 01 Dec 2019 10:57 )  PTT:54.8 sec    ABG - ( 01 Dec 2019 11:03 ) 7.46/29/82/96    RADIOLOGY & ADDITIONAL STUDIES: reviewed  Sputum Cx 11/25: Klebsiella oxytoca/normal resp jazmín    Patient s/p emergent/salvage aortic dissection repair - now POD#8    1. CV  sinus rhythm   hemodynamically stable  LA normal 0.6  Atrial Fib  - on amio load (transitioned to PO)  on heparin infusion (titrate per pTT) for afib AND UE clot noted on duplex    2. Pulm   recruitment maneuvers and serial bronchs performed   in conjunction with diuresis - Fi02 improved  now on 40%    3. Renal   emergent type A repair  Cr 1.29  hypernatremia - change free water to 250 QID  monitor UO/Lytes and Cr   CTa Abd 11/23: Right renal artery: Originates from the false lumen, diminished opacification. No occlusive thrombus versus dissection extending into the origin. Left renal artery: Originates from the true lumen and patent.    4. Neuro  post-op assessment - responsive and following simple commands  non-focal    5. ID  Afebrile   ID following   meropenem started 11/25 - now D#7 - d/c - per d/w ID (Gurjit) CTX for 3 more days (10days total)  sputum Cx 11/25 - polymicrobial - normal jazmín/klebsiella oxytoca -     DVT and GI prophylaxis    d/w family present at bedside; staff; renal attending; ID attending and CTS    I have spent/provided stated minutes of critical care time to this patient: 90 min

## 2019-12-01 NOTE — PROGRESS NOTE ADULT - ASSESSMENT
68 y/o M with PMHx of Ulcerative colitis BIBA to [facility] ED complaining of back pain and profound hypotension, emergently brought to the OR for salvage Type A dissection repair, s/p aortic arch repair and ascending arch replacement.   Nephrology consulted for ARNULFO.    Arnulfo vs ckd  likely 2nd to DANICA   non oliguric  cr improving @ 1.29  will continue monitoring    hypernatremia  on  free water 250 cc po q 4hrs via OGT    acidosis  mild  likely 2nd to diamox   will continue monitoring

## 2019-12-01 NOTE — PROVIDER CONTACT NOTE (CHANGE IN STATUS NOTIFICATION) - SITUATION
On assessment, patient restless without signs of distress on high flow nasal cannula;  >following commands with purposeful movement on all 4 extremities, no drift noted, no nasolabial fold assymetry.    >However, on further assessment patient unable to articulate name, place or time.  Patient able to shake or nod head appropriately when asked.

## 2019-12-01 NOTE — PROGRESS NOTE ADULT - SUBJECTIVE AND OBJECTIVE BOX
Patient is a 69y Male seen and evaluated at bedside.   pt seen and examined  intubated, but awake     aMIOdarone    Tablet    aMIOdarone    Tablet 400 every 8 hours  artificial  tears Solution 1 every 2 hours  aspirin enteric coated 81 daily  bisacodyl Suppository 10 daily PRN  cefTRIAXone   IVPB 2000 every 24 hours  chlorhexidine 0.12% Liquid 15 every 12 hours  chlorhexidine 2% Cloths 1 daily  dextrose 50% Injectable 50 every 15 minutes  heparin  Infusion 1150 <Continuous>  lidocaine   Patch 1 daily  pantoprazole  Injectable 40 daily  sodium chloride 0.9%. 1000 <Continuous>  tamsulosin 0.4 at bedtime      Allergies    penicillin (Unknown)    Intolerances        T(C): , Max: 37.1 (11-30-19 @ 17:36)  T(F): , Max: 98.7 (11-30-19 @ 17:36)  HR: 86 (12-01-19 @ 14:00)  BP: 141/79 (12-01-19 @ 12:08)  BP(mean): 111 (12-01-19 @ 12:08)  RR: 14 (12-01-19 @ 13:00)  SpO2: 94% (12-01-19 @ 14:00)  Wt(kg): --    11-30 @ 07:01  -  12-01 @ 07:00  --------------------------------------------------------  IN: 3864.7 mL / OUT: 2970 mL / NET: 894.7 mL    12-01 @ 07:01  -  12-01 @ 14:38  --------------------------------------------------------  IN: 1500 mL / OUT: 1475 mL / NET: 25 mL          Review of Systems:  CONSTITUTIONAL: No fever or chills, No fatigue or tiredness.  EYES: No blurred or double vision.  RESPIRATORY: No shortness of breath, cough, hemoptysis  CARDIOVASCULAR: No Chest pain or shortness of breath  GASTROINTESTINAL: NO abdominal or flank pain, No nausea or vomiting, No diarrhea  GENITOURINARY: No dysuria or urinary burning, No difficulty passing urine, No hematuria  NEUROLOGICAL: No headaches or blurred vision  SKIN: No skin rashes   MUSCULOSKELETAL: No arthralgia, Joint pain, leg edema, No muscle pains      PHYSICAL EXAM:  GENERAL: NAD, intubated, awake  HEAD:  Atraumatic, Normocephalic,   EYES: Bilateral conjuctiva and sclera normal   Oral cavity: ET in place  NECK: Neck supple, No JVD  CHEST/LUNG: Clear to auscultation bilaterally; No wheeze, no rales, no crepitations  HEART: Regular rate and rhythm. BEKAH II/VI at LPSB, No gallop, no rub   ABDOMEN: Soft, Nontender, BS+nt, No flank tenderness.   EXTREMITIES: no edema   Neurology: Awake  solo cath                 LABS:                        9.1    13.58 )-----------( 327      ( 01 Dec 2019 10:57 )             29.0     12-01    149<H>  |  119<H>  |  34<H>  ----------------------------<  134<H>  3.8   |  21<L>  |  1.29    Ca    8.3<L>      01 Dec 2019 10:57  Phos  2.7     12-01  Mg     2.3     12-01    TPro  5.1<L>  /  Alb  3.1<L>  /  TBili  0.5  /  DBili  x   /  AST  26  /  ALT  33  /  AlkPhos  57  12-01      PT/INR - ( 01 Dec 2019 10:57 )   PT: 14.3 sec;   INR: 1.26          PTT - ( 01 Dec 2019 10:57 )  PTT:54.8 sec          RADIOLOGY & ADDITIONAL STUDIES: Patient is a 69y Male seen and evaluated at bedside.   pt seen and examined  intubated, but awake     aMIOdarone    Tablet    aMIOdarone    Tablet 400 every 8 hours  artificial  tears Solution 1 every 2 hours  aspirin enteric coated 81 daily  bisacodyl Suppository 10 daily PRN  cefTRIAXone   IVPB 2000 every 24 hours  chlorhexidine 0.12% Liquid 15 every 12 hours  chlorhexidine 2% Cloths 1 daily  dextrose 50% Injectable 50 every 15 minutes  heparin  Infusion 1150 <Continuous>  lidocaine   Patch 1 daily  pantoprazole  Injectable 40 daily  sodium chloride 0.9%. 1000 <Continuous>  tamsulosin 0.4 at bedtime      Allergies    penicillin (Unknown)    Intolerances        T(C): , Max: 37.1 (11-30-19 @ 17:36)  T(F): , Max: 98.7 (11-30-19 @ 17:36)  HR: 86 (12-01-19 @ 14:00)  BP: 141/79 (12-01-19 @ 12:08)  BP(mean): 111 (12-01-19 @ 12:08)  RR: 14 (12-01-19 @ 13:00)  SpO2: 94% (12-01-19 @ 14:00)  Wt(kg): --    11-30 @ 07:01  -  12-01 @ 07:00  --------------------------------------------------------  IN: 3864.7 mL / OUT: 2970 mL / NET: 894.7 mL    12-01 @ 07:01  -  12-01 @ 14:38  --------------------------------------------------------  IN: 1500 mL / OUT: 1475 mL / NET: 25 mL          Review of Systems:  CONSTITUTIONAL: No fever or chills, No fatigue or tiredness.  EYES: No blurred or double vision.  RESPIRATORY: No shortness of breath, cough, hemoptysis  CARDIOVASCULAR: No Chest pain or shortness of breath  GASTROINTESTINAL: NO abdominal or flank pain, No nausea or vomiting, No diarrhea  GENITOURINARY: No dysuria or urinary burning, No difficulty passing urine, No hematuria  NEUROLOGICAL: No headaches or blurred vision  SKIN: No skin rashes   MUSCULOSKELETAL: No arthralgia, Joint pain, leg edema, No muscle pains      PHYSICAL EXAM:  GENERAL: NAD, intubated, awake  HEAD:  Atraumatic, Normocephalic,   EYES: Bilateral conjuctiva and sclera normal   Oral cavity: ET in place  NECK: Neck supple, No JVD  CHEST/LUNG: Clear to auscultation bilaterally; No wheeze, no rales, no crepitations  HEART: Regular rate and rhythm. BEKAH II/VI at LPSB, No gallop, no rub   ABDOMEN: Soft, Nontender, BS+nt, No flank tenderness.   EXTREMITIES: less edema   Neurology: Awake  solo cath                 LABS:                        9.1    13.58 )-----------( 327      ( 01 Dec 2019 10:57 )             29.0     12-01    149<H>  |  119<H>  |  34<H>  ----------------------------<  134<H>  3.8   |  21<L>  |  1.29    Ca    8.3<L>      01 Dec 2019 10:57  Phos  2.7     12-01  Mg     2.3     12-01    TPro  5.1<L>  /  Alb  3.1<L>  /  TBili  0.5  /  DBili  x   /  AST  26  /  ALT  33  /  AlkPhos  57  12-01      PT/INR - ( 01 Dec 2019 10:57 )   PT: 14.3 sec;   INR: 1.26          PTT - ( 01 Dec 2019 10:57 )  PTT:54.8 sec          RADIOLOGY & ADDITIONAL STUDIES:

## 2019-12-01 NOTE — PROGRESS NOTE ADULT - SUBJECTIVE AND OBJECTIVE BOX
covering for DR Graff  Critical Care    INTERVAL HPI/OVERNIGHT EVENTS:    ANTIBIOTICS    MEDICATIONS  (STANDING):  aMIOdarone    Tablet   Oral   aMIOdarone    Tablet 400 milliGRAM(s) Oral every 8 hours  artificial  tears Solution 1 Drop(s) Both EYES every 2 hours  aspirin enteric coated 81 milliGRAM(s) Oral daily  chlorhexidine 0.12% Liquid 15 milliLiter(s) Oral Mucosa every 12 hours  chlorhexidine 2% Cloths 1 Application(s) Topical daily  dexMEDEtomidine Infusion 0.5 MICROgram(s)/kG/Hr (10.488 mL/Hr) IV Continuous <Continuous>  dextrose 50% Injectable 50 milliLiter(s) IV Push every 15 minutes  heparin  Infusion 1150 Unit(s)/Hr (11.5 mL/Hr) IV Continuous <Continuous>  lidocaine   Patch 1 Patch Transdermal daily  meropenem  IVPB 1000 milliGRAM(s) IV Intermittent every 12 hours  meropenem  IVPB      pantoprazole  Injectable 40 milliGRAM(s) IV Push daily  phenylephrine    Infusion 0.5 MICROgram(s)/kG/Min (15.731 mL/Hr) IV Continuous <Continuous>  sodium chloride 0.9%. 1000 milliLiter(s) (10 mL/Hr) IV Continuous <Continuous>  tamsulosin 0.4 milliGRAM(s) Oral at bedtime    MEDICATIONS  (PRN):  bisacodyl Suppository 10 milliGRAM(s) Rectal daily PRN Constipation      Allergies    penicillin (Unknown)    Intolerances        REVIEW OF SYSTEMS:    patient still intubated      the patient is alert. Although his is intubated he was able to communicate to ne that his penicillin allergy is very distant   More than 20 years ago    He does not remember the specifics of his reaction  Vital Signs Last 24 Hrs  T(C): 36.8 (01 Dec 2019 09:00), Max: 37.5 (30 Nov 2019 14:00)  T(F): 98.3 (01 Dec 2019 09:00), Max: 99.5 (30 Nov 2019 14:00)  HR: 84 (01 Dec 2019 10:00) (76 - 87)  BP: --  BP(mean): --  RR: 17 (01 Dec 2019 10:00) (14 - 23)  SpO2: 96% (01 Dec 2019 10:00) (93% - 100%)    PHYSICAL EXAM:    General:  in no acute distress  Eyes: PERRL, EOM intact; conjunctiva and sclera clear  Head: Normocephalic; atraumatic  ENMT: No nasal discharge; airway clear  Neck: Supple; non tender; no masses  Respiratory: No wheezes, rales or rhonchi  Cardiovascular: Regular rate and rhythm. S1 and S2 Normal; No murmurs, gallops or rubs  Gastrointestinal: Soft non-tender non-distended; Normal bowel sounds; No hepatosplenomegaly  Genitourinary: No costovertebral angle tenderness  Extremities: Normal range of motion, No clubbing, cyanosis or edema  Vascular: Peripheral pulses palpable 2+ bilaterally  Neurological: Alert and oriented x3  Skin: Warm and dry. No acute rash  LABS:                        8.3    13.88 )-----------( 286      ( 01 Dec 2019 02:33 )             27.2     12-01    148<H>  |  118<H>  |  36<H>  ----------------------------<  141<H>  3.8   |  19<L>  |  1.36<H>    Ca    8.3<L>      01 Dec 2019 02:33  Phos  2.5     12-01  Mg     2.4     12-01    TPro  4.9<L>  /  Alb  2.8<L>  /  TBili  0.4  /  DBili  x   /  AST  27  /  ALT  34  /  AlkPhos  59  12-01    PT/INR - ( 01 Dec 2019 02:33 )   PT: 15.3 sec;   INR: 1.34          PTT - ( 01 Dec 2019 02:33 )  PTT:52.5 sec        MICROBIOLOGY:  Culture Results:   Few-moderate Klebsiella oxytoca  Accompanied by normal respiratory jazmín (11-25 @ 16:51)      RADIOLOGY & ADDITIONAL STUDIES:

## 2019-12-02 LAB
ALBUMIN SERPL ELPH-MCNC: 3.1 G/DL — LOW (ref 3.3–5)
ALBUMIN SERPL ELPH-MCNC: 3.3 G/DL — SIGNIFICANT CHANGE UP (ref 3.3–5)
ALBUMIN SERPL ELPH-MCNC: 3.4 G/DL — SIGNIFICANT CHANGE UP (ref 3.3–5)
ALP SERPL-CCNC: 56 U/L — SIGNIFICANT CHANGE UP (ref 40–120)
ALP SERPL-CCNC: 59 U/L — SIGNIFICANT CHANGE UP (ref 40–120)
ALP SERPL-CCNC: 61 U/L — SIGNIFICANT CHANGE UP (ref 40–120)
ALT FLD-CCNC: 28 U/L — SIGNIFICANT CHANGE UP (ref 10–45)
ALT FLD-CCNC: 31 U/L — SIGNIFICANT CHANGE UP (ref 10–45)
ALT FLD-CCNC: 33 U/L — SIGNIFICANT CHANGE UP (ref 10–45)
ANION GAP SERPL CALC-SCNC: 11 MMOL/L — SIGNIFICANT CHANGE UP (ref 5–17)
APTT BLD: 25.7 SEC — LOW (ref 27.5–36.3)
APTT BLD: 37.4 SEC — HIGH (ref 27.5–36.3)
APTT BLD: 58.6 SEC — HIGH (ref 27.5–36.3)
AST SERPL-CCNC: 23 U/L — SIGNIFICANT CHANGE UP (ref 10–40)
AST SERPL-CCNC: 24 U/L — SIGNIFICANT CHANGE UP (ref 10–40)
AST SERPL-CCNC: 25 U/L — SIGNIFICANT CHANGE UP (ref 10–40)
BASE EXCESS BLDA CALC-SCNC: 0.8 MMOL/L — SIGNIFICANT CHANGE UP (ref -2–3)
BILIRUB SERPL-MCNC: 0.5 MG/DL — SIGNIFICANT CHANGE UP (ref 0.2–1.2)
BILIRUB SERPL-MCNC: 0.5 MG/DL — SIGNIFICANT CHANGE UP (ref 0.2–1.2)
BILIRUB SERPL-MCNC: 0.6 MG/DL — SIGNIFICANT CHANGE UP (ref 0.2–1.2)
BUN SERPL-MCNC: 33 MG/DL — HIGH (ref 7–23)
BUN SERPL-MCNC: 33 MG/DL — HIGH (ref 7–23)
BUN SERPL-MCNC: 34 MG/DL — HIGH (ref 7–23)
CALCIUM SERPL-MCNC: 8 MG/DL — LOW (ref 8.4–10.5)
CALCIUM SERPL-MCNC: 8.4 MG/DL — SIGNIFICANT CHANGE UP (ref 8.4–10.5)
CALCIUM SERPL-MCNC: 8.5 MG/DL — SIGNIFICANT CHANGE UP (ref 8.4–10.5)
CHLORIDE SERPL-SCNC: 116 MMOL/L — HIGH (ref 96–108)
CHLORIDE SERPL-SCNC: 116 MMOL/L — HIGH (ref 96–108)
CHLORIDE SERPL-SCNC: 118 MMOL/L — HIGH (ref 96–108)
CO2 SERPL-SCNC: 21 MMOL/L — LOW (ref 22–31)
CO2 SERPL-SCNC: 22 MMOL/L — SIGNIFICANT CHANGE UP (ref 22–31)
CO2 SERPL-SCNC: 22 MMOL/L — SIGNIFICANT CHANGE UP (ref 22–31)
CREAT SERPL-MCNC: 1.14 MG/DL — SIGNIFICANT CHANGE UP (ref 0.5–1.3)
CREAT SERPL-MCNC: 1.15 MG/DL — SIGNIFICANT CHANGE UP (ref 0.5–1.3)
CREAT SERPL-MCNC: 1.23 MG/DL — SIGNIFICANT CHANGE UP (ref 0.5–1.3)
GAS PNL BLDA: SIGNIFICANT CHANGE UP
GLUCOSE SERPL-MCNC: 114 MG/DL — HIGH (ref 70–99)
GLUCOSE SERPL-MCNC: 147 MG/DL — HIGH (ref 70–99)
GLUCOSE SERPL-MCNC: 148 MG/DL — HIGH (ref 70–99)
HCO3 BLDA-SCNC: 24 MMOL/L — SIGNIFICANT CHANGE UP (ref 21–28)
HCT VFR BLD CALC: 29.7 % — LOW (ref 39–50)
HCT VFR BLD CALC: 30.7 % — LOW (ref 39–50)
HCT VFR BLD CALC: 30.9 % — LOW (ref 39–50)
HGB BLD-MCNC: 9 G/DL — LOW (ref 13–17)
HGB BLD-MCNC: 9.7 G/DL — LOW (ref 13–17)
HGB BLD-MCNC: 9.7 G/DL — LOW (ref 13–17)
INR BLD: 1.2 — HIGH (ref 0.88–1.16)
INR BLD: 1.23 — HIGH (ref 0.88–1.16)
INR BLD: 1.23 — HIGH (ref 0.88–1.16)
LACTATE SERPL-SCNC: 0.6 MMOL/L — SIGNIFICANT CHANGE UP (ref 0.5–2)
LACTATE SERPL-SCNC: 0.6 MMOL/L — SIGNIFICANT CHANGE UP (ref 0.5–2)
LACTATE SERPL-SCNC: 0.8 MMOL/L — SIGNIFICANT CHANGE UP (ref 0.5–2)
MAGNESIUM SERPL-MCNC: 2.4 MG/DL — SIGNIFICANT CHANGE UP (ref 1.6–2.6)
MAGNESIUM SERPL-MCNC: 2.5 MG/DL — SIGNIFICANT CHANGE UP (ref 1.6–2.6)
MAGNESIUM SERPL-MCNC: 2.6 MG/DL — SIGNIFICANT CHANGE UP (ref 1.6–2.6)
MCHC RBC-ENTMCNC: 27.7 PG — SIGNIFICANT CHANGE UP (ref 27–34)
MCHC RBC-ENTMCNC: 28 PG — SIGNIFICANT CHANGE UP (ref 27–34)
MCHC RBC-ENTMCNC: 28.4 PG — SIGNIFICANT CHANGE UP (ref 27–34)
MCHC RBC-ENTMCNC: 30.3 GM/DL — LOW (ref 32–36)
MCHC RBC-ENTMCNC: 31.4 GM/DL — LOW (ref 32–36)
MCHC RBC-ENTMCNC: 31.6 GM/DL — LOW (ref 32–36)
MCV RBC AUTO: 88.7 FL — SIGNIFICANT CHANGE UP (ref 80–100)
MCV RBC AUTO: 90.4 FL — SIGNIFICANT CHANGE UP (ref 80–100)
MCV RBC AUTO: 91.4 FL — SIGNIFICANT CHANGE UP (ref 80–100)
NRBC # BLD: 0 /100 WBCS — SIGNIFICANT CHANGE UP (ref 0–0)
PCO2 BLDA: 32 MMHG — LOW (ref 35–48)
PH BLDA: 7.49 — HIGH (ref 7.35–7.45)
PHOSPHATE SERPL-MCNC: 2.8 MG/DL — SIGNIFICANT CHANGE UP (ref 2.5–4.5)
PHOSPHATE SERPL-MCNC: 2.8 MG/DL — SIGNIFICANT CHANGE UP (ref 2.5–4.5)
PHOSPHATE SERPL-MCNC: 3.4 MG/DL — SIGNIFICANT CHANGE UP (ref 2.5–4.5)
PLATELET # BLD AUTO: 376 K/UL — SIGNIFICANT CHANGE UP (ref 150–400)
PLATELET # BLD AUTO: 408 K/UL — HIGH (ref 150–400)
PLATELET # BLD AUTO: 460 K/UL — HIGH (ref 150–400)
PO2 BLDA: 76 MMHG — LOW (ref 83–108)
POTASSIUM SERPL-MCNC: 3.4 MMOL/L — LOW (ref 3.5–5.3)
POTASSIUM SERPL-MCNC: 3.6 MMOL/L — SIGNIFICANT CHANGE UP (ref 3.5–5.3)
POTASSIUM SERPL-MCNC: 4.2 MMOL/L — SIGNIFICANT CHANGE UP (ref 3.5–5.3)
POTASSIUM SERPL-SCNC: 3.4 MMOL/L — LOW (ref 3.5–5.3)
POTASSIUM SERPL-SCNC: 3.6 MMOL/L — SIGNIFICANT CHANGE UP (ref 3.5–5.3)
POTASSIUM SERPL-SCNC: 4.2 MMOL/L — SIGNIFICANT CHANGE UP (ref 3.5–5.3)
PROT SERPL-MCNC: 5.4 G/DL — LOW (ref 6–8.3)
PROT SERPL-MCNC: 5.5 G/DL — LOW (ref 6–8.3)
PROT SERPL-MCNC: 5.8 G/DL — LOW (ref 6–8.3)
PROTHROM AB SERPL-ACNC: 13.6 SEC — HIGH (ref 10–12.9)
PROTHROM AB SERPL-ACNC: 14 SEC — HIGH (ref 10–12.9)
PROTHROM AB SERPL-ACNC: 14 SEC — HIGH (ref 10–12.9)
RBC # BLD: 3.25 M/UL — LOW (ref 4.2–5.8)
RBC # BLD: 3.42 M/UL — LOW (ref 4.2–5.8)
RBC # BLD: 3.46 M/UL — LOW (ref 4.2–5.8)
RBC # FLD: 15.9 % — HIGH (ref 10.3–14.5)
RBC # FLD: 16 % — HIGH (ref 10.3–14.5)
RBC # FLD: 16.1 % — HIGH (ref 10.3–14.5)
SAO2 % BLDA: 95 % — SIGNIFICANT CHANGE UP (ref 95–100)
SODIUM SERPL-SCNC: 149 MMOL/L — HIGH (ref 135–145)
SODIUM SERPL-SCNC: 149 MMOL/L — HIGH (ref 135–145)
SODIUM SERPL-SCNC: 150 MMOL/L — HIGH (ref 135–145)
SURGICAL PATHOLOGY STUDY: SIGNIFICANT CHANGE UP
WBC # BLD: 11.98 K/UL — HIGH (ref 3.8–10.5)
WBC # BLD: 12.66 K/UL — HIGH (ref 3.8–10.5)
WBC # BLD: 13.62 K/UL — HIGH (ref 3.8–10.5)
WBC # FLD AUTO: 11.98 K/UL — HIGH (ref 3.8–10.5)
WBC # FLD AUTO: 12.66 K/UL — HIGH (ref 3.8–10.5)
WBC # FLD AUTO: 13.62 K/UL — HIGH (ref 3.8–10.5)

## 2019-12-02 PROCEDURE — 71045 X-RAY EXAM CHEST 1 VIEW: CPT | Mod: 26

## 2019-12-02 PROCEDURE — 71045 X-RAY EXAM CHEST 1 VIEW: CPT | Mod: 26,77

## 2019-12-02 PROCEDURE — 99233 SBSQ HOSP IP/OBS HIGH 50: CPT

## 2019-12-02 PROCEDURE — 32557 INSERT CATH PLEURA W/ IMAGE: CPT

## 2019-12-02 PROCEDURE — 99292 CRITICAL CARE ADDL 30 MIN: CPT | Mod: 25

## 2019-12-02 PROCEDURE — 99291 CRITICAL CARE FIRST HOUR: CPT | Mod: 25

## 2019-12-02 PROCEDURE — 70450 CT HEAD/BRAIN W/O DYE: CPT | Mod: 26

## 2019-12-02 PROCEDURE — 31645 BRNCHSC W/THER ASPIR 1ST: CPT

## 2019-12-02 PROCEDURE — 99223 1ST HOSP IP/OBS HIGH 75: CPT

## 2019-12-02 RX ORDER — AMIODARONE HYDROCHLORIDE 400 MG/1
150 TABLET ORAL ONCE
Refills: 0 | Status: COMPLETED | OUTPATIENT
Start: 2019-12-02 | End: 2019-12-02

## 2019-12-02 RX ORDER — ALBUMIN HUMAN 25 %
50 VIAL (ML) INTRAVENOUS
Refills: 0 | Status: COMPLETED | OUTPATIENT
Start: 2019-12-02 | End: 2019-12-02

## 2019-12-02 RX ORDER — PANTOPRAZOLE SODIUM 20 MG/1
40 TABLET, DELAYED RELEASE ORAL
Refills: 0 | Status: DISCONTINUED | OUTPATIENT
Start: 2019-12-02 | End: 2019-12-03

## 2019-12-02 RX ORDER — MIDODRINE HYDROCHLORIDE 2.5 MG/1
10 TABLET ORAL EVERY 8 HOURS
Refills: 0 | Status: DISCONTINUED | OUTPATIENT
Start: 2019-12-02 | End: 2019-12-02

## 2019-12-02 RX ORDER — POTASSIUM CHLORIDE 20 MEQ
20 PACKET (EA) ORAL
Refills: 0 | Status: COMPLETED | OUTPATIENT
Start: 2019-12-02 | End: 2019-12-02

## 2019-12-02 RX ORDER — ALBUMIN HUMAN 25 %
250 VIAL (ML) INTRAVENOUS ONCE
Refills: 0 | Status: COMPLETED | OUTPATIENT
Start: 2019-12-02 | End: 2019-12-02

## 2019-12-02 RX ORDER — HYDROMORPHONE HYDROCHLORIDE 2 MG/ML
0.5 INJECTION INTRAMUSCULAR; INTRAVENOUS; SUBCUTANEOUS ONCE
Refills: 0 | Status: DISCONTINUED | OUTPATIENT
Start: 2019-12-02 | End: 2019-12-02

## 2019-12-02 RX ORDER — MIDAZOLAM HYDROCHLORIDE 1 MG/ML
4 INJECTION, SOLUTION INTRAMUSCULAR; INTRAVENOUS ONCE
Refills: 0 | Status: DISCONTINUED | OUTPATIENT
Start: 2019-12-02 | End: 2019-12-02

## 2019-12-02 RX ADMIN — AMIODARONE HYDROCHLORIDE 300 MILLIGRAM(S): 400 TABLET ORAL at 15:51

## 2019-12-02 RX ADMIN — Medication 0.5 MILLIGRAM(S): at 05:20

## 2019-12-02 RX ADMIN — CHLORHEXIDINE GLUCONATE 15 MILLILITER(S): 213 SOLUTION TOPICAL at 05:15

## 2019-12-02 RX ADMIN — AMIODARONE HYDROCHLORIDE 400 MILLIGRAM(S): 400 TABLET ORAL at 21:11

## 2019-12-02 RX ADMIN — Medication 50 MILLILITER(S): at 16:39

## 2019-12-02 RX ADMIN — Medication 50 MILLIEQUIVALENT(S): at 07:35

## 2019-12-02 RX ADMIN — Medication 50 MILLIEQUIVALENT(S): at 05:15

## 2019-12-02 RX ADMIN — LIDOCAINE 1 PATCH: 4 CREAM TOPICAL at 15:53

## 2019-12-02 RX ADMIN — CHLORHEXIDINE GLUCONATE 1 APPLICATION(S): 213 SOLUTION TOPICAL at 05:15

## 2019-12-02 RX ADMIN — MIDAZOLAM HYDROCHLORIDE 4 MILLIGRAM(S): 1 INJECTION, SOLUTION INTRAMUSCULAR; INTRAVENOUS at 12:00

## 2019-12-02 RX ADMIN — Medication 1 DROP(S): at 06:00

## 2019-12-02 RX ADMIN — LIDOCAINE 1 PATCH: 4 CREAM TOPICAL at 16:33

## 2019-12-02 RX ADMIN — AMIODARONE HYDROCHLORIDE 300 MILLIGRAM(S): 400 TABLET ORAL at 16:55

## 2019-12-02 RX ADMIN — CEFTRIAXONE 100 MILLIGRAM(S): 500 INJECTION, POWDER, FOR SOLUTION INTRAMUSCULAR; INTRAVENOUS at 15:49

## 2019-12-02 RX ADMIN — Medication 1 DROP(S): at 02:00

## 2019-12-02 RX ADMIN — HYDROMORPHONE HYDROCHLORIDE 0.5 MILLIGRAM(S): 2 INJECTION INTRAMUSCULAR; INTRAVENOUS; SUBCUTANEOUS at 23:00

## 2019-12-02 RX ADMIN — HEPARIN SODIUM 11.5 UNIT(S)/HR: 5000 INJECTION INTRAVENOUS; SUBCUTANEOUS at 18:00

## 2019-12-02 RX ADMIN — Medication 1 DROP(S): at 00:00

## 2019-12-02 RX ADMIN — Medication 125 MILLILITER(S): at 16:45

## 2019-12-02 RX ADMIN — Medication 0.5 MILLIGRAM(S): at 18:54

## 2019-12-02 RX ADMIN — Medication 1 DROP(S): at 04:00

## 2019-12-02 RX ADMIN — AMIODARONE HYDROCHLORIDE 400 MILLIGRAM(S): 400 TABLET ORAL at 15:54

## 2019-12-02 RX ADMIN — Medication 50 MILLIEQUIVALENT(S): at 17:13

## 2019-12-02 RX ADMIN — Medication 81 MILLIGRAM(S): at 15:47

## 2019-12-02 RX ADMIN — Medication 50 MILLIEQUIVALENT(S): at 21:10

## 2019-12-02 RX ADMIN — Medication 50 MILLIEQUIVALENT(S): at 19:48

## 2019-12-02 RX ADMIN — Medication 50 MILLIEQUIVALENT(S): at 04:15

## 2019-12-02 RX ADMIN — HYDROMORPHONE HYDROCHLORIDE 0.5 MILLIGRAM(S): 2 INJECTION INTRAMUSCULAR; INTRAVENOUS; SUBCUTANEOUS at 22:44

## 2019-12-02 RX ADMIN — AMIODARONE HYDROCHLORIDE 400 MILLIGRAM(S): 400 TABLET ORAL at 05:23

## 2019-12-02 NOTE — PROGRESS NOTE ADULT - SUBJECTIVE AND OBJECTIVE BOX
CTICU  CRITICAL  CARE  attending     Hand off received 					   Pertinent clinical, laboratory, radiographic, hemodynamic, echocardiographic, respiratory data, microbiologic data and chart were reviewed and analyzed frequently throughout the course of the day and night      69 years old male with H/O colitis. He started having pain in the anterior cervical area around  1:00 AM. Simultaneously he had lower back pain. He felt dizzy and diaphoretic. He felt weakness in both lower extremities. No loss of consciousness.  He was brought in to [facility] emergency room by an ambulance. His BP was 60 by palpation and appeared cyanotic.   CT angio  showed   1. 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  2. Moderate hemopericardium.  3. Dissection extending into the right brachiocephalic artery though the right carotid artery and right subclavian artery remain patent off true lumen.  4. Dissection involving the proximal left subclavian artery which remains patent more distally.  5. The left carotid artery comes off the true lumen of the arch    He was emergently taken to the operating room and cardiopulmonary bypass instituted. Blood evacuated from the pericardium.  Ascending aorta and aortic root were replaced and coronary buttons were reimplanted.      FAMILY HISTORY:  PAST MEDICAL & SURGICAL HISTORY:  Benign prostatic hypertrophy without lower urinary tract symptoms: BPH (benign prostatic hypertrophy)  Ulcerative colitis: Ulcerative colitis  States following surgery of eye and adnexa: straightened right eye  Other postprocedural status: History of hernia repair  Hemorrhoids: Hemorrhoids  Acute appendicitis with generalized peritonitis: Ruptured appendix    Patient is a 69y old  Male who presents with acute Type A Aortic dissection   S/P Repair.      14 system review was unremarkable    Vital signs, hemodynamic and respiratory parameters were reviewed from the bedside nursing flow sheet.  ICU Vital Signs Last 24 Hrs  T(C): 36.7 (02 Dec 2019 18:12), Max: 37.6 (02 Dec 2019 05:01)  T(F): 98 (02 Dec 2019 18:12), Max: 99.7 (02 Dec 2019 05:01)  HR: 112 (02 Dec 2019 22:00) (72 - 114)  BP: 140/72 (02 Dec 2019 11:00) (140/72 - 149/74)  BP(mean): 102 (02 Dec 2019 11:00) (102 - 107)  ABP: 104/68 (02 Dec 2019 22:00) (94/88 - 150/76)  ABP(mean): 82 (02 Dec 2019 22:00) (74 - 108)  RR: 18 (02 Dec 2019 22:00) (14 - 23)  SpO2: 97% (02 Dec 2019 22:00) (89% - 98%)    Adult Advanced Hemodynamics Last 24 Hrs  CVP(mm Hg): --  CVP(cm H2O): --  CO: --  CI: --  PA: --  PA(mean): --  PCWP: --  SVR: --  SVRI: --  PVR: --  PVRI: --, ABG - ( 02 Dec 2019 16:27 )  pH, Arterial: 7.43  pH, Blood: x     /  pCO2: 29    /  pO2: 87    / HCO3: 19    / Base Excess: -4.4  /  SaO2: 95                  Intake and output was reviewed and the fluid balance was calculated  Daily     Daily   I&O's Summary    01 Dec 2019 07:01  -  02 Dec 2019 07:00  --------------------------------------------------------  IN: 2407.5 mL / OUT: 3970 mL / NET: -1562.5 mL    02 Dec 2019 07:01  -  02 Dec 2019 22:34  --------------------------------------------------------  IN: 1007.5 mL / OUT: 1805 mL / NET: -797.5 mL        All lines and drain sites were assessed    Neuro: Moves all 4 extremities. Pupils 2 mm (Reactive). Able to speak few words. Vocal cords moving well on laryngoscopy.  Neck: No JVD.  CVS: S1, S1, No S3.  Lungs: Good air entry bilaterally.  Abd: Soft. No tenderness. + Bowel sounds.  Vascular: + DP/PT.  Extremities: No edema.  Lymphatic: Normal.  Skin: No abnormalities.      labs  CBC Full  -  ( 02 Dec 2019 16:29 )  WBC Count : 11.98 K/uL  RBC Count : 3.25 M/uL  Hemoglobin : 9.0 g/dL  Hematocrit : 29.7 %  Platelet Count - Automated : 376 K/uL  Mean Cell Volume : 91.4 fl  Mean Cell Hemoglobin : 27.7 pg  Mean Cell Hemoglobin Concentration : 30.3 gm/dL  Auto Neutrophil # : x  Auto Lymphocyte # : x  Auto Monocyte # : x  Auto Eosinophil # : x  Auto Basophil # : x  Auto Neutrophil % : x  Auto Lymphocyte % : x  Auto Monocyte % : x  Auto Eosinophil % : x  Auto Basophil % : x    12-02    150<H>  |  118<H>  |  33<H>  ----------------------------<  147<H>  3.4<L>   |  21<L>  |  1.15    Ca    8.0<L>      02 Dec 2019 16:29  Phos  2.8     12-02  Mg     2.4     12-02    TPro  5.4<L>  /  Alb  3.1<L>  /  TBili  0.5  /  DBili  x   /  AST  23  /  ALT  28  /  AlkPhos  56  12-02    PT/INR - ( 02 Dec 2019 16:29 )   PT: 14.0 sec;   INR: 1.23          PTT - ( 02 Dec 2019 16:29 )  PTT:25.7 sec  The current medications were reviewed   MEDICATIONS  (STANDING):  aMIOdarone    Tablet   Oral   aMIOdarone    Tablet 400 milliGRAM(s) Oral every 8 hours  aspirin enteric coated 81 milliGRAM(s) Oral daily  buDESOnide    Inhalation Suspension 0.5 milliGRAM(s) Inhalation every 12 hours  cefTRIAXone   IVPB 2000 milliGRAM(s) IV Intermittent every 24 hours  chlorhexidine 2% Cloths 1 Application(s) Topical daily  dextrose 50% Injectable 50 milliLiter(s) IV Push every 15 minutes  heparin  Infusion 1150 Unit(s)/Hr (11.5 mL/Hr) IV Continuous <Continuous>  HYDROmorphone  Injectable 0.5 milliGRAM(s) IV Push once  lidocaine   Patch 1 Patch Transdermal daily  pantoprazole    Tablet 40 milliGRAM(s) Oral before breakfast  sodium chloride 0.9%. 1000 milliLiter(s) (10 mL/Hr) IV Continuous <Continuous>  tamsulosin 0.4 milliGRAM(s) Oral at bedtime    MEDICATIONS  (PRN):  bisacodyl Suppository 10 milliGRAM(s) Rectal daily PRN Constipation            Patient is a 69y old  Male who presents with cardiogenic shock due to acute Type A aortic dissection with rupture and cardiac tamponade.  S/P Aortic Root Replacement  S/P BioBental and CABROL.  S/P replacement of ascending aorta and hemiarch.  Post operative course complicated by  renal insufficiency, Hematuria,  hypokalemia,  hypernatremia, klebsiella pneumonia, UE thrombosis, expressive aphasia.  CT head negative for acute infarct or bleed.    ACTIVE  ISSUES  Expressive aphasia  DVT  Hypernatremia  Hypokalemic metabolic acidosis.  Hemodynamically stable.  Fair  oxygenation.  Good urine out put.  Overall doing well.      My plan includes :  Speech and swallow evaluation.  Close hemodynamic, ventilatory and drain monitoring and management  Monitor for arrhythmias and monitor parameters for organ perfusion  Monitor neurologic status  Monitor renal function.  Head of the bed should remain elevated to 45 deg .   Chest PT and IS will be encouraged  Monitor adequacy of oxygenation and ventilation and attempt to wean oxygen  Nutritional goals will be met using po eventually , ensure adequate caloric intake and monitor the same  Stress ulcer and VTE prophylaxis will be achieved    Glycemic control is satisfactory  Electrolytes have been repleted as necessary and wound care has been carried out. Pain control has been achieved.   Aggressive physical therapy and early mobility and ambulation goals will be met   The family was updated about the course and plan  CRITICAL CARE TIME SPENT in evaluation and management, reassessments, review and interpretation of labs and x-rays, ventilator and hemodynamic management, formulating a plan and coordinating care: ___90____ MIN.  Time does not include procedural time.  CTICU ATTENDING     					    Giovanni Reis MD

## 2019-12-02 NOTE — PROGRESS NOTE ADULT - SUBJECTIVE AND OBJECTIVE BOX
CTICU  CRITICAL  CARE  attending     Hand off received 					   Pertinent clinical, laboratory, radiographic, hemodynamic, echocardiographic, respiratory data, microbiologic data and chart were reviewed and analyzed frequently throughout the course of the day and night  Patient seen and examined with CTS/ SH attending at bedside  Pt is a 69y , Male, HEALTH ISSUES - PROBLEM Dx:  Bacterial pneumonia: Bacterial pneumonia  Fever, postprocedural: Fever, postprocedural  FELY (acute kidney injury): FELY (acute kidney injury)      , FAMILY HISTORY:  PAST MEDICAL & SURGICAL HISTORY:  Benign prostatic hypertrophy without lower urinary tract symptoms: BPH (benign prostatic hypertrophy)  Ulcerative colitis: Ulcerative colitis  States following surgery of eye and adnexa: straightened right eye  Other postprocedural status: History of hernia repair  Hemorrhoids: Hemorrhoids  Acute appendicitis with generalized peritonitis: Ruptured appendix    Patient is a 69y old  Male who presents with a chief complaint of Aortic dissection (02 Dec 2019 18:22)      14 system review was unremarkable    Vital signs, hemodynamic and respiratory parameters were reviewed from the bedside nursing flowsheet.  ICU Vital Signs Last 24 Hrs  T(C): 36.7 (02 Dec 2019 18:12), Max: 37.6 (02 Dec 2019 05:01)  T(F): 98 (02 Dec 2019 18:12), Max: 99.7 (02 Dec 2019 05:01)  HR: 114 (02 Dec 2019 21:52) (72 - 114)  BP: 140/72 (02 Dec 2019 11:00) (140/72 - 149/74)  BP(mean): 102 (02 Dec 2019 11:00) (102 - 107)  ABP: 106/70 (02 Dec 2019 20:00) (94/88 - 150/76)  ABP(mean): 82 (02 Dec 2019 20:00) (74 - 108)  RR: 22 (02 Dec 2019 21:52) (14 - 23)  SpO2: 97% (02 Dec 2019 21:52) (89% - 98%)    Adult Advanced Hemodynamics Last 24 Hrs  CVP(mm Hg): --  CVP(cm H2O): --  CO: --  CI: --  PA: --  PA(mean): --  PCWP: --  SVR: --  SVRI: --  PVR: --  PVRI: --, ABG - ( 02 Dec 2019 16:27 )  pH, Arterial: 7.43  pH, Blood: x     /  pCO2: 29    /  pO2: 87    / HCO3: 19    / Base Excess: -4.4  /  SaO2: 95                  Intake and output was reviewed and the fluid balance was calculated  Daily     Daily   I&O's Summary    01 Dec 2019 07:01  -  02 Dec 2019 07:00  --------------------------------------------------------  IN: 2407.5 mL / OUT: 3970 mL / NET: -1562.5 mL    02 Dec 2019 07:01  -  02 Dec 2019 22:27  --------------------------------------------------------  IN: 1007.5 mL / OUT: 1805 mL / NET: -797.5 mL        All lines and drain sites were assessed  [facility]      POCT Blood Glucose.: 114 mg/dL (01 Dec 2019 18:22)    No acute change in mental status  Auscultation of the chest reveals equal bs  Abdomen is soft  Extremities are warm and well perfused  Wounds appear clean and unremarkable  Antibiotics are periop    labs  CBC Full  -  ( 02 Dec 2019 16:29 )  WBC Count : 11.98 K/uL  RBC Count : 3.25 M/uL  Hemoglobin : 9.0 g/dL  Hematocrit : 29.7 %  Platelet Count - Automated : 376 K/uL  Mean Cell Volume : 91.4 fl  Mean Cell Hemoglobin : 27.7 pg  Mean Cell Hemoglobin Concentration : 30.3 gm/dL  Auto Neutrophil # : x  Auto Lymphocyte # : x  Auto Monocyte # : x  Auto Eosinophil # : x  Auto Basophil # : x  Auto Neutrophil % : x  Auto Lymphocyte % : x  Auto Monocyte % : x  Auto Eosinophil % : x  Auto Basophil % : x    12-02    150<H>  |  118<H>  |  33<H>  ----------------------------<  147<H>  3.4<L>   |  21<L>  |  1.15    Ca    8.0<L>      02 Dec 2019 16:29  Phos  2.8     12-02  Mg     2.4     12-02    TPro  5.4<L>  /  Alb  3.1<L>  /  TBili  0.5  /  DBili  x   /  AST  23  /  ALT  28  /  AlkPhos  56  12-02    PT/INR - ( 02 Dec 2019 16:29 )   PT: 14.0 sec;   INR: 1.23          PTT - ( 02 Dec 2019 16:29 )  PTT:25.7 sec  The current medications were reviewed   MEDICATIONS  (STANDING):  aMIOdarone    Tablet   Oral   aMIOdarone    Tablet 400 milliGRAM(s) Oral every 8 hours  aspirin enteric coated 81 milliGRAM(s) Oral daily  buDESOnide    Inhalation Suspension 0.5 milliGRAM(s) Inhalation every 12 hours  cefTRIAXone   IVPB 2000 milliGRAM(s) IV Intermittent every 24 hours  chlorhexidine 2% Cloths 1 Application(s) Topical daily  dextrose 50% Injectable 50 milliLiter(s) IV Push every 15 minutes  heparin  Infusion 1150 Unit(s)/Hr (11.5 mL/Hr) IV Continuous <Continuous>  lidocaine   Patch 1 Patch Transdermal daily  pantoprazole    Tablet 40 milliGRAM(s) Oral before breakfast  sodium chloride 0.9%. 1000 milliLiter(s) (10 mL/Hr) IV Continuous <Continuous>  tamsulosin 0.4 milliGRAM(s) Oral at bedtime    MEDICATIONS  (PRN):  bisacodyl Suppository 10 milliGRAM(s) Rectal daily PRN Constipation       PROBLEM LIST/ ASSESSMENT:  HEALTH ISSUES - PROBLEM Dx:  Bacterial pneumonia: Bacterial pneumonia  Fever, postprocedural: Fever, postprocedural  FELY (acute kidney injury): FELY (acute kidney injury)      ,   Patient is a 69y old  Male who presents with a chief complaint of Aortic dissection (02 Dec 2019 18:22)     s/p cardiac surgery              My plan includes :  close hemodynamic, ventilatory and drain monitoring and management per post op routine    Monitor for arrhythmias and monitor parameters for organ perfusion  beta blockade not administered due to hemodynamic instability and bradycardia  monitor neurologic status  Head of the bed should remain elevated to 45 deg .   chest PT and IS will be encouraged  monitor adequacy of oxygenation and ventilation and attempt to wean oxygen  antibiotic regimen will be tailored to the clinical, laboratory and microbiologic data  Nutritional goals will be met using po eventually , ensure adequate caloric intake and montior the same  Stress ulcer and VTE prophylaxis will be achieved    Glycemic control is satisfactory  Electrolytes have been repleted as necessary and wound care has been carried out. Pain control has been achieved.   agressive physical therapy and early mobility and ambulation goals will be met   The family was updated about the course and plan  CRITICAL CARE TIME personally provided by me  in evaluation and management, reassessments, review and interpretation of labs and x-rays, ventilator and hemodynamic management, formulating a plan and coordinating care: ___90____ MIN.  Time does not include procedural time.  CTICU ATTENDING     					    Jaret Hebert MD

## 2019-12-02 NOTE — PROGRESS NOTE ADULT - ASSESSMENT
A/p  70 y/o M with PMHx of Ulcerative colitis BIBA to [facility] ED complaining of back pain and profound hypotension, emergently brought to the OR for salvage Type A dissection repair, s/p aortic arch repair and ascending arch replacement.   Nephrology consulted for FELY.

## 2019-12-02 NOTE — CONSULT NOTE ADULT - SUBJECTIVE AND OBJECTIVE BOX
ENT Consult Note     HPI: 69y Male w of type A aortic dissection s/p aortic arch repair and ascending arch replacement on 11/23. Patient was intubated at that time, extubated on 12/1. Since then patient has not phonated. Per primary team they have only heard him say two words. Barely opens mouth and will respond to questions via writing or nodding. Patient was bronched today, bilateral TVF appeared mobile, but requested ENT evaluation and to rule out upper airway trauma during bronch. Patient not cooperative with my history, only nods yes and no. No known issues with voice preoperatively.     Allergies    penicillin (Unknown)    Intolerances        PAST MEDICAL & SURGICAL HISTORY:  Benign prostatic hypertrophy without lower urinary tract symptoms: BPH (benign prostatic hypertrophy)  Ulcerative colitis: Ulcerative colitis  States following surgery of eye and adnexa: straightened right eye  Other postprocedural status: History of hernia repair  Hemorrhoids: Hemorrhoids  Acute appendicitis with generalized peritonitis: Ruptured appendix    ROS as above     MEDICATIONS:  Antiinfectives:   cefTRIAXone   IVPB 2000 milliGRAM(s) IV Intermittent every 24 hours      Hematologic/Anticoagulation:  aspirin enteric coated 81 milliGRAM(s) Oral daily  heparin  Infusion 1150 Unit(s)/Hr IV Continuous <Continuous>      Pain medications/Neuro:      IV fluids:  potassium chloride  20 mEq/100 mL IVPB 20 milliEquivalent(s) IV Intermittent every 2 hours  sodium chloride 0.9%. 1000 milliLiter(s) IV Continuous <Continuous>      Endocrine Medications:   dextrose 50% Injectable 50 milliLiter(s) IV Push every 15 minutes      All other standing medications:   aMIOdarone    Tablet   Oral   aMIOdarone    Tablet 400 milliGRAM(s) Oral every 8 hours  buDESOnide    Inhalation Suspension 0.5 milliGRAM(s) Inhalation every 12 hours  chlorhexidine 2% Cloths 1 Application(s) Topical daily  lidocaine   Patch 1 Patch Transdermal daily  pantoprazole    Tablet 40 milliGRAM(s) Oral before breakfast  tamsulosin 0.4 milliGRAM(s) Oral at bedtime      All other PRN medications:  bisacodyl Suppository 10 milliGRAM(s) Rectal daily PRN      Vital Signs Last 24 Hrs  T(C): 36.7 (02 Dec 2019 18:12), Max: 37.6 (02 Dec 2019 05:01)  T(F): 98 (02 Dec 2019 18:12), Max: 99.7 (02 Dec 2019 05:01)  HR: 112 (02 Dec 2019 18:00) (72 - 114)  BP: 140/72 (02 Dec 2019 11:00) (140/72 - 149/74)  BP(mean): 102 (02 Dec 2019 11:00) (102 - 107)  RR: 20 (02 Dec 2019 18:00) (14 - 23)  SpO2: 96% (02 Dec 2019 17:00) (89% - 98%)    LABS:                        9.0    11.98 )-----------( 376      ( 02 Dec 2019 16:29 )             29.7       12-02    150<H>  |  118<H>  |  33<H>  ----------------------------<  147<H>  3.4<L>   |  21<L>  |  1.15    Ca    8.0<L>      02 Dec 2019 16:29  Phos  2.8     12-02  Mg     2.4     12-02    TPro  5.4<L>  /  Alb  3.1<L>  /  TBili  0.5  /  DBili  x   /  AST  23  /  ALT  28  /  AlkPhos  56  12-02    Coagulation Studies-  PT/INR - ( 02 Dec 2019 16:29 )   PT: 14.0 sec;   INR: 1.23          PTT - ( 02 Dec 2019 16:29 )  PTT:25.7 sec  Endocrine Panel-  --  --  8.0 mg/dL  --  --  8.5 mg/dL  --  --  8.4 mg/dL  --  --  9.0 mg/dL  --  --  8.3 mg/dL  --  --  8.3 mg/dL  --  --  8.5 mg/dL        PHYSICAL EXAM:  NAD, awake and alert.   Responds/nods yes or no to questions, will not electively phonate. Strong phonation with bothersome maneuvers such as opening mouth or scope exam   Head:  normocephalic, atraumatic.   OC/OP:  Tonsils present. Floor of mouth, buccal mucosa, lips, hard palate, soft palate, uvula, posterior pharyngeal wall normal.  Mucosa moist.  Neck:  Trachea midline.  Thyroid, parotid and submandibular glands normal.  Lymph:  No cervical adenopathy.    Laryngoscopy Findings:     -Verbal consent was obtained from patient prior to procedure.    Indication: aphonia     Anesthesia: Afrin spray was applied to the nasal cavities.    Flexible laryngoscopy was performed and revealed the following:    -- Nasopharynx had no mass or exudate.    -- Base of tongue was symmetric and not enlarged.    -- Vallecula was clear    -- Epiglottis, both aryepiglottic folds and both false vocal folds were normal    -- Arytenoids both without edema and erythema     -- True vocal folds were fully mobile and without lesions.     -- Post cricoid area was clear.    -- Interarytenoid edema was absent    The patient tolerated the procedure well.      Assessment/Plan:  69y Male s/p aortic arch repair and ascending arch replacement on 11/23, extubated on 12/1, with aphonia. Patient is able to phonate when provoked, aphonia is effort related. FFL wnl, bilateral TVF mobile and no trauma on exam.   - No acute ENT intervention at this time  - Care per primary team     [facility] at [phone] with any questions/concerns.

## 2019-12-02 NOTE — PROGRESS NOTE ADULT - SUBJECTIVE AND OBJECTIVE BOX
O/N Events: SAFIA  Subjective:  OOB in the chair, on HFNC, feels good, denies any acute complaints, FELY improved, UOP ~ 4 L for the past 24 hours, received 60 mg IV lasix   CXR with worsening of pleural effusion R>L    VITALS  Vital Signs Last 24 Hrs  T(C): 37.2 (02 Dec 2019 09:00), Max: 37.6 (02 Dec 2019 05:01)  T(F): 99 (02 Dec 2019 09:00), Max: 99.7 (02 Dec 2019 05:01)  HR: 76 (02 Dec 2019 14:00) (72 - 98)  BP: 140/72 (02 Dec 2019 11:00) (140/72 - 149/74)  BP(mean): 102 (02 Dec 2019 11:00) (102 - 107)  RR: 21 (02 Dec 2019 14:00) (16 - 23)  SpO2: 96% (02 Dec 2019 14:00) (89% - 99%)    PHYSICAL EXAM  General: alert and awake, sitting OOB in the chair  Cardiac: S1, S2. RRR. No murmurs   Respiratory: CTAB/l  Abdomen: soft, NTND  Extremities: no LE edema b/l, UE edema improving   Neuro: AO x3, non focal    MEDICATIONS  (STANDING):  aMIOdarone    Tablet   Oral   aMIOdarone    Tablet 400 milliGRAM(s) Oral every 8 hours  aspirin enteric coated 81 milliGRAM(s) Oral daily  buDESOnide    Inhalation Suspension 0.5 milliGRAM(s) Inhalation every 12 hours  cefTRIAXone   IVPB 2000 milliGRAM(s) IV Intermittent every 24 hours  chlorhexidine 2% Cloths 1 Application(s) Topical daily  dextrose 50% Injectable 50 milliLiter(s) IV Push every 15 minutes  heparin  Infusion 1150 Unit(s)/Hr (11.5 mL/Hr) IV Continuous <Continuous>  lidocaine   Patch 1 Patch Transdermal daily  pantoprazole    Tablet 40 milliGRAM(s) Oral before breakfast  sodium chloride 0.9%. 1000 milliLiter(s) (10 mL/Hr) IV Continuous <Continuous>  tamsulosin 0.4 milliGRAM(s) Oral at bedtime    MEDICATIONS  (PRN):  bisacodyl Suppository 10 milliGRAM(s) Rectal daily PRN Constipation      LABS                        9.7    12.66 )-----------( 460      ( 02 Dec 2019 08:53 )             30.7     12-02    149<H>  |  116<H>  |  34<H>  ----------------------------<  114<H>  4.2   |  22  |  1.14    Ca    8.5      02 Dec 2019 08:53  Phos  2.8     12-02  Mg     2.6     12-02    TPro  5.8<L>  /  Alb  3.3  /  TBili  0.6  /  DBili  x   /  AST  25  /  ALT  31  /  AlkPhos  61  12-02    LIVER FUNCTIONS - ( 02 Dec 2019 08:53 )  Alb: 3.3 g/dL / Pro: 5.8 g/dL / ALK PHOS: 61 U/L / ALT: 31 U/L / AST: 25 U/L / GGT: x           PT/INR - ( 02 Dec 2019 08:53 )   PT: 13.6 sec;   INR: 1.20          PTT - ( 02 Dec 2019 08:53 )  PTT:37.4 sec

## 2019-12-02 NOTE — PROGRESS NOTE ADULT - SUBJECTIVE AND OBJECTIVE BOX
CTICU  CRITICAL  CARE  PROCEDURE  NOTE      				     Name of procedure – Flexible fiberoptic bronchoscopy    Indication –   Respiratory failure    Flexible fiberoptic bronchoscopy was performed   Pre-procedural assessment reveals no risk of Tb    versed and precedex  The lorie was sharp  Right LL and RML air way were patent , mucopur thick secretions  Lavaged and sent for culture  Left LL air way  with copious purulent secretions, lavaged, and sent for culture  CXR confirmed no pneumothorax post bronch  There were no complications  The patient tolerated the procedure well

## 2019-12-02 NOTE — CONSULT NOTE ADULT - SUBJECTIVE AND OBJECTIVE BOX
Neurology Stroke Consult Note        69 year old male, with PMHx of colitis, prostate surgery. Patient BIBA to ED [facility] on 11/23 complaining of back pain and profound hypotension. Wife of the patient states that he began complaining of left sided neck pain radiating to his back with bilateral weakness and inability to get out of bed. Upon arriving to the ED patient was profoundly hypotensive SBP 40s and cyanotic. He was emergently rushed to  CT scan which confirmed Type A aortic dissection. He was intubated for hypoxia with cyanosis and emergently brought to the OR for salvage Type A dissection repair.   Pt now s/p aortic arch repair and ascending arch replacement, Cabrol reconstruction of coronary ostia, replacement of ascending aorta/maynor arch, and frozen elephant trunk. Nephrology consulted for FELY. Patient was extubated 12/1 unable to speak.     HPI:     PAST MEDICAL & SURGICAL HISTORY:  Benign prostatic hypertrophy without lower urinary tract symptoms: BPH (benign prostatic hypertrophy)  Ulcerative colitis: Ulcerative colitis  States following surgery of eye and adnexa: straightened right eye  Other postprocedural status: History of hernia repair  Hemorrhoids: Hemorrhoids  Acute appendicitis with generalized peritonitis: Ruptured appendix      FAMILY HISTORY:      SOCIAL HISTORY:  Denies smoking, drinking, or drug use    ROS:  Constitutional: No fever, weight loss or fatigue  Eyes: No eye pain, visual disturbances, or discharge  ENMT:  No difficulty hearing, tinnitus, vertigo;  No sinus or throat pain  Neck: No pain or stiffness  Respiratory: No cough, wheezing, chills or hemoptysis  Cardiovascular: No chest pain, palpitations, shortness of breath, dizziness or leg swelling  Gastrointestinal: No abdominal pain. No nausea, vomiting or hematemesis; No diarrhea or constipation. Nohematochezia.  Genitourinary: No dysuria, frequency, hematuria or incontinence  Neurological: As per HPI  Skin: No itching, burning, rashes or lesions   Endocrine: No heat or cold intolerance; No hair loss  Musculoskeletal: No joint pain or swelling; No muscle, back or extremity pain  Psychiatric: No depression, anxiety, mood swings or difficulty sleeping  Heme/Lymph: No easy bruising or bleeding gums    MEDICATIONS  (STANDING):  albumin human 25% IVPB 50 milliLiter(s) IV Intermittent every 10 minutes  aMIOdarone    Tablet   Oral   aMIOdarone    Tablet 400 milliGRAM(s) Oral every 8 hours  aspirin enteric coated 81 milliGRAM(s) Oral daily  buDESOnide    Inhalation Suspension 0.5 milliGRAM(s) Inhalation every 12 hours  cefTRIAXone   IVPB 2000 milliGRAM(s) IV Intermittent every 24 hours  chlorhexidine 2% Cloths 1 Application(s) Topical daily  dextrose 50% Injectable 50 milliLiter(s) IV Push every 15 minutes  heparin  Infusion 1150 Unit(s)/Hr (11.5 mL/Hr) IV Continuous <Continuous>  lidocaine   Patch 1 Patch Transdermal daily  pantoprazole    Tablet 40 milliGRAM(s) Oral before breakfast  sodium chloride 0.9%. 1000 milliLiter(s) (10 mL/Hr) IV Continuous <Continuous>  tamsulosin 0.4 milliGRAM(s) Oral at bedtime    MEDICATIONS  (PRN):  bisacodyl Suppository 10 milliGRAM(s) Rectal daily PRN Constipation      Allergies    penicillin (Unknown)    Intolerances        Vital Signs Last 24 Hrs  T(C): 37.2 (02 Dec 2019 14:00), Max: 37.6 (02 Dec 2019 05:01)  T(F): 98.9 (02 Dec 2019 14:00), Max: 99.7 (02 Dec 2019 05:01)  HR: 114 (02 Dec 2019 16:00) (72 - 114)  BP: 140/72 (02 Dec 2019 11:00) (140/72 - 149/74)  BP(mean): 102 (02 Dec 2019 11:00) (102 - 107)  RR: 14 (02 Dec 2019 16:00) (14 - 23)  SpO2: 94% (02 Dec 2019 16:00) (89% - 98%)    Physical exam:  General: No acute distress, awake and alert  Cardiovascular: Regular rate and rhythm, no murmurs, rubs, or gallops. No bruits  Pulmonary: Anterior breath sounds clear bilaterally, no crackles or wheezing. No use of accessory muscles  Extremities: Radial and DP pulses +2, no edema    Neurologic:  -Mental status: Awake, alert, oriented to person, place, and time. Speech is fluent with intact naming, repetition, and comprehension, no dysarthria. Recent and remote memory intact. Follows commands. Attention/concentration intact. Fund of knowledge appropriate.  -Cranial nerves:   II: Visual fields are full to confrontation.  III, IV, VI: Extraocular movements are intact without nystagmus. Pupils equally round and reactive to light  V:  Facial sensation V1-V3 equal and intact   VII: Face is symmetric with normal eye closure and smile  VIII: Hearing is bilaterally intact to finger rub  IX, X: Uvula is midline and soft palate rises symmetrically  XI: Head turning and shoulder shrug are intact.  XII: Tongue protrudes midline  Motor: Normal bulk and tone. No pronator drift. Strength bilateral upper extremity 5/5, bilateral lower extremities 5/5.  Rapid alternating movements intact and symmetric  Sensation: Intact to light touch bilaterally. No neglect or extinction on double simultaneous testing.  Coordination: No dysmetria on finger-to-nose and heel-to-shin bilaterally  Reflexes: Downgoing toes bilaterally   Gait: Narrow gait and steady    NIHSS: ***    LABS:                        9.7    12.66 )-----------( 460      ( 02 Dec 2019 08:53 )             30.7     12-02    149<H>  |  116<H>  |  34<H>  ----------------------------<  114<H>  4.2   |  22  |  1.14    Ca    8.5      02 Dec 2019 08:53  Phos  2.8     12-02  Mg     2.6     12-02    TPro  5.8<L>  /  Alb  3.3  /  TBili  0.6  /  DBili  x   /  AST  25  /  ALT  31  /  AlkPhos  61  12-02    PT/INR - ( 02 Dec 2019 08:53 )   PT: 13.6 sec;   INR: 1.20          PTT - ( 02 Dec 2019 08:53 )  PTT:37.4 sec      RADIOLOGY & ADDITIONAL TESTS:      Assessment and Plan  69y Male    Saira Mcmahon PA-C  Stroke Neurology   [phone] Neurology Stroke Consult Note        69 year old male, with PMHx of colitis, prostate surgery. Patient BIBA to ED [facility] on 11/23 complaining of back pain and profound hypotension. Wife of the patient states that he began complaining of left sided neck pain radiating to his back with bilateral weakness and inability to get out of bed. Upon arriving to the ED patient was profoundly hypotensive SBP 40s and cyanotic. He was emergently rushed to  CT scan which confirmed Type A aortic dissection. He was intubated for hypoxia with cyanosis and emergently brought to the OR for salvage Type A dissection repair.   Pt now s/p aortic arch repair and ascending arch replacement, Cabrol reconstruction of coronary ostia, replacement of ascending aorta/maynor arch, and frozen elephant trunk. Nephrology consulted for FELY. Patient was extubated 12/1 unable to speak. Complicated by pleural effusion, aspiration pneumonia , cepahlic vrin ENNT was consulted Patient was extubated 12/1 unable to speak head ct patient seen today     HPI:     PAST MEDICAL & SURGICAL HISTORY:  Benign prostatic hypertrophy without lower urinary tract symptoms: BPH (benign prostatic hypertrophy)  Ulcerative colitis: Ulcerative colitis  States following surgery of eye and adnexa: straightened right eye  Other postprocedural status: History of hernia repair  Hemorrhoids: Hemorrhoids  Acute appendicitis with generalized peritonitis: Ruptured appendix      FAMILY HISTORY:      SOCIAL HISTORY:  Denies smoking, drinking, or drug use    ROS:  Constitutional: No fever, weight loss or fatigue  Eyes: No eye pain, visual disturbances, or discharge  ENMT:  No difficulty hearing, tinnitus, vertigo;  No sinus or throat pain  Neck: No pain or stiffness  Respiratory: No cough, wheezing, chills or hemoptysis  Cardiovascular: No chest pain, palpitations, shortness of breath, dizziness or leg swelling  Gastrointestinal: No abdominal pain. No nausea, vomiting or hematemesis; No diarrhea or constipation. Nohematochezia.  Genitourinary: No dysuria, frequency, hematuria or incontinence  Neurological: As per HPI  Skin: No itching, burning, rashes or lesions   Endocrine: No heat or cold intolerance; No hair loss  Musculoskeletal: No joint pain or swelling; No muscle, back or extremity pain  Psychiatric: No depression, anxiety, mood swings or difficulty sleeping  Heme/Lymph: No easy bruising or bleeding gums    MEDICATIONS  (STANDING):  albumin human 25% IVPB 50 milliLiter(s) IV Intermittent every 10 minutes  aMIOdarone    Tablet   Oral   aMIOdarone    Tablet 400 milliGRAM(s) Oral every 8 hours  aspirin enteric coated 81 milliGRAM(s) Oral daily  buDESOnide    Inhalation Suspension 0.5 milliGRAM(s) Inhalation every 12 hours  cefTRIAXone   IVPB 2000 milliGRAM(s) IV Intermittent every 24 hours  chlorhexidine 2% Cloths 1 Application(s) Topical daily  dextrose 50% Injectable 50 milliLiter(s) IV Push every 15 minutes  heparin  Infusion 1150 Unit(s)/Hr (11.5 mL/Hr) IV Continuous <Continuous>  lidocaine   Patch 1 Patch Transdermal daily  pantoprazole    Tablet 40 milliGRAM(s) Oral before breakfast  sodium chloride 0.9%. 1000 milliLiter(s) (10 mL/Hr) IV Continuous <Continuous>  tamsulosin 0.4 milliGRAM(s) Oral at bedtime    MEDICATIONS  (PRN):  bisacodyl Suppository 10 milliGRAM(s) Rectal daily PRN Constipation      Allergies    penicillin (Unknown)    Intolerances        Vital Signs Last 24 Hrs  T(C): 37.2 (02 Dec 2019 14:00), Max: 37.6 (02 Dec 2019 05:01)  T(F): 98.9 (02 Dec 2019 14:00), Max: 99.7 (02 Dec 2019 05:01)  HR: 114 (02 Dec 2019 16:00) (72 - 114)  BP: 140/72 (02 Dec 2019 11:00) (140/72 - 149/74)  BP(mean): 102 (02 Dec 2019 11:00) (102 - 107)  RR: 14 (02 Dec 2019 16:00) (14 - 23)  SpO2: 94% (02 Dec 2019 16:00) (89% - 98%)    Physical exam:  General: No acute distress, awake and alert  Cardiovascular: Regular rate and rhythm, no murmurs, rubs, or gallops. No bruits  Pulmonary: Anterior breath sounds clear bilaterally, no crackles or wheezing. No use of accessory muscles  Extremities: Radial and DP pulses +2, no edema    Neurologic:  -Mental status: Awake, alert, oriented to person, place, and time. Speech is fluent with intact naming, repetition, and comprehension, no dysarthria. Recent and remote memory intact. Follows commands. Attention/concentration intact. Fund of knowledge appropriate.  -Cranial nerves:   II: Visual fields are full to confrontation.  III, IV, VI: Extraocular movements are intact without nystagmus. Pupils equally round and reactive to light  V:  Facial sensation V1-V3 equal and intact   VII: Face is symmetric with normal eye closure and smile  VIII: Hearing is bilaterally intact to finger rub  IX, X: Uvula is midline and soft palate rises symmetrically  XI: Head turning and shoulder shrug are intact.  XII: Tongue protrudes midline  Motor: Normal bulk and tone. No pronator drift. Strength bilateral upper extremity 5/5, bilateral lower extremities 5/5.  Rapid alternating movements intact and symmetric  Sensation: Intact to light touch bilaterally. No neglect or extinction on double simultaneous testing.  Coordination: No dysmetria on finger-to-nose and heel-to-shin bilaterally  Reflexes: Downgoing toes bilaterally   Gait: Narrow gait and steady    NIHSS: ***    LABS:                        9.7    12.66 )-----------( 460      ( 02 Dec 2019 08:53 )             30.7     12-02    149<H>  |  116<H>  |  34<H>  ----------------------------<  114<H>  4.2   |  22  |  1.14    Ca    8.5      02 Dec 2019 08:53  Phos  2.8     12-02  Mg     2.6     12-02    TPro  5.8<L>  /  Alb  3.3  /  TBili  0.6  /  DBili  x   /  AST  25  /  ALT  31  /  AlkPhos  61  12-02    PT/INR - ( 02 Dec 2019 08:53 )   PT: 13.6 sec;   INR: 1.20          PTT - ( 02 Dec 2019 08:53 )  PTT:37.4 sec      RADIOLOGY & ADDITIONAL TESTS:      Assessment and Plan  69y Male    Saira Mcmahon PA-C  Stroke Neurology   [phone] Neurology Stroke Consult Note    HPI: 69 year old male, with PMHx of colitis, prostate surgery @ [facility], BIBA to [facility] ED complaining of back pain and profound hypotension. Per patient's wife patient began complaining of left sided neck pain radiating to his back at around 1:00 AM with bilateral weakness and inability to get out of bed. Upon arriving to the ED patient was profoundly hypotensive SBP 40s and cyanotic. He was emergently rushed to CT scan which confirmed Type A aortic dissection and CT surgery was called. Arterial line was placed and patient was stabilized on Levophed and Phenylephrine with marginal SBP 80-90s. He was intubated for hypoxia with cyanosis and emergently brought to the OR for salvage Type A dissection repair. In the OR, the patient coded and was rapidly placed on bypass. Patient s/p Cabrol procedure for replacement of aortic valve, aortic root, and ascending aorta, repair, aortic arch, using frozen elephant trunk technique, replacement, ascending aorta and hemiarch, and aortic root replacement 11/23. Patient's course was complicated by hemodynamic instability, aspiration pneumonia, acute respiratory distress, prolonged intubation, pleural effusion, Left cephalic vein DVT with extension to subclavian, and afib with RVR. Throughout his course, patient was neurologically nonfocal and able to follow commands and nonverbally respond appropriately. Patient was extubated yesterday and noted to have expressive aphasia and was not saying or attempting to speak. ENT was consulted for prolonged intubation and assessment of vocal cords and stroke neurology was consulted for expressive aphasia. Patient seen this morning not speaking but reacting appropriately and able to scoff and create noise. Patient was also able to open mouth and move tongue with prompting but was unable to say his name. HCT was obtained and showed hypodensities in the subcortical bilateral frontal lobes. Patient was seen again in the evening and assessed. Patient was able to repeat "my name is Bert" and several attempts. 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 He was also able to read. Patient denies acute numbness, weakness, tingling, change in vision, dizziness, headache.       PAST MEDICAL & SURGICAL HISTORY:  Benign prostatic hypertrophy without lower urinary tract symptoms: BPH (benign prostatic hypertrophy)  Ulcerative colitis: Ulcerative colitis  States following surgery of eye and adnexa: straightened right eye  Other postprocedural status: History of hernia repair  Hemorrhoids: Hemorrhoids  Acute appendicitis with generalized peritonitis: Ruptured appendix      FAMILY HISTORY:        MEDICATIONS  (STANDING):  albumin human 25% IVPB 50 milliLiter(s) IV Intermittent every 10 minutes  aMIOdarone    Tablet   Oral   aMIOdarone    Tablet 400 milliGRAM(s) Oral every 8 hours  aspirin enteric coated 81 milliGRAM(s) Oral daily  buDESOnide    Inhalation Suspension 0.5 milliGRAM(s) Inhalation every 12 hours  cefTRIAXone   IVPB 2000 milliGRAM(s) IV Intermittent every 24 hours  chlorhexidine 2% Cloths 1 Application(s) Topical daily  dextrose 50% Injectable 50 milliLiter(s) IV Push every 15 minutes  heparin  Infusion 1150 Unit(s)/Hr (11.5 mL/Hr) IV Continuous <Continuous>  lidocaine   Patch 1 Patch Transdermal daily  pantoprazole    Tablet 40 milliGRAM(s) Oral before breakfast  sodium chloride 0.9%. 1000 milliLiter(s) (10 mL/Hr) IV Continuous <Continuous>  tamsulosin 0.4 milliGRAM(s) Oral at bedtime    MEDICATIONS  (PRN):  bisacodyl Suppository 10 milliGRAM(s) Rectal daily PRN Constipation      Allergies    penicillin (Unknown)    Intolerances        Vital Signs Last 24 Hrs  T(C): 37.2 (02 Dec 2019 14:00), Max: 37.6 (02 Dec 2019 05:01)  T(F): 98.9 (02 Dec 2019 14:00), Max: 99.7 (02 Dec 2019 05:01)  HR: 114 (02 Dec 2019 16:00) (72 - 114)  BP: 140/72 (02 Dec 2019 11:00) (140/72 - 149/74)  BP(mean): 102 (02 Dec 2019 11:00) (102 - 107)  RR: 14 (02 Dec 2019 16:00) (14 - 23)  SpO2: 94% (02 Dec 2019 16:00) (89% - 98%)    Physical exam:  General: No acute distress, awake and alert on high flow nasal cannula  Neurologic:  -Mental status: Awake, alert, oriented to person, place, and time (with choices, initially nonverbal). Patient able to repeat "my name is Bert" after coaching and several attempts. He was also able to repeat/sing Happy Birthday with the stroke team after additional coaching. 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 He was able to read text aloud slowly without coaching. Patient with moderate dysarthria. Follows commands. Attention/concentration intact.   -Cranial nerves:   II: Visual fields are full to confrontation.  III, IV, VI: Extraocular movements are intact without nystagmus. Pupils equally round and reactive to light  V:  Facial sensation V1-V3 equal and intact   VII: Face is symmetric with normal eye closure and smile  XII: Tongue protrudes midline  Motor: Unable to assess pronator drift, pain limited. Strength bilateral upper extremity 5/5, bilateral lower extremities 5/5.  Sensation: Intact to light touch bilaterally. No neglect or extinction on double simultaneous testing.  Coordination: Unable to assess finger to nose, pain limited, though no dysmetria noted with patient reaching out to provider hand.   NIHSS: 3    LABS:                        9.7    12.66 )-----------( 460      ( 02 Dec 2019 08:53 )             30.7     12-02    149<H>  |  116<H>  |  34<H>  ----------------------------<  114<H>  4.2   |  22  |  1.14    Ca    8.5      02 Dec 2019 08:53  Phos  2.8     12-02  Mg     2.6     12-02    TPro  5.8<L>  /  Alb  3.3  /  TBili  0.6  /  DBili  x   /  AST  25  /  ALT  31  /  AlkPhos  61  12-02    PT/INR - ( 02 Dec 2019 08:53 )   PT: 13.6 sec;   INR: 1.20          PTT - ( 02 Dec 2019 08:53 )  PTT:37.4 sec      RADIOLOGY & ADDITIONAL TESTS:  < from: CT Head No Cont (12.02.19 @ 15:10) >  No acute intracranial hemorrhage or transcortical infarct.    Gray-white matter hypodensities in the subcortical bilateral frontal   lobes which may reflect age-indeterminate ischemia, infection,   inflammation amongst other etiologies. Further evaluation with an MRI   brain with and without IV contrast is recommended.      Assessment and Plan  This is a 69 year old male, with PMHx of colitis, prostate surgery who presented with back pain and profound hypotension, found to have a Type A aortic dissection. Upon arrival to ED, patient was profoundly hypotensive SBP 40s and cyanotic, after CTA confirming Type A aortic dissection, patient was stabilized on Levophed and Phenylephrine with marginal SBP 80-90s, intubated for hypoxia with cyanosis and emergently brought to the OR for salvage Type A dissection repair. In the OR, the patient coded and was rapidly placed on bypass. Patient s/p emergent salvage AVR(Bio)/root-hemiarch replacement 11/23. Patient's course was complicated by hemodynamic instability, aspiration pneumonia, acute respiratory distress, prolonged intubation, pleural effusion, Left cephalic vein DVT with extension to subclavian, and afib with RVR. Throughout his course, patient was neurologically nonfocal and able to follow commands and nonverbally respond appropriately. Patient was extubated 12/1 and was noted to have expressive aphasia for which stroke neurology was consulted. HCT was obtained and showed hypodensities in the subcortical bilateral frontal lobes. Stroke etiology likely hypoperfusion secondary to cardiac arrest given bilateral frontal watershed hypodensities and recent hospital course, less likely cardioembolic or thrombotic.     - continue heparin drip for DVT per [facility]  - continue asa 81mg daily per [facility]  - recommend starting atorvastatin 80mg daily  - recommend MAP goal  as tolerated  - no need for further neuro imaging  - SCDs as per [facility], heparin drip as above as DVT prophylaxis   - encourage patient to sing in order to encourage speech production  - appreciate speech and language recommendations  - inpatient management per [facility]    Juanita Cooper  Neurology Stroke NP  [phone]

## 2019-12-03 DIAGNOSIS — E87.0 HYPEROSMOLALITY AND HYPERNATREMIA: ICD-10-CM

## 2019-12-03 LAB
ALBUMIN SERPL ELPH-MCNC: 3.3 G/DL — SIGNIFICANT CHANGE UP (ref 3.3–5)
ALBUMIN SERPL ELPH-MCNC: 3.6 G/DL — SIGNIFICANT CHANGE UP (ref 3.3–5)
ALBUMIN SERPL ELPH-MCNC: 3.8 G/DL — SIGNIFICANT CHANGE UP (ref 3.3–5)
ALP SERPL-CCNC: 48 U/L — SIGNIFICANT CHANGE UP (ref 40–120)
ALP SERPL-CCNC: 53 U/L — SIGNIFICANT CHANGE UP (ref 40–120)
ALP SERPL-CCNC: 61 U/L — SIGNIFICANT CHANGE UP (ref 40–120)
ALT FLD-CCNC: 23 U/L — SIGNIFICANT CHANGE UP (ref 10–45)
ALT FLD-CCNC: 25 U/L — SIGNIFICANT CHANGE UP (ref 10–45)
ALT FLD-CCNC: 29 U/L — SIGNIFICANT CHANGE UP (ref 10–45)
ANION GAP SERPL CALC-SCNC: 11 MMOL/L — SIGNIFICANT CHANGE UP (ref 5–17)
ANION GAP SERPL CALC-SCNC: 12 MMOL/L — SIGNIFICANT CHANGE UP (ref 5–17)
ANION GAP SERPL CALC-SCNC: 13 MMOL/L — SIGNIFICANT CHANGE UP (ref 5–17)
APTT BLD: 48 SEC — HIGH (ref 27.5–36.3)
APTT BLD: 48.7 SEC — HIGH (ref 27.5–36.3)
APTT BLD: 55.4 SEC — HIGH (ref 27.5–36.3)
APTT BLD: 58.6 SEC — HIGH (ref 27.5–36.3)
AST SERPL-CCNC: 19 U/L — SIGNIFICANT CHANGE UP (ref 10–40)
AST SERPL-CCNC: 27 U/L — SIGNIFICANT CHANGE UP (ref 10–40)
AST SERPL-CCNC: 31 U/L — SIGNIFICANT CHANGE UP (ref 10–40)
BILIRUB SERPL-MCNC: 0.5 MG/DL — SIGNIFICANT CHANGE UP (ref 0.2–1.2)
BILIRUB SERPL-MCNC: 0.5 MG/DL — SIGNIFICANT CHANGE UP (ref 0.2–1.2)
BILIRUB SERPL-MCNC: 0.6 MG/DL — SIGNIFICANT CHANGE UP (ref 0.2–1.2)
BUN SERPL-MCNC: 31 MG/DL — HIGH (ref 7–23)
BUN SERPL-MCNC: 33 MG/DL — HIGH (ref 7–23)
BUN SERPL-MCNC: 33 MG/DL — HIGH (ref 7–23)
CALCIUM SERPL-MCNC: 8.2 MG/DL — LOW (ref 8.4–10.5)
CALCIUM SERPL-MCNC: 8.6 MG/DL — SIGNIFICANT CHANGE UP (ref 8.4–10.5)
CALCIUM SERPL-MCNC: 8.7 MG/DL — SIGNIFICANT CHANGE UP (ref 8.4–10.5)
CHLORIDE SERPL-SCNC: 116 MMOL/L — HIGH (ref 96–108)
CHLORIDE SERPL-SCNC: 119 MMOL/L — HIGH (ref 96–108)
CHLORIDE SERPL-SCNC: 119 MMOL/L — HIGH (ref 96–108)
CO2 SERPL-SCNC: 21 MMOL/L — LOW (ref 22–31)
CO2 SERPL-SCNC: 22 MMOL/L — SIGNIFICANT CHANGE UP (ref 22–31)
CO2 SERPL-SCNC: 22 MMOL/L — SIGNIFICANT CHANGE UP (ref 22–31)
CREAT SERPL-MCNC: 1.1 MG/DL — SIGNIFICANT CHANGE UP (ref 0.5–1.3)
CREAT SERPL-MCNC: 1.13 MG/DL — SIGNIFICANT CHANGE UP (ref 0.5–1.3)
CREAT SERPL-MCNC: 1.14 MG/DL — SIGNIFICANT CHANGE UP (ref 0.5–1.3)
GAS PNL BLDA: SIGNIFICANT CHANGE UP
GLUCOSE SERPL-MCNC: 122 MG/DL — HIGH (ref 70–99)
GLUCOSE SERPL-MCNC: 131 MG/DL — HIGH (ref 70–99)
GLUCOSE SERPL-MCNC: 134 MG/DL — HIGH (ref 70–99)
HCT VFR BLD CALC: 28.3 % — LOW (ref 39–50)
HCT VFR BLD CALC: 30.4 % — LOW (ref 39–50)
HCT VFR BLD CALC: 31.5 % — LOW (ref 39–50)
HGB BLD-MCNC: 8.6 G/DL — LOW (ref 13–17)
HGB BLD-MCNC: 9.2 G/DL — LOW (ref 13–17)
HGB BLD-MCNC: 9.6 G/DL — LOW (ref 13–17)
INR BLD: 1.31 — HIGH (ref 0.88–1.16)
INR BLD: 1.31 — HIGH (ref 0.88–1.16)
INR BLD: 1.32 — HIGH (ref 0.88–1.16)
INR BLD: 1.33 — HIGH (ref 0.88–1.16)
LACTATE SERPL-SCNC: 0.6 MMOL/L — SIGNIFICANT CHANGE UP (ref 0.5–2)
LACTATE SERPL-SCNC: 0.7 MMOL/L — SIGNIFICANT CHANGE UP (ref 0.5–2)
LACTATE SERPL-SCNC: 0.9 MMOL/L — SIGNIFICANT CHANGE UP (ref 0.5–2)
MAGNESIUM SERPL-MCNC: 2.5 MG/DL — SIGNIFICANT CHANGE UP (ref 1.6–2.6)
MAGNESIUM SERPL-MCNC: 2.6 MG/DL — SIGNIFICANT CHANGE UP (ref 1.6–2.6)
MAGNESIUM SERPL-MCNC: 2.8 MG/DL — HIGH (ref 1.6–2.6)
MCHC RBC-ENTMCNC: 27.7 PG — SIGNIFICANT CHANGE UP (ref 27–34)
MCHC RBC-ENTMCNC: 27.8 PG — SIGNIFICANT CHANGE UP (ref 27–34)
MCHC RBC-ENTMCNC: 28.1 PG — SIGNIFICANT CHANGE UP (ref 27–34)
MCHC RBC-ENTMCNC: 30.3 GM/DL — LOW (ref 32–36)
MCHC RBC-ENTMCNC: 30.4 GM/DL — LOW (ref 32–36)
MCHC RBC-ENTMCNC: 30.5 GM/DL — LOW (ref 32–36)
MCV RBC AUTO: 91 FL — SIGNIFICANT CHANGE UP (ref 80–100)
MCV RBC AUTO: 91.6 FL — SIGNIFICANT CHANGE UP (ref 80–100)
MCV RBC AUTO: 93 FL — SIGNIFICANT CHANGE UP (ref 80–100)
NRBC # BLD: 0 /100 WBCS — SIGNIFICANT CHANGE UP (ref 0–0)
PHOSPHATE SERPL-MCNC: 2.5 MG/DL — SIGNIFICANT CHANGE UP (ref 2.5–4.5)
PHOSPHATE SERPL-MCNC: 2.5 MG/DL — SIGNIFICANT CHANGE UP (ref 2.5–4.5)
PHOSPHATE SERPL-MCNC: 2.6 MG/DL — SIGNIFICANT CHANGE UP (ref 2.5–4.5)
PLATELET # BLD AUTO: 416 K/UL — HIGH (ref 150–400)
PLATELET # BLD AUTO: 493 K/UL — HIGH (ref 150–400)
PLATELET # BLD AUTO: 616 K/UL — HIGH (ref 150–400)
POTASSIUM SERPL-MCNC: 3.5 MMOL/L — SIGNIFICANT CHANGE UP (ref 3.5–5.3)
POTASSIUM SERPL-MCNC: 4 MMOL/L — SIGNIFICANT CHANGE UP (ref 3.5–5.3)
POTASSIUM SERPL-MCNC: 4.1 MMOL/L — SIGNIFICANT CHANGE UP (ref 3.5–5.3)
POTASSIUM SERPL-SCNC: 3.5 MMOL/L — SIGNIFICANT CHANGE UP (ref 3.5–5.3)
POTASSIUM SERPL-SCNC: 4 MMOL/L — SIGNIFICANT CHANGE UP (ref 3.5–5.3)
POTASSIUM SERPL-SCNC: 4.1 MMOL/L — SIGNIFICANT CHANGE UP (ref 3.5–5.3)
PROT SERPL-MCNC: 5.3 G/DL — LOW (ref 6–8.3)
PROT SERPL-MCNC: 5.9 G/DL — LOW (ref 6–8.3)
PROT SERPL-MCNC: 6.2 G/DL — SIGNIFICANT CHANGE UP (ref 6–8.3)
PROTHROM AB SERPL-ACNC: 14.9 SEC — HIGH (ref 10–12.9)
PROTHROM AB SERPL-ACNC: 14.9 SEC — HIGH (ref 10–12.9)
PROTHROM AB SERPL-ACNC: 15 SEC — HIGH (ref 10–12.9)
PROTHROM AB SERPL-ACNC: 15.2 SEC — HIGH (ref 10–12.9)
RBC # BLD: 3.09 M/UL — LOW (ref 4.2–5.8)
RBC # BLD: 3.27 M/UL — LOW (ref 4.2–5.8)
RBC # BLD: 3.46 M/UL — LOW (ref 4.2–5.8)
RBC # FLD: 15.9 % — HIGH (ref 10.3–14.5)
SODIUM SERPL-SCNC: 151 MMOL/L — HIGH (ref 135–145)
SODIUM SERPL-SCNC: 152 MMOL/L — HIGH (ref 135–145)
SODIUM SERPL-SCNC: 152 MMOL/L — HIGH (ref 135–145)
WBC # BLD: 11.46 K/UL — HIGH (ref 3.8–10.5)
WBC # BLD: 13.59 K/UL — HIGH (ref 3.8–10.5)
WBC # BLD: 15.96 K/UL — HIGH (ref 3.8–10.5)
WBC # FLD AUTO: 11.46 K/UL — HIGH (ref 3.8–10.5)
WBC # FLD AUTO: 13.59 K/UL — HIGH (ref 3.8–10.5)
WBC # FLD AUTO: 15.96 K/UL — HIGH (ref 3.8–10.5)

## 2019-12-03 PROCEDURE — 99233 SBSQ HOSP IP/OBS HIGH 50: CPT

## 2019-12-03 PROCEDURE — 99291 CRITICAL CARE FIRST HOUR: CPT

## 2019-12-03 PROCEDURE — 99292 CRITICAL CARE ADDL 30 MIN: CPT

## 2019-12-03 PROCEDURE — 71045 X-RAY EXAM CHEST 1 VIEW: CPT | Mod: 26

## 2019-12-03 RX ORDER — PHENYLEPHRINE HYDROCHLORIDE 10 MG/ML
0.4 INJECTION INTRAVENOUS
Qty: 40 | Refills: 0 | Status: DISCONTINUED | OUTPATIENT
Start: 2019-12-03 | End: 2019-12-05

## 2019-12-03 RX ORDER — AMIODARONE HYDROCHLORIDE 400 MG/1
150 TABLET ORAL ONCE
Refills: 0 | Status: COMPLETED | OUTPATIENT
Start: 2019-12-03 | End: 2019-12-03

## 2019-12-03 RX ORDER — HEPARIN SODIUM 5000 [USP'U]/ML
1200 INJECTION INTRAVENOUS; SUBCUTANEOUS
Qty: 25000 | Refills: 0 | Status: DISCONTINUED | OUTPATIENT
Start: 2019-12-03 | End: 2019-12-03

## 2019-12-03 RX ORDER — PHENYLEPHRINE HYDROCHLORIDE 10 MG/ML
0.5 INJECTION INTRAVENOUS
Qty: 40 | Refills: 0 | Status: DISCONTINUED | OUTPATIENT
Start: 2019-12-03 | End: 2019-12-03

## 2019-12-03 RX ORDER — FUROSEMIDE 40 MG
40 TABLET ORAL ONCE
Refills: 0 | Status: COMPLETED | OUTPATIENT
Start: 2019-12-03 | End: 2019-12-03

## 2019-12-03 RX ORDER — ASPIRIN/CALCIUM CARB/MAGNESIUM 324 MG
81 TABLET ORAL DAILY
Refills: 0 | Status: DISCONTINUED | OUTPATIENT
Start: 2019-12-03 | End: 2019-12-26

## 2019-12-03 RX ORDER — ALBUMIN HUMAN 25 %
250 VIAL (ML) INTRAVENOUS ONCE
Refills: 0 | Status: COMPLETED | OUTPATIENT
Start: 2019-12-03 | End: 2019-12-03

## 2019-12-03 RX ORDER — ATORVASTATIN CALCIUM 80 MG/1
20 TABLET, FILM COATED ORAL AT BEDTIME
Refills: 0 | Status: DISCONTINUED | OUTPATIENT
Start: 2019-12-03 | End: 2019-12-05

## 2019-12-03 RX ORDER — HEPARIN SODIUM 5000 [USP'U]/ML
1300 INJECTION INTRAVENOUS; SUBCUTANEOUS
Qty: 25000 | Refills: 0 | Status: DISCONTINUED | OUTPATIENT
Start: 2019-12-03 | End: 2019-12-06

## 2019-12-03 RX ORDER — DIGOXIN 250 MCG
0.5 TABLET ORAL ONCE
Refills: 0 | Status: COMPLETED | OUTPATIENT
Start: 2019-12-03 | End: 2019-12-03

## 2019-12-03 RX ORDER — POTASSIUM CHLORIDE 20 MEQ
40 PACKET (EA) ORAL ONCE
Refills: 0 | Status: COMPLETED | OUTPATIENT
Start: 2019-12-03 | End: 2019-12-03

## 2019-12-03 RX ORDER — PANTOPRAZOLE SODIUM 20 MG/1
40 TABLET, DELAYED RELEASE ORAL DAILY
Refills: 0 | Status: DISCONTINUED | OUTPATIENT
Start: 2019-12-03 | End: 2019-12-06

## 2019-12-03 RX ADMIN — AMIODARONE HYDROCHLORIDE 400 MILLIGRAM(S): 400 TABLET ORAL at 14:17

## 2019-12-03 RX ADMIN — Medication 125 MILLILITER(S): at 08:15

## 2019-12-03 RX ADMIN — PANTOPRAZOLE SODIUM 40 MILLIGRAM(S): 20 TABLET, DELAYED RELEASE ORAL at 11:49

## 2019-12-03 RX ADMIN — Medication 40 MILLIGRAM(S): at 14:17

## 2019-12-03 RX ADMIN — Medication 40 MILLIEQUIVALENT(S): at 17:23

## 2019-12-03 RX ADMIN — CHLORHEXIDINE GLUCONATE 1 APPLICATION(S): 213 SOLUTION TOPICAL at 06:06

## 2019-12-03 RX ADMIN — ATORVASTATIN CALCIUM 20 MILLIGRAM(S): 80 TABLET, FILM COATED ORAL at 21:12

## 2019-12-03 RX ADMIN — PHENYLEPHRINE HYDROCHLORIDE 15.73 MICROGRAM(S)/KG/MIN: 10 INJECTION INTRAVENOUS at 10:34

## 2019-12-03 RX ADMIN — AMIODARONE HYDROCHLORIDE 400 MILLIGRAM(S): 400 TABLET ORAL at 06:05

## 2019-12-03 RX ADMIN — LIDOCAINE 1 PATCH: 4 CREAM TOPICAL at 17:20

## 2019-12-03 RX ADMIN — AMIODARONE HYDROCHLORIDE 300 MILLIGRAM(S): 400 TABLET ORAL at 17:57

## 2019-12-03 RX ADMIN — LIDOCAINE 1 PATCH: 4 CREAM TOPICAL at 11:49

## 2019-12-03 RX ADMIN — HEPARIN SODIUM 13 UNIT(S)/HR: 5000 INJECTION INTRAVENOUS; SUBCUTANEOUS at 11:00

## 2019-12-03 RX ADMIN — AMIODARONE HYDROCHLORIDE 400 MILLIGRAM(S): 400 TABLET ORAL at 21:12

## 2019-12-03 RX ADMIN — Medication 125 MILLILITER(S): at 01:15

## 2019-12-03 RX ADMIN — CEFTRIAXONE 100 MILLIGRAM(S): 500 INJECTION, POWDER, FOR SOLUTION INTRAMUSCULAR; INTRAVENOUS at 11:48

## 2019-12-03 RX ADMIN — LIDOCAINE 1 PATCH: 4 CREAM TOPICAL at 03:00

## 2019-12-03 RX ADMIN — Medication 0.5 MILLIGRAM(S): at 10:34

## 2019-12-03 RX ADMIN — HEPARIN SODIUM 13 UNIT(S)/HR: 5000 INJECTION INTRAVENOUS; SUBCUTANEOUS at 10:31

## 2019-12-03 RX ADMIN — Medication 0.5 MILLIGRAM(S): at 06:05

## 2019-12-03 RX ADMIN — Medication 81 MILLIGRAM(S): at 11:49

## 2019-12-03 RX ADMIN — Medication 0.5 MILLIGRAM(S): at 17:23

## 2019-12-03 NOTE — PROGRESS NOTE ADULT - SUBJECTIVE AND OBJECTIVE BOX
Neurology Stroke Progress Note  ****** INCOMPLETE*****  INTERVAL HPI/OVERNIGHT EVENTS:  Patient seen and examined.     MEDICATIONS  (STANDING):  aMIOdarone    Tablet   Oral   aMIOdarone    Tablet 400 milliGRAM(s) Oral every 8 hours  aspirin  chewable 81 milliGRAM(s) Oral daily  atorvastatin 20 milliGRAM(s) Oral at bedtime  buDESOnide    Inhalation Suspension 0.5 milliGRAM(s) Inhalation every 12 hours  cefTRIAXone   IVPB 2000 milliGRAM(s) IV Intermittent every 24 hours  chlorhexidine 2% Cloths 1 Application(s) Topical daily  dextrose 50% Injectable 50 milliLiter(s) IV Push every 15 minutes  heparin  Infusion 1300 Unit(s)/Hr (13 mL/Hr) IV Continuous <Continuous>  lidocaine   Patch 1 Patch Transdermal daily  pantoprazole   Suspension 40 milliGRAM(s) Enteral Tube daily  phenylephrine    Infusion 0.5 MICROgram(s)/kG/Min (15.731 mL/Hr) IV Continuous <Continuous>  sodium chloride 0.9%. 1000 milliLiter(s) (10 mL/Hr) IV Continuous <Continuous>    MEDICATIONS  (PRN):  bisacodyl Suppository 10 milliGRAM(s) Rectal daily PRN Constipation      Allergies    penicillin (Unknown)    Intolerances        ROS: As per HPI, otherwise negative    Vital Signs Last 24 Hrs  T(C): 37.3 (03 Dec 2019 09:00), Max: 37.3 (03 Dec 2019 09:00)  T(F): 99.1 (03 Dec 2019 09:00), Max: 99.1 (03 Dec 2019 09:00)  HR: 118 (03 Dec 2019 11:00) (74 - 132)  BP: 94/62 (03 Dec 2019 08:00) (89/55 - 98/61)  BP(mean): 67 (03 Dec 2019 08:00) (62 - 72)  RR: 21 (03 Dec 2019 11:00) (13 - 23)  SpO2: 93% (03 Dec 2019 11:00) (89% - 99%)    Physical exam:  General: No acute distress, awake and alert  Cardiovascular: Regular rate and rhythm, no murmurs, rubs, or gallops. No bruits  Pulmonary: Anterior breath sounds clear bilaterally, no crackles or wheezing. No use of accessory muscles  Extremities: Radial and DP pulses +2, no edema    Neurologic:  -Mental status: Awake, alert, oriented to person, place, and time. Speech is fluent with intact naming, repetition, and comprehension, no dysarthria. Recent and remote memory intact. Follows commands. Attention/concentration intact. Fund of knowledge appropriate.  -Cranial nerves:   II: Visual fields are full to confrontation.  III, IV, VI: Extraocular movements are intact without nystagmus. Pupils equally round and reactive to light  V:  Facial sensation V1-V3 equal and intact   VII: Face is symmetric with normal eye closure and smile  VIII: Hearing is bilaterally intact to finger rub  IX, X: Uvula is midline and soft palate rises symmetrically  XI: Head turning and shoulder shrug are intact.  XII: Tongue protrudes midline  Motor: Normal bulk and tone. No pronator drift. Strength bilateral upper extremity 5/5, bilateral lower extremities 5/5.  Rapid alternating movements intact and symmetric  Sensation: Intact to light touch bilaterally. No neglect or extinction on double simultaneous testing.  Coordination: No dysmetria on finger-to-nose and heel-to-shin bilaterally  Reflexes: Downgoing toes bilaterally   Gait: Narrow gait and steady    LABS:                        9.2    13.59 )-----------( 493      ( 03 Dec 2019 09:31 )             30.4     12-03    152<H>  |  119<H>  |  31<H>  ----------------------------<  134<H>  4.0   |  21<L>  |  1.13    Ca    8.6      03 Dec 2019 09:31  Phos  2.6     12-03  Mg     2.8     12-03    TPro  5.9<L>  /  Alb  3.6  /  TBili  0.6  /  DBili  x   /  AST  31  /  ALT  25  /  AlkPhos  53  12-03    PT/INR - ( 03 Dec 2019 09:31 )   PT: 14.9 sec;   INR: 1.31          PTT - ( 03 Dec 2019 09:31 )  PTT:55.4 sec      RADIOLOGY & ADDITIONAL TESTS:      Assessment and Plan  69y Male        Saira Mcmahon PA-C  Stroke Neurology   [phone] Neurology Stroke Progress Note  ****** INCOMPLETE*****  INTERVAL HPI/OVERNIGHT EVENTS:  Patient is alert and oriented     MEDICATIONS  (STANDING):  aMIOdarone    Tablet   Oral   aMIOdarone    Tablet 400 milliGRAM(s) Oral every 8 hours  aspirin  chewable 81 milliGRAM(s) Oral daily  atorvastatin 20 milliGRAM(s) Oral at bedtime  buDESOnide    Inhalation Suspension 0.5 milliGRAM(s) Inhalation every 12 hours  cefTRIAXone   IVPB 2000 milliGRAM(s) IV Intermittent every 24 hours  chlorhexidine 2% Cloths 1 Application(s) Topical daily  dextrose 50% Injectable 50 milliLiter(s) IV Push every 15 minutes  heparin  Infusion 1300 Unit(s)/Hr (13 mL/Hr) IV Continuous <Continuous>  lidocaine   Patch 1 Patch Transdermal daily  pantoprazole   Suspension 40 milliGRAM(s) Enteral Tube daily  phenylephrine    Infusion 0.5 MICROgram(s)/kG/Min (15.731 mL/Hr) IV Continuous <Continuous>  sodium chloride 0.9%. 1000 milliLiter(s) (10 mL/Hr) IV Continuous <Continuous>    MEDICATIONS  (PRN):  bisacodyl Suppository 10 milliGRAM(s) Rectal daily PRN Constipation      Allergies    penicillin (Unknown)    Intolerances        ROS: As per HPI, otherwise negative    Vital Signs Last 24 Hrs  T(C): 37.3 (03 Dec 2019 09:00), Max: 37.3 (03 Dec 2019 09:00)  T(F): 99.1 (03 Dec 2019 09:00), Max: 99.1 (03 Dec 2019 09:00)  HR: 118 (03 Dec 2019 11:00) (74 - 132)  BP: 94/62 (03 Dec 2019 08:00) (89/55 - 98/61)  BP(mean): 67 (03 Dec 2019 08:00) (62 - 72)  RR: 21 (03 Dec 2019 11:00) (13 - 23)  SpO2: 93% (03 Dec 2019 11:00) (89% - 99%)        Neurologic:  -Mental status: Awake, alert, oriented to person, place, and time. Patient was able to mimic "My name" but did not say "Bert", he was also able to say his wife's name "Catrachita". Patient has dysarthria. Is able to follow commands and his attention and concentration are in tact.     -Cranial nerves:   II: Visual fields are full to confrontation.  III, IV, VI: Patient states that his right eye is blurry. Extraocular movements are intact without nystagmus. Pupils equally round and reactive to light  VII: Face is symmetric with normal eye closure and smile  Motor: bilateral upper extremity 5/5, bilateral lower extremities 5/5.        LABS:                        9.2    13.59 )-----------( 493      ( 03 Dec 2019 09:31 )             30.4     12-03    152<H>  |  119<H>  |  31<H>  ----------------------------<  134<H>  4.0   |  21<L>  |  1.13    Ca    8.6      03 Dec 2019 09:31  Phos  2.6     12-03  Mg     2.8     12-03    TPro  5.9<L>  /  Alb  3.6  /  TBili  0.6  /  DBili  x   /  AST  31  /  ALT  25  /  AlkPhos  53  12-03    PT/INR - ( 03 Dec 2019 09:31 )   PT: 14.9 sec;   INR: 1.31          PTT - ( 03 Dec 2019 09:31 )  PTT:55.4 sec      RADIOLOGY & ADDITIONAL TESTS:      Assessment and Plan  69y Male        Saira Mcmahon PA-C  Stroke Neurology   [phone] Neurology Stroke Progress Note  ****** INCOMPLETE*****      INTERVAL HPI/OVERNIGHT EVENTS:  Patient seen and examined.     MEDICATIONS  (STANDING):  aMIOdarone    Tablet   Oral   aMIOdarone    Tablet 400 milliGRAM(s) Oral every 8 hours  aspirin  chewable 81 milliGRAM(s) Oral daily  atorvastatin 20 milliGRAM(s) Oral at bedtime  buDESOnide    Inhalation Suspension 0.5 milliGRAM(s) Inhalation every 12 hours  chlorhexidine 2% Cloths 1 Application(s) Topical daily  dextrose 50% Injectable 50 milliLiter(s) IV Push every 15 minutes  heparin  Infusion 1300 Unit(s)/Hr (13 mL/Hr) IV Continuous <Continuous>  lidocaine   Patch 1 Patch Transdermal daily  pantoprazole   Suspension 40 milliGRAM(s) Enteral Tube daily  phenylephrine    Infusion 0.5 MICROgram(s)/kG/Min (15.731 mL/Hr) IV Continuous <Continuous>  sodium chloride 0.9%. 1000 milliLiter(s) (10 mL/Hr) IV Continuous <Continuous>    MEDICATIONS  (PRN):  bisacodyl Suppository 10 milliGRAM(s) Rectal daily PRN Constipation      Allergies penicillin (Unknown)      ROS: As per HPI, otherwise negative    Vital Signs Last 24 Hrs  T(C): 37.3 (03 Dec 2019 09:00), Max: 37.3 (03 Dec 2019 09:00)  T(F): 99.1 (03 Dec 2019 09:00), Max: 99.1 (03 Dec 2019 09:00)  HR: 118 (03 Dec 2019 11:00) (74 - 132)  BP: 94/62 (03 Dec 2019 08:00) (89/55 - 98/61)  BP(mean): 67 (03 Dec 2019 08:00) (62 - 72)  RR: 21 (03 Dec 2019 11:00) (13 - 23)  SpO2: 93% (03 Dec 2019 11:00) (91% - 99%)        LABS:                        9.2    13.59 )-----------( 493      ( 03 Dec 2019 09:31 )             30.4     12-03    152<H>  |  119<H>  |  31<H>  ----------------------------<  134<H>  4.0   |  21<L>  |  1.13    Ca    8.6      03 Dec 2019 09:31  Phos  2.6     12-03  Mg     2.8     12-03    TPro  5.9<L>  /  Alb  3.6  /  TBili  0.6  /  DBili  x   /  AST  31  /  ALT  25  /  AlkPhos  53  12-03    PT/INR - ( 03 Dec 2019 09:31 )   PT: 14.9 sec;   INR: 1.31          PTT - ( 03 Dec 2019 09:31 )  PTT:55.4 sec      Neurologic:  -Mental status: Awake, alert, oriented to person, place, and time. Patient was able to mimic "My name" but did not say "Bert", he was also able to say his wife's name "Catrachita". Patient has dysarthria. Is able to follow commands and his attention and concentration are in tact.     -Cranial nerves:   II: Visual fields are full to confrontation.  III, IV, VI: Patient states that his right eye is blurry. Extraocular movements are intact without nystagmus. Pupils equally round and reactive to light  VII: Face is symmetric with normal eye closure and smile  Motor: bilateral upper extremity 5/5, bilateral lower extremities 5/5.    Saira Mcmahon PA-C  Stroke Neurology   [phone] Neurology Stroke Progress Note  ****** INCOMPLETE*****  Examined today and was awake and alert. Patient continues to have expressive aphasia. Did not respond to any of our questions nor commands.     MEDICATIONS  (STANDING):  aMIOdarone    Tablet   Oral   aMIOdarone    Tablet 400 milliGRAM(s) Oral every 8 hours  aspirin  chewable 81 milliGRAM(s) Oral daily  atorvastatin 20 milliGRAM(s) Oral at bedtime  buDESOnide    Inhalation Suspension 0.5 milliGRAM(s) Inhalation every 12 hours  chlorhexidine 2% Cloths 1 Application(s) Topical daily  dextrose 50% Injectable 50 milliLiter(s) IV Push every 15 minutes  heparin  Infusion 1300 Unit(s)/Hr (13 mL/Hr) IV Continuous <Continuous>  lidocaine   Patch 1 Patch Transdermal daily  pantoprazole   Suspension 40 milliGRAM(s) Enteral Tube daily  phenylephrine    Infusion 0.5 MICROgram(s)/kG/Min (15.731 mL/Hr) IV Continuous <Continuous>  sodium chloride 0.9%. 1000 milliLiter(s) (10 mL/Hr) IV Continuous <Continuous>    MEDICATIONS  (PRN):  bisacodyl Suppository 10 milliGRAM(s) Rectal daily PRN Constipation      Allergies penicillin (Unknown)      ROS: As per HPI, otherwise negative    Vital Signs Last 24 Hrs  T(C): 37.3 (03 Dec 2019 09:00), Max: 37.3 (03 Dec 2019 09:00)  T(F): 99.1 (03 Dec 2019 09:00), Max: 99.1 (03 Dec 2019 09:00)  HR: 118 (03 Dec 2019 11:00) (74 - 132)  BP: 94/62 (03 Dec 2019 08:00) (89/55 - 98/61)  BP(mean): 67 (03 Dec 2019 08:00) (62 - 72)  RR: 21 (03 Dec 2019 11:00) (13 - 23)  SpO2: 93% (03 Dec 2019 11:00) (91% - 99%)        LABS:                        9.2    13.59 )-----------( 493      ( 03 Dec 2019 09:31 )             30.4     12-03    152<H>  |  119<H>  |  31<H>  ----------------------------<  134<H>  4.0   |  21<L>  |  1.13    Ca    8.6      03 Dec 2019 09:31  Phos  2.6     12-03  Mg     2.8     12-03    TPro  5.9<L>  /  Alb  3.6  /  TBili  0.6  /  DBili  x   /  AST  31  /  ALT  25  /  AlkPhos  53  12-03    PT/INR - ( 03 Dec 2019 09:31 )   PT: 14.9 sec;   INR: 1.31          PTT - ( 03 Dec 2019 09:31 )  PTT:55.4 sec      Neurologic:  -Mental status: Awake and alert. Looked lethargic and was not following our questions nor commands.   -Cranial nerves:   II: Visual fields are full to confrontation.  III, IV, VI: Patient states that his right eye is blurry. Extraocular movements are intact without nystagmus. Pupils equally round and reactive to light  VII: Face is symmetric with normal eye closure and smile.   Motor: bilateral upper extremity 5/5, bilateral lower extremities 5/5.    Plan:    Stroke etiology likely hypoperfusion secondary to cardiac arrest given bilateral frontal watershed hypodensities and recent hospital course, less likely cardioembolic or thrombotic.     - continue heparin drip for DVT per [facility]  - continue asa 81mg daily per [facility]  - recommend starting atorvastatin 80mg daily  - recommend MAP goal 100 as tolerated  - no need for further neuro imaging  - SCDs as per [facility], heparin drip as above as DVT prophylaxis   - encourage patient to sing in order to encourage speech production  - appreciate speech and language recommendations  - inpatient management per [facility]    Saira Mcmahon PA-C  Stroke Neurology   [phone] Neurology Stroke Progress Note  ****** INCOMPLETE*****  Examined today and was awake and alert. Patient continues to have expressive aphasia. Did not respond to any of our questions nor commands.     MEDICATIONS  (STANDING):  aMIOdarone    Tablet   Oral   aMIOdarone    Tablet 400 milliGRAM(s) Oral every 8 hours  aspirin  chewable 81 milliGRAM(s) Oral daily  atorvastatin 20 milliGRAM(s) Oral at bedtime  buDESOnide    Inhalation Suspension 0.5 milliGRAM(s) Inhalation every 12 hours  chlorhexidine 2% Cloths 1 Application(s) Topical daily  dextrose 50% Injectable 50 milliLiter(s) IV Push every 15 minutes  heparin  Infusion 1300 Unit(s)/Hr (13 mL/Hr) IV Continuous <Continuous>  lidocaine   Patch 1 Patch Transdermal daily  pantoprazole   Suspension 40 milliGRAM(s) Enteral Tube daily  phenylephrine    Infusion 0.5 MICROgram(s)/kG/Min (15.731 mL/Hr) IV Continuous <Continuous>  sodium chloride 0.9%. 1000 milliLiter(s) (10 mL/Hr) IV Continuous <Continuous>    MEDICATIONS  (PRN):  bisacodyl Suppository 10 milliGRAM(s) Rectal daily PRN Constipation      Allergies penicillin (Unknown)      ROS: As per HPI, otherwise negative    Vital Signs Last 24 Hrs  T(C): 37.3 (03 Dec 2019 09:00), Max: 37.3 (03 Dec 2019 09:00)  T(F): 99.1 (03 Dec 2019 09:00), Max: 99.1 (03 Dec 2019 09:00)  HR: 118 (03 Dec 2019 11:00) (74 - 132)  BP: 94/62 (03 Dec 2019 08:00) (89/55 - 98/61)  BP(mean): 67 (03 Dec 2019 08:00) (62 - 72)  RR: 21 (03 Dec 2019 11:00) (13 - 23)  SpO2: 93% (03 Dec 2019 11:00) (91% - 99%)        LABS:                        9.2    13.59 )-----------( 493      ( 03 Dec 2019 09:31 )             30.4     12-03    152<H>  |  119<H>  |  31<H>  ----------------------------<  134<H>  4.0   |  21<L>  |  1.13    Ca    8.6      03 Dec 2019 09:31  Phos  2.6     12-03  Mg     2.8     12-03    TPro  5.9<L>  /  Alb  3.6  /  TBili  0.6  /  DBili  x   /  AST  31  /  ALT  25  /  AlkPhos  53  12-03    PT/INR - ( 03 Dec 2019 09:31 )   PT: 14.9 sec;   INR: 1.31          PTT - ( 03 Dec 2019 09:31 )  PTT:55.4 sec      Neurologic:  -Mental status: Awake and alert. Looked lethargic and was not following our questions nor commands.   -Cranial nerves:   II: Visual fields are full to confrontation.  III, IV, VI: Patient states that his right eye is blurry. Extraocular movements are intact without nystagmus. Pupils equally round and reactive to light  VII: Face is symmetric with normal eye closure and smile.   Motor: bilateral upper extremity 5/5, bilateral lower extremities 5/5.    Plan:    Stroke etiology likely hypoperfusion secondary to cardiac arrest given bilateral frontal watershed hypodensities and recent hospital course, less likely cardioembolic or thrombotic.     - continue heparin drip for DVT per [facility]  - continue asa 81mg daily per [facility]  - recommend starting atorvastatin 80mg daily  - recommend MAP range 100 as tolerated  - no need for further neuro imaging  - SCDs as per [facility], heparin drip as above as DVT prophylaxis   - encourage patient to sing in order to encourage speech production  - appreciate speech and language recommendations  - inpatient management per [facility]    Saira Mcmahon PA-C  Stroke Neurology   [phone] Neurology Stroke Progress Note    INTERVAL HPI/OVERNIGHT EVENTS:  Patient was examined this am, awake and alert responding appropriately. Patient was able to say "my name is" with prompting but not name even with coaching. Patient able to say wife's name when asked. Later in evening, patient did not participate with verbal exam or attempt to sing. Patient nods his head indicating right eye blurry vision.     MEDICATIONS  (STANDING):  aMIOdarone    Tablet   Oral   aMIOdarone    Tablet 400 milliGRAM(s) Oral every 8 hours  aspirin  chewable 81 milliGRAM(s) Oral daily  atorvastatin 20 milliGRAM(s) Oral at bedtime  buDESOnide    Inhalation Suspension 0.5 milliGRAM(s) Inhalation every 12 hours  chlorhexidine 2% Cloths 1 Application(s) Topical daily  dextrose 50% Injectable 50 milliLiter(s) IV Push every 15 minutes  heparin  Infusion 1300 Unit(s)/Hr (13 mL/Hr) IV Continuous <Continuous>  lidocaine   Patch 1 Patch Transdermal daily  pantoprazole   Suspension 40 milliGRAM(s) Enteral Tube daily  phenylephrine    Infusion 0.4 MICROgram(s)/kG/Min (12.585 mL/Hr) IV Continuous <Continuous>  sodium chloride 0.9%. 1000 milliLiter(s) (10 mL/Hr) IV Continuous <Continuous>    MEDICATIONS  (PRN):  bisacodyl Suppository 10 milliGRAM(s) Rectal daily PRN Constipation      Allergies    penicillin (Unknown)    Intolerances    Vital Signs Last 24 Hrs  T(C): 37.3 (03 Dec 2019 18:05), Max: 37.3 (03 Dec 2019 09:00)  T(F): 99.1 (03 Dec 2019 18:05), Max: 99.2 (03 Dec 2019 14:00)  HR: 118 (03 Dec 2019 18:00) (74 - 132)  BP: 94/62 (03 Dec 2019 08:00) (89/55 - 98/61)  BP(mean): 67 (03 Dec 2019 08:00) (62 - 72)  RR: 21 (03 Dec 2019 18:00) (13 - 23)  SpO2: 96% (03 Dec 2019 18:00) (92% - 99%)    Physical exam:  General: No acute distress, awake and alert  Neurologic:  -Mental status: Awake, alert, oriented to person, place, and time with choices. Patient was able to say "my name is" with prompting but not name even with coaching. Patient able to say wife's name when asked. Later in evening, patient did not participate with verbal exam or attempt to sing, improved dysarthria compared to previous day. Follows commands. Smiles and scoffs appropriately.   -Cranial nerves:   II: Visual fields are full to blink to threat  III, IV, VI: Extraocular movements are intact without nystagmus. Pupils equally round and reactive to light  V:  Facial sensation V1-V3 equal and intact   VII: Face is symmetric with normal eye closure and smile  Motor:  No pronator drift. Strength bilateral upper extremity 5/5, bilateral lower extremities 5/5.  Sensation: Intact to light touch bilaterally.  Coordination: No dysmetria on finger-to-nose     LABS:                        9.6    15.96 )-----------( 616      ( 03 Dec 2019 15:38 )             31.5     12-03    151<H>  |  116<H>  |  33<H>  ----------------------------<  131<H>  3.5   |  22  |  1.10    Ca    8.7      03 Dec 2019 15:38  Phos  2.5     12-03  Mg     2.6     12-03    TPro  6.2  /  Alb  3.8  /  TBili  0.5  /  DBili  x   /  AST  27  /  ALT  29  /  AlkPhos  61  12-03    PT/INR - ( 03 Dec 2019 15:38 )   PT: 15.0 sec;   INR: 1.32          PTT - ( 03 Dec 2019 15:38 )  PTT:58.6 sec      RADIOLOGY & ADDITIONAL TESTS:      Assessment and Plan  This is a 69 year old male, with PMHx of colitis, prostate surgery who presented with back pain and profound hypotension, found to have a Type A aortic dissection. Patient s/p emergent salvage AVR(Bio)/root-hemiarch replacement 11/23. Patient's course was complicated by hemodynamic instability, aspiration pneumonia, acute respiratory distress, prolonged intubation, pleural effusion, Left cephalic vein DVT with extension to subclavian, and afib with RVR. Throughout his course, patient was neurologically nonfocal and able to follow commands and nonverbally respond appropriately. Patient was extubated 12/1 and was noted to have expressive aphasia for which stroke neurology was consulted. HCT was obtained and showed hypodensities in the subcortical bilateral frontal lobes. Stroke etiology likely hypoperfusion secondary to cardiac arrest given bilateral frontal watershed hypodensities and recent hospital course, less likely cardioembolic or thrombotic.     - continue heparin drip for DVT per [facility]  - continue asa 81mg daily per [facility]  - recommend starting atorvastatin 80mg daily  - recommend MAP goal  as tolerated per [facility]  - SCDs as per [facility], heparin drip as above as DVT prophylaxis   - encourage patient to sing in order to encourage speech production  - appreciate speech and language recommendations  - inpatient management per [facility]    Saira Mcmahon PA-C  Stroke Neurology   [phone]                          Plan:    Stroke etiology likely hypoperfusion secondary to cardiac arrest given bilateral frontal watershed hypodensities and recent hospital course, less likely cardioembolic or thrombotic.     - continue heparin drip for DVT per [facility]  - continue asa 81mg daily per [facility]  - recommend starting atorvastatin 80mg daily  - recommend MAP range 100 as tolerated  - no need for further neuro imaging  - SCDs as per [facility], heparin drip as above as DVT prophylaxis   - encourage patient to sing in order to encourage speech production  - appreciate speech and language recommendations  - inpatient management per [facility]    Saira Mcmahon PA-C  Stroke Neurology   [phone]

## 2019-12-03 NOTE — SWALLOW FEES ASSESSMENT ADULT - COMMENTS
-Presence of a bulky posterior pharyngeal wall, which suggests presence of anterior cervical osteophytes vs post-intubation edema  -Additive lesions on posterior aspects of the bilateral TVF, consistent with granulated tissue in the setting of prolonged intubation. FEES recommended based on pt's clinical presentation during clinical bedside swallowing evaluation. Risks, benefits, and alternatives to this study were reviewed with pt and his wife. Consent provided. Pt tolerated the passing of the scope and its presence.

## 2019-12-03 NOTE — PROGRESS NOTE ADULT - ASSESSMENT
A/p  70 y/o M with PMHx of Ulcerative colitis BIBA to [facility] ED complaining of back pain and profound hypotension, emergently brought to the OR for salvage Type A dissection repair, s/p aortic arch repair and ascending arch replacement.   Nephrology consulted for FELY.

## 2019-12-03 NOTE — PROGRESS NOTE ADULT - PROBLEM SELECTOR PLAN 2
FWD ~ 4.5 L   - would recommend adding D5W infusion 60 cc/hr and cw free water flushes 250 cc q 4hr or increase flushes to 500 q4  will follow

## 2019-12-03 NOTE — PROGRESS NOTE ADULT - SUBJECTIVE AND OBJECTIVE BOX
INTERVAL HPI/OVERNIGHT EVENTS: Patient now extubated, afebrile    CONSTITUTIONAL:  Negative fever or chills  EYES:  Negative  blurry vision or double vision  CARDIOVASCULAR:  Negative for chest pain or palpitations  RESPIRATORY:  Negative for cough, wheezing, or SOB   GASTROINTESTINAL:  Negative for nausea, vomiting, diarrhea, constipation, or abdominal pain  GENITOURINARY:  Negative frequency, urgency or dysuria  NEUROLOGIC:  No headache, confusion, dizziness      ANTIBIOTICS/RELEVANT:    MEDICATIONS  (STANDING):  aMIOdarone    Tablet   Oral   aMIOdarone    Tablet 400 milliGRAM(s) Oral every 8 hours  aspirin  chewable 81 milliGRAM(s) Oral daily  atorvastatin 20 milliGRAM(s) Oral at bedtime  buDESOnide    Inhalation Suspension 0.5 milliGRAM(s) Inhalation every 12 hours  chlorhexidine 2% Cloths 1 Application(s) Topical daily  dextrose 50% Injectable 50 milliLiter(s) IV Push every 15 minutes  heparin  Infusion 1300 Unit(s)/Hr (13 mL/Hr) IV Continuous <Continuous>  lidocaine   Patch 1 Patch Transdermal daily  pantoprazole   Suspension 40 milliGRAM(s) Enteral Tube daily  phenylephrine    Infusion 0.4 MICROgram(s)/kG/Min (12.585 mL/Hr) IV Continuous <Continuous>  sodium chloride 0.9%. 1000 milliLiter(s) (10 mL/Hr) IV Continuous <Continuous>    MEDICATIONS  (PRN):  bisacodyl Suppository 10 milliGRAM(s) Rectal daily PRN Constipation        Vital Signs Last 24 Hrs  T(C): 37.3 (03 Dec 2019 22:25), Max: 37.3 (03 Dec 2019 09:00)  T(F): 99.2 (03 Dec 2019 22:25), Max: 99.2 (03 Dec 2019 14:00)  HR: 108 (03 Dec 2019 22:00) (74 - 132)  BP: 113/66 (03 Dec 2019 21:00) (89/55 - 113/66)  BP(mean): 92 (03 Dec 2019 21:00) (62 - 92)  RR: 19 (03 Dec 2019 22:00) (13 - 26)  SpO2: 94% (03 Dec 2019 22:00) (92% - 99%)    12-02-19 @ 07:01  -  12-03-19 @ 07:00  --------------------------------------------------------  IN: 2545 mL / OUT: 2415 mL / NET: 130 mL    12-03-19 @ 07:01  -  12-03-19 @ 22:51  --------------------------------------------------------  IN: 1436.1 mL / OUT: 1260 mL / NET: 176.1 mL      PHYSICAL EXAM:  Constitutional:Well-developed, well nourished  Eyes:SANTANA, EOMI  Ear/Nose/Throat: no oral lesion, no sinus tenderness on percussion	  Neck:no JVD, no lymphadenopathy, supple  Respiratory: CTA adilene  Cardiovascular: S1S2 RRR, no murmurs  Gastrointestinal:soft, (+) BS, no HSM  Extremities: no LE edema  Neuro: AO x3, non focal  LABS:                        9.6    15.96 )-----------( 616      ( 03 Dec 2019 15:38 )             31.5     12-03    151<H>  |  116<H>  |  33<H>  ----------------------------<  131<H>  3.5   |  22  |  1.10    Ca    8.7      03 Dec 2019 15:38  Phos  2.5     12-03  Mg     2.6     12-03    TPro  6.2  /  Alb  3.8  /  TBili  0.5  /  DBili  x   /  AST  27  /  ALT  29  /  AlkPhos  61  12-03    PT/INR - ( 03 Dec 2019 15:38 )   PT: 15.0 sec;   INR: 1.32          PTT - ( 03 Dec 2019 15:38 )  PTT:58.6 sec      MICROBIOLOGY:  Culture - Sputum . (11.25.19 @ 16:51)    Gram Stain:   Rare epithelial cells  Numerous White blood cells  Few Gram Negative Rods  Few Gram positive cocci in pairs  Few Gram Negative Diplococci    -  Ampicillin: R >16 These ampicillin results predict results for amoxicillin    -  Ampicillin/Sulbactam: S 8/4 Enterobacter, Citrobacter, and Serratia may develop resistance during prolonged therapy (3-4 days)    -  Cefazolin: R >16 Enterobacter, Citrobacter, and Serratia may develop resistance during prolonged therapy (3-4 days)    -  Ceftriaxone: S <=1 Enterobacter, Citrobacter, and Serratia may develop resistance during prolonged therapy    -  Gentamicin: S <=1    -  Piperacillin/Tazobactam: S <=8    -  Tobramycin: S <=2    -  Trimethoprim/Sulfamethoxazole: S <=0.5/9.5    Specimen Source: .Sputum Sputum    Culture Results:   Few-moderate Klebsiella oxytoca  Accompanied by normal respiratory jazmín    Organism Identification: Klebsiella oxytoca    Organism: Klebsiella oxytoca    Method Type: CHERY        RADIOLOGY & ADDITIONAL STUDIES:   CT Head No Cont (12.02.19 @ 15:10) >  IMPRESSION:  No acute intracranial hemorrhage or transcortical infarct.    Gray-white matter hypodensities in the subcortical bilateral frontal   lobes which may reflect age-indeterminate ischemia, infection,   inflammation amongst other etiologies. Further evaluation with an MRI   brain with and without IV contrast is recommended.

## 2019-12-03 NOTE — SWALLOW FEES ASSESSMENT ADULT - PHARYNGEAL PHASE COMMENTS
Pharyngeal swallow was delayed, triggering with the bolus head in either the valleculae/lateral channels. Improved bolus control noted with more viscous boli. Penetration/aspiration occurred after the swallow with thin and NTL as a result of liquid spilling over the L aryepiglottic fold, L arytenoid, and/or interarytenoid space. Pt was sensate to aspiration AEB immediate throat clearing. Postural maneuvers, including the use of a head turn R, were attempted to improve airway protection. Penetration inconsistently occurred during the swallow with thin and NTL. Although unable to visualize d/t white out period, suspect aspiration also occurred during the swallow AEB immediate throat clearing following these trials. Reduced tongue base retraction/pharyngeal contraction resulted in post-deglutitive residue, most significant with solids. Mild diffuse pharyngeal residue observed across liquid trials. Residue in the valleculae increased to a moderate-severe vallecular residue with solids which was reduced, but not cleared, with the use of a liquid wash.

## 2019-12-03 NOTE — SWALLOW BEDSIDE ASSESSMENT ADULT - NS SPL SWALLOW CLINIC TRIAL FT
Oral phase was unremarkable. Pt presented with immediate (non-productive) throat clearing following all thin liquid trials and cup sips of NTL suggestive of aspiration. Pt unable to expectorate orally despite reports of sensing pooled secretions. Laryngeal elevation palpated. Pt with multiple (~2-3) secondary swallows per liquid bolus suggestive of pharyngeal stasis.

## 2019-12-03 NOTE — PROGRESS NOTE ADULT - SUBJECTIVE AND OBJECTIVE BOX
INTERVAL HPI/OVERNIGHT EVENTS:    s/p Meropenem D#7; ceftriaxone x 3 days - ends 12/4 after dosing for total 10days    11/23: emergent salvage AVR(Bio)/root-hemiarch replacement  EF 60%    68yo Male with Hx Ulcerative colitis, BPH/prostate surgery presenting with back pain/neck pain and weakness  and profound weakness     found profoundly hypotensive on assessment - SBP 40's with cyanosis     CT scan: Type A aortic dissection  pressors started/intubated - CT surgery was called and emergently transferred - taken to OR    to OR 11/23:   intraop: 2.5 L Crystalloid/6 U pRBC/4 FFP/10 Cryo/1000 FEIBA  arrived on Epi/levo/vaso    atel on xray - bronch performed 11/24  LA normalized  and pressors titrate down     fever noted post-op - Cx sent - started presumptively on Meropenem 11/25. ID (Zlantanic)  ongoing diuresis - lasix infusion started - held this am     11/26 - off [place] and Juan M off  diuresis with improvement in Fi02 requirements - currently 40%  tolerated some early mobility     11/27: Doppler LUE: Thrombus in the left cephalic vein protruding into the subclavian vein. Remainder of subclavian vein is patent. Thrombus in the left basilic with overlying soft tissue swelling.    11/29 - atrial fib/RVR - Amiodarone IV transitioned to po load  heparin infusion started for Fib and LUE clot    new midline placed 11/29  had been in sinus - Afib returned 12/2 4p  Extubated to HFNC 12/1    some concern for expressive aphasia post extubation  CT Head 12/2: No acute intracranial hemorrhage or transcortical infarct. Gray-white matter hypodensities in the subcortical bilateral frontal lobes which may reflect age-indeterminate ischemia, infection, inflammation amongst other etiologies. No acute events reported overnight     Neuro assessment:  hypodensities in the subcortical bilateral frontal lobes. Stroke etiology likely hypoperfusion secondary to cardiac arrest given bilateral frontal watershed hypodensities and recent hospital course, less likely cardioembolic or thrombotic.     remains on heparin infusion (pTT 55.4)  ENT evaluated for aphonia - assessment revealed able to phonate when provoked; good VC movement on direct visualization     speech reportedly improved over course of day/night   remains in fib    no acute events reported overnight  enteral feeds ongoing pending formal S/S assessment    PMHx includes but is not limited to:   BPH   Ulcerative colitis: Ulcerative colitis  States following surgery of eye and adnexa: straightened right eye  Other postprocedural status: History of hernia repair  Hemorrhoids: Hemorrhoids  Acute appendicitis with generalized peritonitis: Ruptured appendix    ICU Vital Signs Last 24 Hrs  T(C): 37.3 (03 Dec 2019 09:00), Max: 37.3 (03 Dec 2019 09:00)  T(F): 99.1 (03 Dec 2019 09:00), Max: 99.1 (03 Dec 2019 09:00)  HR: 122 (03 Dec 2019 09:08) (74 - 132) Fib  BP: 94/62 (03 Dec 2019 08:00) (89/55 - 140/72)  BP(mean): 67 (03 Dec 2019 08:00) (62 - 102)  ABP: 102/58 (03 Dec 2019 09:08) (84/56 - 134/72)  ABP(mean): 70 (03 Dec 2019 09:08) (64 - 108)  SpO2: 95% (03 Dec 2019 09:08) (89% - 99%) Fi02 40%    Qtts: Heparin 1300 U/Hr    I&O's Summary    02 Dec 2019 07:01  -  03 Dec 2019 07:00  --------------------------------------------------------  IN: 2545 mL / OUT: 2415 mL / NET: 130 mL    03 Dec 2019 07:01  -  03 Dec 2019 09:57  --------------------------------------------------------  IN: 437.5 mL / OUT: 20 mL / NET: 417.5 mL    Physical Exam    Heart   Lungs  Abd  Ext  Chest  Neuro  Skin    LABS:                        9.2    13.59 )-----------( 493      ( 03 Dec 2019 09:31 )             30.4     12-03    152<H>  |  119<H>  |  33<H>  ----------------------------<  122<H>  4.1   |  22  |  1.14    Ca    8.2<L>      03 Dec 2019 02:07  Phos  2.5     12-03  Mg     2.5     12-03    TPro  5.3<L>  /  Alb  3.3  /  TBili  0.5  /  DBili  x   /  AST  19  /  ALT  23  /  AlkPhos  48  12-03    PT/INR - ( 03 Dec 2019 09:31 )   PT: 14.9 sec;   INR: 1.31          PTT - ( 03 Dec 2019 09:31 )  PTT:55.4 sec    ABG - ( 03 Dec 2019 09:30 )  pH, Arterial: 7.47  pH, Blood: x     /  pCO2: 30    /  pO2: 79    / HCO3: 21    / Base Excess: -1.8  /  SaO2: 95                  RADIOLOGY & ADDITIONAL STUDIES:    I have spent/provided stated minutes of critical care time to this patient: INTERVAL HPI/OVERNIGHT EVENTS:    s/p Meropenem D#7; ceftriaxone x 3 days - ends 12/4 after dosing for total 10days    11/23: emergent salvage AVR(Bio)/root-hemiarch replacement  EF 60%    70yo Male with Hx Ulcerative colitis, BPH/prostate surgery presenting with back pain/neck pain and weakness  and profound weakness     found profoundly hypotensive on assessment - SBP 40's with cyanosis     CT scan: Type A aortic dissection  pressors started/intubated - CT surgery was called and emergently transferred - taken to OR    to OR 11/23:   intraop: 2.5 L Crystalloid/6 U pRBC/4 FFP/10 Cryo/1000 FEIBA  arrived on Epi/levo/vaso    atel on xray - bronch performed 11/24  LA normalized  and pressors titrate down     fever noted post-op - Cx sent - started presumptively on Meropenem 11/25. ID (Zlantanic)  ongoing diuresis - lasix infusion started - held this am     11/26 - off [place] and Juan M off  diuresis with improvement in Fi02 requirements - currently 40%  tolerated some early mobility     11/27: Doppler LUE: Thrombus in the left cephalic vein protruding into the subclavian vein. Remainder of subclavian vein is patent. Thrombus in the left basilic with overlying soft tissue swelling.    11/29 - atrial fib/RVR - Amiodarone IV transitioned to po load  heparin infusion started for Fib and LUE clot    new midline placed 11/29  had been in sinus - Afib returned 12/2 4p  Extubated to HFNC 12/1    some concern for expressive aphasia post extubation  CT Head 12/2: No acute intracranial hemorrhage or transcortical infarct. Gray-white matter hypodensities in the subcortical bilateral frontal lobes which may reflect age-indeterminate ischemia, infection, inflammation amongst other etiologies. No acute events reported overnight     Neuro assessment:  hypodensities in the subcortical bilateral frontal lobes. Stroke etiology likely hypoperfusion secondary to cardiac arrest given bilateral frontal watershed hypodensities and recent hospital course, less likely cardioembolic or thrombotic.     remains on heparin infusion (pTT 55.4)  ENT evaluated for aphonia - assessment revealed able to phonate when provoked; good VC movement on direct visualization     speech reportedly improved over course of day/night   remains in fib    no acute events reported overnight  enteral feeds ongoing pending formal S/S assessment    PMHx includes but is not limited to:   BPH   Ulcerative colitis: Ulcerative colitis  States following surgery of eye and adnexa: straightened right eye  Other postprocedural status: History of hernia repair  Hemorrhoids: Hemorrhoids  Acute appendicitis with generalized peritonitis: Ruptured appendix    ICU Vital Signs Last 24 Hrs  T(C): 37.3 (03 Dec 2019 09:00), Max: 37.3 (03 Dec 2019 09:00)  T(F): 99.1 (03 Dec 2019 09:00), Max: 99.1 (03 Dec 2019 09:00)  HR: 122 (03 Dec 2019 09:08) (74 - 132) Fib  BP: 94/62 (03 Dec 2019 08:00) (89/55 - 140/72)  BP(mean): 67 (03 Dec 2019 08:00) (62 - 102)  ABP: 102/58 (03 Dec 2019 09:08) (84/56 - 134/72)  ABP(mean): 70 (03 Dec 2019 09:08) (64 - 108)  SpO2: 95% (03 Dec 2019 09:08) (89% - 99%) Fi02 40%    Qtts: Heparin 1300 U/Hr    I&O's Summary    02 Dec 2019 07:01  -  03 Dec 2019 07:00  --------------------------------------------------------  IN: 2545 mL / OUT: 2415 mL / NET: 130 mL    03 Dec 2019 07:01  -  03 Dec 2019 09:57  --------------------------------------------------------  IN: 437.5 mL / OUT: 20 mL / NET: 417.5 mL    Physical Exam    Heart - regular, no rub/gallop  Lungs - dec BS bases - poor inspiratory effort - improves with prompting; no rhonchi/wheeze  Abd - (+)BS soft NTND (-)r/r/g  Ext - warm to touch; no cyanosis/clubbing  Chest - incision site clean and dry  Neuro - alert/interactive - shakes head yes - when asked to speak just smiles at me; moving all extremities  Skin - no rash     LABS:                        9.2    13.59 )-----------( 493      ( 03 Dec 2019 09:31 )             30.4     12-03    152<H>  |  119<H>  |  33<H>  ----------------------------<  122<H>  4.1   |  22  |  1.14    Ca    8.2<L>      03 Dec 2019 02:07  Phos  2.5     12-03  Mg     2.5     12-03    TPro  5.3<L>  /  Alb  3.3  /  TBili  0.5  /  DBili  x   /  AST  19  /  ALT  23  /  AlkPhos  48  12-03    PT/INR - ( 03 Dec 2019 09:31 )   PT: 14.9 sec;   INR: 1.31     PTT - ( 03 Dec 2019 09:31 )  PTT:55.4 sec    ABG - ( 03 Dec 2019 09:30 ) 7.47/30/79/95    RADIOLOGY & ADDITIONAL STUDIES: reviewed    Sputum Cx 11/25: Klebsiella oxytoca/normal resp jazmín    Patient s/p emergent/salvage aortic dissection repair - now POD#8    1. CV  atrial fib   hemodynamically stable  LA normal 0.6  Atrial Fib  - on amio load (transitioned to PO)  on heparin infusion (titrate per pTT) for afib AND UE clot noted on duplex    2. Pulm   recruitment maneuvers and serial bronchs performed   extubated 12/1 - remains on NC (6L) - attempt to titrate down   intermittent n conjunction with diuresis - Fi02 improved  now on 40%    3. Renal   emergent type A repair  Cr 1.29  hypernatremia - change free water to 250 QID  monitor UO/Lytes and Cr   CTa Abd 11/23: Right renal artery: Originates from the false lumen, diminished opacification. No occlusive thrombus versus dissection extending into the origin. Left renal artery: Originates from the true lumen and patent.    4. Neuro  post-op assessment - responsive and following simple commands  non-focal    5. ID  Afebrile   ID following   meropenem started 11/25 - now D#7 - d/c - per d/w ID ([facility]) CTX for 3 more days (10days total)  sputum Cx 11/25 - polymicrobial - normal jazmín/klebsiella oxytoca -     DVT and GI prophylaxis    d/w family present at bedside; staff; renal attending; ID attending and CTS        I have spent/provided stated minutes of critical care time to this patient: 2 hour INTERVAL HPI/OVERNIGHT EVENTS:    s/p Meropenem D#7; ceftriaxone x 3 days - ends 12/4 after dosing for total 10days    11/23: emergent salvage AVR(Bio)/root-hemiarch replacement  EF 60%    68yo Male with Hx Ulcerative colitis, BPH/prostate surgery presenting with back pain/neck pain and weakness  and profound weakness     found profoundly hypotensive on assessment - SBP 40's with cyanosis     CT scan: Type A aortic dissection  pressors started/intubated - CT surgery was called and emergently transferred - taken to OR    to OR 11/23:   intraop: 2.5 L Crystalloid/6 U pRBC/4 FFP/10 Cryo/1000 FEIBA  arrived on Epi/levo/vaso    atel on xray - bronch performed 11/24  LA normalized  and pressors titrate down     fever noted post-op - Cx sent - started presumptively on Meropenem 11/25. ID (Zlantanic)  ongoing diuresis - lasix infusion started - held this am     11/26 - off pepe and Juan M off  diuresis with improvement in Fi02 requirements - currently 40%  tolerated some early mobility     11/27: Doppler LUE: Thrombus in the left cephalic vein protruding into the subclavian vein. Remainder of subclavian vein is patent. Thrombus in the left basilic with overlying soft tissue swelling.    11/29 - atrial fib/RVR - Amiodarone IV transitioned to po load  heparin infusion started for Fib and LUE clot    new midline placed 11/29  had been in sinus - Afib returned 12/2 4p  Extubated to HFNC 12/1    some concern for expressive aphasia post extubation  CT Head 12/2: No acute intracranial hemorrhage or transcortical infarct. Gray-white matter hypodensities in the subcortical bilateral frontal lobes which may reflect age-indeterminate ischemia, infection, inflammation amongst other etiologies. No acute events reported overnight     Neuro assessment:  hypodensities in the subcortical bilateral frontal lobes. Stroke etiology likely hypoperfusion secondary to cardiac arrest given bilateral frontal watershed hypodensities and recent hospital course, less likely cardioembolic or thrombotic.     remains on heparin infusion (pTT 55.4)  ENT evaluated for aphonia - assessment revealed able to phonate when provoked; good VC movement on direct visualization     speech reportedly improved over course of day/night   remains in fib    no acute events reported overnight  enteral feeds ongoing pending formal S/S assessment    PMHx includes but is not limited to:   BPH   Ulcerative colitis: Ulcerative colitis  States following surgery of eye and adnexa: straightened right eye  Other postprocedural status: History of hernia repair  Hemorrhoids: Hemorrhoids  Acute appendicitis with generalized peritonitis: Ruptured appendix    ICU Vital Signs Last 24 Hrs  T(C): 37.3 (03 Dec 2019 09:00), Max: 37.3 (03 Dec 2019 09:00)  T(F): 99.1 (03 Dec 2019 09:00), Max: 99.1 (03 Dec 2019 09:00)  HR: 122 (03 Dec 2019 09:08) (74 - 132) Fib  BP: 94/62 (03 Dec 2019 08:00) (89/55 - 140/72)  BP(mean): 67 (03 Dec 2019 08:00) (62 - 102)  ABP: 102/58 (03 Dec 2019 09:08) (84/56 - 134/72)  ABP(mean): 70 (03 Dec 2019 09:08) (64 - 108)  SpO2: 95% (03 Dec 2019 09:08) (89% - 99%) Fi02 40%    Qtts: Heparin 1300 U/Hr    I&O's Summary    02 Dec 2019 07:01  -  03 Dec 2019 07:00  --------------------------------------------------------  IN: 2545 mL / OUT: 2415 mL / NET: 130 mL    03 Dec 2019 07:01  -  03 Dec 2019 09:57  --------------------------------------------------------  IN: 437.5 mL / OUT: 20 mL / NET: 417.5 mL    Physical Exam    Heart - regular, no rub/gallop  Lungs - dec BS bases - poor inspiratory effort - improves with prompting; no rhonchi/wheeze  Abd - (+)BS soft NTND (-)r/r/g  Ext - warm to touch; no cyanosis/clubbing  Chest - incision site clean and dry  Neuro - alert/interactive - shakes head yes - when asked to speak just smiles at me; moving all extremities  Skin - no rash     LABS:                        9.2    13.59 )-----------( 493      ( 03 Dec 2019 09:31 )             30.4     12-03    152<H>  |  119<H>  |  33<H>  ----------------------------<  122<H>  4.1   |  22  |  1.14    Ca    8.2<L>      03 Dec 2019 02:07  Phos  2.5     12-03  Mg     2.5     12-03    TPro  5.3<L>  /  Alb  3.3  /  TBili  0.5  /  DBili  x   /  AST  19  /  ALT  23  /  AlkPhos  48  12-03    PT/INR - ( 03 Dec 2019 09:31 )   PT: 14.9 sec;   INR: 1.31     PTT - ( 03 Dec 2019 09:31 )  PTT:55.4 sec    ABG - ( 03 Dec 2019 09:30 ) 7.47/30/79/95    RADIOLOGY & ADDITIONAL STUDIES: reviewed    Sputum Cx 11/25: Klebsiella oxytoca/normal resp jazmín    Patient s/p emergent/salvage aortic dissection repair - post-op from 11/23 with expressive aphasia symptoms    1. CV  atrial fib   hemodynamically stable - Pepe started to drive up MAP in light of aphasia sxs per neuro recommendations  LA normal 0.6  Atrial Fib  - on amio load (transitioned to PO)  on heparin infusion (titrate per pTT) for afib AND UE clot noted on duplex  will given Dig dosing to assist in rate control     2. Pulm   recruitment maneuvers and serial bronchs performed   targeted Abx dick D#9/10 - to d/c tomorrow  extubated 12/1 - remains on NC (6L) - attempt to titrate down     3. Renal   emergent type A repair  Cr 1.13  hypernatremia - change free water to 250 QID  monitor UO/Lytes and Cr   CTa Abd 11/23: Right renal artery: Originates from the false lumen, diminished opacification. No occlusive thrombus versus dissection extending into the origin. Left renal artery: Originates from the true lumen and patent.    4. Neuro  post-op assessment - responsive and following simple commands, but ongoing expressive aphasia  CT Head reviewed and neuro following  non-focal    5. ID  Afebrile   ID following   meropenem started 11/25 - now D#7 - d/c - per d/w ID ([facility]) CTX for 3 more days (10days total)  sputum Cx 11/25 - polymicrobial - normal jazmín/klebsiella oxytoca -     formal S/S assessment pending prior to consideration of PO intake   DVT and GI prophylaxis  PT/OT    d/w family present at bedside; staff and CTS    I have spent/provided stated minutes of critical care time to this patient: 2 hour

## 2019-12-03 NOTE — PROGRESS NOTE ADULT - SUBJECTIVE AND OBJECTIVE BOX
CTICU  CRITICAL  CARE  attending     Hand off received 					   Pertinent clinical, laboratory, radiographic, hemodynamic, echocardiographic, respiratory data, microbiologic data and chart were reviewed and analyzed frequently throughout the course of the day and night    Patient seen and examined with CTS/ SH attending at bedside.    69 years old male with H/O colitis. He started having pain in the anterior cervical area around  1:00 AM. Simultaneously he had lower back pain. He felt dizzy and diaphoretic. He felt weakness in both lower extremities. No loss of consciousness.  He was brought in to [facility] emergency room by an ambulance. His BP was 60 by palpation and appeared cyanotic.   CT angio  showed   1. 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  2. Moderate hemopericardium.  3. Dissection extending into the right brachiocephalic artery though the right carotid artery and right subclavian artery remain patent off true lumen.  4. Dissection involving the proximal left subclavian artery which remains patent more distally.  5. The left carotid artery comes off the true lumen of the arch    He was emergently taken to the operating room and cardiopulmonary bypass instituted. Blood evacuated from the pericardium.  Ascending aorta and aortic root were replaced and coronary buttons were reimplanted.      HEALTH ISSUES - PROBLEM Dx:  Hypernatremia: Hypernatremia  Bacterial pneumonia: Bacterial pneumonia  Fever, postprocedural: Fever, postprocedural  FELY (acute kidney injury): FELY (acute kidney injury)      FAMILY HISTORY:  PAST MEDICAL & SURGICAL HISTORY:  Benign prostatic hypertrophy without lower urinary tract symptoms: BPH (benign prostatic hypertrophy)  Ulcerative colitis: Ulcerative colitis  States following surgery of eye and adnexa: straightened right eye  Other postprocedural status: History of hernia repair  Hemorrhoids: Hemorrhoids  Acute appendicitis with generalized peritonitis: Ruptured appendix    Patient is a 69y old  Male who presented with Aortic dissection.  S/P Repair of aortic dissection.    14 system review was unremarkable    Vital signs, hemodynamic and respiratory parameters were reviewed from the bedside nursing flow sheet.  ICU Vital Signs Last 24 Hrs  T(C): 37.3 (03 Dec 2019 22:25), Max: 37.3 (03 Dec 2019 09:00)  T(F): 99.2 (03 Dec 2019 22:25), Max: 99.2 (03 Dec 2019 14:00)  HR: 104 (03 Dec 2019 23:00) (74 - 132)  BP: 133/81 (03 Dec 2019 23:00) (89/55 - 133/81)  BP(mean): 97 (03 Dec 2019 23:00) (62 - 97)  ABP: 132/79 (03 Dec 2019 23:00) (84/56 - 142/86)  ABP(mean): 98 (03 Dec 2019 23:00) (64 - 106)  RR: 16 (03 Dec 2019 23:00) (13 - 26)  SpO2: 94% (03 Dec 2019 23:00) (92% - 99%)    Adult Advanced Hemodynamics Last 24 Hrs  CVP(mm Hg): --  CVP(cm H2O): --  CO: --  CI: --  PA: --  PA(mean): --  PCWP: --  SVR: --  SVRI: --  PVR: --  PVRI: --, ABG - ( 03 Dec 2019 15:42 )  pH, Arterial: 7.48  pH, Blood: x     /  pCO2: 34    /  pO2: 72    / HCO3: 24    / Base Excess: 0.9   /  SaO2: 94                  Intake and output was reviewed and the fluid balance was calculated  Daily     Daily   I&O's Summary    02 Dec 2019 07:01  -  03 Dec 2019 07:00  --------------------------------------------------------  IN: 2545 mL / OUT: 2415 mL / NET: 130 mL    03 Dec 2019 07:01  -  03 Dec 2019 23:17  --------------------------------------------------------  IN: 1487.1 mL / OUT: 1260 mL / NET: 227.1 mL        All lines and drain sites were assessed    Neuro: Follows commands. Moves all 4 extremities. Not able to reconstruct words and language all the time. Frequently uses sign language. Occasionally speaks full sentences. Vocal cord function is normal.  Neck: No JVD.  CVS: S1, S1, No S3.  Lungs: Good air entry bilaterally.  Abd: Soft. No tenderness. + Bowel sounds.  Vascular: + DP/PT.  Extremities: No edema.  Lymphatic: Normal.  Skin: No abnormalities.      labs  CBC Full  -  ( 03 Dec 2019 15:38 )  WBC Count : 15.96 K/uL  RBC Count : 3.46 M/uL  Hemoglobin : 9.6 g/dL  Hematocrit : 31.5 %  Platelet Count - Automated : 616 K/uL  Mean Cell Volume : 91.0 fl  Mean Cell Hemoglobin : 27.7 pg  Mean Cell Hemoglobin Concentration : 30.5 gm/dL  Auto Neutrophil # : x  Auto Lymphocyte # : x  Auto Monocyte # : x  Auto Eosinophil # : x  Auto Basophil # : x  Auto Neutrophil % : x  Auto Lymphocyte % : x  Auto Monocyte % : x  Auto Eosinophil % : x  Auto Basophil % : x    12-03    151<H>  |  116<H>  |  33<H>  ----------------------------<  131<H>  3.5   |  22  |  1.10    Ca    8.7      03 Dec 2019 15:38  Phos  2.5     12-03  Mg     2.6     12-03    TPro  6.2  /  Alb  3.8  /  TBili  0.5  /  DBili  x   /  AST  27  /  ALT  29  /  AlkPhos  61  12-03    PT/INR - ( 03 Dec 2019 15:38 )   PT: 15.0 sec;   INR: 1.32          PTT - ( 03 Dec 2019 15:38 )  PTT:58.6 sec  The current medications were reviewed   MEDICATIONS  (STANDING):  aMIOdarone    Tablet   Oral   aMIOdarone    Tablet 400 milliGRAM(s) Oral every 8 hours  aspirin  chewable 81 milliGRAM(s) Oral daily  atorvastatin 20 milliGRAM(s) Oral at bedtime  buDESOnide    Inhalation Suspension 0.5 milliGRAM(s) Inhalation every 12 hours  chlorhexidine 2% Cloths 1 Application(s) Topical daily  dextrose 50% Injectable 50 milliLiter(s) IV Push every 15 minutes  heparin  Infusion 1300 Unit(s)/Hr (13 mL/Hr) IV Continuous <Continuous>  lidocaine   Patch 1 Patch Transdermal daily  pantoprazole   Suspension 40 milliGRAM(s) Enteral Tube daily  phenylephrine    Infusion 0.4 MICROgram(s)/kG/Min (12.585 mL/Hr) IV Continuous <Continuous>  sodium chloride 0.9%. 1000 milliLiter(s) (10 mL/Hr) IV Continuous <Continuous>    MEDICATIONS  (PRN):  bisacodyl Suppository 10 milliGRAM(s) Rectal daily PRN Constipation            Patient is a 69y old  Male who presents with cardiogenic shock due to acute Type A aortic dissection with rupture and cardiac tamponade.  S/P Aortic Root Replacement  S/P BioBental and CABROL.  S/P replacement of ascending aorta and hemiarch.  Post operative course complicated by  renal insufficiency, Hematuria,  hypokalemia,  hypernatremia, klebsiella pneumonia, UE thrombosis, expressive aphasia.  CT head negative for acute infarct or bleed.    ACTIVE  ISSUES  Expressive aphasia  DVT  Hypernatremia.  Klebsiella pneumonia.  Hypokalemic metabolic acidosis.  Hemodynamically stable.  Fair  oxygenation.  Good urine out put.  Overall doing well.         My plan includes :  D/C antibiotics in AM. Ten days completed.  Statin and Betablocker.  Close hemodynamic, ventilatory and drain monitoring and management  Monitor for arrhythmias and monitor parameters for organ perfusion  Monitor neurologic status  Monitor renal function.  Head of the bed should remain elevated to 45 deg .   Chest PT and IS will be encouraged  Monitor adequacy of oxygenation and ventilation and attempt to wean oxygen  Nutritional goals will be met using po eventually , ensure adequate caloric intake and monitor the same  Stress ulcer and VTE prophylaxis will be achieved    Glycemic control is satisfactory  Electrolytes have been repleted as necessary and wound care has been carried out. Pain control has been achieved.   Aggressive physical therapy and early mobility and ambulation goals will be met   The family was updated about the course and plan  CRITICAL CARE TIME SPENT in evaluation and management, reassessments, review and interpretation of labs and x-rays, ventilator and hemodynamic management, formulating a plan and coordinating care: ___30____ MIN.  Time does not include procedural time.  CTICU ATTENDING     					    Giovanni Reis MD

## 2019-12-03 NOTE — SWALLOW FEES ASSESSMENT ADULT - RECOMMENDED FEEDING/EATING TECHNIQUES
rest breaks throughout meal/alternate food with liquid/position upright (90 degrees)/small sips/bites/maintain upright posture during/after eating for 30 mins/no straws

## 2019-12-03 NOTE — SWALLOW BEDSIDE ASSESSMENT ADULT - SLP PERTINENT HISTORY OF CURRENT PROBLEM
s/p aortic root replacement, s/p biobental and CABROL, s/p replacement of ascending aorta and hemiarch, post operative course complicated by renal insufficiency, hematuria, hypokalemia, hypernatremia, klebsiella PNA, LUE thrombosis

## 2019-12-03 NOTE — SWALLOW FEES ASSESSMENT ADULT - DIAGNOSTIC IMPRESSIONS
Pt presents with moderate pharyngeal dysphagia characterized by reduced bolus control, delayed initiation, reduced pharyngeal swallow efficiency and airway protection, and poor pharyngeal clearance. Penetration with suspected aspiration occurred during the swallow with penetration/aspiration after the swallow with thin and NTL. Pt was sensate to aspiration AEB immediate throat clearing. Vallecular residue with solids was largely reduced with the use of a liquid wash.

## 2019-12-03 NOTE — PROGRESS NOTE ADULT - SUBJECTIVE AND OBJECTIVE BOX
O/N Events: hypotensive, received albumin   Subjective:  bp improved to 125/65 mmhg in am  sitting in the chair, denies complaints, on 6 L NC  s/p left pigtail output ~ 800 cc/ Cr remained stable at 1.1/ hypernatremia worsening 152/ UOP 1.3 L   FB even for the past 24 hours     VITALS  Vital Signs Last 24 Hrs  T(C): 37.3 (03 Dec 2019 09:00), Max: 37.3 (03 Dec 2019 09:00)  T(F): 99.1 (03 Dec 2019 09:00), Max: 99.1 (03 Dec 2019 09:00)  HR: 124 (03 Dec 2019 12:00) (74 - 132)  BP: 94/62 (03 Dec 2019 08:00) (89/55 - 98/61)  BP(mean): 67 (03 Dec 2019 08:00) (62 - 72)  RR: 14 (03 Dec 2019 12:00) (13 - 23)  SpO2: 98% (03 Dec 2019 12:00) (91% - 99%)    PHYSICAL EXAM  General: alert and awake, sitting OOB in the chair on NC  Cardiac: S1, S2. RRR. No murmurs   Respiratory: CTAB/l  Abdomen: soft, NTND  Extremities: no LE edema b/l, UE edema improving   Neuro: AO x3, non focal    MEDICATIONS  (STANDING):  aMIOdarone    Tablet   Oral   aMIOdarone    Tablet 400 milliGRAM(s) Oral every 8 hours  aspirin  chewable 81 milliGRAM(s) Oral daily  atorvastatin 20 milliGRAM(s) Oral at bedtime  buDESOnide    Inhalation Suspension 0.5 milliGRAM(s) Inhalation every 12 hours  chlorhexidine 2% Cloths 1 Application(s) Topical daily  dextrose 50% Injectable 50 milliLiter(s) IV Push every 15 minutes  heparin  Infusion 1300 Unit(s)/Hr (13 mL/Hr) IV Continuous <Continuous>  lidocaine   Patch 1 Patch Transdermal daily  pantoprazole   Suspension 40 milliGRAM(s) Enteral Tube daily  phenylephrine    Infusion 0.5 MICROgram(s)/kG/Min (15.731 mL/Hr) IV Continuous <Continuous>  sodium chloride 0.9%. 1000 milliLiter(s) (10 mL/Hr) IV Continuous <Continuous>    MEDICATIONS  (PRN):  bisacodyl Suppository 10 milliGRAM(s) Rectal daily PRN Constipation      LABS                        9.2    13.59 )-----------( 493      ( 03 Dec 2019 09:31 )             30.4     12-03    152<H>  |  119<H>  |  31<H>  ----------------------------<  134<H>  4.0   |  21<L>  |  1.13    Ca    8.6      03 Dec 2019 09:31  Phos  2.6     12-03  Mg     2.8     12-03    TPro  5.9<L>  /  Alb  3.6  /  TBili  0.6  /  DBili  x   /  AST  31  /  ALT  25  /  AlkPhos  53  12-03    LIVER FUNCTIONS - ( 03 Dec 2019 09:31 )  Alb: 3.6 g/dL / Pro: 5.9 g/dL / ALK PHOS: 53 U/L / ALT: 25 U/L / AST: 31 U/L / GGT: x           PT/INR - ( 03 Dec 2019 09:31 )   PT: 14.9 sec;   INR: 1.31          PTT - ( 03 Dec 2019 09:31 )  PTT:55.4 sec

## 2019-12-03 NOTE — PROGRESS NOTE ADULT - ASSESSMENT
69 year old male, with PMHx of colitis, prostate surgery @ [facility], BIBA to [facility] ED complaining of back pain and profound hypotension, emergently brought to the OR for salvage Type A dissection repair ,s/p AVR, aortic arch repair and ascending arch replacement, now with fever, susp Aspiration pneumonia, Sputum culture grew Klebsiella

## 2019-12-04 LAB
ALBUMIN SERPL ELPH-MCNC: 3.3 G/DL — SIGNIFICANT CHANGE UP (ref 3.3–5)
ALBUMIN SERPL ELPH-MCNC: 3.7 G/DL — SIGNIFICANT CHANGE UP (ref 3.3–5)
ALP SERPL-CCNC: 60 U/L — SIGNIFICANT CHANGE UP (ref 40–120)
ALP SERPL-CCNC: 65 U/L — SIGNIFICANT CHANGE UP (ref 40–120)
ALT FLD-CCNC: 27 U/L — SIGNIFICANT CHANGE UP (ref 10–45)
ALT FLD-CCNC: 32 U/L — SIGNIFICANT CHANGE UP (ref 10–45)
ANION GAP SERPL CALC-SCNC: 10 MMOL/L — SIGNIFICANT CHANGE UP (ref 5–17)
ANION GAP SERPL CALC-SCNC: 13 MMOL/L — SIGNIFICANT CHANGE UP (ref 5–17)
APTT BLD: 51.9 SEC — HIGH (ref 27.5–36.3)
APTT BLD: 53.4 SEC — HIGH (ref 27.5–36.3)
AST SERPL-CCNC: 25 U/L — SIGNIFICANT CHANGE UP (ref 10–40)
AST SERPL-CCNC: 30 U/L — SIGNIFICANT CHANGE UP (ref 10–40)
BILIRUB SERPL-MCNC: 0.6 MG/DL — SIGNIFICANT CHANGE UP (ref 0.2–1.2)
BILIRUB SERPL-MCNC: 0.6 MG/DL — SIGNIFICANT CHANGE UP (ref 0.2–1.2)
BUN SERPL-MCNC: 28 MG/DL — HIGH (ref 7–23)
BUN SERPL-MCNC: 32 MG/DL — HIGH (ref 7–23)
CALCIUM SERPL-MCNC: 8.5 MG/DL — SIGNIFICANT CHANGE UP (ref 8.4–10.5)
CALCIUM SERPL-MCNC: 8.8 MG/DL — SIGNIFICANT CHANGE UP (ref 8.4–10.5)
CHLORIDE SERPL-SCNC: 118 MMOL/L — HIGH (ref 96–108)
CHLORIDE SERPL-SCNC: 118 MMOL/L — HIGH (ref 96–108)
CO2 SERPL-SCNC: 21 MMOL/L — LOW (ref 22–31)
CO2 SERPL-SCNC: 22 MMOL/L — SIGNIFICANT CHANGE UP (ref 22–31)
CREAT SERPL-MCNC: 0.99 MG/DL — SIGNIFICANT CHANGE UP (ref 0.5–1.3)
CREAT SERPL-MCNC: 1.12 MG/DL — SIGNIFICANT CHANGE UP (ref 0.5–1.3)
GAS PNL BLDA: SIGNIFICANT CHANGE UP
GAS PNL BLDA: SIGNIFICANT CHANGE UP
GLUCOSE SERPL-MCNC: 129 MG/DL — HIGH (ref 70–99)
GLUCOSE SERPL-MCNC: 173 MG/DL — HIGH (ref 70–99)
HCT VFR BLD CALC: 28.4 % — LOW (ref 39–50)
HCT VFR BLD CALC: 29.9 % — LOW (ref 39–50)
HGB BLD-MCNC: 8.8 G/DL — LOW (ref 13–17)
HGB BLD-MCNC: 9.2 G/DL — LOW (ref 13–17)
INR BLD: 1.35 — HIGH (ref 0.88–1.16)
INR BLD: 1.41 — HIGH (ref 0.88–1.16)
LACTATE SERPL-SCNC: 0.7 MMOL/L — SIGNIFICANT CHANGE UP (ref 0.5–2)
LACTATE SERPL-SCNC: 1.6 MMOL/L — SIGNIFICANT CHANGE UP (ref 0.5–2)
MAGNESIUM SERPL-MCNC: 2.4 MG/DL — SIGNIFICANT CHANGE UP (ref 1.6–2.6)
MAGNESIUM SERPL-MCNC: 2.5 MG/DL — SIGNIFICANT CHANGE UP (ref 1.6–2.6)
MCHC RBC-ENTMCNC: 28 PG — SIGNIFICANT CHANGE UP (ref 27–34)
MCHC RBC-ENTMCNC: 28.3 PG — SIGNIFICANT CHANGE UP (ref 27–34)
MCHC RBC-ENTMCNC: 30.8 GM/DL — LOW (ref 32–36)
MCHC RBC-ENTMCNC: 31 GM/DL — LOW (ref 32–36)
MCV RBC AUTO: 91.2 FL — SIGNIFICANT CHANGE UP (ref 80–100)
MCV RBC AUTO: 91.3 FL — SIGNIFICANT CHANGE UP (ref 80–100)
NRBC # BLD: 0 /100 WBCS — SIGNIFICANT CHANGE UP (ref 0–0)
NRBC # BLD: 0 /100 WBCS — SIGNIFICANT CHANGE UP (ref 0–0)
PHOSPHATE SERPL-MCNC: 2.3 MG/DL — LOW (ref 2.5–4.5)
PHOSPHATE SERPL-MCNC: 3.6 MG/DL — SIGNIFICANT CHANGE UP (ref 2.5–4.5)
PLATELET # BLD AUTO: 580 K/UL — HIGH (ref 150–400)
PLATELET # BLD AUTO: 583 K/UL — HIGH (ref 150–400)
POTASSIUM SERPL-MCNC: 3.6 MMOL/L — SIGNIFICANT CHANGE UP (ref 3.5–5.3)
POTASSIUM SERPL-MCNC: 4.1 MMOL/L — SIGNIFICANT CHANGE UP (ref 3.5–5.3)
POTASSIUM SERPL-SCNC: 3.6 MMOL/L — SIGNIFICANT CHANGE UP (ref 3.5–5.3)
POTASSIUM SERPL-SCNC: 4.1 MMOL/L — SIGNIFICANT CHANGE UP (ref 3.5–5.3)
PROT SERPL-MCNC: 5.5 G/DL — LOW (ref 6–8.3)
PROT SERPL-MCNC: 5.9 G/DL — LOW (ref 6–8.3)
PROTHROM AB SERPL-ACNC: 15.4 SEC — HIGH (ref 10–12.9)
PROTHROM AB SERPL-ACNC: 16.1 SEC — HIGH (ref 10–12.9)
RBC # BLD: 3.11 M/UL — LOW (ref 4.2–5.8)
RBC # BLD: 3.28 M/UL — LOW (ref 4.2–5.8)
RBC # FLD: 15.9 % — HIGH (ref 10.3–14.5)
RBC # FLD: 16 % — HIGH (ref 10.3–14.5)
SODIUM SERPL-SCNC: 150 MMOL/L — HIGH (ref 135–145)
SODIUM SERPL-SCNC: 152 MMOL/L — HIGH (ref 135–145)
WBC # BLD: 16.88 K/UL — HIGH (ref 3.8–10.5)
WBC # BLD: 17.51 K/UL — HIGH (ref 3.8–10.5)
WBC # FLD AUTO: 16.88 K/UL — HIGH (ref 3.8–10.5)
WBC # FLD AUTO: 17.51 K/UL — HIGH (ref 3.8–10.5)

## 2019-12-04 PROCEDURE — 71045 X-RAY EXAM CHEST 1 VIEW: CPT | Mod: 26

## 2019-12-04 PROCEDURE — 99291 CRITICAL CARE FIRST HOUR: CPT

## 2019-12-04 PROCEDURE — 99233 SBSQ HOSP IP/OBS HIGH 50: CPT

## 2019-12-04 PROCEDURE — 99292 CRITICAL CARE ADDL 30 MIN: CPT

## 2019-12-04 RX ORDER — DONEPEZIL HYDROCHLORIDE 10 MG/1
5 TABLET, FILM COATED ORAL AT BEDTIME
Refills: 0 | Status: DISCONTINUED | OUTPATIENT
Start: 2019-12-04 | End: 2019-12-05

## 2019-12-04 RX ORDER — POTASSIUM PHOSPHATE, MONOBASIC POTASSIUM PHOSPHATE, DIBASIC 236; 224 MG/ML; MG/ML
30 INJECTION, SOLUTION INTRAVENOUS ONCE
Refills: 0 | Status: COMPLETED | OUTPATIENT
Start: 2019-12-04 | End: 2019-12-04

## 2019-12-04 RX ORDER — POTASSIUM CHLORIDE 20 MEQ
20 PACKET (EA) ORAL ONCE
Refills: 0 | Status: COMPLETED | OUTPATIENT
Start: 2019-12-04 | End: 2019-12-04

## 2019-12-04 RX ADMIN — ATORVASTATIN CALCIUM 20 MILLIGRAM(S): 80 TABLET, FILM COATED ORAL at 21:23

## 2019-12-04 RX ADMIN — AMIODARONE HYDROCHLORIDE 400 MILLIGRAM(S): 400 TABLET ORAL at 06:39

## 2019-12-04 RX ADMIN — LIDOCAINE 1 PATCH: 4 CREAM TOPICAL at 19:17

## 2019-12-04 RX ADMIN — PHENYLEPHRINE HYDROCHLORIDE 12.59 MICROGRAM(S)/KG/MIN: 10 INJECTION INTRAVENOUS at 10:17

## 2019-12-04 RX ADMIN — LIDOCAINE 1 PATCH: 4 CREAM TOPICAL at 13:41

## 2019-12-04 RX ADMIN — POTASSIUM PHOSPHATE, MONOBASIC POTASSIUM PHOSPHATE, DIBASIC 83.33 MILLIMOLE(S): 236; 224 INJECTION, SOLUTION INTRAVENOUS at 06:39

## 2019-12-04 RX ADMIN — Medication 100 MILLIEQUIVALENT(S): at 06:39

## 2019-12-04 RX ADMIN — HEPARIN SODIUM 13 UNIT(S)/HR: 5000 INJECTION INTRAVENOUS; SUBCUTANEOUS at 10:17

## 2019-12-04 RX ADMIN — AMIODARONE HYDROCHLORIDE 400 MILLIGRAM(S): 400 TABLET ORAL at 21:23

## 2019-12-04 RX ADMIN — CHLORHEXIDINE GLUCONATE 1 APPLICATION(S): 213 SOLUTION TOPICAL at 06:38

## 2019-12-04 RX ADMIN — Medication 0.5 MILLIGRAM(S): at 10:16

## 2019-12-04 RX ADMIN — AMIODARONE HYDROCHLORIDE 400 MILLIGRAM(S): 400 TABLET ORAL at 13:41

## 2019-12-04 RX ADMIN — DONEPEZIL HYDROCHLORIDE 5 MILLIGRAM(S): 10 TABLET, FILM COATED ORAL at 21:23

## 2019-12-04 RX ADMIN — LIDOCAINE 1 PATCH: 4 CREAM TOPICAL at 00:00

## 2019-12-04 RX ADMIN — PANTOPRAZOLE SODIUM 40 MILLIGRAM(S): 20 TABLET, DELAYED RELEASE ORAL at 13:41

## 2019-12-04 RX ADMIN — Medication 81 MILLIGRAM(S): at 13:40

## 2019-12-04 NOTE — PROGRESS NOTE ADULT - SUBJECTIVE AND OBJECTIVE BOX
Neurology Stroke Progress Note    INTERVAL HPI/OVERNIGHT EVENTS:  Patient seen and examined this evening. Nods and shakes head appropriately but did not speak, except once to say "stop annoying me".     MEDICATIONS  (STANDING):  aMIOdarone    Tablet   Oral   aMIOdarone    Tablet 400 milliGRAM(s) Oral every 8 hours  aspirin  chewable 81 milliGRAM(s) Oral daily  atorvastatin 20 milliGRAM(s) Oral at bedtime  buDESOnide    Inhalation Suspension 0.5 milliGRAM(s) Inhalation every 12 hours  chlorhexidine 2% Cloths 1 Application(s) Topical daily  dextrose 50% Injectable 50 milliLiter(s) IV Push every 15 minutes  donepezil 5 milliGRAM(s) Oral at bedtime  heparin  Infusion 1300 Unit(s)/Hr (13 mL/Hr) IV Continuous <Continuous>  lidocaine   Patch 1 Patch Transdermal daily  pantoprazole   Suspension 40 milliGRAM(s) Enteral Tube daily  phenylephrine    Infusion 0.4 MICROgram(s)/kG/Min (12.585 mL/Hr) IV Continuous <Continuous>  sodium chloride 0.9%. 1000 milliLiter(s) (10 mL/Hr) IV Continuous <Continuous>    MEDICATIONS  (PRN):  bisacodyl Suppository 10 milliGRAM(s) Rectal daily PRN Constipation      Allergies    penicillin (Unknown)    Intolerances        ROS: As per HPI, otherwise negative    Vital Signs Last 24 Hrs  T(C): 37.4 (04 Dec 2019 17:11), Max: 37.9 (04 Dec 2019 05:01)  T(F): 99.4 (04 Dec 2019 17:11), Max: 100.2 (04 Dec 2019 05:01)  HR: 76 (04 Dec 2019 20:00) (68 - 109)  BP: 164/76 (04 Dec 2019 00:00) (113/66 - 164/76)  BP(mean): 107 (04 Dec 2019 00:00) (92 - 107)  RR: 19 (04 Dec 2019 20:00) (14 - 29)  SpO2: 98% (04 Dec 2019 20:00) (89% - 98%)    Physical exam:  General: awake and alert, sitting comfortably, no acute distress    Neurologic:  Mental status: awake, alert, followed all commands, nods appropriately to questions (like his job). Said "stop annoying me", no dysarthria.  Cranial nerves:   VII: no facial droop  Motor: Normal bulk and tone, strength 5/5 in b/l UE and LE,  strength 5/5. No pronator drift  Sensation: intact to light touch. No neglect.            LABS:                        9.2    17.51 )-----------( 583      ( 04 Dec 2019 13:22 )             29.9     12-04    152<H>  |  118<H>  |  28<H>  ----------------------------<  173<H>  4.1   |  21<L>  |  0.99    Ca    8.8      04 Dec 2019 13:22  Phos  3.6     12-04  Mg     2.4     12-04    TPro  5.9<L>  /  Alb  3.7  /  TBili  0.6  /  DBili  x   /  AST  30  /  ALT  32  /  AlkPhos  65  12-04    PT/INR - ( 04 Dec 2019 13:22 )   PT: 16.1 sec;   INR: 1.41          PTT - ( 04 Dec 2019 13:22 )  PTT:51.9 sec      RADIOLOGY & ADDITIONAL TESTS:  no new cerebral imaging    Assessment and Plan  69y Male PMH colitis, prostate surgery  presented with back pain and profound hypotension, found to have a Type A aortic dissection. Patient s/p emergent salvage AVR(Bio)/root-hemiarch replacement 11/23. Patient's course was complicated by hemodynamic instability, aspiration pneumonia, acute respiratory distress, prolonged intubation, pleural effusion, Left cephalic vein DVT with extension to subclavian, and afib with RVR. Throughout his course, patient was neurologically nonfocal and able to follow commands and nonverbally respond appropriately. Patient was extubated 12/1 and was noted to have expressive aphasia for which stroke neurology was consulted. HCT was obtained and showed hypodensities in the subcortical bilateral frontal lobes. Stroke etiology likely hypoperfusion secondary to cardiac arrest given bilateral frontal watershed hypodensities and recent hospital course, less likely cardioembolic or thrombotic.     1)Secondary stroke prevention  -heparin drip, ASA per CTICU  -recommend Atorvastatin 80    2)  Further workup   - MAP goal okay to be lowered to 90  -Recommend starting Donepezil  -Continue encourage singing   -Inpatient management per CTICU    DVT prophylaxis   -Heparin drip and SCDs

## 2019-12-04 NOTE — OCCUPATIONAL THERAPY INITIAL EVALUATION ADULT - ADDITIONAL COMMENTS
PLOF and social history obtained from wife at bedside 2/2 patient with expressive aphasia. Per wife, patient was fully independent PTA without use of AE/AD. Patient works and drives.

## 2019-12-04 NOTE — PROGRESS NOTE ADULT - ASSESSMENT
69 year old male, with PMHx of colitis, prostate surgery @ [facility], BIBA to [facility] ED complaining of back pain and profound hypotension, emergently brought to the OR for salvage Type A dissection repair ,s/p AVR, aortic arch repair and ascending arch replacement, post-op  fever, susp Aspiration pneumonia, Sputum culture grew Klebsiella, now off antibiotics

## 2019-12-04 NOTE — PROGRESS NOTE ADULT - SUBJECTIVE AND OBJECTIVE BOX
CTICU  CRITICAL  CARE  attending     Hand off received 					   Pertinent clinical, laboratory, radiographic, hemodynamic, echocardiographic, respiratory data, microbiologic data and chart were reviewed and analyzed frequently throughout the course of the day and night  Patient seen and examined with CTS/ SH attending at bedside    Pt is a 69y , Male, s/p emergent Type A dissection repair;    post op:  intubated for extended period of time  now on a small of neosynephrine to maintain MAPS > 80  Hypernatremia: Hypernatremia  Bacterial pneumonia: Bacterial pneumonia  Fever, postprocedural: Fever, postprocedural  FELY (acute kidney injury): FELY (acute kidney injury)      , FAMILY HISTORY:  PAST MEDICAL & SURGICAL HISTORY:  Benign prostatic hypertrophy without lower urinary tract symptoms: BPH (benign prostatic hypertrophy)  Ulcerative colitis: Ulcerative colitis  States following surgery of eye and adnexa: straightened right eye  Other postprocedural status: History of hernia repair  Hemorrhoids: Hemorrhoids  Acute appendicitis with generalized peritonitis: Ruptured appendix    Patient is a 69y old  Male who presents with a chief complaint of Aortic dissection (04 Dec 2019 22:45)      14 system review was unremarkable  acute changes include acute respiratory failure  Vital signs, hemodynamic and respiratory parameters were reviewed from the bedside nursing flowsheet.  ICU Vital Signs Last 24 Hrs  T(C): 36.9 (05 Dec 2019 01:01), Max: 37.9 (04 Dec 2019 05:01)  T(F): 98.5 (05 Dec 2019 01:01), Max: 100.2 (04 Dec 2019 05:01)  HR: 68 (05 Dec 2019 01:00) (68 - 82)  BP: 142/79 (04 Dec 2019 21:00) (142/79 - 142/79)  BP(mean): 98 (04 Dec 2019 21:00) (98 - 98)  ABP: 152/70 (05 Dec 2019 01:00) (132/66 - 164/86)  ABP(mean): 100 (05 Dec 2019 01:00) (90 - 116)  RR: 15 (05 Dec 2019 01:00) (13 - 29)  SpO2: 95% (05 Dec 2019 01:00) (89% - 98%)    Adult Advanced Hemodynamics Last 24 Hrs  CVP(mm Hg): --  CVP(cm H2O): --  CO: --  CI: --  PA: --  PA(mean): --  PCWP: --  SVR: --  SVRI: --  PVR: --  PVRI: --, ABG - ( 04 Dec 2019 13:17 )  pH, Arterial: 7.46  pH, Blood: x     /  pCO2: 33    /  pO2: 70    / HCO3: 23    / Base Excess: -0.1  /  SaO2: 94                  Intake and output was reviewed and the fluid balance was calculated  Daily Height in cm: 182.88 (04 Dec 2019 08:00)    Daily   I&O's Summary    03 Dec 2019 07:01  -  04 Dec 2019 07:00  --------------------------------------------------------  IN: 2383.1 mL / OUT: 1930 mL / NET: 453.1 mL    04 Dec 2019 07:01  -  05 Dec 2019 01:35  --------------------------------------------------------  IN: 760.4 mL / OUT: 1040 mL / NET: -279.6 mL        All lines and drain sites were assessed  Glycemic trend was reviewedCAPILLARY BLOOD GLUCOSE        No acute change in mental status  Auscultation of the chest reveals equal bs  Abdomen is soft  Extremities are warm and well perfused  Wounds appear clean and unremarkable  Antibiotics are periop    labs  CBC Full  -  ( 04 Dec 2019 13:22 )  WBC Count : 17.51 K/uL  RBC Count : 3.28 M/uL  Hemoglobin : 9.2 g/dL  Hematocrit : 29.9 %  Platelet Count - Automated : 583 K/uL  Mean Cell Volume : 91.2 fl  Mean Cell Hemoglobin : 28.0 pg  Mean Cell Hemoglobin Concentration : 30.8 gm/dL  Auto Neutrophil # : x  Auto Lymphocyte # : x  Auto Monocyte # : x  Auto Eosinophil # : x  Auto Basophil # : x  Auto Neutrophil % : x  Auto Lymphocyte % : x  Auto Monocyte % : x  Auto Eosinophil % : x  Auto Basophil % : x    12-04    152<H>  |  118<H>  |  28<H>  ----------------------------<  173<H>  4.1   |  21<L>  |  0.99    Ca    8.8      04 Dec 2019 13:22  Phos  3.6     12-04  Mg     2.4     12-04    TPro  5.9<L>  /  Alb  3.7  /  TBili  0.6  /  DBili  x   /  AST  30  /  ALT  32  /  AlkPhos  65  12-04    PT/INR - ( 04 Dec 2019 13:22 )   PT: 16.1 sec;   INR: 1.41          PTT - ( 04 Dec 2019 13:22 )  PTT:51.9 sec  The current medications were reviewed   MEDICATIONS  (STANDING):  aMIOdarone    Tablet   Oral   aMIOdarone    Tablet 400 milliGRAM(s) Oral every 8 hours  aMIOdarone    Tablet 200 milliGRAM(s) Oral daily  aspirin  chewable 81 milliGRAM(s) Oral daily  atorvastatin 20 milliGRAM(s) Oral at bedtime  buDESOnide    Inhalation Suspension 0.5 milliGRAM(s) Inhalation every 12 hours  chlorhexidine 2% Cloths 1 Application(s) Topical daily  dextrose 50% Injectable 50 milliLiter(s) IV Push every 15 minutes  donepezil 5 milliGRAM(s) Oral at bedtime  heparin  Infusion 1300 Unit(s)/Hr (13 mL/Hr) IV Continuous <Continuous>  lidocaine   Patch 1 Patch Transdermal daily  pantoprazole   Suspension 40 milliGRAM(s) Enteral Tube daily  phenylephrine    Infusion 0.4 MICROgram(s)/kG/Min (12.585 mL/Hr) IV Continuous <Continuous>  sodium chloride 0.9%. 1000 milliLiter(s) (10 mL/Hr) IV Continuous <Continuous>    MEDICATIONS  (PRN):  bisacodyl Suppository 10 milliGRAM(s) Rectal daily PRN Constipation       PROBLEM LIST/ ASSESSMENT:  HEALTH ISSUES - PROBLEM Dx:  Hypernatremia: Hypernatremia  Bacterial pneumonia: Bacterial pneumonia  Fever, postprocedural: Fever, postprocedural  FELY (acute kidney injury): FELY (acute kidney injury)      ,   Patient is a 69y old  Male who presents with a chief complaint of Aortic dissection (04 Dec 2019 22:45)     s/p emergent type A dissection repair  acute changes include acute respiratory failure    My plan includes :  close hemodynamic, ventilatory and drain monitoring and management per post op routine    Monitor for arrhythmias and monitor parameters for organ perfusion  monitor neurologic status  Head of the bed should remain elevated to 45 deg .   chest PT and IS will be encouraged  monitor adequacy of oxygenation and ventilation and attempt to wean oxygen  Nutritional goals will be met using po eventually , ensure adequate caloric intake and montior the same  Stress ulcer and VTE prophylaxis will be achieved    Glycemic control is satisfactory  Electrolytes have been repleted as necessary and wound care has been carried out. Pain control has been achieved.   agressive physical therapy and early mobility and ambulation goals will be met   The family was updated about the course and plan  CRITICAL CARE TIME SPENT in evaluation and management, reassessments, review and interpretation of labs and x-rays, ventilator and hemodynamic management, formulating a plan and coordinating care: ___30____ MIN.  Time does not include procedural time.  CTICU ATTENDING     					    Luis Loving MD

## 2019-12-04 NOTE — PROGRESS NOTE ADULT - SUBJECTIVE AND OBJECTIVE BOX
INTERVAL HPI/OVERNIGHT EVENTS:    completed 10days culture directed Abx - Meropenem/Ceftriaxone    11/23: emergent salvage AVR(Bio)/root-hemiarch replacement  EF 60%    68yo Male with Hx Ulcerative colitis, BPH/prostate surgery presenting with back pain/neck pain and weakness  and profound weakness     found profoundly hypotensive on assessment - SBP 40's with cyanosis     CT scan: Type A aortic dissection  pressors started/intubated - CT surgery was called and emergently transferred - taken to OR    to OR 11/23:   intraop: 2.5 L Crystalloid/6 U pRBC/4 FFP/10 Cryo/1000 FEIBA  arrived on Epi/levo/vaso    atel on xray - bronch performed 11/24  LA normalized  and pressors titrate down     fever noted post-op - Cx sent - started presumptively on Meropenem 11/25. ID (Zlantanic)  ongoing diuresis - lasix infusion started - held this am     11/26 - off pepe and Juan M off  diuresis with improvement in Fi02 requirements - currently 40%  tolerated some early mobility     11/27: Doppler LUE: Thrombus in the left cephalic vein protruding into the subclavian vein. Remainder of subclavian vein is patent. Thrombus in the left basilic with overlying soft tissue swelling.    11/29 - atrial fib/RVR - Amiodarone IV transitioned to po load  heparin infusion started for Fib and LUE clot    new midline placed 11/29  had been in sinus - Afib returned 12/2 4p  Extubated to HFNC 12/1    some concern for expressive aphasia post extubation  CT Head 12/2: No acute intracranial hemorrhage or transcortical infarct. Gray-white matter hypodensities in the subcortical bilateral frontal lobes which may reflect age-indeterminate ischemia, infection, inflammation amongst other etiologies. No acute events reported overnight     Neuro assessment:  hypodensities in the subcortical bilateral frontal lobes. Stroke etiology likely hypoperfusion secondary to cardiac arrest given bilateral frontal watershed hypodensities and recent hospital course, less likely cardioembolic or thrombotic.     remains on heparin infusion (pTT 55.4)  ENT evaluated for aphonia - assessment revealed able to phonate when provoked; good VC movement on direct visualization     speech reportedly improved over course of day/night   remains in fib  passed formal S/S assessment - mechanical soft with honey thick fluids - diet changed accordingly     ongoing expressive aphasia/apraxia - seen by Neuro and after review with Neuro/CTS - decision made to drive MAP up   Pepe initiated 12/3    ongoing intermittent   no acute events reported overnight - placed on HFNC overnight and remains on this am         PMHx includes but is not limited to:   BPH   Ulcerative colitis: Ulcerative colitis  States following surgery of eye and adnexa: straightened right eye  Other postprocedural status: History of hernia repair  Hemorrhoids: Hemorrhoids  Acute appendicitis with generalized peritonitis: Ruptured appendix        ICU Vital Signs Last 24 Hrs  T(C): 37.9 (04 Dec 2019 05:01), Max: 37.9 (04 Dec 2019 05:01)  T(F): 100.2 (04 Dec 2019 05:01), Max: 100.2 (04 Dec 2019 05:01)  HR: 76 (04 Dec 2019 09:00) (68 - 128)  BP: 164/76 (04 Dec 2019 00:00) (113/66 - 164/76)  BP(mean): 107 (04 Dec 2019 00:00) (92 - 107)  ABP: 154/80 (04 Dec 2019 09:00) (96/58 - 160/80)  ABP(mean): 108 (04 Dec 2019 09:00) (72 - 108)  RR: 19 (04 Dec 2019 09:00) (14 - 26)  SpO2: 95% (04 Dec 2019 09:00) (89% - 98%)    Qtts:     I&O's Summary    03 Dec 2019 07:01  -  04 Dec 2019 07:00  --------------------------------------------------------  IN: 2383.1 mL / OUT: 1930 mL / NET: 453.1 mL    04 Dec 2019 07:01  -  04 Dec 2019 09:28  --------------------------------------------------------  IN: 35.6 mL / OUT: 0 mL / NET: 35.6 mL            Physical Exam    Heart  Lungs  Abd  Ext  Chest  Neuro  Skin    LABS:                        8.8    16.88 )-----------( 580      ( 04 Dec 2019 03:31 )             28.4     12-04    150<H>  |  118<H>  |  32<H>  ----------------------------<  129<H>  3.6   |  22  |  1.12    Ca    8.5      04 Dec 2019 03:31  Phos  2.3     12-04  Mg     2.5     12-04    TPro  5.5<L>  /  Alb  3.3  /  TBili  0.6  /  DBili  x   /  AST  25  /  ALT  27  /  AlkPhos  60  12-04    PT/INR - ( 04 Dec 2019 03:31 )   PT: 15.4 sec;   INR: 1.35          PTT - ( 04 Dec 2019 03:31 )  PTT:53.4 sec    ABG - ( 04 Dec 2019 03:29 )  pH, Arterial: 7.44  pH, Blood: x     /  pCO2: 36    /  pO2: 74    / HCO3: 24    / Base Excess: -0.1  /  SaO2: 94                  RADIOLOGY & ADDITIONAL STUDIES:    I have spent/provided stated minutes of critical care time to this patient: INTERVAL HPI/OVERNIGHT EVENTS:    completed 10days culture directed Abx - Meropenem/Ceftriaxone    11/23: emergent salvage AVR(Bio)/root-hemiarch replacement  EF 60%    70yo Male with Hx Ulcerative colitis, BPH/prostate surgery presenting with back pain/neck pain and weakness  and profound weakness     found profoundly hypotensive on assessment - SBP 40's with cyanosis     CT scan: Type A aortic dissection  pressors started/intubated - CT surgery was called and emergently transferred - taken to OR    to OR 11/23:   intraop: 2.5 L Crystalloid/6 U pRBC/4 FFP/10 Cryo/1000 FEIBA  arrived on Epi/levo/vaso    atel on xray - bronch performed 11/24  LA normalized  and pressors titrate down     fever noted post-op - Cx sent - started presumptively on Meropenem 11/25. ID (Zlantanic)  ongoing diuresis - lasix infusion started - held this am     11/26 - off pepe and Juan M off  diuresis with improvement in Fi02 requirements - currently 40%  tolerated some early mobility     11/27: Doppler LUE: Thrombus in the left cephalic vein protruding into the subclavian vein. Remainder of subclavian vein is patent. Thrombus in the left basilic with overlying soft tissue swelling.    11/29 - atrial fib/RVR - Amiodarone IV transitioned to po load  heparin infusion started for Fib and LUE clot    new midline placed 11/29  had been in sinus - Afib returned 12/2 4p  Extubated to HFNC 12/1    some concern for expressive aphasia post extubation  CT Head 12/2: No acute intracranial hemorrhage or transcortical infarct. Gray-white matter hypodensities in the subcortical bilateral frontal lobes which may reflect age-indeterminate ischemia, infection, inflammation amongst other etiologies. No acute events reported overnight     Neuro assessment:  hypodensities in the subcortical bilateral frontal lobes. Stroke etiology likely hypoperfusion secondary to cardiac arrest given bilateral frontal watershed hypodensities and recent hospital course, less likely cardioembolic or thrombotic.     remains on heparin infusion (pTT 55.4)  ENT evaluated for aphonia - assessment revealed able to phonate when provoked; good VC movement on direct visualization     speech reportedly improved over course of day/night   remains in fib  passed formal S/S assessment - mechanical soft with honey thick fluids - diet changed accordingly     ongoing expressive aphasia/apraxia - seen by Neuro and after review with Neuro/CTS - decision made to drive MAP up   Pepe initiated 12/3 - remains on 0.4 Pepe for BP/MAP parameters    no acute events reported overnight - placed on HFNC overnight and remains on this am - transitioned again to NC  still with aphasia/apraxia - though intermittently answers question appropriately by report    PMHx includes but is not limited to:   BPH   Ulcerative colitis: Ulcerative colitis  States following surgery of eye and adnexa: straightened right eye  Other postprocedural status: History of hernia repair  Hemorrhoids: Hemorrhoids  Acute appendicitis with generalized peritonitis: Ruptured appendix    ICU Vital Signs Last 24 Hrs  T(C): 37.9 (04 Dec 2019 05:01), Max: 37.9 (04 Dec 2019 05:01)  T(F): 100.2 (04 Dec 2019 05:01), Max: 100.2 (04 Dec 2019 05:01)  HR: 76 (04 Dec 2019 09:00) (68 - 128) sinus  BP: 164/76 (04 Dec 2019 00:00) (113/66 - 164/76)  BP(mean): 107 (04 Dec 2019 00:00) (92 - 107)  ABP: 154/80 (04 Dec 2019 09:00) (96/58 - 160/80)  ABP(mean): 108 (04 Dec 2019 09:00) (72 - 108)  SpO2: 95% (04 Dec 2019 09:00) (89% - 98%) HFNC    Qtts:   Heparin 1300U/Hr  Pepe 0.4    I&O's Summary    03 Dec 2019 07:01  -  04 Dec 2019 07:00  --------------------------------------------------------  IN: 2383.1 mL / OUT: 1930 mL / NET: 453.1 mL    04 Dec 2019 07:01  -  04 Dec 2019 09:28  --------------------------------------------------------  IN: 35.6 mL / OUT: 0 mL / NET: 35.6 mL    Physical Exam    Heart - regular (-)rub/gallop  Lungs - dec BS bases; no rhonchi/wheeze on exam   Abd - (+)BS - soft NTND (-)r/r/g  Ext - warm to touch; no cyanosis/clubbing  Chest - incision site clean and dry  Neuro - alert - non-focal ; ongoing aphasia/apraxia - did not speak for me - reports on intermittent non-slurred speech when speaking by report  Skin - no rash     LABS:                        8.8    16.88 )-----------( 580      ( 04 Dec 2019 03:31 )             28.4     12-04    150<H>  |  118<H>  |  32<H>  ----------------------------<  129<H>  3.6   |  22  |  1.12    Ca    8.5      04 Dec 2019 03:31  Phos  2.3     12-04  Mg     2.5     12-04    TPro  5.5<L>  /  Alb  3.3  /  TBili  0.6  /  DBili  x   /  AST  25  /  ALT  27  /  AlkPhos  60  12-04    PT/INR - ( 04 Dec 2019 03:31 )   PT: 15.4 sec;   INR: 1.35     PTT - ( 04 Dec 2019 03:31 )  PTT:53.4 sec    ABG - ( 04 Dec 2019 03:29 ) 7.44/36/74/94    RADIOLOGY & ADDITIONAL STUDIES: reviewed - plan bedside sono of right - assess effusion v atel    Sputum Cx 11/25: Klebsiella oxytoca/normal resp jazmín    Patient s/p emergent/salvage aortic dissection repair - post-op from 11/23 with expressive aphasia symptoms    1. CV  atrial fib   hemodynamically stable - Pepe started to drive up MAP in light of aphasia sxs per neuro recommendations  LA normal 0.6  Atrial Fib  - on amio load (transitioned to PO)  on heparin infusion (titrate per pTT) for afib AND UE clot noted on duplex  will given Dig dosing to assist in rate control     2. Pulm   recruitment maneuvers and serial bronchs performed   completed 10day Cx directed Abx today  extubated 12/1 - remains on HFNC - transitioned back to 6L  PT/OT ambulation   plan U/S bedside this am     3. Renal   emergent type A repair  Cr 1.12  hypernatremia - change free water to 250 QID  monitor UO/Lytes and Cr   CTa Abd 11/23: Right renal artery: Originates from the false lumen, diminished opacification. No occlusive thrombus versus dissection extending into the origin. Left renal artery: Originates from the true lumen and patent.    4. Neuro  post-op assessment - responsive and following simple commands, but ongoing expressive aphasia  CT Head reviewed and neuro following  non-focal - ongoing expressive aphasia/apraxia   maintain MAP higher - pepe as needed     5. ID  Afebrile   ID following   meropenem started 11/25 - now D#7 - d/c - per d/w ID ([facility]) CTX for 3 more days (10days total)  sputum Cx 11/25 - polymicrobial - normal jazmín/klebsiella oxytoca -     formal S/S assessment pending prior to consideration of PO intake   DVT and GI prophylaxis  PT/OT    d/w family present at bedside; staff and CTS    I have spent/provided stated minutes of critical care time to this patient: 90 min

## 2019-12-04 NOTE — OCCUPATIONAL THERAPY INITIAL EVALUATION ADULT - PERTINENT HX OF CURRENT PROBLEM, REHAB EVAL
Throughout patient was neurologically nonfocal and able to follow commands and nonverbally respond appropriately. Patient was extubated 12/1 and was noted to have expressive aphasia for which stroke neurology was consulted. HCT was obtained and showed hypodensities in the subcortical bilateral frontal lobes. Stroke etiology likely hypoperfusion secondary to cardiac arrest given bilateral frontal watershed hypodensities and recent hospital course, less likely cardioembolic or thrombotic.

## 2019-12-04 NOTE — OCCUPATIONAL THERAPY INITIAL EVALUATION ADULT - RANGE OF MOTION EXAMINATION
CN Testing: b/l frontalis intact; b/l buccinator intact; smile symmetrical; tongue protrusion to L; b/l eyes open/close intact; b/l shoulder elevation intact

## 2019-12-04 NOTE — CHART NOTE - NSCHARTNOTEFT_GEN_A_CORE
Exam:  US Chest    Procedure Date: 12/4/19    History: 69y Male whose CXR today shows a possible right sided pleural effusion.  Pt is POD #11 from repair type A dissection, aortic root replacement/reconstruction, cabrol, replacement ascending aorta/hemiarch, frozen elephant trunk.       Findings:                    Evaluation of the right side of the thoracic cavity demonstrates                   small  pleural effusion    Impression:  small pleural effusion

## 2019-12-04 NOTE — OCCUPATIONAL THERAPY INITIAL EVALUATION ADULT - MD ORDER
This is a 69 year old male, with PMHx of colitis, prostate surgery who presented with back pain and profound hypotension, found to have a Type A aortic dissection. Patient s/p emergent salvage AVR(Bio)/root-hemiarch replacement 11/23. Patient's course was complicated by hemodynamic instability, aspiration pneumonia, acute respiratory distress, prolonged intubation, pleural effusion, Left cephalic vein DVT with extension to subclavian, and afib with RVR.

## 2019-12-04 NOTE — OCCUPATIONAL THERAPY INITIAL EVALUATION ADULT - PLANNED THERAPY INTERVENTIONS, OT EVAL
cognitive, visual perceptual/fine motor coordination training/transfer training/ADL retraining/balance training

## 2019-12-04 NOTE — OCCUPATIONAL THERAPY INITIAL EVALUATION ADULT - MANUAL MUSCLE TESTING RESULTS, REHAB EVAL
grossly assessed due to/sternal precautions, however b/l elbow flex/ext and  at least 4/5 as observed functionally against gravity and during transfers. Bilateral shoulder flex grossly 3/5

## 2019-12-04 NOTE — OCCUPATIONAL THERAPY INITIAL EVALUATION ADULT - STANDING BALANCE: DYNAMIC, REHAB EVAL
poor plus/Patient ambulated 75ftx2 with UE support on portable monitor and CGA. Decreased balance during turns requiring intermittent progression to Miguel Ángel to correct.

## 2019-12-04 NOTE — OCCUPATIONAL THERAPY INITIAL EVALUATION ADULT - GENERAL OBSERVATIONS, REHAB EVAL
Patient left hand dominant. Chart reviewed, SOLE High cleared patient for OT evaluation. Patient received seated in bed, NAD, +IV, +a-line, +tele, +SCDs, +TPM, +NC O2 2L, +sternal dressing C/D/I.

## 2019-12-04 NOTE — CHART NOTE - NSCHARTNOTEFT_GEN_A_CORE
Admitting Diagnosis:   Patient is a 69y old  Male who presents with a chief complaint of Aortic dissection (04 Dec 2019 09:27)      PAST MEDICAL & SURGICAL HISTORY:  Benign prostatic hypertrophy without lower urinary tract symptoms: BPH (benign prostatic hypertrophy)  Ulcerative colitis: Ulcerative colitis  States following surgery of eye and adnexa: straightened right eye  Other postprocedural status: History of hernia repair  Hemorrhoids: Hemorrhoids  Acute appendicitis with generalized peritonitis: Ruptured appendix      Current Nutrition Order:   Mechanical Soft w/ Honey Thickened Liquids (advanced 12/3)    PO Intake: Good (%) [   ]  Fair (50-75%) [   ] Poor (<25%) [  x ]  Consumed apple sauce for breakfast this am. Shook his head yes to wanting ONS.     GI Issues:   Denies N/V,   Last BM 12/3    Pain:  No pain reported  Being medically managed    Skin Integrity:  MSI  beth score 14  Skin tear between scapula      Labs:   12-04    150<H>  |  118<H>  |  32<H>  ----------------------------<  129<H>  3.6   |  22  |  1.12    Ca    8.5      04 Dec 2019 03:31  Phos  2.3     12-04  Mg     2.5     12-04    TPro  5.5<L>  /  Alb  3.3  /  TBili  0.6  /  DBili  x   /  AST  25  /  ALT  27  /  AlkPhos  60  12-04    CAPILLARY BLOOD GLUCOSE          Medications:  MEDICATIONS  (STANDING):  aMIOdarone    Tablet   Oral   aMIOdarone    Tablet 400 milliGRAM(s) Oral every 8 hours  aspirin  chewable 81 milliGRAM(s) Oral daily  atorvastatin 20 milliGRAM(s) Oral at bedtime  buDESOnide    Inhalation Suspension 0.5 milliGRAM(s) Inhalation every 12 hours  chlorhexidine 2% Cloths 1 Application(s) Topical daily  dextrose 50% Injectable 50 milliLiter(s) IV Push every 15 minutes  heparin  Infusion 1300 Unit(s)/Hr (13 mL/Hr) IV Continuous <Continuous>  lidocaine   Patch 1 Patch Transdermal daily  pantoprazole   Suspension 40 milliGRAM(s) Enteral Tube daily  phenylephrine    Infusion 0.4 MICROgram(s)/kG/Min (12.585 mL/Hr) IV Continuous <Continuous>  sodium chloride 0.9%. 1000 milliLiter(s) (10 mL/Hr) IV Continuous <Continuous>    MEDICATIONS  (PRN):  bisacodyl Suppository 10 milliGRAM(s) Rectal daily PRN Constipation      Weight: 185lbs  Height: 6'0", IBW 178lbs+/-10%, %%, BMI 25.1   Daily     Weight Change:   No known wt changes PTA per pt.    Please obtain updated weight for trending.     Estimated energy needs:   ABW (84.1kg) used for calculations as pt between % of IBW.   Nutrient needs based on [facility] standards of care for maintenance in older adults.   Needs adjusted for age, post-op healing, inflammatory state  2102-2523kcal/day (25-30kcal/kg)  101-118g pro/day (1.2-1.4g pro/kg)  Fluids per team 2/2 increased MAP requirements    Subjective:   68yo M w/ PMH ulcerative colitis, BIBA to [facility] w/ c/o back pain and profound hypotension. Was emergently taken to the OR for salvage Type A dissection repair. Pt now s/p aortic arch repair and ascending arch replacement, Cabrol reconstruction of coronary ostia, replacement of ascending aorta/maynor arch, and frozen elephant trunk (11/23). Nephrology consulted for FELY. Was successfully extubated 12/1. Now w/ concern for expressive aphasia s/p extubation; concern for stroke etiology 2/2 hypoperfusion. MAP 81 at time of assessment- Pepe initiated 12/3 for increased BP/MAP parameters. Per SLP recommendations, pt is now ordered for a mechanical soft diet w/ honey thickened liquids. Diet advanced was advanced to this yesterday 12/3.  Pt seen in room, resting in chair, w/ face mask on. Observed 100% of apple sauce consumed on tray table. Denied consuming anything else for breakfast. Discussed option of ONS to which pt was agreeable and nodded his head yes. Denies N/V, last BM was yesterday 12/3. Recommend DASH/TLC w/ EnsureEnlive BID (700kcal, 40g pro) w/ consistency per SLP. RD to follow.     Previous Nutrition Diagnosis:  Inadequate energy intake RT inability to meet needs w/ NPO status AEB NPO, EN on hold  Active [   ]  Resolved [x   ] N/A 2/2 PO diet now    If resolved, new PES:   increased nutrient needs RT increased demand for kcal/pro AEB s/p aortic arch repair and ascending arch replacement, Cabrol reconstruction of coronary ostia, replacement of ascending aorta/maynor arch, and frozen elephant trunk    Goal:  Pt to consistently meet % of estimated needs PO     Recommendations:  1. Recommend DASH/TLC diet w/ EnsureEnlive BID (700kcal, 40g pro)  2. Consistency per SLP recs  3. Monitor for s/s intolerance, issues chewing/swallowing  4. Obtain updated weight.     Education:   Option of ONS. Modified consistencies. Increased needs post-op.    Risk Level: High [ x  ] Moderate [   ] Low [   ]

## 2019-12-04 NOTE — OCCUPATIONAL THERAPY INITIAL EVALUATION ADULT - NS ASR FOLLOW COMMAND OT EVAL
75% of the time/Following simple commands 100% of time with increased time and cues and complex/novel commands ~75% of the time. Patient with expressive aphasia. Responding appropriately to Y/N questions and communicating via written communication. Able to correctly ID 2/2 colors

## 2019-12-04 NOTE — OCCUPATIONAL THERAPY INITIAL EVALUATION ADULT - RANGE OF MOTION EXAMINATION, UPPER EXTREMITY
bilateral UE Active ROM was WNL (within normal limits)/except bilateral shoulder flexion grossly assessed 0-90 degrees 2/2 sternal precautions

## 2019-12-04 NOTE — PROGRESS NOTE ADULT - SUBJECTIVE AND OBJECTIVE BOX
INTERVAL HPI/OVERNIGHT EVENTS: Remains afebrile, off antibiotics, no distress    CONSTITUTIONAL:  Negative fever or chills  EYES:  Negative  blurry vision or double vision  CARDIOVASCULAR:  Negative for chest pain or palpitations  RESPIRATORY:  Negative for cough, wheezing, or SOB   GASTROINTESTINAL:  Negative for nausea, vomiting, diarrhea, or abdominal pain  GENITOURINARY:  Negative frequency, urgency or dysuria  NEUROLOGIC:  No headache, confusion, dizziness, lightheadedness      ANTIBIOTICS/RELEVANT:    MEDICATIONS  (STANDING):  aMIOdarone    Tablet   Oral   aMIOdarone    Tablet 400 milliGRAM(s) Oral every 8 hours  aspirin  chewable 81 milliGRAM(s) Oral daily  atorvastatin 20 milliGRAM(s) Oral at bedtime  buDESOnide    Inhalation Suspension 0.5 milliGRAM(s) Inhalation every 12 hours  chlorhexidine 2% Cloths 1 Application(s) Topical daily  dextrose 50% Injectable 50 milliLiter(s) IV Push every 15 minutes  donepezil 5 milliGRAM(s) Oral at bedtime  heparin  Infusion 1300 Unit(s)/Hr (13 mL/Hr) IV Continuous <Continuous>  lidocaine   Patch 1 Patch Transdermal daily  pantoprazole   Suspension 40 milliGRAM(s) Enteral Tube daily  phenylephrine    Infusion 0.4 MICROgram(s)/kG/Min (12.585 mL/Hr) IV Continuous <Continuous>  sodium chloride 0.9%. 1000 milliLiter(s) (10 mL/Hr) IV Continuous <Continuous>    MEDICATIONS  (PRN):  bisacodyl Suppository 10 milliGRAM(s) Rectal daily PRN Constipation        Vital Signs Last 24 Hrs  T(C): 37.2 (04 Dec 2019 20:50), Max: 37.9 (04 Dec 2019 05:01)  T(F): 98.9 (04 Dec 2019 20:50), Max: 100.2 (04 Dec 2019 05:01)  HR: 70 (04 Dec 2019 22:00) (68 - 104)  BP: 142/79 (04 Dec 2019 21:00) (133/81 - 164/76)  BP(mean): 98 (04 Dec 2019 21:00) (97 - 107)  RR: 17 (04 Dec 2019 22:00) (14 - 29)  SpO2: 92% (04 Dec 2019 22:00) (89% - 98%)    12-03-19 @ 07:01  -  12-04-19 @ 07:00  --------------------------------------------------------  IN: 2383.1 mL / OUT: 1930 mL / NET: 453.1 mL    12-04-19 @ 07:01  -  12-04-19 @ 22:45  --------------------------------------------------------  IN: 563.2 mL / OUT: 990 mL / NET: -426.8 mL      PHYSICAL EXAM:    Constitutional:Well-developed, well nourished  Eyes:SANTANA, EOMI  Ear/Nose/Throat: no oral lesion, no sinus tenderness on percussion	  Neck:no JVD, no lymphadenopathy, supple  Respiratory: CTA adilene, L chest tube in place  Cardiovascular: S1S2 RRR, no murmurs  Gastrointestinal: soft, (+) BS, no HSM  Extremities: no LE edema  Neuro: AO x3, non focal      LABS:                        9.2    17.51 )-----------( 583      ( 04 Dec 2019 13:22 )             29.9     12-04    152<H>  |  118<H>  |  28<H>  ----------------------------<  173<H>  4.1   |  21<L>  |  0.99    Ca    8.8      04 Dec 2019 13:22  Phos  3.6     12-04  Mg     2.4     12-04    TPro  5.9<L>  /  Alb  3.7  /  TBili  0.6  /  DBili  x   /  AST  30  /  ALT  32  /  AlkPhos  65  12-04    PT/INR - ( 04 Dec 2019 13:22 )   PT: 16.1 sec;   INR: 1.41          PTT - ( 04 Dec 2019 13:22 )  PTT:51.9 sec      MICROBIOLOGY:  Culture - Sputum . (11.25.19 @ 16:51)    -  Gentamicin: S <=1    -  Piperacillin/Tazobactam: S <=8    -  Tobramycin: S <=2    -  Trimethoprim/Sulfamethoxazole: S <=0.5/9.5    Gram Stain:   Rare epithelial cells  Numerous White blood cells  Few Gram Negative Rods  Few Gram positive cocci in pairs  Few Gram Negative Diplococci    -  Ampicillin: R >16 These ampicillin results predict results for amoxicillin    -  Ampicillin/Sulbactam: S 8/4 Enterobacter, Citrobacter, and Serratia may develop resistance during prolonged therapy (3-4 days)    -  Cefazolin: R >16 Enterobacter, Citrobacter, and Serratia may develop resistance during prolonged therapy (3-4 days)    -  Ceftriaxone: S <=1 Enterobacter, Citrobacter, and Serratia may develop resistance during prolonged therapy    Specimen Source: .Sputum Sputum    Culture Results:   Few-moderate Klebsiella oxytoca  Accompanied by normal respiratory jazmín    Organism Identification: Klebsiella oxytoca    Organism: Klebsiella oxytoca    Method Type: CHERY      RADIOLOGY & ADDITIONAL STUDIES:  < from: Xray Chest 1 View- PORTABLE-Routine (12.04.19 @ 06:15) >    EXAM:  XR CHEST PORTABLE ROUTINE 1V                          PROCEDURE DATE:  12/04/2019          INTERPRETATION:  Chest x-ray    Indication: Aortic dissection    A portable frontal view of the chest is compared to the prior study dated   12/3/2019. Tip of right PICC lines overlie the right axilla. Gastric tube   has been removed. Left chest tube is unchanged. Stable heart size. Small   to moderate-sized right pleural effusion persists. No pneumothorax.      IMPRESSION: Removal of gastric tube.No other significant change.

## 2019-12-05 LAB
ALBUMIN SERPL ELPH-MCNC: 3.3 G/DL — SIGNIFICANT CHANGE UP (ref 3.3–5)
ALBUMIN SERPL ELPH-MCNC: 3.5 G/DL — SIGNIFICANT CHANGE UP (ref 3.3–5)
ALP SERPL-CCNC: 55 U/L — SIGNIFICANT CHANGE UP (ref 40–120)
ALP SERPL-CCNC: 57 U/L — SIGNIFICANT CHANGE UP (ref 40–120)
ALT FLD-CCNC: 28 U/L — SIGNIFICANT CHANGE UP (ref 10–45)
ALT FLD-CCNC: 30 U/L — SIGNIFICANT CHANGE UP (ref 10–45)
ANION GAP SERPL CALC-SCNC: 9 MMOL/L — SIGNIFICANT CHANGE UP (ref 5–17)
ANION GAP SERPL CALC-SCNC: 9 MMOL/L — SIGNIFICANT CHANGE UP (ref 5–17)
APTT BLD: 51.9 SEC — HIGH (ref 27.5–36.3)
APTT BLD: 60.3 SEC — HIGH (ref 27.5–36.3)
AST SERPL-CCNC: 26 U/L — SIGNIFICANT CHANGE UP (ref 10–40)
AST SERPL-CCNC: 27 U/L — SIGNIFICANT CHANGE UP (ref 10–40)
BILIRUB SERPL-MCNC: 0.6 MG/DL — SIGNIFICANT CHANGE UP (ref 0.2–1.2)
BILIRUB SERPL-MCNC: 0.6 MG/DL — SIGNIFICANT CHANGE UP (ref 0.2–1.2)
BUN SERPL-MCNC: 26 MG/DL — HIGH (ref 7–23)
BUN SERPL-MCNC: 28 MG/DL — HIGH (ref 7–23)
CALCIUM SERPL-MCNC: 8.4 MG/DL — SIGNIFICANT CHANGE UP (ref 8.4–10.5)
CALCIUM SERPL-MCNC: 8.6 MG/DL — SIGNIFICANT CHANGE UP (ref 8.4–10.5)
CHLORIDE SERPL-SCNC: 117 MMOL/L — HIGH (ref 96–108)
CHLORIDE SERPL-SCNC: 118 MMOL/L — HIGH (ref 96–108)
CO2 SERPL-SCNC: 23 MMOL/L — SIGNIFICANT CHANGE UP (ref 22–31)
CO2 SERPL-SCNC: 24 MMOL/L — SIGNIFICANT CHANGE UP (ref 22–31)
CREAT SERPL-MCNC: 0.93 MG/DL — SIGNIFICANT CHANGE UP (ref 0.5–1.3)
CREAT SERPL-MCNC: 0.94 MG/DL — SIGNIFICANT CHANGE UP (ref 0.5–1.3)
GAS PNL BLDA: SIGNIFICANT CHANGE UP
GAS PNL BLDA: SIGNIFICANT CHANGE UP
GLUCOSE SERPL-MCNC: 122 MG/DL — HIGH (ref 70–99)
GLUCOSE SERPL-MCNC: 146 MG/DL — HIGH (ref 70–99)
HCT VFR BLD CALC: 27.5 % — LOW (ref 39–50)
HCT VFR BLD CALC: 28.5 % — LOW (ref 39–50)
HGB BLD-MCNC: 8.4 G/DL — LOW (ref 13–17)
HGB BLD-MCNC: 8.8 G/DL — LOW (ref 13–17)
INR BLD: 1.51 — HIGH (ref 0.88–1.16)
INR BLD: 1.53 — HIGH (ref 0.88–1.16)
LACTATE SERPL-SCNC: 0.6 MMOL/L — SIGNIFICANT CHANGE UP (ref 0.5–2)
LACTATE SERPL-SCNC: 1 MMOL/L — SIGNIFICANT CHANGE UP (ref 0.5–2)
MAGNESIUM SERPL-MCNC: 2.4 MG/DL — SIGNIFICANT CHANGE UP (ref 1.6–2.6)
MAGNESIUM SERPL-MCNC: 2.5 MG/DL — SIGNIFICANT CHANGE UP (ref 1.6–2.6)
MCHC RBC-ENTMCNC: 27.9 PG — SIGNIFICANT CHANGE UP (ref 27–34)
MCHC RBC-ENTMCNC: 28.2 PG — SIGNIFICANT CHANGE UP (ref 27–34)
MCHC RBC-ENTMCNC: 30.5 GM/DL — LOW (ref 32–36)
MCHC RBC-ENTMCNC: 30.9 GM/DL — LOW (ref 32–36)
MCV RBC AUTO: 91.3 FL — SIGNIFICANT CHANGE UP (ref 80–100)
MCV RBC AUTO: 91.4 FL — SIGNIFICANT CHANGE UP (ref 80–100)
NRBC # BLD: 0 /100 WBCS — SIGNIFICANT CHANGE UP (ref 0–0)
NRBC # BLD: 0 /100 WBCS — SIGNIFICANT CHANGE UP (ref 0–0)
PHOSPHATE SERPL-MCNC: 2.2 MG/DL — LOW (ref 2.5–4.5)
PHOSPHATE SERPL-MCNC: 2.5 MG/DL — SIGNIFICANT CHANGE UP (ref 2.5–4.5)
PLATELET # BLD AUTO: 522 K/UL — HIGH (ref 150–400)
PLATELET # BLD AUTO: 586 K/UL — HIGH (ref 150–400)
POTASSIUM SERPL-MCNC: 3.6 MMOL/L — SIGNIFICANT CHANGE UP (ref 3.5–5.3)
POTASSIUM SERPL-MCNC: 4 MMOL/L — SIGNIFICANT CHANGE UP (ref 3.5–5.3)
POTASSIUM SERPL-SCNC: 3.6 MMOL/L — SIGNIFICANT CHANGE UP (ref 3.5–5.3)
POTASSIUM SERPL-SCNC: 4 MMOL/L — SIGNIFICANT CHANGE UP (ref 3.5–5.3)
PROT SERPL-MCNC: 5.3 G/DL — LOW (ref 6–8.3)
PROT SERPL-MCNC: 5.6 G/DL — LOW (ref 6–8.3)
PROTHROM AB SERPL-ACNC: 17.3 SEC — HIGH (ref 10–12.9)
PROTHROM AB SERPL-ACNC: 17.5 SEC — HIGH (ref 10–12.9)
RBC # BLD: 3.01 M/UL — LOW (ref 4.2–5.8)
RBC # BLD: 3.12 M/UL — LOW (ref 4.2–5.8)
RBC # FLD: 15.9 % — HIGH (ref 10.3–14.5)
RBC # FLD: 15.9 % — HIGH (ref 10.3–14.5)
SODIUM SERPL-SCNC: 150 MMOL/L — HIGH (ref 135–145)
SODIUM SERPL-SCNC: 150 MMOL/L — HIGH (ref 135–145)
WBC # BLD: 16.36 K/UL — HIGH (ref 3.8–10.5)
WBC # BLD: 17.44 K/UL — HIGH (ref 3.8–10.5)
WBC # FLD AUTO: 16.36 K/UL — HIGH (ref 3.8–10.5)
WBC # FLD AUTO: 17.44 K/UL — HIGH (ref 3.8–10.5)

## 2019-12-05 PROCEDURE — 71045 X-RAY EXAM CHEST 1 VIEW: CPT | Mod: 26

## 2019-12-05 PROCEDURE — 99233 SBSQ HOSP IP/OBS HIGH 50: CPT

## 2019-12-05 PROCEDURE — 99292 CRITICAL CARE ADDL 30 MIN: CPT

## 2019-12-05 PROCEDURE — 99291 CRITICAL CARE FIRST HOUR: CPT

## 2019-12-05 RX ORDER — SODIUM CHLORIDE 9 MG/ML
1000 INJECTION, SOLUTION INTRAVENOUS
Refills: 0 | Status: DISCONTINUED | OUTPATIENT
Start: 2019-12-05 | End: 2019-12-06

## 2019-12-05 RX ORDER — MIDODRINE HYDROCHLORIDE 2.5 MG/1
5 TABLET ORAL THREE TIMES A DAY
Refills: 0 | Status: DISCONTINUED | OUTPATIENT
Start: 2019-12-05 | End: 2019-12-06

## 2019-12-05 RX ORDER — ATORVASTATIN CALCIUM 80 MG/1
80 TABLET, FILM COATED ORAL AT BEDTIME
Refills: 0 | Status: DISCONTINUED | OUTPATIENT
Start: 2019-12-05 | End: 2019-12-06

## 2019-12-05 RX ORDER — POTASSIUM CHLORIDE 20 MEQ
20 PACKET (EA) ORAL
Refills: 0 | Status: COMPLETED | OUTPATIENT
Start: 2019-12-05 | End: 2019-12-05

## 2019-12-05 RX ORDER — MIDODRINE HYDROCHLORIDE 2.5 MG/1
10 TABLET ORAL THREE TIMES A DAY
Refills: 0 | Status: DISCONTINUED | OUTPATIENT
Start: 2019-12-05 | End: 2019-12-05

## 2019-12-05 RX ORDER — QUETIAPINE FUMARATE 200 MG/1
25 TABLET, FILM COATED ORAL AT BEDTIME
Refills: 0 | Status: DISCONTINUED | OUTPATIENT
Start: 2019-12-05 | End: 2019-12-07

## 2019-12-05 RX ORDER — SERTRALINE 25 MG/1
25 TABLET, FILM COATED ORAL DAILY
Refills: 0 | Status: DISCONTINUED | OUTPATIENT
Start: 2019-12-05 | End: 2019-12-12

## 2019-12-05 RX ORDER — MODAFINIL 200 MG/1
100 TABLET ORAL DAILY
Refills: 0 | Status: DISCONTINUED | OUTPATIENT
Start: 2019-12-05 | End: 2019-12-07

## 2019-12-05 RX ADMIN — QUETIAPINE FUMARATE 25 MILLIGRAM(S): 200 TABLET, FILM COATED ORAL at 22:12

## 2019-12-05 RX ADMIN — PANTOPRAZOLE SODIUM 40 MILLIGRAM(S): 20 TABLET, DELAYED RELEASE ORAL at 18:26

## 2019-12-05 RX ADMIN — Medication 0.5 MILLIGRAM(S): at 05:42

## 2019-12-05 RX ADMIN — ATORVASTATIN CALCIUM 80 MILLIGRAM(S): 80 TABLET, FILM COATED ORAL at 22:12

## 2019-12-05 RX ADMIN — Medication 0.5 MILLIGRAM(S): at 17:53

## 2019-12-05 RX ADMIN — Medication 4 MILLIGRAM(S): at 18:18

## 2019-12-05 RX ADMIN — Medication 100 MILLIEQUIVALENT(S): at 06:37

## 2019-12-05 RX ADMIN — LIDOCAINE 1 PATCH: 4 CREAM TOPICAL at 00:33

## 2019-12-05 RX ADMIN — Medication 100 MILLIEQUIVALENT(S): at 07:29

## 2019-12-05 RX ADMIN — Medication 4 MILLIGRAM(S): at 19:50

## 2019-12-05 RX ADMIN — LIDOCAINE 1 PATCH: 4 CREAM TOPICAL at 19:50

## 2019-12-05 RX ADMIN — PHENYLEPHRINE HYDROCHLORIDE 12.59 MICROGRAM(S)/KG/MIN: 10 INJECTION INTRAVENOUS at 00:35

## 2019-12-05 RX ADMIN — Medication 81 MILLIGRAM(S): at 09:21

## 2019-12-05 RX ADMIN — AMIODARONE HYDROCHLORIDE 400 MILLIGRAM(S): 400 TABLET ORAL at 06:36

## 2019-12-05 RX ADMIN — MIDODRINE HYDROCHLORIDE 5 MILLIGRAM(S): 2.5 TABLET ORAL at 18:17

## 2019-12-05 RX ADMIN — LIDOCAINE 1 PATCH: 4 CREAM TOPICAL at 09:21

## 2019-12-05 RX ADMIN — CHLORHEXIDINE GLUCONATE 1 APPLICATION(S): 213 SOLUTION TOPICAL at 06:35

## 2019-12-05 RX ADMIN — SERTRALINE 25 MILLIGRAM(S): 25 TABLET, FILM COATED ORAL at 18:17

## 2019-12-05 RX ADMIN — HEPARIN SODIUM 13 UNIT(S)/HR: 5000 INJECTION INTRAVENOUS; SUBCUTANEOUS at 00:35

## 2019-12-05 RX ADMIN — LIDOCAINE 1 PATCH: 4 CREAM TOPICAL at 22:12

## 2019-12-05 NOTE — PROGRESS NOTE ADULT - ASSESSMENT
A/p  70 y/o M with PMHx of Ulcerative colitis BIBA to [facility] ED complaining of back pain and profound hypotension, emergently brought to the OR for salvage Type A dissection repair, s/p aortic arch repair and ascending arch replacement.   Nephrology consulted for FELY.

## 2019-12-05 NOTE — CHART NOTE - NSCHARTNOTEFT_GEN_A_CORE
CT Removal:    Pt seen and examined at bedside.  Case discussed with Dr. Latif.  Minimal output from left pigtail.  No air leak appreciated.  Lft pigtail removed without incident per Dr. latif  request.  Occlusive DSD placed.  CXR no obvious PTX noted.  Pt tolerated procedure well.

## 2019-12-05 NOTE — PROGRESS NOTE ADULT - SUBJECTIVE AND OBJECTIVE BOX
O/N Events: SAFIA  Subjective:  does not offer complaints, avoid to speak, kidney function remained normal, hypernatremia slowly improving with D5W infusion   Phos low 2.2/ still high O2 requirement currently on HFNC 50L 50%    VITALS  Vital Signs Last 24 Hrs  T(C): 37.2 (05 Dec 2019 14:00), Max: 37.4 (04 Dec 2019 17:11)  T(F): 98.9 (05 Dec 2019 14:00), Max: 99.4 (04 Dec 2019 17:11)  HR: 84 (05 Dec 2019 13:00) (66 - 86)  BP: 142/79 (04 Dec 2019 21:00) (142/79 - 142/79)  BP(mean): 98 (04 Dec 2019 21:00) (98 - 98)  RR: 20 (05 Dec 2019 13:00) (13 - 26)  SpO2: 91% (05 Dec 2019 13:00) (91% - 100%)    PHYSICAL EXAM  General: alert and awake, sitting OOB in the chair  Cardiac: S1, S2. RRR. No murmurs   Respiratory: CTAB/l  Abdomen: soft, NTND  Extremities: warm , no edema   Neuro: AO x3, non focal    MEDICATIONS  (STANDING):  aMIOdarone    Tablet   Oral   aMIOdarone    Tablet 200 milliGRAM(s) Oral daily  aspirin  chewable 81 milliGRAM(s) Oral daily  atorvastatin 80 milliGRAM(s) Oral at bedtime  buDESOnide    Inhalation Suspension 0.5 milliGRAM(s) Inhalation every 12 hours  chlorhexidine 2% Cloths 1 Application(s) Topical daily  dextrose 5%. 1000 milliLiter(s) (100 mL/Hr) IV Continuous <Continuous>  dextrose 50% Injectable 50 milliLiter(s) IV Push every 15 minutes  donepezil 5 milliGRAM(s) Oral at bedtime  heparin  Infusion 1300 Unit(s)/Hr (13 mL/Hr) IV Continuous <Continuous>  lidocaine   Patch 1 Patch Transdermal daily  midodrine. 10 milliGRAM(s) Oral three times a day  pantoprazole   Suspension 40 milliGRAM(s) Enteral Tube daily  sodium chloride 0.9%. 1000 milliLiter(s) (10 mL/Hr) IV Continuous <Continuous>    MEDICATIONS  (PRN):  bisacodyl Suppository 10 milliGRAM(s) Rectal daily PRN Constipation      LABS                        8.8    17.44 )-----------( 586      ( 05 Dec 2019 11:19 )             28.5     12-05    150<H>  |  118<H>  |  28<H>  ----------------------------<  146<H>  4.0   |  23  |  0.94    Ca    8.6      05 Dec 2019 11:19  Phos  2.2     12-05  Mg     2.5     12-05    TPro  5.6<L>  /  Alb  3.5  /  TBili  0.6  /  DBili  x   /  AST  26  /  ALT  30  /  AlkPhos  57  12-05    LIVER FUNCTIONS - ( 05 Dec 2019 11:19 )  Alb: 3.5 g/dL / Pro: 5.6 g/dL / ALK PHOS: 57 U/L / ALT: 30 U/L / AST: 26 U/L / GGT: x           PT/INR - ( 05 Dec 2019 11:19 )   PT: 17.3 sec;   INR: 1.51          PTT - ( 05 Dec 2019 11:19 )  PTT:60.3 sec

## 2019-12-05 NOTE — PROGRESS NOTE ADULT - SUBJECTIVE AND OBJECTIVE BOX
Neurology Stroke Progress Note    INTERVAL HPI/OVERNIGHT EVENTS:  Patient seen and examined. Admits to being tired but, only communicates by nodding head "yes" or "no."    MEDICATIONS  (STANDING):  aMIOdarone    Tablet   Oral   aMIOdarone    Tablet 200 milliGRAM(s) Oral daily  aspirin  chewable 81 milliGRAM(s) Oral daily  atorvastatin 80 milliGRAM(s) Oral at bedtime  buDESOnide    Inhalation Suspension 0.5 milliGRAM(s) Inhalation every 12 hours  chlorhexidine 2% Cloths 1 Application(s) Topical daily  dextrose 5%. 1000 milliLiter(s) (100 mL/Hr) IV Continuous <Continuous>  dextrose 50% Injectable 50 milliLiter(s) IV Push every 15 minutes  heparin  Infusion 1300 Unit(s)/Hr (13 mL/Hr) IV Continuous <Continuous>  lidocaine   Patch 1 Patch Transdermal daily  midodrine. 5 milliGRAM(s) Oral three times a day  modafinil 100 milliGRAM(s) Oral daily  pantoprazole   Suspension 40 milliGRAM(s) Enteral Tube daily  QUEtiapine 25 milliGRAM(s) Oral at bedtime  sertraline 25 milliGRAM(s) Oral daily  sodium chloride 0.9%. 1000 milliLiter(s) (10 mL/Hr) IV Continuous <Continuous>  testosterone patch 4 mG/24 Hr(s) 4 milliGRAM(s) Transdermal daily    MEDICATIONS  (PRN):  bisacodyl Suppository 10 milliGRAM(s) Rectal daily PRN Constipation    Allergies  penicillin (Unknown)    ROS: As per HPI, otherwise negative    Vital Signs Last 24 Hrs  T(C): 37.2 (05 Dec 2019 14:00), Max: 37.2 (04 Dec 2019 20:50)  T(F): 98.9 (05 Dec 2019 14:00), Max: 98.9 (04 Dec 2019 20:50)  HR: 78 (05 Dec 2019 17:00) (66 - 86)  BP: 130/76 (05 Dec 2019 17:00) (127/73 - 147/79)  BP(mean): 93 (05 Dec 2019 17:00) (93 - 98)  RR: 18 (05 Dec 2019 17:00) (13 - 22)  SpO2: 93% (05 Dec 2019 17:00) (92% - 100%)    Physical exam:  General: Awake and alert, sitting comfortably, in NAD.    Neurologic:  Mental status: Awake, alert, nods head appropriately to questions when asked. Follows only few commands. Appears to have a flat affect.  Cranial nerves:   III, IV, VI: EOMI without nystagmus.   V:  V1-V3 sensation intact.   VII: No facial droop, face symmetric with normal eye closure and smile.     Unable to continue with exam as patient became uncooperative, shook head "yes" when asked if he was tired and refused to participate with examination.      LABS:                        8.8    17.44 )-----------( 586      ( 05 Dec 2019 11:19 )             28.5     12-05    150<H>  |  118<H>  |  28<H>  ----------------------------<  146<H>  4.0   |  23  |  0.94    Ca    8.6      05 Dec 2019 11:19  Phos  2.2     12-05  Mg     2.5     12-05    TPro  5.6<L>  /  Alb  3.5  /  TBili  0.6  /  DBili  x   /  AST  26  /  ALT  30  /  AlkPhos  57  12-05    PT/INR - ( 05 Dec 2019 11:19 )   PT: 17.3 sec;   INR: 1.51          PTT - ( 05 Dec 2019 11:19 )  PTT:60.3 sec      RADIOLOGY & ADDITIONAL TESTS:  - CT brain 12/2/2019  < from: CT Head No Cont (12.02.19 @ 15:10) >  IMPRESSION:  No acute intracranial hemorrhage or transcortical infarct.    Gray-white matter hypodensities in the subcortical bilateral frontal   lobes which may reflect age-indeterminate ischemia, infection,   inflammation amongst other etiologies. Further evaluation with an MRI   brain with and without IV contrast is recommended.    No new neurologic/cerebral imaging.     Assessment and Plan  69y M with PMHx of colitis and prostate surgery presented with back pain and hypotension, found to have a Type A aortic dissection, s/p 11/23 emergent salvage AVR(Bio)/root-hemiarch replacement. Hospital course was complicated by hemodynamic instability, aspiration pneumonia, acute respiratory distress, prolonged intubation, pleural effusion, left cephalic vein DVT with extension to subclavian, and Afib with RVR. On 12/1, patient was extubated and noted to have expressive aphasia for which stroke neurology was consulted. HCT 12/2 revealed hypodensities in the subcortical bilateral frontal lobes. Stroke etiology likely hypoperfusion secondary to cardiac arrest, less likely embolic or thrombotic. Given exam today 12/5, patient appears depressed with flat affect. Recommend starting Modafinil and SSRI to further reassess improvement of cognition/mood.    1)Secondary stroke prevention  -Continue with heparin drip, ASA per CTICU  -Continue with Atorvastatin 80mg PO daily     2)  Further workup   - Recommend starting Modafinil 100mg PO  - Recommend Sertraline 25mg PO   -Continue to  encourage singing   -Inpatient management per CTICU    3)DVT prophylaxis   -Heparin drip and SCDs

## 2019-12-05 NOTE — PROGRESS NOTE ADULT - PROBLEM SELECTOR PLAN 2
improving with free water flushes 250 cc q4 and D5W infusion   - please continue free water repletion with D5W at 100cc/hr    Will follow

## 2019-12-05 NOTE — PROGRESS NOTE ADULT - SUBJECTIVE AND OBJECTIVE BOX
CTICU  CRITICAL  CARE  attending     Hand off received 					   Pertinent clinical, laboratory, radiographic, hemodynamic, echocardiographic, respiratory data, microbiologic data and chart were reviewed and analyzed frequently throughout the course of the day and night  Patient seen and examined with CTS/ SH attending at bedside  Pt is a 69y , Male, HEALTH ISSUES - PROBLEM Dx:  Hypernatremia: Hypernatremia  Bacterial pneumonia: Bacterial pneumonia  Fever, postprocedural: Fever, postprocedural  FELY (acute kidney injury): FELY (acute kidney injury)      , FAMILY HISTORY:  PAST MEDICAL & SURGICAL HISTORY:  Benign prostatic hypertrophy without lower urinary tract symptoms: BPH (benign prostatic hypertrophy)  Ulcerative colitis: Ulcerative colitis  States following surgery of eye and adnexa: straightened right eye  Other postprocedural status: History of hernia repair  Hemorrhoids: Hemorrhoids  Acute appendicitis with generalized peritonitis: Ruptured appendix    Patient is a 69y old  Male who presents with a chief complaint of Aortic dissection (05 Dec 2019 18:00)      14 system review was unremarkable    Vital signs, hemodynamic and respiratory parameters were reviewed from the bedside nursing flowsheet.  ICU Vital Signs Last 24 Hrs  T(C): 37.4 (05 Dec 2019 21:15), Max: 37.4 (05 Dec 2019 21:15)  T(F): 99.4 (05 Dec 2019 21:15), Max: 99.4 (05 Dec 2019 21:15)  HR: 84 (05 Dec 2019 22:00) (66 - 86)  BP: 130/76 (05 Dec 2019 17:00) (127/73 - 147/79)  BP(mean): 93 (05 Dec 2019 17:00) (93 - 96)  ABP: 120/62 (05 Dec 2019 22:00) (106/52 - 158/74)  ABP(mean): 82 (05 Dec 2019 22:00) (70 - 106)  RR: 10 (05 Dec 2019 22:00) (9 - 22)  SpO2: 100% (05 Dec 2019 22:00) (92% - 100%)    Adult Advanced Hemodynamics Last 24 Hrs  CVP(mm Hg): --  CVP(cm H2O): --  CO: --  CI: --  PA: --  PA(mean): --  PCWP: --  SVR: --  SVRI: --  PVR: --  PVRI: --, ABG - ( 05 Dec 2019 11:14 )  pH, Arterial: 7.48  pH, Blood: x     /  pCO2: 33    /  pO2: 103   / HCO3: 24    / Base Excess: 0.7   /  SaO2: 98                  Intake and output was reviewed and the fluid balance was calculated  Daily     Daily   I&O's Summary    04 Dec 2019 07:01  -  05 Dec 2019 07:00  --------------------------------------------------------  IN: 1172.4 mL / OUT: 1540 mL / NET: -367.6 mL    05 Dec 2019 07:01  -  05 Dec 2019 23:00  --------------------------------------------------------  IN: 709.4 mL / OUT: 180 mL / NET: 529.4 mL        All lines and drain sites were assessed  Glycemic trend was reviewedCAPILLARY BLOOD GLUCOSE        No acute change in mental status  Auscultation of the chest reveals equal bs  Abdomen is soft  Extremities are warm and well perfused  Wounds appear clean and unremarkable  Antibiotics are periop    labs  CBC Full  -  ( 05 Dec 2019 11:19 )  WBC Count : 17.44 K/uL  RBC Count : 3.12 M/uL  Hemoglobin : 8.8 g/dL  Hematocrit : 28.5 %  Platelet Count - Automated : 586 K/uL  Mean Cell Volume : 91.3 fl  Mean Cell Hemoglobin : 28.2 pg  Mean Cell Hemoglobin Concentration : 30.9 gm/dL  Auto Neutrophil # : x  Auto Lymphocyte # : x  Auto Monocyte # : x  Auto Eosinophil # : x  Auto Basophil # : x  Auto Neutrophil % : x  Auto Lymphocyte % : x  Auto Monocyte % : x  Auto Eosinophil % : x  Auto Basophil % : x    12-05    150<H>  |  118<H>  |  28<H>  ----------------------------<  146<H>  4.0   |  23  |  0.94    Ca    8.6      05 Dec 2019 11:19  Phos  2.2     12-05  Mg     2.5     12-05    TPro  5.6<L>  /  Alb  3.5  /  TBili  0.6  /  DBili  x   /  AST  26  /  ALT  30  /  AlkPhos  57  12-05    PT/INR - ( 05 Dec 2019 11:19 )   PT: 17.3 sec;   INR: 1.51          PTT - ( 05 Dec 2019 11:19 )  PTT:60.3 sec  The current medications were reviewed   MEDICATIONS  (STANDING):  aMIOdarone    Tablet   Oral   aMIOdarone    Tablet 200 milliGRAM(s) Oral daily  aspirin  chewable 81 milliGRAM(s) Oral daily  atorvastatin 80 milliGRAM(s) Oral at bedtime  buDESOnide    Inhalation Suspension 0.5 milliGRAM(s) Inhalation every 12 hours  chlorhexidine 2% Cloths 1 Application(s) Topical daily  dextrose 5%. 1000 milliLiter(s) (100 mL/Hr) IV Continuous <Continuous>  dextrose 50% Injectable 50 milliLiter(s) IV Push every 15 minutes  heparin  Infusion 1300 Unit(s)/Hr (13 mL/Hr) IV Continuous <Continuous>  lidocaine   Patch 1 Patch Transdermal daily  midodrine. 5 milliGRAM(s) Oral three times a day  modafinil 100 milliGRAM(s) Oral daily  pantoprazole   Suspension 40 milliGRAM(s) Enteral Tube daily  QUEtiapine 25 milliGRAM(s) Oral at bedtime  sertraline 25 milliGRAM(s) Oral daily  sodium chloride 0.9%. 1000 milliLiter(s) (10 mL/Hr) IV Continuous <Continuous>  testosterone patch 4 mG/24 Hr(s) 4 milliGRAM(s) Transdermal daily    MEDICATIONS  (PRN):  bisacodyl Suppository 10 milliGRAM(s) Rectal daily PRN Constipation       PROBLEM LIST/ ASSESSMENT:  HEALTH ISSUES - PROBLEM Dx:  Hypernatremia: Hypernatremia  Bacterial pneumonia: Bacterial pneumonia  Fever, postprocedural: Fever, postprocedural  FELY (acute kidney injury): FELY (acute kidney injury)      ,   Patient is a 69y old  Male who presents with a chief complaint of Aortic dissection (05 Dec 2019 18:00)     s/p cardiac surgery              My plan includes :  close hemodynamic, ventilatory and drain monitoring and management per post op routine    Monitor for arrhythmias and monitor parameters for organ perfusion  beta blockade not administered due to hemodynamic instability and bradycardia  monitor neurologic status  Head of the bed should remain elevated to 45 deg .   chest PT and IS will be encouraged  monitor adequacy of oxygenation and ventilation and attempt to wean oxygen  antibiotic regimen will be tailored to the clinical, laboratory and microbiologic data  Nutritional goals will be met using po eventually , ensure adequate caloric intake and montior the same  Stress ulcer and VTE prophylaxis will be achieved    Glycemic control is satisfactory  Electrolytes have been repleted as necessary and wound care has been carried out. Pain control has been achieved.   agressive physical therapy and early mobility and ambulation goals will be met   The family was updated about the course and plan  CRITICAL CARE TIME personally provided by me  in evaluation and management, reassessments, review and interpretation of labs and x-rays, ventilator and hemodynamic management, formulating a plan and coordinating care: ___90____ MIN.  Time does not include procedural time.  CTICU ATTENDING     					    Jaret Hebert MD

## 2019-12-06 DIAGNOSIS — D72.829 ELEVATED WHITE BLOOD CELL COUNT, UNSPECIFIED: ICD-10-CM

## 2019-12-06 LAB
ALBUMIN SERPL ELPH-MCNC: 2.9 G/DL — LOW (ref 3.3–5)
ALBUMIN SERPL ELPH-MCNC: 3.1 G/DL — LOW (ref 3.3–5)
ALBUMIN SERPL ELPH-MCNC: 3.2 G/DL — LOW (ref 3.3–5)
ALP SERPL-CCNC: 52 U/L — SIGNIFICANT CHANGE UP (ref 40–120)
ALP SERPL-CCNC: 54 U/L — SIGNIFICANT CHANGE UP (ref 40–120)
ALP SERPL-CCNC: 59 U/L — SIGNIFICANT CHANGE UP (ref 40–120)
ALT FLD-CCNC: 26 U/L — SIGNIFICANT CHANGE UP (ref 10–45)
ALT FLD-CCNC: 29 U/L — SIGNIFICANT CHANGE UP (ref 10–45)
ALT FLD-CCNC: 32 U/L — SIGNIFICANT CHANGE UP (ref 10–45)
ANION GAP SERPL CALC-SCNC: 10 MMOL/L — SIGNIFICANT CHANGE UP (ref 5–17)
ANION GAP SERPL CALC-SCNC: 9 MMOL/L — SIGNIFICANT CHANGE UP (ref 5–17)
APTT BLD: 24.5 SEC — LOW (ref 27.5–36.3)
APTT BLD: 30.3 SEC — SIGNIFICANT CHANGE UP (ref 27.5–36.3)
APTT BLD: 54.2 SEC — HIGH (ref 27.5–36.3)
APTT BLD: 55.9 SEC — HIGH (ref 27.5–36.3)
AST SERPL-CCNC: 21 U/L — SIGNIFICANT CHANGE UP (ref 10–40)
AST SERPL-CCNC: 22 U/L — SIGNIFICANT CHANGE UP (ref 10–40)
AST SERPL-CCNC: 25 U/L — SIGNIFICANT CHANGE UP (ref 10–40)
BILIRUB SERPL-MCNC: 0.5 MG/DL — SIGNIFICANT CHANGE UP (ref 0.2–1.2)
BILIRUB SERPL-MCNC: 0.6 MG/DL — SIGNIFICANT CHANGE UP (ref 0.2–1.2)
BILIRUB SERPL-MCNC: 0.6 MG/DL — SIGNIFICANT CHANGE UP (ref 0.2–1.2)
BLD GP AB SCN SERPL QL: NEGATIVE — SIGNIFICANT CHANGE UP
BUN SERPL-MCNC: 23 MG/DL — SIGNIFICANT CHANGE UP (ref 7–23)
BUN SERPL-MCNC: 25 MG/DL — HIGH (ref 7–23)
BUN SERPL-MCNC: 25 MG/DL — HIGH (ref 7–23)
BUN SERPL-MCNC: 26 MG/DL — HIGH (ref 7–23)
CALCIUM SERPL-MCNC: 8.2 MG/DL — LOW (ref 8.4–10.5)
CALCIUM SERPL-MCNC: 8.2 MG/DL — LOW (ref 8.4–10.5)
CALCIUM SERPL-MCNC: 8.6 MG/DL — SIGNIFICANT CHANGE UP (ref 8.4–10.5)
CALCIUM SERPL-MCNC: 8.6 MG/DL — SIGNIFICANT CHANGE UP (ref 8.4–10.5)
CHLORIDE SERPL-SCNC: 115 MMOL/L — HIGH (ref 96–108)
CO2 SERPL-SCNC: 23 MMOL/L — SIGNIFICANT CHANGE UP (ref 22–31)
CO2 SERPL-SCNC: 24 MMOL/L — SIGNIFICANT CHANGE UP (ref 22–31)
CO2 SERPL-SCNC: 24 MMOL/L — SIGNIFICANT CHANGE UP (ref 22–31)
CO2 SERPL-SCNC: 26 MMOL/L — SIGNIFICANT CHANGE UP (ref 22–31)
CREAT SERPL-MCNC: 0.95 MG/DL — SIGNIFICANT CHANGE UP (ref 0.5–1.3)
CREAT SERPL-MCNC: 1 MG/DL — SIGNIFICANT CHANGE UP (ref 0.5–1.3)
CREAT SERPL-MCNC: 1.02 MG/DL — SIGNIFICANT CHANGE UP (ref 0.5–1.3)
CREAT SERPL-MCNC: 1.02 MG/DL — SIGNIFICANT CHANGE UP (ref 0.5–1.3)
GAS PNL BLDA: SIGNIFICANT CHANGE UP
GLUCOSE SERPL-MCNC: 115 MG/DL — HIGH (ref 70–99)
GLUCOSE SERPL-MCNC: 117 MG/DL — HIGH (ref 70–99)
GLUCOSE SERPL-MCNC: 122 MG/DL — HIGH (ref 70–99)
GLUCOSE SERPL-MCNC: 152 MG/DL — HIGH (ref 70–99)
GRAM STN FLD: SIGNIFICANT CHANGE UP
HCT VFR BLD CALC: 25.8 % — LOW (ref 39–50)
HCT VFR BLD CALC: 26 % — LOW (ref 39–50)
HCT VFR BLD CALC: 26.1 % — LOW (ref 39–50)
HCT VFR BLD CALC: 28.1 % — LOW (ref 39–50)
HGB BLD-MCNC: 7.8 G/DL — LOW (ref 13–17)
HGB BLD-MCNC: 7.9 G/DL — LOW (ref 13–17)
HGB BLD-MCNC: 7.9 G/DL — LOW (ref 13–17)
HGB BLD-MCNC: 8.5 G/DL — LOW (ref 13–17)
INR BLD: 1.39 — HIGH (ref 0.88–1.16)
INR BLD: 1.47 — HIGH (ref 0.88–1.16)
LACTATE SERPL-SCNC: 1.1 MMOL/L — SIGNIFICANT CHANGE UP (ref 0.5–2)
MAGNESIUM SERPL-MCNC: 2.1 MG/DL — SIGNIFICANT CHANGE UP (ref 1.6–2.6)
MAGNESIUM SERPL-MCNC: 2.1 MG/DL — SIGNIFICANT CHANGE UP (ref 1.6–2.6)
MAGNESIUM SERPL-MCNC: 2.2 MG/DL — SIGNIFICANT CHANGE UP (ref 1.6–2.6)
MAGNESIUM SERPL-MCNC: 2.2 MG/DL — SIGNIFICANT CHANGE UP (ref 1.6–2.6)
MCHC RBC-ENTMCNC: 27.8 PG — SIGNIFICANT CHANGE UP (ref 27–34)
MCHC RBC-ENTMCNC: 28 PG — SIGNIFICANT CHANGE UP (ref 27–34)
MCHC RBC-ENTMCNC: 28.1 PG — SIGNIFICANT CHANGE UP (ref 27–34)
MCHC RBC-ENTMCNC: 28.4 PG — SIGNIFICANT CHANGE UP (ref 27–34)
MCHC RBC-ENTMCNC: 30.2 GM/DL — LOW (ref 32–36)
MCHC RBC-ENTMCNC: 30.2 GM/DL — LOW (ref 32–36)
MCHC RBC-ENTMCNC: 30.3 GM/DL — LOW (ref 32–36)
MCHC RBC-ENTMCNC: 30.4 GM/DL — LOW (ref 32–36)
MCV RBC AUTO: 91.8 FL — SIGNIFICANT CHANGE UP (ref 80–100)
MCV RBC AUTO: 92.2 FL — SIGNIFICANT CHANGE UP (ref 80–100)
MCV RBC AUTO: 92.7 FL — SIGNIFICANT CHANGE UP (ref 80–100)
MCV RBC AUTO: 93.9 FL — SIGNIFICANT CHANGE UP (ref 80–100)
NRBC # BLD: 0 /100 WBCS — SIGNIFICANT CHANGE UP (ref 0–0)
PHOSPHATE SERPL-MCNC: 2.2 MG/DL — LOW (ref 2.5–4.5)
PHOSPHATE SERPL-MCNC: 2.2 MG/DL — LOW (ref 2.5–4.5)
PHOSPHATE SERPL-MCNC: 2.6 MG/DL — SIGNIFICANT CHANGE UP (ref 2.5–4.5)
PLATELET # BLD AUTO: 466 K/UL — HIGH (ref 150–400)
PLATELET # BLD AUTO: 469 K/UL — HIGH (ref 150–400)
PLATELET # BLD AUTO: 488 K/UL — HIGH (ref 150–400)
PLATELET # BLD AUTO: 541 K/UL — HIGH (ref 150–400)
POTASSIUM SERPL-MCNC: 3.6 MMOL/L — SIGNIFICANT CHANGE UP (ref 3.5–5.3)
POTASSIUM SERPL-MCNC: 3.9 MMOL/L — SIGNIFICANT CHANGE UP (ref 3.5–5.3)
POTASSIUM SERPL-MCNC: 4.1 MMOL/L — SIGNIFICANT CHANGE UP (ref 3.5–5.3)
POTASSIUM SERPL-MCNC: 4.3 MMOL/L — SIGNIFICANT CHANGE UP (ref 3.5–5.3)
POTASSIUM SERPL-SCNC: 3.6 MMOL/L — SIGNIFICANT CHANGE UP (ref 3.5–5.3)
POTASSIUM SERPL-SCNC: 3.9 MMOL/L — SIGNIFICANT CHANGE UP (ref 3.5–5.3)
POTASSIUM SERPL-SCNC: 4.1 MMOL/L — SIGNIFICANT CHANGE UP (ref 3.5–5.3)
POTASSIUM SERPL-SCNC: 4.3 MMOL/L — SIGNIFICANT CHANGE UP (ref 3.5–5.3)
PROT SERPL-MCNC: 4.8 G/DL — LOW (ref 6–8.3)
PROT SERPL-MCNC: 5.2 G/DL — LOW (ref 6–8.3)
PROT SERPL-MCNC: 5.4 G/DL — LOW (ref 6–8.3)
PROTHROM AB SERPL-ACNC: 15.9 SEC — HIGH (ref 10–12.9)
PROTHROM AB SERPL-ACNC: 16.8 SEC — HIGH (ref 10–12.9)
RBC # BLD: 2.78 M/UL — LOW (ref 4.2–5.8)
RBC # BLD: 2.81 M/UL — LOW (ref 4.2–5.8)
RBC # BLD: 2.82 M/UL — LOW (ref 4.2–5.8)
RBC # BLD: 3.03 M/UL — LOW (ref 4.2–5.8)
RBC # FLD: 15.8 % — HIGH (ref 10.3–14.5)
RBC # FLD: 15.8 % — HIGH (ref 10.3–14.5)
RBC # FLD: 15.9 % — HIGH (ref 10.3–14.5)
RBC # FLD: 16 % — HIGH (ref 10.3–14.5)
RH IG SCN BLD-IMP: POSITIVE — SIGNIFICANT CHANGE UP
SODIUM SERPL-SCNC: 148 MMOL/L — HIGH (ref 135–145)
SODIUM SERPL-SCNC: 149 MMOL/L — HIGH (ref 135–145)
SODIUM SERPL-SCNC: 149 MMOL/L — HIGH (ref 135–145)
SODIUM SERPL-SCNC: 150 MMOL/L — HIGH (ref 135–145)
SPECIMEN SOURCE: SIGNIFICANT CHANGE UP
WBC # BLD: 11.33 K/UL — HIGH (ref 3.8–10.5)
WBC # BLD: 14.12 K/UL — HIGH (ref 3.8–10.5)
WBC # BLD: 14.19 K/UL — HIGH (ref 3.8–10.5)
WBC # BLD: 16.83 K/UL — HIGH (ref 3.8–10.5)
WBC # FLD AUTO: 11.33 K/UL — HIGH (ref 3.8–10.5)
WBC # FLD AUTO: 14.12 K/UL — HIGH (ref 3.8–10.5)
WBC # FLD AUTO: 14.19 K/UL — HIGH (ref 3.8–10.5)
WBC # FLD AUTO: 16.83 K/UL — HIGH (ref 3.8–10.5)

## 2019-12-06 PROCEDURE — 99233 SBSQ HOSP IP/OBS HIGH 50: CPT | Mod: 25

## 2019-12-06 PROCEDURE — 99291 CRITICAL CARE FIRST HOUR: CPT | Mod: 25

## 2019-12-06 PROCEDURE — 93321 DOPPLER ECHO F-UP/LMTD STD: CPT | Mod: 26

## 2019-12-06 PROCEDURE — 76930: CPT | Mod: 26

## 2019-12-06 PROCEDURE — 71045 X-RAY EXAM CHEST 1 VIEW: CPT | Mod: 26

## 2019-12-06 PROCEDURE — 71045 X-RAY EXAM CHEST 1 VIEW: CPT | Mod: 26,77

## 2019-12-06 PROCEDURE — 99292 CRITICAL CARE ADDL 30 MIN: CPT | Mod: 25

## 2019-12-06 PROCEDURE — 93308 TTE F-UP OR LMTD: CPT | Mod: 26

## 2019-12-06 PROCEDURE — 33010: CPT

## 2019-12-06 PROCEDURE — 31500 INSERT EMERGENCY AIRWAY: CPT

## 2019-12-06 RX ORDER — MEROPENEM 1 G/30ML
1000 INJECTION INTRAVENOUS EVERY 8 HOURS
Refills: 0 | Status: DISCONTINUED | OUTPATIENT
Start: 2019-12-06 | End: 2019-12-07

## 2019-12-06 RX ORDER — DIGOXIN 250 MCG
0.25 TABLET ORAL ONCE
Refills: 0 | Status: COMPLETED | OUTPATIENT
Start: 2019-12-06 | End: 2019-12-06

## 2019-12-06 RX ORDER — PANTOPRAZOLE SODIUM 20 MG/1
40 TABLET, DELAYED RELEASE ORAL DAILY
Refills: 0 | Status: DISCONTINUED | OUTPATIENT
Start: 2019-12-06 | End: 2019-12-10

## 2019-12-06 RX ORDER — MIDAZOLAM HYDROCHLORIDE 1 MG/ML
2 INJECTION, SOLUTION INTRAMUSCULAR; INTRAVENOUS ONCE
Refills: 0 | Status: DISCONTINUED | OUTPATIENT
Start: 2019-12-06 | End: 2019-12-06

## 2019-12-06 RX ORDER — POTASSIUM CHLORIDE 20 MEQ
20 PACKET (EA) ORAL ONCE
Refills: 0 | Status: COMPLETED | OUTPATIENT
Start: 2019-12-06 | End: 2019-12-06

## 2019-12-06 RX ORDER — PROPOFOL 10 MG/ML
20 INJECTION, EMULSION INTRAVENOUS
Qty: 500 | Refills: 0 | Status: DISCONTINUED | OUTPATIENT
Start: 2019-12-06 | End: 2019-12-07

## 2019-12-06 RX ORDER — POTASSIUM CHLORIDE 20 MEQ
20 PACKET (EA) ORAL
Refills: 0 | Status: COMPLETED | OUTPATIENT
Start: 2019-12-06 | End: 2019-12-06

## 2019-12-06 RX ORDER — AMIODARONE HYDROCHLORIDE 400 MG/1
1 TABLET ORAL
Qty: 900 | Refills: 0 | Status: DISCONTINUED | OUTPATIENT
Start: 2019-12-06 | End: 2019-12-07

## 2019-12-06 RX ORDER — SODIUM,POTASSIUM PHOSPHATES 278-250MG
1 POWDER IN PACKET (EA) ORAL
Refills: 0 | Status: COMPLETED | OUTPATIENT
Start: 2019-12-06 | End: 2019-12-06

## 2019-12-06 RX ORDER — ALBUMIN HUMAN 25 %
250 VIAL (ML) INTRAVENOUS
Refills: 0 | Status: COMPLETED | OUTPATIENT
Start: 2019-12-06 | End: 2019-12-06

## 2019-12-06 RX ORDER — VASOPRESSIN 20 [USP'U]/ML
0.05 INJECTION INTRAVENOUS
Qty: 50 | Refills: 0 | Status: DISCONTINUED | OUTPATIENT
Start: 2019-12-06 | End: 2019-12-07

## 2019-12-06 RX ORDER — CISATRACURIUM BESYLATE 2 MG/ML
10 INJECTION INTRAVENOUS ONCE
Refills: 0 | Status: COMPLETED | OUTPATIENT
Start: 2019-12-06 | End: 2019-12-06

## 2019-12-06 RX ORDER — AMIODARONE HYDROCHLORIDE 400 MG/1
150 TABLET ORAL ONCE
Refills: 0 | Status: COMPLETED | OUTPATIENT
Start: 2019-12-06 | End: 2019-12-06

## 2019-12-06 RX ORDER — PHENYLEPHRINE HYDROCHLORIDE 10 MG/ML
0.5 INJECTION INTRAVENOUS
Qty: 40 | Refills: 0 | Status: DISCONTINUED | OUTPATIENT
Start: 2019-12-06 | End: 2019-12-07

## 2019-12-06 RX ORDER — PROTAMINE SULFATE 10 MG/ML
50 AMPUL (ML) INTRAVENOUS ONCE
Refills: 0 | Status: COMPLETED | OUTPATIENT
Start: 2019-12-06 | End: 2019-12-06

## 2019-12-06 RX ORDER — ALBUMIN HUMAN 25 %
250 VIAL (ML) INTRAVENOUS ONCE
Refills: 0 | Status: COMPLETED | OUTPATIENT
Start: 2019-12-06 | End: 2019-12-06

## 2019-12-06 RX ORDER — CHLORHEXIDINE GLUCONATE 213 G/1000ML
15 SOLUTION TOPICAL EVERY 12 HOURS
Refills: 0 | Status: DISCONTINUED | OUTPATIENT
Start: 2019-12-06 | End: 2019-12-07

## 2019-12-06 RX ORDER — PROTAMINE SULFATE 10 MG/ML
25 AMPUL (ML) INTRAVENOUS ONCE
Refills: 0 | Status: DISCONTINUED | OUTPATIENT
Start: 2019-12-06 | End: 2019-12-06

## 2019-12-06 RX ORDER — HEPARIN SODIUM 5000 [USP'U]/ML
5000 INJECTION INTRAVENOUS; SUBCUTANEOUS EVERY 8 HOURS
Refills: 0 | Status: DISCONTINUED | OUTPATIENT
Start: 2019-12-06 | End: 2019-12-09

## 2019-12-06 RX ADMIN — MODAFINIL 100 MILLIGRAM(S): 200 TABLET ORAL at 11:25

## 2019-12-06 RX ADMIN — LIDOCAINE 1 PATCH: 4 CREAM TOPICAL at 11:23

## 2019-12-06 RX ADMIN — Medication 125 MILLILITER(S): at 19:34

## 2019-12-06 RX ADMIN — Medication 125 MILLILITER(S): at 18:35

## 2019-12-06 RX ADMIN — CISATRACURIUM BESYLATE 10 MILLIGRAM(S): 2 INJECTION INTRAVENOUS at 17:30

## 2019-12-06 RX ADMIN — VASOPRESSIN 3 UNIT(S)/MIN: 20 INJECTION INTRAVENOUS at 19:47

## 2019-12-06 RX ADMIN — PANTOPRAZOLE SODIUM 40 MILLIGRAM(S): 20 TABLET, DELAYED RELEASE ORAL at 11:22

## 2019-12-06 RX ADMIN — AMIODARONE HYDROCHLORIDE 200 MILLIGRAM(S): 400 TABLET ORAL at 05:42

## 2019-12-06 RX ADMIN — MIDODRINE HYDROCHLORIDE 5 MILLIGRAM(S): 2.5 TABLET ORAL at 05:42

## 2019-12-06 RX ADMIN — Medication 4 MILLIGRAM(S): at 19:24

## 2019-12-06 RX ADMIN — PHENYLEPHRINE HYDROCHLORIDE 15.73 MICROGRAM(S)/KG/MIN: 10 INJECTION INTRAVENOUS at 19:47

## 2019-12-06 RX ADMIN — PROPOFOL 10.07 MICROGRAM(S)/KG/MIN: 10 INJECTION, EMULSION INTRAVENOUS at 19:47

## 2019-12-06 RX ADMIN — Medication 1 TABLET(S): at 07:17

## 2019-12-06 RX ADMIN — Medication 0.25 MILLIGRAM(S): at 17:00

## 2019-12-06 RX ADMIN — Medication 4 MILLIGRAM(S): at 07:10

## 2019-12-06 RX ADMIN — MIDODRINE HYDROCHLORIDE 5 MILLIGRAM(S): 2.5 TABLET ORAL at 11:22

## 2019-12-06 RX ADMIN — Medication 220 MILLIGRAM(S): at 17:00

## 2019-12-06 RX ADMIN — Medication 20 MILLIEQUIVALENT(S): at 05:43

## 2019-12-06 RX ADMIN — Medication 50 MILLIEQUIVALENT(S): at 10:38

## 2019-12-06 RX ADMIN — Medication 50 MILLIEQUIVALENT(S): at 12:17

## 2019-12-06 RX ADMIN — LIDOCAINE 1 PATCH: 4 CREAM TOPICAL at 19:08

## 2019-12-06 RX ADMIN — Medication 4 MILLIGRAM(S): at 11:24

## 2019-12-06 RX ADMIN — HEPARIN SODIUM 13 UNIT(S)/HR: 5000 INJECTION INTRAVENOUS; SUBCUTANEOUS at 00:16

## 2019-12-06 RX ADMIN — HEPARIN SODIUM 5000 UNIT(S): 5000 INJECTION INTRAVENOUS; SUBCUTANEOUS at 22:36

## 2019-12-06 RX ADMIN — CISATRACURIUM BESYLATE 10 MILLIGRAM(S): 2 INJECTION INTRAVENOUS at 18:30

## 2019-12-06 RX ADMIN — Medication 125 MILLILITER(S): at 08:42

## 2019-12-06 RX ADMIN — CHLORHEXIDINE GLUCONATE 1 APPLICATION(S): 213 SOLUTION TOPICAL at 05:43

## 2019-12-06 RX ADMIN — AMIODARONE HYDROCHLORIDE 33.33 MG/MIN: 400 TABLET ORAL at 22:35

## 2019-12-06 RX ADMIN — MIDAZOLAM HYDROCHLORIDE 2 MILLIGRAM(S): 1 INJECTION, SOLUTION INTRAMUSCULAR; INTRAVENOUS at 17:00

## 2019-12-06 RX ADMIN — MEROPENEM 100 MILLIGRAM(S): 1 INJECTION INTRAVENOUS at 22:36

## 2019-12-06 RX ADMIN — LIDOCAINE 1 PATCH: 4 CREAM TOPICAL at 22:37

## 2019-12-06 RX ADMIN — SERTRALINE 25 MILLIGRAM(S): 25 TABLET, FILM COATED ORAL at 11:22

## 2019-12-06 RX ADMIN — Medication 81 MILLIGRAM(S): at 11:22

## 2019-12-06 RX ADMIN — Medication 1 TABLET(S): at 11:28

## 2019-12-06 RX ADMIN — Medication 4 MILLIGRAM(S): at 19:09

## 2019-12-06 RX ADMIN — AMIODARONE HYDROCHLORIDE 200 MILLIGRAM(S): 400 TABLET ORAL at 08:15

## 2019-12-06 RX ADMIN — AMIODARONE HYDROCHLORIDE 200 MILLIGRAM(S): 400 TABLET ORAL at 17:00

## 2019-12-06 NOTE — PROGRESS NOTE ADULT - SUBJECTIVE AND OBJECTIVE BOX
Procedure: Pericardial Drain placement    Indication: Pericardial Effusion      Clinical History: Pericardial Effusion      Meds:aMIOdarone    Tablet   Oral   aMIOdarone    Tablet 200 milliGRAM(s) Oral daily  aspirin  chewable 81 milliGRAM(s) Oral daily  atorvastatin 80 milliGRAM(s) Oral at bedtime  bisacodyl Suppository 10 milliGRAM(s) Rectal daily PRN  buDESOnide    Inhalation Suspension 0.5 milliGRAM(s) Inhalation every 12 hours  chlorhexidine 2% Cloths 1 Application(s) Topical daily  dextrose 50% Injectable 50 milliLiter(s) IV Push every 15 minutes  heparin  Infusion 1300 Unit(s)/Hr IV Continuous <Continuous>  lidocaine   Patch 1 Patch Transdermal daily  midodrine. 5 milliGRAM(s) Oral three times a day  modafinil 100 milliGRAM(s) Oral daily  pantoprazole   Suspension 40 milliGRAM(s) Enteral Tube daily  phenylephrine    Infusion 0.5 MICROgram(s)/kG/Min IV Continuous <Continuous>  potassium acid phosphate/sodium acid phosphate tablet (K-PHOS No. 2) 1 Tablet(s) Oral three times a day with meals  potassium chloride  20 mEq/100 mL IVPB 20 milliEquivalent(s) IV Intermittent every 2 hours  protamine  IVPB 50 milliGRAM(s) IV Intermittent once  QUEtiapine 25 milliGRAM(s) Oral at bedtime  sertraline 25 milliGRAM(s) Oral daily  sodium chloride 0.9%. 1000 milliLiter(s) IV Continuous <Continuous>  testosterone patch 4 mG/24 Hr(s) 4 milliGRAM(s) Transdermal daily      Allergies:penicillin (Unknown)        Labs:                           8.5    11.33 )-----------( 541      ( 06 Dec 2019 16:26 )             28.1     PT/INR - ( 06 Dec 2019 09:34 )   PT: 16.8 sec;   INR: 1.47          PTT - ( 06 Dec 2019 09:34 )  PTT:55.9 sec  12-06    148<H>  |  115<H>  |  23  ----------------------------<  152<H>  3.6   |  23  |  0.95    Ca    8.2<L>      06 Dec 2019 09:34  Phos  2.2     12-06  Mg     2.1     12-06    TPro  5.2<L>  /  Alb  3.1<L>  /  TBili  0.5  /  DBili  x   /  AST  21  /  ALT  26  /  AlkPhos  52  12-06        Physical Exam: NAD      Anesthesia: Local with Sedation    ASA: 2

## 2019-12-06 NOTE — PROGRESS NOTE ADULT - ASSESSMENT
Neuro -- pain control, sedation PRN  CVS - s/p pericardial effusion drainage   AFib - on amio, no anticoagulation  Pulm - vent weaning as tolerated   may need to drain effusion   GI - GI proph   - monitor UOP  Endo - glycemic control  Heme - correct coagulapathy, monitor for bleeding  ID - periop antibiotics.       Critical post op.    Critical care time spent 50 min

## 2019-12-06 NOTE — CHART NOTE - NSCHARTNOTEFT_GEN_A_CORE
Brief Operative Note    Attending:  Dhruv      Preoperative Diagnosis: DISSECTION AORTA  ADVANCED ILLNESS  Handoff  MEWS Score  Benign prostatic hypertrophy without lower urinary tract symptoms  Ulcerative colitis  Aortic dissection  Cardiogenic shock  Cardiac tamponade  Aortic dissection  Dissection of aorta, unspecified portion of aorta  Leukocytosis, unspecified type  Hypernatremia  Bacterial pneumonia  Fever, postprocedural  FELY (acute kidney injury)  Cabrol procedure for replacement of aortic valve, aortic root, and ascending aorta  Repair, aortic arch, using frozen elephant trunk technique  Replacement, ascending aorta and hemiarch  Aortic root replacement  States following surgery of eye and adnexa  Other postprocedural status  Hemorrhoids  Acute appendicitis with generalized peritonitis  NECK AND BACK PAIN  90+      Postoperative Diagnosis: same    Procedure: Pericardial Drainage Catheter Placement    Anesthesia: General    Findings: 10 F drain placed    EBL: minimal    Complications: none    Specimen: Pericardial fluid    Condition upon return to IR recovery: stable     [facility]

## 2019-12-06 NOTE — PROGRESS NOTE ADULT - SUBJECTIVE AND OBJECTIVE BOX
covering for Dr Graff  Critical Care    INTERVAL HPI/OVERNIGHT EVENTS:    ANTIBIOTICS    MEDICATIONS  (STANDING):  aMIOdarone    Tablet   Oral   aMIOdarone    Tablet 200 milliGRAM(s) Oral daily  aspirin  chewable 81 milliGRAM(s) Oral daily  atorvastatin 80 milliGRAM(s) Oral at bedtime  buDESOnide    Inhalation Suspension 0.5 milliGRAM(s) Inhalation every 12 hours  chlorhexidine 2% Cloths 1 Application(s) Topical daily  dextrose 50% Injectable 50 milliLiter(s) IV Push every 15 minutes  heparin  Infusion 1300 Unit(s)/Hr (13 mL/Hr) IV Continuous <Continuous>  lidocaine   Patch 1 Patch Transdermal daily  midodrine. 5 milliGRAM(s) Oral three times a day  modafinil 100 milliGRAM(s) Oral daily  pantoprazole   Suspension 40 milliGRAM(s) Enteral Tube daily  phenylephrine    Infusion 0.5 MICROgram(s)/kG/Min (15.731 mL/Hr) IV Continuous <Continuous>  potassium acid phosphate/sodium acid phosphate tablet (K-PHOS No. 2) 1 Tablet(s) Oral three times a day with meals  potassium chloride  20 mEq/100 mL IVPB 20 milliEquivalent(s) IV Intermittent every 2 hours  protamine  IVPB 50 milliGRAM(s) IV Intermittent once  QUEtiapine 25 milliGRAM(s) Oral at bedtime  sertraline 25 milliGRAM(s) Oral daily  sodium chloride 0.9%. 1000 milliLiter(s) (10 mL/Hr) IV Continuous <Continuous>  testosterone patch 4 mG/24 Hr(s) 4 milliGRAM(s) Transdermal daily    MEDICATIONS  (PRN):  bisacodyl Suppository 10 milliGRAM(s) Rectal daily PRN Constipation      Allergies    penicillin (Unknown)    Intolerances        REVIEW OF SYSTEMS:    Constitutional: No fever, weight loss or fatigue  Eyes: No eye pain, visual disturbances, or discharge  ENMT:  No difficulty hearing, tinnitus, vertigo; No sinus or throat pain  Neck: No pain or stiffness  Respiratory: SOB  Cardiovascular: No chest pain, palpitations, shortness of breath, dizziness or leg swelling  Gastrointestinal: No abdominal or epigastric pain. No nausea, vomiting or hematemesis; No diarrhea or constipation. No melena or hematochezia.  Genitourinary: No dysuria, frequency, hematuria or incontinence  Rectal: No pain, hemorrhoids or incontinence  Neurological: No headaches, memory loss, loss of strength, numbness or tremors  Skin: No itching, burning, rashes or lesions     Vital Signs Last 24 Hrs  T(C): 37.3 (06 Dec 2019 14:00), Max: 37.4 (05 Dec 2019 21:15)  T(F): 99.1 (06 Dec 2019 14:00), Max: 99.4 (05 Dec 2019 21:15)  HR: 121 (06 Dec 2019 15:00) (72 - 136)  BP: 101/73 (06 Dec 2019 12:50) (94/64 - 130/76)  BP(mean): 84 (06 Dec 2019 12:50) (72 - 93)  RR: 22 (06 Dec 2019 16:00) (9 - 25)  SpO2: 95% (06 Dec 2019 16:00) (91% - 100%)    PHYSICAL EXAM:      Eyes: PERRL, EOM intact; conjunctiva and sclera clear  Head: Normocephalic; atraumatic  ENMT: No nasal discharge; airway clear  Neck: Supple; non tender; no masses  Respiratory: No wheezes, rales or rhonchi  Cardiovascular: Regular rate and rhythm. S1 and S2 Normal; No murmurs, gallops or rubs  Gastrointestinal: Soft non-tender non-distended; Normal bowel sounds; No hepatosplenomegaly  Genitourinary: No costovertebral angle tenderness    LABS:                        7.9    14.12 )-----------( 488      ( 06 Dec 2019 09:34 )             26.0     12-06    148<H>  |  115<H>  |  23  ----------------------------<  152<H>  3.6   |  23  |  0.95    Ca    8.2<L>      06 Dec 2019 09:34  Phos  2.2     12-06  Mg     2.1     12-06    TPro  5.2<L>  /  Alb  3.1<L>  /  TBili  0.5  /  DBili  x   /  AST  21  /  ALT  26  /  AlkPhos  52  12-06    PT/INR - ( 06 Dec 2019 09:34 )   PT: 16.8 sec;   INR: 1.47          PTT - ( 06 Dec 2019 09:34 )  PTT:55.9 sec        MICROBIOLOGY:      RADIOLOGY & ADDITIONAL STUDIES:

## 2019-12-06 NOTE — PROGRESS NOTE ADULT - ASSESSMENT
CT staff inform me that the patient has a pericardial effusion and that he is going for drainage today  This may account for his leukocytosis

## 2019-12-06 NOTE — PROGRESS NOTE ADULT - SUBJECTIVE AND OBJECTIVE BOX
s/p Type A dissection repair on 11/23  post op course complicated by pericardial effusion s/p drainage by IR today   respiratory failure/hypoxia - intubated urgently today during procedures  hypotension on pressors  Delirium and agitation      ICU Vital Signs Last 24 Hrs  T(C): 37.1 (12-06-19 @ 18:38), Max: 37.4 (12-06-19 @ 01:01)  T(F): 98.8 (12-06-19 @ 18:38), Max: 99.3 (12-06-19 @ 01:01)  HR: 102 (12-06-19 @ 21:00) (72 - 136)  BP: 101/73 (12-06-19 @ 12:50) (94/64 - 101/73)  BP(mean): 84 (12-06-19 @ 12:50) (72 - 84)  ABP: 108/68 (12-06-19 @ 21:00) (96/60 - 190/110)  ABP(mean): 82 (12-06-19 @ 21:00) (72 - 144)  RR: 15 (12-06-19 @ 21:00) (10 - 36)  SpO2: 99% (12-06-19 @ 21:00) (85% - 100%)    I&O's Summary    05 Dec 2019 07:01  -  06 Dec 2019 07:00  --------------------------------------------------------  IN: 1189.4 mL / OUT: 180 mL / NET: 1009.4 mL    06 Dec 2019 07:01  -  06 Dec 2019 21:38  --------------------------------------------------------  IN: 904 mL / OUT: 1085 mL / NET: -181 mL      Mode: AC/ CMV (Assist Control/ Continuous Mandatory Ventilation)  RR (machine): 12  TV (machine): 600  FiO2: 100  PEEP: 5  ITime: 1  MAP: 9  PIP: 19    ABG - ( 06 Dec 2019 18:56 )  pH: 7.39  /  pCO2: 40    /  pO2: 117   / HCO3: 24    / Base Excess: -1.2  /  SaO2: 98                              7.9    16.83 )-----------( 469      ( 06 Dec 2019 18:57 )             26.1     06 Dec 2019 18:57    149    |  115    |  25     ----------------------------<  115    4.1     |  24     |  1.02     Ca    8.2        06 Dec 2019 18:57  Phos  2.6       06 Dec 2019 16:26  Mg     2.1       06 Dec 2019 18:57    TPro  5.4    /  Alb  3.2    /  TBili  0.6    /  DBili  x      /  AST  25     /  ALT  32     /  AlkPhos  59     06 Dec 2019 16:26    PT/INR - ( 06 Dec 2019 18:57 )   PT: 16.8 sec;   INR: 1.47     PTT - ( 06 Dec 2019 18:57 )  PTT:24.5 sec    MEDICATIONS  (STANDING):  aMIOdarone Infusion 1 mG/Min IV Continuous <Continuous>  aspirin  chewable 81 milliGRAM(s) Oral daily  buDESOnide    Inhalation Suspension 0.5 milliGRAM(s) Inhalation every 12 hours  heparin  Injectable 5000 Unit(s) SubCutaneous every 8 hours  lidocaine   Patch 1 Patch Transdermal daily  meropenem  IVPB 1000 milliGRAM(s) IV Intermittent every 8 hours  modafinil 100 milliGRAM(s) Oral daily  pantoprazole  Injectable 40 milliGRAM(s) IV Push daily  phenylephrine    Infusion 0.5 MICROgram(s)/kG/Min IV Continuous <Continuous>  propofol Infusion 20 MICROgram(s)/kG/Min IV Continuous <Continuous>  QUEtiapine 25 milliGRAM(s) Oral at bedtime  sertraline 25 milliGRAM(s) Oral daily  testosterone patch 4 mG/24 Hr(s) 4 milliGRAM(s) Transdermal daily  vasopressin Infusion 0.05 Unit(s)/Min IV Continuous <Continuous>    Home Medications:  Flomax 0.4 mg oral capsule: 1 cap(s) orally once a day (23 Nov 2019 05:20)  sulfADIAZINE:  (23 Nov 2019 05:19)    PHYSICAL EXAM:  Gen : intubated sedated   Neck: No LAD, No JVD  Respiratory: decreased in the bases  Cardiovascular: S1 and S2, RRR, no M/G/R  pericardial drain +, no drainage   Gastrointestinal: BS+, soft, NT/ND  Extremities: No peripheral edema  Vascular: 2+ peripheral pulses  Skin : no rash or lesions   Incision: clean dry/ no sign of infection  Lines: no sign of infection

## 2019-12-06 NOTE — PROGRESS NOTE ADULT - SUBJECTIVE AND OBJECTIVE BOX
CTICU  CRITICAL  CARE  attending     Hand off received 					   Pertinent clinical, laboratory, radiographic, hemodynamic, echocardiographic, respiratory data, microbiologic data and chart were reviewed and analyzed frequently throughout the course of the day and night  Patient seen and examined with CTS/ SH attending at bedside  Pt is a 69y , Male, HEALTH ISSUES - PROBLEM Dx:  Leukocytosis, unspecified type: Leukocytosis, unspecified type  Hypernatremia: Hypernatremia  Bacterial pneumonia: Bacterial pneumonia  Fever, postprocedural: Fever, postprocedural  FELY (acute kidney injury): FELY (acute kidney injury)      , FAMILY HISTORY:  PAST MEDICAL & SURGICAL HISTORY:  Benign prostatic hypertrophy without lower urinary tract symptoms: BPH (benign prostatic hypertrophy)  Ulcerative colitis: Ulcerative colitis  States following surgery of eye and adnexa: straightened right eye  Other postprocedural status: History of hernia repair  Hemorrhoids: Hemorrhoids  Acute appendicitis with generalized peritonitis: Ruptured appendix    Patient is a 69y old  Male who presents with a chief complaint of Aortic dissection (06 Dec 2019 21:36)      14 system review was unremarkable    Vital signs, hemodynamic and respiratory parameters were reviewed from the bedside nursing flowsheet.  ICU Vital Signs Last 24 Hrs  T(C): 37.1 (06 Dec 2019 18:38), Max: 37.4 (06 Dec 2019 01:01)  T(F): 98.8 (06 Dec 2019 18:38), Max: 99.3 (06 Dec 2019 01:01)  HR: 92 (06 Dec 2019 21:36) (72 - 136)  BP: 101/73 (06 Dec 2019 12:50) (94/64 - 101/73)  BP(mean): 84 (06 Dec 2019 12:50) (72 - 84)  ABP: 108/68 (06 Dec 2019 21:00) (96/60 - 190/110)  ABP(mean): 82 (06 Dec 2019 21:00) (72 - 144)  RR: 17 (06 Dec 2019 21:36) (11 - 36)  SpO2: 99% (06 Dec 2019 21:36) (85% - 99%)    Adult Advanced Hemodynamics Last 24 Hrs  CVP(mm Hg): --  CVP(cm H2O): --  CO: --  CI: --  PA: --  PA(mean): --  PCWP: --  SVR: --  SVRI: --  PVR: --  PVRI: --, ABG - ( 06 Dec 2019 18:56 )  pH, Arterial: 7.39  pH, Blood: x     /  pCO2: 40    /  pO2: 117   / HCO3: 24    / Base Excess: -1.2  /  SaO2: 98                Mode: AC/ CMV (Assist Control/ Continuous Mandatory Ventilation)  RR (machine): 14  TV (machine): 600  FiO2: 60  PEEP: 5  ITime: 1  MAP: 8  PIP: 17    Intake and output was reviewed and the fluid balance was calculated  Daily     Daily   I&O's Summary    05 Dec 2019 07:01  -  06 Dec 2019 07:00  --------------------------------------------------------  IN: 1189.4 mL / OUT: 180 mL / NET: 1009.4 mL    06 Dec 2019 07:01  -  06 Dec 2019 22:29  --------------------------------------------------------  IN: 904 mL / OUT: 1085 mL / NET: -181 mL        All lines and drain sites were assessed  Glycemic trend was reviewedCAPILLARY BLOOD GLUCOSE        No acute change in mental status  Auscultation of the chest reveals equal bs  Abdomen is soft  Extremities are warm and well perfused  Wounds appear clean and unremarkable  Antibiotics are periop    labs  CBC Full  -  ( 06 Dec 2019 18:57 )  WBC Count : 16.83 K/uL  RBC Count : 2.78 M/uL  Hemoglobin : 7.9 g/dL  Hematocrit : 26.1 %  Platelet Count - Automated : 469 K/uL  Mean Cell Volume : 93.9 fl  Mean Cell Hemoglobin : 28.4 pg  Mean Cell Hemoglobin Concentration : 30.3 gm/dL  Auto Neutrophil # : x  Auto Lymphocyte # : x  Auto Monocyte # : x  Auto Eosinophil # : x  Auto Basophil # : x  Auto Neutrophil % : x  Auto Lymphocyte % : x  Auto Monocyte % : x  Auto Eosinophil % : x  Auto Basophil % : x    12-06    149<H>  |  115<H>  |  25<H>  ----------------------------<  115<H>  4.1   |  24  |  1.02    Ca    8.2<L>      06 Dec 2019 18:57  Phos  2.6     12-06  Mg     2.1     12-06    TPro  5.4<L>  /  Alb  3.2<L>  /  TBili  0.6  /  DBili  x   /  AST  25  /  ALT  32  /  AlkPhos  59  12-06    PT/INR - ( 06 Dec 2019 18:57 )   PT: 16.8 sec;   INR: 1.47          PTT - ( 06 Dec 2019 18:57 )  PTT:24.5 sec  The current medications were reviewed   MEDICATIONS  (STANDING):  aMIOdarone Infusion 1 mG/Min (33.333 mL/Hr) IV Continuous <Continuous>  aspirin  chewable 81 milliGRAM(s) Oral daily  buDESOnide    Inhalation Suspension 0.5 milliGRAM(s) Inhalation every 12 hours  chlorhexidine 0.12% Liquid 15 milliLiter(s) Oral Mucosa every 12 hours  chlorhexidine 2% Cloths 1 Application(s) Topical daily  dextrose 50% Injectable 50 milliLiter(s) IV Push every 15 minutes  heparin  Injectable 5000 Unit(s) SubCutaneous every 8 hours  lidocaine   Patch 1 Patch Transdermal daily  meropenem  IVPB 1000 milliGRAM(s) IV Intermittent every 8 hours  modafinil 100 milliGRAM(s) Oral daily  pantoprazole  Injectable 40 milliGRAM(s) IV Push daily  phenylephrine    Infusion 0.5 MICROgram(s)/kG/Min (15.731 mL/Hr) IV Continuous <Continuous>  propofol Infusion 20 MICROgram(s)/kG/Min (10.068 mL/Hr) IV Continuous <Continuous>  QUEtiapine 25 milliGRAM(s) Oral at bedtime  sertraline 25 milliGRAM(s) Oral daily  sodium chloride 0.9%. 1000 milliLiter(s) (10 mL/Hr) IV Continuous <Continuous>  testosterone patch 4 mG/24 Hr(s) 4 milliGRAM(s) Transdermal daily  vasopressin Infusion 0.05 Unit(s)/Min (3 mL/Hr) IV Continuous <Continuous>    MEDICATIONS  (PRN):  bisacodyl Suppository 10 milliGRAM(s) Rectal daily PRN Constipation       PROBLEM LIST/ ASSESSMENT:  HEALTH ISSUES - PROBLEM Dx:  Leukocytosis, unspecified type: Leukocytosis, unspecified type  Hypernatremia: Hypernatremia  Bacterial pneumonia: Bacterial pneumonia  Fever, postprocedural: Fever, postprocedural  FELY (acute kidney injury): FELY (acute kidney injury)      ,   Patient is a 69y old  Male who presents with a chief complaint of Aortic dissection (06 Dec 2019 21:36)     s/p cardiac surgery              My plan includes :  close hemodynamic, ventilatory and drain monitoring and management per post op routine    Monitor for arrhythmias and monitor parameters for organ perfusion  beta blockade not administered due to hemodynamic instability and bradycardia  monitor neurologic status  Head of the bed should remain elevated to 45 deg .   chest PT and IS will be encouraged  monitor adequacy of oxygenation and ventilation and attempt to wean oxygen  antibiotic regimen will be tailored to the clinical, laboratory and microbiologic data  Nutritional goals will be met using po eventually , ensure adequate caloric intake and montior the same  Stress ulcer and VTE prophylaxis will be achieved    Glycemic control is satisfactory  Electrolytes have been repleted as necessary and wound care has been carried out. Pain control has been achieved.   agressive physical therapy and early mobility and ambulation goals will be met   The family was updated about the course and plan  CRITICAL CARE TIME personally provided by me  in evaluation and management, reassessments, review and interpretation of labs and x-rays, ventilator and hemodynamic management, formulating a plan and coordinating care: ___90____ MIN.  Time does not include procedural time.  CTICU ATTENDING     					    Jaret Hebert MD

## 2019-12-07 LAB
ALBUMIN SERPL ELPH-MCNC: 3.1 G/DL — LOW (ref 3.3–5)
ALBUMIN SERPL ELPH-MCNC: 3.1 G/DL — LOW (ref 3.3–5)
ALP SERPL-CCNC: 52 U/L — SIGNIFICANT CHANGE UP (ref 40–120)
ALP SERPL-CCNC: 59 U/L — SIGNIFICANT CHANGE UP (ref 40–120)
ALT FLD-CCNC: 24 U/L — SIGNIFICANT CHANGE UP (ref 10–45)
ALT FLD-CCNC: 24 U/L — SIGNIFICANT CHANGE UP (ref 10–45)
ANION GAP SERPL CALC-SCNC: 10 MMOL/L — SIGNIFICANT CHANGE UP (ref 5–17)
ANION GAP SERPL CALC-SCNC: 10 MMOL/L — SIGNIFICANT CHANGE UP (ref 5–17)
ANION GAP SERPL CALC-SCNC: 9 MMOL/L — SIGNIFICANT CHANGE UP (ref 5–17)
APTT BLD: 25.7 SEC — LOW (ref 27.5–36.3)
APTT BLD: 30.9 SEC — SIGNIFICANT CHANGE UP (ref 27.5–36.3)
AST SERPL-CCNC: 19 U/L — SIGNIFICANT CHANGE UP (ref 10–40)
AST SERPL-CCNC: 21 U/L — SIGNIFICANT CHANGE UP (ref 10–40)
BILIRUB SERPL-MCNC: 0.5 MG/DL — SIGNIFICANT CHANGE UP (ref 0.2–1.2)
BILIRUB SERPL-MCNC: 0.6 MG/DL — SIGNIFICANT CHANGE UP (ref 0.2–1.2)
BUN SERPL-MCNC: 22 MG/DL — SIGNIFICANT CHANGE UP (ref 7–23)
BUN SERPL-MCNC: 23 MG/DL — SIGNIFICANT CHANGE UP (ref 7–23)
BUN SERPL-MCNC: 24 MG/DL — HIGH (ref 7–23)
CALCIUM SERPL-MCNC: 8.1 MG/DL — LOW (ref 8.4–10.5)
CALCIUM SERPL-MCNC: 8.4 MG/DL — SIGNIFICANT CHANGE UP (ref 8.4–10.5)
CALCIUM SERPL-MCNC: 8.5 MG/DL — SIGNIFICANT CHANGE UP (ref 8.4–10.5)
CHLORIDE SERPL-SCNC: 112 MMOL/L — HIGH (ref 96–108)
CHLORIDE SERPL-SCNC: 114 MMOL/L — HIGH (ref 96–108)
CHLORIDE SERPL-SCNC: 115 MMOL/L — HIGH (ref 96–108)
CO2 SERPL-SCNC: 24 MMOL/L — SIGNIFICANT CHANGE UP (ref 22–31)
CO2 SERPL-SCNC: 25 MMOL/L — SIGNIFICANT CHANGE UP (ref 22–31)
CO2 SERPL-SCNC: 25 MMOL/L — SIGNIFICANT CHANGE UP (ref 22–31)
CREAT SERPL-MCNC: 1.03 MG/DL — SIGNIFICANT CHANGE UP (ref 0.5–1.3)
CREAT SERPL-MCNC: 1.13 MG/DL — SIGNIFICANT CHANGE UP (ref 0.5–1.3)
CREAT SERPL-MCNC: 1.16 MG/DL — SIGNIFICANT CHANGE UP (ref 0.5–1.3)
GAS PNL BLDA: SIGNIFICANT CHANGE UP
GLUCOSE BLDC GLUCOMTR-MCNC: 137 MG/DL — HIGH (ref 70–99)
GLUCOSE SERPL-MCNC: 108 MG/DL — HIGH (ref 70–99)
GLUCOSE SERPL-MCNC: 114 MG/DL — HIGH (ref 70–99)
GLUCOSE SERPL-MCNC: 130 MG/DL — HIGH (ref 70–99)
HCT VFR BLD CALC: 24.9 % — LOW (ref 39–50)
HCT VFR BLD CALC: 26.9 % — LOW (ref 39–50)
HGB BLD-MCNC: 7.6 G/DL — LOW (ref 13–17)
HGB BLD-MCNC: 8.3 G/DL — LOW (ref 13–17)
INR BLD: 1.38 — HIGH (ref 0.88–1.16)
INR BLD: 1.38 — HIGH (ref 0.88–1.16)
LACTATE SERPL-SCNC: 0.9 MMOL/L — SIGNIFICANT CHANGE UP (ref 0.5–2)
MAGNESIUM SERPL-MCNC: 2.2 MG/DL — SIGNIFICANT CHANGE UP (ref 1.6–2.6)
MAGNESIUM SERPL-MCNC: 2.2 MG/DL — SIGNIFICANT CHANGE UP (ref 1.6–2.6)
MCHC RBC-ENTMCNC: 28.3 PG — SIGNIFICANT CHANGE UP (ref 27–34)
MCHC RBC-ENTMCNC: 28.4 PG — SIGNIFICANT CHANGE UP (ref 27–34)
MCHC RBC-ENTMCNC: 30.5 GM/DL — LOW (ref 32–36)
MCHC RBC-ENTMCNC: 30.9 GM/DL — LOW (ref 32–36)
MCV RBC AUTO: 92.1 FL — SIGNIFICANT CHANGE UP (ref 80–100)
MCV RBC AUTO: 92.6 FL — SIGNIFICANT CHANGE UP (ref 80–100)
NRBC # BLD: 0 /100 WBCS — SIGNIFICANT CHANGE UP (ref 0–0)
NRBC # BLD: 0 /100 WBCS — SIGNIFICANT CHANGE UP (ref 0–0)
PHOSPHATE SERPL-MCNC: 2.9 MG/DL — SIGNIFICANT CHANGE UP (ref 2.5–4.5)
PHOSPHATE SERPL-MCNC: 3.1 MG/DL — SIGNIFICANT CHANGE UP (ref 2.5–4.5)
PLATELET # BLD AUTO: 428 K/UL — HIGH (ref 150–400)
PLATELET # BLD AUTO: 527 K/UL — HIGH (ref 150–400)
POTASSIUM SERPL-MCNC: 4 MMOL/L — SIGNIFICANT CHANGE UP (ref 3.5–5.3)
POTASSIUM SERPL-MCNC: 4.2 MMOL/L — SIGNIFICANT CHANGE UP (ref 3.5–5.3)
POTASSIUM SERPL-MCNC: 4.2 MMOL/L — SIGNIFICANT CHANGE UP (ref 3.5–5.3)
POTASSIUM SERPL-SCNC: 4 MMOL/L — SIGNIFICANT CHANGE UP (ref 3.5–5.3)
POTASSIUM SERPL-SCNC: 4.2 MMOL/L — SIGNIFICANT CHANGE UP (ref 3.5–5.3)
POTASSIUM SERPL-SCNC: 4.2 MMOL/L — SIGNIFICANT CHANGE UP (ref 3.5–5.3)
PROT SERPL-MCNC: 5.1 G/DL — LOW (ref 6–8.3)
PROT SERPL-MCNC: 5.2 G/DL — LOW (ref 6–8.3)
PROTHROM AB SERPL-ACNC: 15.7 SEC — HIGH (ref 10–12.9)
PROTHROM AB SERPL-ACNC: 15.8 SEC — HIGH (ref 10–12.9)
RBC # BLD: 2.69 M/UL — LOW (ref 4.2–5.8)
RBC # BLD: 2.92 M/UL — LOW (ref 4.2–5.8)
RBC # FLD: 15.7 % — HIGH (ref 10.3–14.5)
RBC # FLD: 15.9 % — HIGH (ref 10.3–14.5)
SODIUM SERPL-SCNC: 147 MMOL/L — HIGH (ref 135–145)
SODIUM SERPL-SCNC: 147 MMOL/L — HIGH (ref 135–145)
SODIUM SERPL-SCNC: 150 MMOL/L — HIGH (ref 135–145)
WBC # BLD: 11.58 K/UL — HIGH (ref 3.8–10.5)
WBC # BLD: 13.69 K/UL — HIGH (ref 3.8–10.5)
WBC # FLD AUTO: 11.58 K/UL — HIGH (ref 3.8–10.5)
WBC # FLD AUTO: 13.69 K/UL — HIGH (ref 3.8–10.5)

## 2019-12-07 PROCEDURE — 71045 X-RAY EXAM CHEST 1 VIEW: CPT | Mod: 26

## 2019-12-07 PROCEDURE — 93308 TTE F-UP OR LMTD: CPT | Mod: 26

## 2019-12-07 PROCEDURE — 71045 X-RAY EXAM CHEST 1 VIEW: CPT | Mod: 26,77

## 2019-12-07 PROCEDURE — 99291 CRITICAL CARE FIRST HOUR: CPT

## 2019-12-07 PROCEDURE — 99233 SBSQ HOSP IP/OBS HIGH 50: CPT

## 2019-12-07 PROCEDURE — 99233 SBSQ HOSP IP/OBS HIGH 50: CPT | Mod: GC

## 2019-12-07 PROCEDURE — 32557 INSERT CATH PLEURA W/ IMAGE: CPT

## 2019-12-07 PROCEDURE — 93321 DOPPLER ECHO F-UP/LMTD STD: CPT | Mod: 26

## 2019-12-07 RX ORDER — METOPROLOL TARTRATE 50 MG
2.5 TABLET ORAL
Refills: 0 | Status: COMPLETED | OUTPATIENT
Start: 2019-12-07 | End: 2019-12-07

## 2019-12-07 RX ORDER — ALBUMIN HUMAN 25 %
250 VIAL (ML) INTRAVENOUS
Refills: 0 | Status: COMPLETED | OUTPATIENT
Start: 2019-12-07 | End: 2019-12-07

## 2019-12-07 RX ORDER — AMIODARONE HYDROCHLORIDE 400 MG/1
200 TABLET ORAL DAILY
Refills: 0 | Status: DISCONTINUED | OUTPATIENT
Start: 2019-12-07 | End: 2019-12-26

## 2019-12-07 RX ORDER — FUROSEMIDE 40 MG
20 TABLET ORAL ONCE
Refills: 0 | Status: COMPLETED | OUTPATIENT
Start: 2019-12-07 | End: 2019-12-07

## 2019-12-07 RX ORDER — SODIUM CHLORIDE 9 MG/ML
1000 INJECTION, SOLUTION INTRAVENOUS
Refills: 0 | Status: DISCONTINUED | OUTPATIENT
Start: 2019-12-07 | End: 2019-12-13

## 2019-12-07 RX ORDER — POTASSIUM CHLORIDE 20 MEQ
20 PACKET (EA) ORAL ONCE
Refills: 0 | Status: COMPLETED | OUTPATIENT
Start: 2019-12-07 | End: 2019-12-07

## 2019-12-07 RX ORDER — METOPROLOL TARTRATE 50 MG
12.5 TABLET ORAL EVERY 6 HOURS
Refills: 0 | Status: DISCONTINUED | OUTPATIENT
Start: 2019-12-07 | End: 2019-12-08

## 2019-12-07 RX ADMIN — Medication 2.5 MILLIGRAM(S): at 23:00

## 2019-12-07 RX ADMIN — CHLORHEXIDINE GLUCONATE 15 MILLILITER(S): 213 SOLUTION TOPICAL at 05:24

## 2019-12-07 RX ADMIN — Medication 0.5 MILLIGRAM(S): at 18:43

## 2019-12-07 RX ADMIN — CHLORHEXIDINE GLUCONATE 1 APPLICATION(S): 213 SOLUTION TOPICAL at 05:24

## 2019-12-07 RX ADMIN — Medication 100 MILLIEQUIVALENT(S): at 07:29

## 2019-12-07 RX ADMIN — HEPARIN SODIUM 5000 UNIT(S): 5000 INJECTION INTRAVENOUS; SUBCUTANEOUS at 05:26

## 2019-12-07 RX ADMIN — Medication 4 MILLIGRAM(S): at 06:20

## 2019-12-07 RX ADMIN — Medication 4 MILLIGRAM(S): at 11:19

## 2019-12-07 RX ADMIN — SERTRALINE 25 MILLIGRAM(S): 25 TABLET, FILM COATED ORAL at 11:20

## 2019-12-07 RX ADMIN — MEROPENEM 100 MILLIGRAM(S): 1 INJECTION INTRAVENOUS at 05:25

## 2019-12-07 RX ADMIN — Medication 81 MILLIGRAM(S): at 11:19

## 2019-12-07 RX ADMIN — HEPARIN SODIUM 5000 UNIT(S): 5000 INJECTION INTRAVENOUS; SUBCUTANEOUS at 22:03

## 2019-12-07 RX ADMIN — Medication 500 MILLILITER(S): at 20:00

## 2019-12-07 RX ADMIN — Medication 4 MILLIGRAM(S): at 19:29

## 2019-12-07 RX ADMIN — Medication 500 MILLILITER(S): at 19:44

## 2019-12-07 RX ADMIN — AMIODARONE HYDROCHLORIDE 200 MILLIGRAM(S): 400 TABLET ORAL at 11:19

## 2019-12-07 RX ADMIN — LIDOCAINE 1 PATCH: 4 CREAM TOPICAL at 11:18

## 2019-12-07 RX ADMIN — HEPARIN SODIUM 5000 UNIT(S): 5000 INJECTION INTRAVENOUS; SUBCUTANEOUS at 15:01

## 2019-12-07 RX ADMIN — PANTOPRAZOLE SODIUM 40 MILLIGRAM(S): 20 TABLET, DELAYED RELEASE ORAL at 08:08

## 2019-12-07 RX ADMIN — LIDOCAINE 1 PATCH: 4 CREAM TOPICAL at 19:29

## 2019-12-07 RX ADMIN — Medication 20 MILLIGRAM(S): at 07:29

## 2019-12-07 RX ADMIN — Medication 2.5 MILLIGRAM(S): at 22:03

## 2019-12-07 NOTE — PROGRESS NOTE ADULT - PROBLEM SELECTOR PLAN 1
7 days of IV antibiotics
# Non oliguric FELY  improved   most likely perfusion related, ATN in the setting of cardiogenic shock
# Non oliguric FELY  improved   most likely perfusion related, ATN in the setting of cardiogenic shock.
# Non oliguric FELY  most likely perfusion related, including ATN in the setting of cardiogenic shock  Cr improving, auto diuresing with UOP in polyuric range cb hypernatremia, improving with free water flushes   volume status improving  - would suggest to start D5W IV at 80 cc/hr   - cw 250 ml of free water to Q4hr via NGT  - follow lytes  Will follow
# Non oliguric FELY  most likely perfusion related, including ATN in the setting of cardiogenic shock, kidney function improved and normalized  volume status on the overloaded side, peripheral edema improved since admission but CXR shows recurrence of b/l pleural effusion R>L  - cw lasix per CTICU, please follow lytes and replete accordingly  Will follow
# Non oliguric FELY/  improved   most likely perfusion related, ATN in the setting of cardiogenic shock,
1) If persistent fever will send Blood culture; Sputum culture is growing Klebsiella  2) Continue Meropenem 1gr IV q12h  3) Hold Vancomycin until trough < 17, then re-dose if GPC in culture
1) If persistent fever will send Blood culture; Sputum culture is growing Klebsiella  2) Continue Meropenem 1gr IV q12h x 7 days  3) Hold Vancomycin until trough < 17, then re-dose if GPC in culture. Culture negative for GPC d/c Vancomycin
1) Patient completed 7 days of Meropenem, switched to IV Ceftriaxone 2gr q24 x 3 more days, d/c  today  2) CXR showed R mod pl effusion and worsening leukocytosis, observe off antibiotics
1) Patient completed 7 days of Meropenem, switched to IV Ceftriaxone 2gr q24 x 3 more days, d/c on 12/4/2019
Most likely perfusion related, including ATN in the setting of cardiogenic shock  Cr plateaued and slowly trending down with good UOP, off diuretics  volume status improving   reaching polyuria associated with hypernatremia, Free water deficit ~ 4.5 L   - please start D5W infusion at  cc/hr     Will follow
Most likely perfusion related, including ATN in the setting of cardiogenic shock  Cr plateaued and slowly trending down with great UOP in response to Lasix gtt  - patient appears well diuresed would hold off diuretics today   - please obtain UA     Will follow
Most likely perfusion related, including ATN in the setting of cardiogenic shock  Cr still trending up, has good response to lasix gtt in the setting of volume overload and transfusion requirement periop  - would consider to titrate down lasix, ok to continue with 2mg/hr for now   - please obtain UA     Will follow
non-oliguric FELY most probably secondary to cardiogenic shock  responded well to Lasix 20mg IV x 1 overnight  currently weaned off all pressors except for low dose of Pepe  probable ATN from decreased renal perfusion - expect with hemodynamic stability now that Scret will plateau and then resolve  currently 1L + fluid balance with clear CXR  maintain slight + fluid balance  Lasix PRN should UO  taper off
please send pericardial fluid for cell count ,gram stain and AFB smear and culture  If fluid appears infected on the basis of cell count start meropenem one gram every 8 hours
switch to ceftriaxone 2 grams every 24 hours

## 2019-12-07 NOTE — PROGRESS NOTE ADULT - SUBJECTIVE AND OBJECTIVE BOX
CTICU  CRITICAL  CARE  attending     Hand off received 					   Pertinent clinical, laboratory, radiographic, hemodynamic, echocardiographic, respiratory data, microbiologic data and chart were reviewed and analyzed frequently throughout the course of the day and night  Patient seen and examined with CTS/ SH attending at bedside    Pt is a 69y , Male, post op day # 14; s/p emergent Type A dissection repair;    post op:    acute CVA; bilat frontal lobe infarcts+ watershed areas  pericardial effusion; s/p drainage  bilateral pleural effusions/ s/p right pigtail thoracentesis;  atrial fibrillation with variable VR  acute post H'gic anemia        Leukocytosis, unspecified type: Leukocytosis, unspecified type  Hypernatremia: Hypernatremia  Bacterial pneumonia: Bacterial pneumonia  Fever, postprocedural: Fever, postprocedural  FELY (acute kidney injury): FELY (acute kidney injury)      , FAMILY HISTORY:  PAST MEDICAL & SURGICAL HISTORY:  Benign prostatic hypertrophy without lower urinary tract symptoms: BPH (benign prostatic hypertrophy)  Ulcerative colitis: Ulcerative colitis  States following surgery of eye and adnexa: straightened right eye  Other postprocedural status: History of hernia repair  Hemorrhoids: Hemorrhoids  Acute appendicitis with generalized peritonitis: Ruptured appendix    Patient is a 69y old  Male who presents with a chief complaint of Aortic dissection (07 Dec 2019 13:34)      14 system review was unremarkable  acute changes include acute respiratory failure  Vital signs, hemodynamic and respiratory parameters were reviewed from the bedside nursing flowsheet.  ICU Vital Signs Last 24 Hrs  T(C): 36.8 (07 Dec 2019 14:30), Max: 37.2 (07 Dec 2019 01:01)  T(F): 98.2 (07 Dec 2019 14:30), Max: 98.9 (07 Dec 2019 01:01)  HR: 116 (07 Dec 2019 16:00) (84 - 130)  BP: --  BP(mean): --  ABP: 96/62 (07 Dec 2019 16:00) (96/60 - 190/110)  ABP(mean): 74 (07 Dec 2019 16:00) (72 - 144)  RR: 17 (07 Dec 2019 16:00) (14 - 169)  SpO2: 94% (07 Dec 2019 16:00) (85% - 99%)    Adult Advanced Hemodynamics Last 24 Hrs  CVP(mm Hg): --  CVP(cm H2O): --  CO: --  CI: --  PA: --  PA(mean): --  PCWP: --  SVR: --  SVRI: --  PVR: --  PVRI: --, ABG - ( 07 Dec 2019 11:46 )  pH, Arterial: 7.52  pH, Blood: x     /  pCO2: 33    /  pO2: 74    / HCO3: 26    / Base Excess: 3.0   /  SaO2: 95                Mode: CPAP with PS  FiO2: 50  PEEP: 5  PS: 10  MAP: 8  PIP: 15    Intake and output was reviewed and the fluid balance was calculated  Daily     Daily   I&O's Summary    06 Dec 2019 07:01  -  07 Dec 2019 07:00  --------------------------------------------------------  IN: 1739.9 mL / OUT: 2460 mL / NET: -720.1 mL    07 Dec 2019 07:01  -  07 Dec 2019 16:51  --------------------------------------------------------  IN: 410.1 mL / OUT: 2210 mL / NET: -1799.9 mL        All lines and drain sites were assessed  Glycemic trend was reviewedCAPILLARY BLOOD GLUCOSE        No acute change in mental status  Auscultation of the chest reveals equal bs  Abdomen is soft  Extremities are warm and well perfused  Wounds appear clean and unremarkable  Antibiotics are periop    labs  CBC Full  -  ( 07 Dec 2019 11:54 )  WBC Count : 11.58 K/uL  RBC Count : 2.92 M/uL  Hemoglobin : 8.3 g/dL  Hematocrit : 26.9 %  Platelet Count - Automated : 428 K/uL  Mean Cell Volume : 92.1 fl  Mean Cell Hemoglobin : 28.4 pg  Mean Cell Hemoglobin Concentration : 30.9 gm/dL  Auto Neutrophil # : x  Auto Lymphocyte # : x  Auto Monocyte # : x  Auto Eosinophil # : x  Auto Basophil # : x  Auto Neutrophil % : x  Auto Lymphocyte % : x  Auto Monocyte % : x  Auto Eosinophil % : x  Auto Basophil % : x    12-07    150<H>  |  115<H>  |  23  ----------------------------<  114<H>  4.2   |  25  |  1.13    Ca    8.5      07 Dec 2019 11:54  Phos  2.9     12-07  Mg     2.2     12-07    TPro  5.2<L>  /  Alb  3.1<L>  /  TBili  0.6  /  DBili  x   /  AST  21  /  ALT  24  /  AlkPhos  59  12-07    PT/INR - ( 07 Dec 2019 11:54 )   PT: 15.7 sec;   INR: 1.38          PTT - ( 07 Dec 2019 11:54 )  PTT:30.9 sec  The current medications were reviewed   MEDICATIONS  (STANDING):  aMIOdarone    Tablet 200 milliGRAM(s) Oral daily  aspirin  chewable 81 milliGRAM(s) Oral daily  buDESOnide    Inhalation Suspension 0.5 milliGRAM(s) Inhalation every 12 hours  chlorhexidine 0.12% Liquid 15 milliLiter(s) Oral Mucosa every 12 hours  chlorhexidine 2% Cloths 1 Application(s) Topical daily  dextrose 5%. 1000 milliLiter(s) (40 mL/Hr) IV Continuous <Continuous>  dextrose 50% Injectable 50 milliLiter(s) IV Push every 15 minutes  heparin  Injectable 5000 Unit(s) SubCutaneous every 8 hours  lidocaine   Patch 1 Patch Transdermal daily  pantoprazole  Injectable 40 milliGRAM(s) IV Push daily  sertraline 25 milliGRAM(s) Oral daily  sodium chloride 0.9%. 1000 milliLiter(s) (10 mL/Hr) IV Continuous <Continuous>  testosterone patch 4 mG/24 Hr(s) 4 milliGRAM(s) Transdermal daily    MEDICATIONS  (PRN):  bisacodyl Suppository 10 milliGRAM(s) Rectal daily PRN Constipation       PROBLEM LIST/ ASSESSMENT:  HEALTH ISSUES - PROBLEM Dx:  atrial fibrillation  pericardial effusion  acute CVA  pleural effusion  Leukocytosis, unspecified type: Leukocytosis, unspecified type  Hypernatremia: Hypernatremia  Bacterial pneumonia: Bacterial pneumonia  Fever, postprocedural: Fever, postprocedural  FELY (acute kidney injury): FELY (acute kidney injury)      ,   Patient is a 69y old  Male who presents with a chief complaint of Aortic dissection (07 Dec 2019 13:34)     s/p s/p emergent Type A dissection repair;  acute changes include acute respiratory failure    My plan includes :  close hemodynamic, ventilatory and drain monitoring and management per post op routine    Monitor for arrhythmias and monitor parameters for organ perfusion  monitor neurologic status  Head of the bed should remain elevated to 45 deg .   chest PT and IS will be encouraged  monitor adequacy of oxygenation and ventilation and attempt to wean oxygen  Nutritional goals will be met using po eventually , ensure adequate caloric intake and montior the same  Stress ulcer and VTE prophylaxis will be achieved    Glycemic control is satisfactory  Electrolytes have been repleted as necessary and wound care has been carried out. Pain control has been achieved.   agressive physical therapy and early mobility and ambulation goals will be met   The family was updated about the course and plan  CRITICAL CARE TIME SPENT in evaluation and management, reassessments, review and interpretation of labs and x-rays, ventilator and hemodynamic management, formulating a plan and coordinating care: ___55____ MIN.  Time does not include procedural time.  CTICU ATTENDING     					    Luis Loving MD

## 2019-12-07 NOTE — BEHAVIORAL HEALTH ASSESSMENT NOTE - RISK ASSESSMENT
Low Acute Suicide Risk The pt was agitated yesterday but is calm today.  We will be available for consult and f/up.

## 2019-12-07 NOTE — BEHAVIORAL HEALTH ASSESSMENT NOTE - HPI (INCLUDE ILLNESS QUALITY, SEVERITY, DURATION, TIMING, CONTEXT, MODIFYING FACTORS, ASSOCIATED SIGNS AND SYMPTOMS)
The pt is in his bed in the ICU.  The nurse said he cannot talk due to having expressive aphasia. He was extubated recently. He appears relaxed and calm. He made good eye-contact and his demeanor was calm, pleasant. I asked him if he was in any pain. He nodded "Yes"; I asked if it was severe, he shook his head "no".  I asked him if he was comfortable; he said "yes".  The nurse said he may still have some anesthesia in his system from the surgery, e.g. Propaphol and Presedex, which are making him feel relaxed. She said that "yesterday he was swatting with his hand to move his wife away., but today he is calmer."  He denied SI/HI, denies A/V hallucinations.

## 2019-12-07 NOTE — PROGRESS NOTE ADULT - ASSESSMENT
68 yo M with type A aortic dissection and pericardial effusion s/p IR placement of a pericardial drain on 12/6.     - IR will continue to follow drain output  - Contact with any questions

## 2019-12-07 NOTE — PROGRESS NOTE ADULT - SUBJECTIVE AND OBJECTIVE BOX
CTICU  CRITICAL  CARE  ATTENDING:   				   Pertinent clinical, laboratory, radiographic, hemodynamic, echocardiographic, respiratory data, microbiologic data and chart were reviewed and analyzed frequently throughout the course of the day and night    Patient seen and examined with CTS/ SH attending at bedside    Pt is a 69y Male admitted for cardiac surgery     HEALTH ISSUES - PROBLEM Dx:  Leukocytosis, unspecified type: Leukocytosis, unspecified type  Hypernatremia: Hypernatremia  Bacterial pneumonia: Bacterial pneumonia  Fever, postprocedural: Fever, postprocedural  FELY (acute kidney injury): FELY (acute kidney injury)         FAMILY HISTORY:  PAST MEDICAL & SURGICAL HISTORY:  Benign prostatic hypertrophy without lower urinary tract symptoms: BPH (benign prostatic hypertrophy)  Ulcerative colitis: Ulcerative colitis  States following surgery of eye and adnexa: straightened right eye  Other postprocedural status: History of hernia repair  Hemorrhoids: Hemorrhoids  Acute appendicitis with generalized peritonitis: Ruptured appendix      14 system review was unremarkable      Vital signs, hemodynamic and respiratory parameters were reviewed from the bedside nursing flowsheet.  ICU Vital Signs Last 24 Hrs  T(C): 36.4 (07 Dec 2019 17:43), Max: 37.2 (07 Dec 2019 01:01)  T(F): 97.5 (07 Dec 2019 17:43), Max: 98.9 (07 Dec 2019 01:01)  HR: 123 (07 Dec 2019 20:29) (84 - 126)  BP: 82/58 (07 Dec 2019 19:00) (82/58 - 82/58)  BP(mean): 65 (07 Dec 2019 19:00) (65 - 65)  ABP: 112/68 (07 Dec 2019 20:00) (96/62 - 138/84)  ABP(mean): 84 (07 Dec 2019 20:00) (72 - 104)  RR: 16 (07 Dec 2019 20:29) (14 - 169)  SpO2: 99% (07 Dec 2019 20:29) (92% - 99%)    Adult Advanced Hemodynamics Last 24 Hrs  CVP(mm Hg): --  CVP(cm H2O): --  CO: --  CI: --  PA: --  PA(mean): --  PCWP: --  SVR: --  SVRI: --  PVR: --  PVRI: --, ABG - ( 07 Dec 2019 11:46 )  pH, Arterial: 7.52  pH, Blood: x     /  pCO2: 33    /  pO2: 74    / HCO3: 26    / Base Excess: 3.0   /  SaO2: 95                Mode: CPAP with PS  FiO2: 50  PEEP: 5  PS: 10  MAP: 8  PIP: 15    Intake and output was reviewed and the fluid balance was calculated  Daily     Daily   I&O's Summary    06 Dec 2019 07:01  -  07 Dec 2019 07:00  --------------------------------------------------------  IN: 1739.9 mL / OUT: 2460 mL / NET: -720.1 mL    07 Dec 2019 07:01  -  07 Dec 2019 20:46  --------------------------------------------------------  IN: 1060.1 mL / OUT: 2635 mL / NET: -1574.9 mL        All lines and drain sites were assessed  Glycemic trend was reviewedCAPILLARY BLOOD GLUCOSE            Exam:   Gen: NAD   Neuro: Mental status  Lungs: Auscultation of the chest reveals equal bs  Abd: soft, nt/nd  Ext: warm and well perfused  Wound: appears clean and unremarkable  Antibiotics are periop      labs  CBC Full  -  ( 07 Dec 2019 11:54 )  WBC Count : 11.58 K/uL  RBC Count : 2.92 M/uL  Hemoglobin : 8.3 g/dL  Hematocrit : 26.9 %  Platelet Count - Automated : 428 K/uL  Mean Cell Volume : 92.1 fl  Mean Cell Hemoglobin : 28.4 pg  Mean Cell Hemoglobin Concentration : 30.9 gm/dL  Auto Neutrophil # : x  Auto Lymphocyte # : x  Auto Monocyte # : x  Auto Eosinophil # : x  Auto Basophil # : x  Auto Neutrophil % : x  Auto Lymphocyte % : x  Auto Monocyte % : x  Auto Eosinophil % : x  Auto Basophil % : x    12-07    150<H>  |  115<H>  |  23  ----------------------------<  114<H>  4.2   |  25  |  1.13    Ca    8.5      07 Dec 2019 11:54  Phos  2.9     12-07  Mg     2.2     12-07    TPro  5.2<L>  /  Alb  3.1<L>  /  TBili  0.6  /  DBili  x   /  AST  21  /  ALT  24  /  AlkPhos  59  12-07    PT/INR - ( 07 Dec 2019 11:54 )   PT: 15.7 sec;   INR: 1.38          PTT - ( 07 Dec 2019 11:54 )  PTT:30.9 sec    The current medications were reviewed   MEDICATIONS  (STANDING):  aMIOdarone    Tablet 200 milliGRAM(s) Oral daily  aspirin  chewable 81 milliGRAM(s) Oral daily  buDESOnide    Inhalation Suspension 0.5 milliGRAM(s) Inhalation every 12 hours  chlorhexidine 2% Cloths 1 Application(s) Topical daily  dextrose 5%. 1000 milliLiter(s) (40 mL/Hr) IV Continuous <Continuous>  dextrose 50% Injectable 50 milliLiter(s) IV Push every 15 minutes  heparin  Injectable 5000 Unit(s) SubCutaneous every 8 hours  lidocaine   Patch 1 Patch Transdermal daily  metoprolol tartrate 12.5 milliGRAM(s) Oral every 6 hours  pantoprazole  Injectable 40 milliGRAM(s) IV Push daily  sertraline 25 milliGRAM(s) Oral daily  sodium chloride 0.9%. 1000 milliLiter(s) (10 mL/Hr) IV Continuous <Continuous>  testosterone patch 4 mG/24 Hr(s) 4 milliGRAM(s) Transdermal daily    MEDICATIONS  (PRN):  bisacodyl Suppository 10 milliGRAM(s) Rectal daily PRN Constipation       PROBLEM LIST/ ASSESSMENT:  HEALTH ISSUES - PROBLEM Dx:  Leukocytosis, unspecified type: Leukocytosis, unspecified type  Hypernatremia: Hypernatremia  Bacterial pneumonia: Bacterial pneumonia  Fever, postprocedural: Fever, postprocedural  FELY (acute kidney injury): FELY (acute kidney injury)         Patient is a 69y old  Male who presents with a chief complaint of Aortic dissection (07 Dec 2019 16:51)      Pt is s/p   acute changes include acute respiratory failure    My plan includes :  -Close hemodynamic, ventilatory and drain monitoring and management per post op routine  -Monitor for arrhythmias and monitor parameters for organ perfusion  -Monitor neurologic status  -Head of the bed should remain elevated to 45 deg .   -Chest PT and IS will be encouraged  -Monitor adequacy of oxygenation and ventilation and attempt to wean oxygen  -Nutritional goals will be met using po eventually , ensure adequate caloric intake and monitor the same  -Stress ulcer and VTE prophylaxis will be achieved    -Glycemic control is satisfactory  -Electrolytes have been repleated as necessary and wound care has been carried out. Pain control has been achieved.   -Agressive physical therapy and early mobility and ambulation goals will be met   The family was updated about the course and plan    CRITICAL CARE TIME SPENT in evaluation and management, reassessments, review and interpretation of labs and x-rays, ventilator and hemodynamic management, formulating a plan and coordinating care: ___30____ MIN.  Time does not include procedural time.      CTICU ATTENDING:      		  Radha Rubio MD

## 2019-12-07 NOTE — PROGRESS NOTE ADULT - SUBJECTIVE AND OBJECTIVE BOX
covering for Dr Graff    Critical Care    INTERVAL HPI/OVERNIGHT EVENTS:    ANTIBIOTICS    MEDICATIONS  (STANDING):  aMIOdarone Infusion 1 mG/Min (33.333 mL/Hr) IV Continuous <Continuous>  aspirin  chewable 81 milliGRAM(s) Oral daily  buDESOnide    Inhalation Suspension 0.5 milliGRAM(s) Inhalation every 12 hours  chlorhexidine 0.12% Liquid 15 milliLiter(s) Oral Mucosa every 12 hours  chlorhexidine 2% Cloths 1 Application(s) Topical daily  dextrose 50% Injectable 50 milliLiter(s) IV Push every 15 minutes  heparin  Injectable 5000 Unit(s) SubCutaneous every 8 hours  lidocaine   Patch 1 Patch Transdermal daily  meropenem  IVPB 1000 milliGRAM(s) IV Intermittent every 8 hours  modafinil 100 milliGRAM(s) Oral daily  pantoprazole  Injectable 40 milliGRAM(s) IV Push daily  phenylephrine    Infusion 0.5 MICROgram(s)/kG/Min (15.731 mL/Hr) IV Continuous <Continuous>  QUEtiapine 25 milliGRAM(s) Oral at bedtime  sertraline 25 milliGRAM(s) Oral daily  sodium chloride 0.9%. 1000 milliLiter(s) (10 mL/Hr) IV Continuous <Continuous>  testosterone patch 4 mG/24 Hr(s) 4 milliGRAM(s) Transdermal daily    MEDICATIONS  (PRN):  bisacodyl Suppository 10 milliGRAM(s) Rectal daily PRN Constipation      Allergies    penicillin (Unknown)    Intolerances        REVIEW OF SYSTEMS:    Constitutional: No fever, weight loss or fatigue  Eyes: No eye pain, visual disturbances, or discharge  ENMT:  No difficulty hearing, tinnitus, vertigo; No sinus or throat pain  Neck: No pain or stiffness  Respiratory: No cough, wheezing, chills or hemoptysis  Cardiovascular: No chest pain, palpitations, shortness of breath, dizziness or leg swelling  Gastrointestinal: No abdominal or epigastric pain. No nausea, vomiting or hematemesis; No diarrhea or constipation. No melena or hematochezia.  Genitourinary: No dysuria, frequency, hematuria or incontinence  Rectal: No pain, hemorrhoids or incontinence  Neurological: No headaches, memory loss, loss of strength, numbness or tremors  Skin: No itching, burning, rashes or lesions     Vital Signs Last 24 Hrs  T(C): 37.1 (07 Dec 2019 05:01), Max: 37.3 (06 Dec 2019 14:00)  T(F): 98.7 (07 Dec 2019 05:01), Max: 99.1 (06 Dec 2019 14:00)  HR: 108 (07 Dec 2019 09:00) (84 - 136)  BP: 101/73 (06 Dec 2019 12:50) (94/64 - 101/73)  BP(mean): 84 (06 Dec 2019 12:50) (72 - 84)  RR: 27 (07 Dec 2019 09:00) (11 - 169)  SpO2: 96% (07 Dec 2019 09:00) (85% - 99%)    PHYSICAL EXAM:    Head: Normocephalic; atraumatic  ENMT: No nasal discharge; airway clear  Neck: Supple; non tender; no masses  Respiratory: No wheezes, rales or rhonchi  Cardiovascular: Regular rate and rhythm. S1 and S2 Normal; No murmurs, gallops or rubs  Gastrointestinal: Soft non-tender non-distended; Normal bowel sounds; No hepatosplenomegaly  Genitourinary: No costovertebral angle tenderness    LABS:                        7.6    13.69 )-----------( 527      ( 07 Dec 2019 03:30 )             24.9     12-07    147<H>  |  114<H>  |  24<H>  ----------------------------<  108<H>  4.2   |  24  |  1.03    Ca    8.1<L>      07 Dec 2019 03:30  Phos  3.1     12-07  Mg     2.2     12-07    TPro  5.1<L>  /  Alb  3.1<L>  /  TBili  0.5  /  DBili  x   /  AST  19  /  ALT  24  /  AlkPhos  52  12-07    PT/INR - ( 07 Dec 2019 03:30 )   PT: 15.8 sec;   INR: 1.38          PTT - ( 07 Dec 2019 03:30 )  PTT:25.7 sec        MICROBIOLOGY:  Culture Results:   No growth to date (12-06 @ 20:30)      RADIOLOGY & ADDITIONAL STUDIES:

## 2019-12-07 NOTE — PROGRESS NOTE ADULT - SUBJECTIVE AND OBJECTIVE BOX
68 yo M with type A aortic dissection and pericardial effusion s/p IR placement of a pericardial drain on 12/6. 68 yo M with type A aortic dissection and pericardial effusion s/p IR placement of a pericardial drain on 12/6. Drain is to suction with output of 40 cc of serosanguinous fluid in the past 24 hours (510 cc prior 24 hours).

## 2019-12-07 NOTE — BEHAVIORAL HEALTH ASSESSMENT NOTE - NSBHCONSULTRECOMMENDOTHER_PSY_A_CORE FT
Recommend D/C Seroquel since it can worsen delirium and there is Black Box Warning that discourages its use in elder (pt is 69) with agitation related to dementia due to risk of sudden death

## 2019-12-07 NOTE — BEHAVIORAL HEALTH ASSESSMENT NOTE - NSBHPSYCHMEDSHX_PSY_A_CORE
Post-Op Assessment Note      CV Status:  Stable    Mental Status:  Somnolent    Hydration Status:  Euvolemic    PONV Controlled:  Controlled    Airway Patency:  Patent    Post Op Vitals Reviewed: Yes          Staff: Anesthesiologist       Comments: vss, report to rn          /71 (08/01/18 1028)    Temp (!) 97 2 °F (36 2 °C) (08/01/18 1028)    Pulse (!) 121 (08/01/18 1028)   Resp 16 (08/01/18 1028)    SpO2
yes...

## 2019-12-07 NOTE — PROGRESS NOTE ADULT - ATTENDING COMMENTS
I independently performed the key portions of the evaluation and management service provided. I agree with the above history, physical, and plan which I have reviewed and edited where appropriate. I find BP stable. FELY improved. Hypernatremic. Follow SCr. Increase free water.  See full note. (Patient seen earlier in day.)

## 2019-12-07 NOTE — PROGRESS NOTE ADULT - PROBLEM SELECTOR PROBLEM 1
Bacterial pneumonia
FELY (acute kidney injury)
Fever, postprocedural
Fever, postprocedural
Leukocytosis, unspecified type

## 2019-12-07 NOTE — BEHAVIORAL HEALTH ASSESSMENT NOTE - SUMMARY
The pt is a 70 yo white male who is s/p aorta surgery who had been irritable yesterday. Pt is calmer today.   We will be available for consult  Continue current meds; consider d/c'ing the Seroquel due to possibility it can worsen delirium and there is a Black-Box Warning in the PDR for risk of sudden death in the elderly; Should not be used to reduce agitation in the elderly with dementia

## 2019-12-07 NOTE — PROGRESS NOTE ADULT - ASSESSMENT
cultures of pericardial fluid no growth to date      I had suggested endotracheal cultures last night they were never sent. At this point sputum cultures would have little value since the patient is currently extubated and has only a slight dry cough     I would suggest reevaluating the need for antibiotics on Monday with Dr Graff

## 2019-12-07 NOTE — PROGRESS NOTE ADULT - SUBJECTIVE AND OBJECTIVE BOX
O/N Events: extubated successfully to HF  Subjective:  kidney function and hypernatremia remained stable   found to have large pericardial effusion on repeat echo, s/p drainage and DANIELLE drain placement  also required emergent intubation during procedure, however extubated in am   currently NAD on HFNC/ alert and awake/ following commands   off pressors     VITALS  Vital Signs Last 24 Hrs  T(C): 37 (07 Dec 2019 10:00), Max: 37.3 (06 Dec 2019 14:00)  T(F): 98.6 (07 Dec 2019 10:00), Max: 99.1 (06 Dec 2019 14:00)  HR: 126 (07 Dec 2019 13:00) (84 - 131)  BP: 116/70 mmhg   RR: 22 (07 Dec 2019 13:00) (14 - 169)  SpO2: 96% (07 Dec 2019 13:00) (85% - 99%)    PHYSICAL EXAM  General: awake and alert, NAD  Cardiac: S1, S2. RRR. No murmurs   Respiratory: CTAB/l/ unlabored breathing on HFNC  Abdomen: soft, NTND  Extremities: warm , no edema   Neuro: AO x3, non focal    MEDICATIONS  (STANDING):  aMIOdarone    Tablet 200 milliGRAM(s) Oral daily  aspirin  chewable 81 milliGRAM(s) Oral daily  buDESOnide    Inhalation Suspension 0.5 milliGRAM(s) Inhalation every 12 hours  chlorhexidine 0.12% Liquid 15 milliLiter(s) Oral Mucosa every 12 hours  chlorhexidine 2% Cloths 1 Application(s) Topical daily  dextrose 5%. 1000 milliLiter(s) (40 mL/Hr) IV Continuous <Continuous>  dextrose 50% Injectable 50 milliLiter(s) IV Push every 15 minutes  heparin  Injectable 5000 Unit(s) SubCutaneous every 8 hours  lidocaine   Patch 1 Patch Transdermal daily  pantoprazole  Injectable 40 milliGRAM(s) IV Push daily  sertraline 25 milliGRAM(s) Oral daily  sodium chloride 0.9%. 1000 milliLiter(s) (10 mL/Hr) IV Continuous <Continuous>  testosterone patch 4 mG/24 Hr(s) 4 milliGRAM(s) Transdermal daily    MEDICATIONS  (PRN):  bisacodyl Suppository 10 milliGRAM(s) Rectal daily PRN Constipation      LABS                        8.3    11.58 )-----------( 428      ( 07 Dec 2019 11:54 )             26.9     12-07    150<H>  |  115<H>  |  23  ----------------------------<  114<H>  4.2   |  25  |  1.13    Ca    8.5      07 Dec 2019 11:54  Phos  2.9     12-07  Mg     2.2     12-07    TPro  5.2<L>  /  Alb  3.1<L>  /  TBili  0.6  /  DBili  x   /  AST  21  /  ALT  24  /  AlkPhos  59  12-07    LIVER FUNCTIONS - ( 07 Dec 2019 11:54 )  Alb: 3.1 g/dL / Pro: 5.2 g/dL / ALK PHOS: 59 U/L / ALT: 24 U/L / AST: 21 U/L / GGT: x           PT/INR - ( 07 Dec 2019 11:54 )   PT: 15.7 sec;   INR: 1.38          PTT - ( 07 Dec 2019 11:54 )  PTT:30.9 sec

## 2019-12-07 NOTE — PROGRESS NOTE ADULT - ASSESSMENT
A/p  70 y/o M with PMHx of Ulcerative colitis BIBA to [facility] ED complaining of back pain and profound hypotension, emergently brought to the OR for salvage Type A dissection repair, s/p aortic arch repair and ascending arch replacement.   Nephrology consulted for FELY.

## 2019-12-08 LAB
ALBUMIN SERPL ELPH-MCNC: 3.3 G/DL — SIGNIFICANT CHANGE UP (ref 3.3–5)
ALBUMIN SERPL ELPH-MCNC: 3.4 G/DL — SIGNIFICANT CHANGE UP (ref 3.3–5)
ALP SERPL-CCNC: 53 U/L — SIGNIFICANT CHANGE UP (ref 40–120)
ALP SERPL-CCNC: 55 U/L — SIGNIFICANT CHANGE UP (ref 40–120)
ALT FLD-CCNC: 16 U/L — SIGNIFICANT CHANGE UP (ref 10–45)
ALT FLD-CCNC: 20 U/L — SIGNIFICANT CHANGE UP (ref 10–45)
ANION GAP SERPL CALC-SCNC: 10 MMOL/L — SIGNIFICANT CHANGE UP (ref 5–17)
ANION GAP SERPL CALC-SCNC: 9 MMOL/L — SIGNIFICANT CHANGE UP (ref 5–17)
APTT BLD: 28 SEC — SIGNIFICANT CHANGE UP (ref 27.5–36.3)
APTT BLD: 28 SEC — SIGNIFICANT CHANGE UP (ref 27.5–36.3)
AST SERPL-CCNC: 12 U/L — SIGNIFICANT CHANGE UP (ref 10–40)
AST SERPL-CCNC: 22 U/L — SIGNIFICANT CHANGE UP (ref 10–40)
BILIRUB SERPL-MCNC: 0.5 MG/DL — SIGNIFICANT CHANGE UP (ref 0.2–1.2)
BILIRUB SERPL-MCNC: 0.6 MG/DL — SIGNIFICANT CHANGE UP (ref 0.2–1.2)
BUN SERPL-MCNC: 20 MG/DL — SIGNIFICANT CHANGE UP (ref 7–23)
BUN SERPL-MCNC: 21 MG/DL — SIGNIFICANT CHANGE UP (ref 7–23)
CALCIUM SERPL-MCNC: 8.4 MG/DL — SIGNIFICANT CHANGE UP (ref 8.4–10.5)
CALCIUM SERPL-MCNC: 8.5 MG/DL — SIGNIFICANT CHANGE UP (ref 8.4–10.5)
CHLORIDE SERPL-SCNC: 111 MMOL/L — HIGH (ref 96–108)
CHLORIDE SERPL-SCNC: 112 MMOL/L — HIGH (ref 96–108)
CO2 SERPL-SCNC: 26 MMOL/L — SIGNIFICANT CHANGE UP (ref 22–31)
CO2 SERPL-SCNC: 26 MMOL/L — SIGNIFICANT CHANGE UP (ref 22–31)
CREAT SERPL-MCNC: 1.05 MG/DL — SIGNIFICANT CHANGE UP (ref 0.5–1.3)
CREAT SERPL-MCNC: 1.23 MG/DL — SIGNIFICANT CHANGE UP (ref 0.5–1.3)
GAS PNL BLDA: SIGNIFICANT CHANGE UP
GAS PNL BLDA: SIGNIFICANT CHANGE UP
GLUCOSE SERPL-MCNC: 136 MG/DL — HIGH (ref 70–99)
GLUCOSE SERPL-MCNC: 138 MG/DL — HIGH (ref 70–99)
HCT VFR BLD CALC: 25.7 % — LOW (ref 39–50)
HCT VFR BLD CALC: 27.1 % — LOW (ref 39–50)
HGB BLD-MCNC: 7.8 G/DL — LOW (ref 13–17)
HGB BLD-MCNC: 8.2 G/DL — LOW (ref 13–17)
INR BLD: 1.49 — HIGH (ref 0.88–1.16)
INR BLD: 1.68 — HIGH (ref 0.88–1.16)
MAGNESIUM SERPL-MCNC: 2.1 MG/DL — SIGNIFICANT CHANGE UP (ref 1.6–2.6)
MAGNESIUM SERPL-MCNC: 2.2 MG/DL — SIGNIFICANT CHANGE UP (ref 1.6–2.6)
MCHC RBC-ENTMCNC: 27.6 PG — SIGNIFICANT CHANGE UP (ref 27–34)
MCHC RBC-ENTMCNC: 27.7 PG — SIGNIFICANT CHANGE UP (ref 27–34)
MCHC RBC-ENTMCNC: 30.3 GM/DL — LOW (ref 32–36)
MCHC RBC-ENTMCNC: 30.4 GM/DL — LOW (ref 32–36)
MCV RBC AUTO: 91.1 FL — SIGNIFICANT CHANGE UP (ref 80–100)
MCV RBC AUTO: 91.2 FL — SIGNIFICANT CHANGE UP (ref 80–100)
NRBC # BLD: 0 /100 WBCS — SIGNIFICANT CHANGE UP (ref 0–0)
NRBC # BLD: 0 /100 WBCS — SIGNIFICANT CHANGE UP (ref 0–0)
PHOSPHATE SERPL-MCNC: 2.5 MG/DL — SIGNIFICANT CHANGE UP (ref 2.5–4.5)
PHOSPHATE SERPL-MCNC: 2.7 MG/DL — SIGNIFICANT CHANGE UP (ref 2.5–4.5)
PLATELET # BLD AUTO: 409 K/UL — HIGH (ref 150–400)
PLATELET # BLD AUTO: 429 K/UL — HIGH (ref 150–400)
POTASSIUM SERPL-MCNC: 3.9 MMOL/L — SIGNIFICANT CHANGE UP (ref 3.5–5.3)
POTASSIUM SERPL-MCNC: 4.1 MMOL/L — SIGNIFICANT CHANGE UP (ref 3.5–5.3)
POTASSIUM SERPL-SCNC: 3.9 MMOL/L — SIGNIFICANT CHANGE UP (ref 3.5–5.3)
POTASSIUM SERPL-SCNC: 4.1 MMOL/L — SIGNIFICANT CHANGE UP (ref 3.5–5.3)
PROT SERPL-MCNC: 5.1 G/DL — LOW (ref 6–8.3)
PROT SERPL-MCNC: 5.3 G/DL — LOW (ref 6–8.3)
PROTHROM AB SERPL-ACNC: 17.1 SEC — HIGH (ref 10–12.9)
PROTHROM AB SERPL-ACNC: 19.3 SEC — HIGH (ref 10–12.9)
RBC # BLD: 2.82 M/UL — LOW (ref 4.2–5.8)
RBC # BLD: 2.97 M/UL — LOW (ref 4.2–5.8)
RBC # FLD: 15.3 % — HIGH (ref 10.3–14.5)
RBC # FLD: 15.5 % — HIGH (ref 10.3–14.5)
SODIUM SERPL-SCNC: 147 MMOL/L — HIGH (ref 135–145)
SODIUM SERPL-SCNC: 147 MMOL/L — HIGH (ref 135–145)
WBC # BLD: 11.13 K/UL — HIGH (ref 3.8–10.5)
WBC # BLD: 11.47 K/UL — HIGH (ref 3.8–10.5)
WBC # FLD AUTO: 11.13 K/UL — HIGH (ref 3.8–10.5)
WBC # FLD AUTO: 11.47 K/UL — HIGH (ref 3.8–10.5)

## 2019-12-08 PROCEDURE — 99292 CRITICAL CARE ADDL 30 MIN: CPT

## 2019-12-08 PROCEDURE — 99232 SBSQ HOSP IP/OBS MODERATE 35: CPT

## 2019-12-08 PROCEDURE — 71045 X-RAY EXAM CHEST 1 VIEW: CPT | Mod: 26,77

## 2019-12-08 PROCEDURE — 99233 SBSQ HOSP IP/OBS HIGH 50: CPT

## 2019-12-08 PROCEDURE — 71045 X-RAY EXAM CHEST 1 VIEW: CPT | Mod: 26

## 2019-12-08 PROCEDURE — 99291 CRITICAL CARE FIRST HOUR: CPT

## 2019-12-08 RX ORDER — FUROSEMIDE 40 MG
20 TABLET ORAL ONCE
Refills: 0 | Status: COMPLETED | OUTPATIENT
Start: 2019-12-08 | End: 2019-12-08

## 2019-12-08 RX ORDER — ACETAMINOPHEN 500 MG
1000 TABLET ORAL ONCE
Refills: 0 | Status: COMPLETED | OUTPATIENT
Start: 2019-12-08 | End: 2019-12-08

## 2019-12-08 RX ORDER — DILTIAZEM HCL 120 MG
10 CAPSULE, EXT RELEASE 24 HR ORAL ONCE
Refills: 0 | Status: COMPLETED | OUTPATIENT
Start: 2019-12-08 | End: 2019-12-08

## 2019-12-08 RX ORDER — DILTIAZEM HCL 120 MG
30 CAPSULE, EXT RELEASE 24 HR ORAL EVERY 8 HOURS
Refills: 0 | Status: DISCONTINUED | OUTPATIENT
Start: 2019-12-08 | End: 2019-12-15

## 2019-12-08 RX ORDER — POTASSIUM CHLORIDE 20 MEQ
20 PACKET (EA) ORAL ONCE
Refills: 0 | Status: COMPLETED | OUTPATIENT
Start: 2019-12-08 | End: 2019-12-08

## 2019-12-08 RX ORDER — DILTIAZEM HCL 120 MG
30 CAPSULE, EXT RELEASE 24 HR ORAL ONCE
Refills: 0 | Status: COMPLETED | OUTPATIENT
Start: 2019-12-08 | End: 2019-12-09

## 2019-12-08 RX ORDER — FUROSEMIDE 40 MG
20 TABLET ORAL ONCE
Refills: 0 | Status: COMPLETED | OUTPATIENT
Start: 2019-12-08 | End: 2019-12-09

## 2019-12-08 RX ADMIN — HEPARIN SODIUM 5000 UNIT(S): 5000 INJECTION INTRAVENOUS; SUBCUTANEOUS at 05:38

## 2019-12-08 RX ADMIN — Medication 12.5 MILLIGRAM(S): at 05:38

## 2019-12-08 RX ADMIN — Medication 81 MILLIGRAM(S): at 11:40

## 2019-12-08 RX ADMIN — Medication 10 MILLIGRAM(S): at 07:14

## 2019-12-08 RX ADMIN — Medication 400 MILLIGRAM(S): at 07:00

## 2019-12-08 RX ADMIN — Medication 100 MILLIEQUIVALENT(S): at 05:38

## 2019-12-08 RX ADMIN — LIDOCAINE 1 PATCH: 4 CREAM TOPICAL at 23:03

## 2019-12-08 RX ADMIN — CHLORHEXIDINE GLUCONATE 1 APPLICATION(S): 213 SOLUTION TOPICAL at 05:39

## 2019-12-08 RX ADMIN — LIDOCAINE 1 PATCH: 4 CREAM TOPICAL at 11:39

## 2019-12-08 RX ADMIN — Medication 0.5 MILLIGRAM(S): at 17:26

## 2019-12-08 RX ADMIN — Medication 0.5 MILLIGRAM(S): at 05:38

## 2019-12-08 RX ADMIN — Medication 30 MILLIGRAM(S): at 21:32

## 2019-12-08 RX ADMIN — LIDOCAINE 1 PATCH: 4 CREAM TOPICAL at 20:20

## 2019-12-08 RX ADMIN — PANTOPRAZOLE SODIUM 40 MILLIGRAM(S): 20 TABLET, DELAYED RELEASE ORAL at 11:40

## 2019-12-08 RX ADMIN — Medication 12.5 MILLIGRAM(S): at 00:05

## 2019-12-08 RX ADMIN — Medication 4 MILLIGRAM(S): at 07:45

## 2019-12-08 RX ADMIN — Medication 4 MILLIGRAM(S): at 11:40

## 2019-12-08 RX ADMIN — AMIODARONE HYDROCHLORIDE 200 MILLIGRAM(S): 400 TABLET ORAL at 05:39

## 2019-12-08 RX ADMIN — HEPARIN SODIUM 5000 UNIT(S): 5000 INJECTION INTRAVENOUS; SUBCUTANEOUS at 21:32

## 2019-12-08 RX ADMIN — Medication 1000 MILLIGRAM(S): at 08:00

## 2019-12-08 RX ADMIN — Medication 30 MILLIGRAM(S): at 14:47

## 2019-12-08 RX ADMIN — Medication 20 MILLIGRAM(S): at 17:36

## 2019-12-08 RX ADMIN — SERTRALINE 25 MILLIGRAM(S): 25 TABLET, FILM COATED ORAL at 11:40

## 2019-12-08 RX ADMIN — HEPARIN SODIUM 5000 UNIT(S): 5000 INJECTION INTRAVENOUS; SUBCUTANEOUS at 14:47

## 2019-12-08 RX ADMIN — Medication 4 MILLIGRAM(S): at 20:21

## 2019-12-08 RX ADMIN — Medication 4 MILLIGRAM(S): at 11:24

## 2019-12-08 RX ADMIN — LIDOCAINE 1 PATCH: 4 CREAM TOPICAL at 00:05

## 2019-12-08 NOTE — CHART NOTE - NSCHARTNOTEFT_GEN_A_CORE
Admitting Diagnosis:   Patient is a 69y old  Male who presents with a chief complaint of Aortic dissection (07 Dec 2019 20:46)      PAST MEDICAL & SURGICAL HISTORY:  Benign prostatic hypertrophy without lower urinary tract symptoms: BPH (benign prostatic hypertrophy)  Ulcerative colitis: Ulcerative colitis  States following surgery of eye and adnexa: straightened right eye  Other postprocedural status: History of hernia repair  Hemorrhoids: Hemorrhoids  Acute appendicitis with generalized peritonitis: Ruptured appendix      Current Nutrition Order:   Mechanical Soft w/ Honey Thickened Liquids  DASH/TLC  CSTCHO  EnsureEnlive BID (700kcal, 40g pro)    PO Intake: Good (%) [   ]  Fair (50-75%) [   ] Poor (<25%) [  x ]  Suspect 25% of meals being consumed. Per RN, pt had 2 juices thickened, and 1 banana this am. Declined eggs. Has not been drinking the Ensures. Indicating minimal protein intake.   Requiring feed assistance. Appreciate support and encouragement provided at meal times.     GI Issues:   No apparent GI distress  Last  12/6    Pain:  No pain reported  Being medically managed    Skin Integrity:  MSI  beth score 15  Posterior midline skin tear    Labs:   12-08    147<H>  |  112<H>  |  21  ----------------------------<  136<H>  3.9   |  26  |  1.23    Ca    8.4      08 Dec 2019 03:02  Phos  2.7     12-08  Mg     2.1     12-08    TPro  5.1<L>  /  Alb  3.3  /  TBili  0.6  /  DBili  x   /  AST  22  /  ALT  20  /  AlkPhos  55  12-08    CAPILLARY BLOOD GLUCOSE      POCT Blood Glucose.: 137 mg/dL (07 Dec 2019 21:56)      Medications:  MEDICATIONS  (STANDING):  aMIOdarone    Tablet 200 milliGRAM(s) Oral daily  aspirin  chewable 81 milliGRAM(s) Oral daily  buDESOnide    Inhalation Suspension 0.5 milliGRAM(s) Inhalation every 12 hours  chlorhexidine 2% Cloths 1 Application(s) Topical daily  dextrose 5%. 1000 milliLiter(s) (40 mL/Hr) IV Continuous <Continuous>  dextrose 50% Injectable 50 milliLiter(s) IV Push every 15 minutes  diltiazem    Tablet 30 milliGRAM(s) Oral every 8 hours  heparin  Injectable 5000 Unit(s) SubCutaneous every 8 hours  lidocaine   Patch 1 Patch Transdermal daily  pantoprazole  Injectable 40 milliGRAM(s) IV Push daily  sertraline 25 milliGRAM(s) Oral daily  sodium chloride 0.9%. 1000 milliLiter(s) (10 mL/Hr) IV Continuous <Continuous>  testosterone patch 4 mG/24 Hr(s) 4 milliGRAM(s) Transdermal daily    MEDICATIONS  (PRN):  bisacodyl Suppository 10 milliGRAM(s) Rectal daily PRN Constipation      Weight Change:   No known wt changes PTA per pt.    Please obtain updated weight for trending. *d/w RN obtaining updated weight when pt is repositioned.     Estimated energy needs:   ABW (84.1kg) used for calculations as pt between % of IBW.   Nutrient needs based on [facility] standards of care for maintenance in older adults.   Needs adjusted for age, post-op healing, inflammatory state  2102-2523kcal/day (25-30kcal/kg)  101-118g pro/day (1.2-1.4g pro/kg)  Fluids per team 2/2 increased MAP requirements    Subjective:   70yo M w/ PMH ulcerative colitis, BIBA to [facility] w/ c/o back pain and profound hypotension. Was emergently taken to the OR for salvage Type A dissection repair. Pt now s/p aortic arch repair and ascending arch replacement, Cabrol reconstruction of coronary ostia, replacement of ascending aorta/maynor arch, and frozen elephant trunk (11/23). Nephrology consulted for FELY. Was successfully extubated 12/1. Post op course c/b expressive aphasia s/p extubation; concern for stroke etiology 2/2 hypoperfusion. 12/5 Pt was found to have pericardial effusion s/p IR drainage 12/6 requiring urgent reintubation for procedure- Pt was extubated at 6 this morning. Pt seen in room, asleep in bed, on HFNC. MAP 76 at time of assessment, not requiring pressors at this time. Per SLP recommendations, pt is ordered for a mechanical soft diet w/ honey thickened liquids. Consumed juice and a banana this am w/ feeding assistance from PCA, has not been drinking Ensures. D/w RN obtaining updated weight for trending 2/2 insufficient PO intake. No apparent GI distress, last BM 12/6. Continue w/ current diet order. Consider SLP re-eval to assess for possible diet liberalization. Please encourage Ensures at or between meals. Appreciate support provided at meal time. RD to follow.     Previous Nutrition Diagnosis:  increased nutrient needs RT increased demand for kcal/pro AEB s/p aortic arch repair and ascending arch replacement, Cabrol reconstruction of coronary ostia, replacement of ascending aorta/maynor arch, and frozen elephant trunk  Active [  x ]  Resolved [   ]    If resolved, new PES:     Goal:  Pt to consistently meet % of estimated needs PO     Recommendations:  1. Continue on current diet order  2. Consistency per SLP recs  3. Monitor for s/s intolerance, issues chewing/swallowing  4. Obtain updated weight.   5. Appreciate support and encouragement provided at meal time    Education:   Option of ONS. Modified consistencies. Increased needs post-op.    Risk Level: High [ x  ] Moderate [   ] Low [   ].

## 2019-12-08 NOTE — PROGRESS NOTE ADULT - SUBJECTIVE AND OBJECTIVE BOX
INTERVAL HPI/OVERNIGHT EVENTS:    11/23: emergent salvage AVR(Bio)/root-hemiarch replacement  EF 60%    68yo Male with Hx Ulcerative colitis, BPH/prostate surgery presenting with back pain/neck pain and weakness  and profound weakness     found profoundly hypotensive on assessment - SBP 40's with cyanosis     CT scan: Type A aortic dissection  pressors started/intubated - CT surgery was called and emergently transferred - taken to OR    to OR 11/23:   intraop: 2.5 L Crystalloid/6 U pRBC/4 FFP/10 Cryo/1000 FEIBA  arrived on Epi/levo/vaso    atel on xray - bronch performed 11/24  LA normalized  and pressors titrate down     fever noted post-op - Cx sent - started presumptively on Meropenem 11/25. ID (Zlantanic)  ongoing diuresis - lasix infusion started - held this am     11/26 - off [place] and Juan M off  diuresis with improvement in Fi02 requirements - currently 40%  tolerated some early mobility     11/27: Doppler LUE: Thrombus in the left cephalic vein protruding into the subclavian vein. Remainder of subclavian vein is patent. Thrombus in the left basilic with overlying soft tissue swelling.    11/29 - atrial fib/RVR - Amiodarone IV transitioned to po load  heparin infusion started for Fib and LUE clot    new midline placed 11/29  had been in sinus - Afib returned 12/2 4p  Extubated to HFNC 12/1    some concern for expressive aphasia post extubation  CT Head 12/2: No acute intracranial hemorrhage or transcortical infarct. Gray-white matter hypodensities in the subcortical bilateral frontal lobes which may reflect age-indeterminate ischemia, infection, inflammation amongst other etiologies. No acute events reported overnight     Neuro assessment:  hypodensities in the subcortical bilateral frontal lobes. Stroke etiology likely hypoperfusion secondary to cardiac arrest given bilateral frontal watershed hypodensities and recent hospital course, less likely cardioembolic or thrombotic.     remains on heparin infusion (pTT 55.4)  ENT evaluated for aphonia - assessment revealed able to phonate when provoked; good VC movement on direct visualization     speech reportedly improved over course of day/night   remains in fib  passed formal S/S assessment - mechanical soft with honey thick fluids - diet changed accordingly to recommendations    ongoing expressive aphasia/apraxia    persistent Afib - difficult to rate control -   ECHO 12/6 demonstrating large pericardial effusion  to IR 12/6 - drain placed in IR - intubated in IR periprocedural  extubated 12/7 to HFNC and remains on HFNC - right pigtail placed with reported significant technical difficulty as patient was not cooperative by report    remains in Afib - rate     no acute events reported overnight - remains on HFNC 40/70    PMHx includes but is not limited to:   BPH   Ulcerative colitis: Ulcerative colitis  States following surgery of eye and adnexa: straightened right eye  Other postprocedural status: History of hernia repair  Hemorrhoids: Hemorrhoids  Acute appendicitis with generalized peritonitis: Ruptured appendix      ICU Vital Signs Last 24 Hrs  T(C): 37.1 (08 Dec 2019 09:45), Max: 37.7 (07 Dec 2019 22:57)  T(F): 98.8 (08 Dec 2019 09:45), Max: 99.8 (07 Dec 2019 22:57)  HR: 102 (08 Dec 2019 12:00) (92 - 126) fib  BP: 82/58 (07 Dec 2019 19:00) (82/58 - 82/58)  BP(mean): 65 (07 Dec 2019 19:00) (65 - 65)  ABP: 114/68 (08 Dec 2019 12:00) (96/62 - 138/84)  ABP(mean): 84 (08 Dec 2019 12:00) (72 - 104)  SpO2: 98% (08 Dec 2019 12:00) (94% - 99%) HFNC    Qtts: None    I&O's Summary    07 Dec 2019 07:01  -  08 Dec 2019 07:00  --------------------------------------------------------  IN: 1860.1 mL / OUT: 3590 mL / NET: -1729.9 mL    08 Dec 2019 07:01  -  08 Dec 2019 12:25  --------------------------------------------------------  IN: 380 mL / OUT: 370 mL / NET: 10 mL    Physical Exam    Heart  Lungs  Abd  Ext  Chest  Neuro  Skin    LABS:                        7.8    11.13 )-----------( 409      ( 08 Dec 2019 03:02 )             25.7     12-08    147<H>  |  112<H>  |  21  ----------------------------<  136<H>  3.9   |  26  |  1.23    Ca    8.4      08 Dec 2019 03:02  Phos  2.7     12-08  Mg     2.1     12-08    TPro  5.1<L>  /  Alb  3.3  /  TBili  0.6  /  DBili  x   /  AST  22  /  ALT  20  /  AlkPhos  55  12-08    PT/INR - ( 08 Dec 2019 03:02 )   PT: 17.1 sec;   INR: 1.49          PTT - ( 08 Dec 2019 03:02 )  PTT:28.0 sec    ABG - ( 08 Dec 2019 02:43 )  pH, Arterial: 7.45  pH, Blood: x     /  pCO2: 41    /  pO2: 99    / HCO3: 28    / Base Excess: 3.7   /  SaO2: 97                  RADIOLOGY & ADDITIONAL STUDIES:    I have spent/provided stated minutes of critical care time to this patient: INTERVAL HPI/OVERNIGHT EVENTS:    11/23: emergent salvage AVR(Bio)/root-hemiarch replacement  EF 60%    70yo Male with Hx Ulcerative colitis, BPH/prostate surgery presenting with back pain/neck pain and weakness  and profound weakness     found profoundly hypotensive on assessment - SBP 40's with cyanosis     CT scan: Type A aortic dissection  pressors started/intubated - CT surgery was called and emergently transferred - taken to OR    to OR 11/23:   intraop: 2.5 L Crystalloid/6 U pRBC/4 FFP/10 Cryo/1000 FEIBA  arrived on Epi/levo/vaso    atel on xray - bronch performed 11/24  LA normalized  and pressors titrate down     fever noted post-op - Cx sent - started presumptively on Meropenem 11/25. ID (Zlantanic)  ongoing diuresis - lasix infusion started - held this am     11/26 - off [place] and Juan M off  diuresis with improvement in Fi02 requirements - currently 40%  tolerated some early mobility     11/27: Doppler LUE: Thrombus in the left cephalic vein protruding into the subclavian vein. Remainder of subclavian vein is patent. Thrombus in the left basilic with overlying soft tissue swelling.    11/29 - atrial fib/RVR - Amiodarone IV transitioned to po load  heparin infusion started for Fib and LUE clot    new midline placed 11/29  had been in sinus - Afib returned 12/2 4p  Extubated to HFNC 12/1    some concern for expressive aphasia post extubation  CT Head 12/2: No acute intracranial hemorrhage or transcortical infarct. Gray-white matter hypodensities in the subcortical bilateral frontal lobes which may reflect age-indeterminate ischemia, infection, inflammation amongst other etiologies. No acute events reported overnight     Neuro assessment:  hypodensities in the subcortical bilateral frontal lobes. Stroke etiology likely hypoperfusion secondary to cardiac arrest given bilateral frontal watershed hypodensities and recent hospital course, less likely cardioembolic or thrombotic.     remains on heparin infusion (pTT 55.4)  ENT evaluated for aphonia - assessment revealed able to phonate when provoked; good VC movement on direct visualization     speech reportedly improved over course of day/night   remains in fib  passed formal S/S assessment - mechanical soft with honey thick fluids - diet changed accordingly to recommendations    ongoing expressive aphasia/apraxia    persistent Afib - difficult to rate control -   ECHO 12/6 demonstrating large pericardial effusion  to IR 12/6 - drain placed in IR - intubated in IR periprocedural  extubated 12/7 to HFNC and remains on HFNC - right pigtail placed with reported significant technical difficulty as patient was not cooperative by report    remains in Afib - rate     no acute events reported overnight - remains on HFNC 40/70  per wife at bedside - patient markedly better - intermittently will speak - when reportedly decides to    PMHx includes but is not limited to:   BPH   Ulcerative colitis: Ulcerative colitis  States following surgery of eye and adnexa: straightened right eye  Other postprocedural status: History of hernia repair  Hemorrhoids: Hemorrhoids  Acute appendicitis with generalized peritonitis: Ruptured appendix    ICU Vital Signs Last 24 Hrs  T(C): 37.1 (08 Dec 2019 09:45), Max: 37.7 (07 Dec 2019 22:57)  T(F): 98.8 (08 Dec 2019 09:45), Max: 99.8 (07 Dec 2019 22:57)  HR: 102 (08 Dec 2019 12:00) (92 - 126) fib  BP: 82/58 (07 Dec 2019 19:00) (82/58 - 82/58)  BP(mean): 65 (07 Dec 2019 19:00) (65 - 65)  ABP: 114/68 (08 Dec 2019 12:00) (96/62 - 138/84)  ABP(mean): 84 (08 Dec 2019 12:00) (72 - 104)  SpO2: 98% (08 Dec 2019 12:00) (94% - 99%) HFNC    Qtts: None    I&O's Summary    07 Dec 2019 07:01  -  08 Dec 2019 07:00  --------------------------------------------------------  IN: 1860.1 mL / OUT: 3590 mL / NET: -1729.9 mL    08 Dec 2019 07:01  -  08 Dec 2019 12:25  --------------------------------------------------------  IN: 380 mL / OUT: 370 mL / NET: 10 mL    Physical Exam    Heart - irregular irregular (-)rub/gallop  Lungs - dec BS bases - no rhonchi/wheeze  Abd -(+)BS soft NTND (-)r/r/g  Ext - warm to touch; no cyanosis/clubbing  Chest - incision site clean and dry  Neuro - non-focal ; not speaking for me when asked questions directly - shakes head yes/no;   Skin - no rash     LABS:                        7.8    11.13 )-----------( 409      ( 08 Dec 2019 03:02 )             25.7     12-08    147<H>  |  112<H>  |  21  ----------------------------<  136<H>  3.9   |  26  |  1.23    Ca    8.4      08 Dec 2019 03:02  Phos  2.7     12-08  Mg     2.1     12-08    TPro  5.1<L>  /  Alb  3.3  /  TBili  0.6  /  DBili  x   /  AST  22  /  ALT  20  /  AlkPhos  55  12-08    PT/INR - ( 08 Dec 2019 03:02 )   PT: 17.1 sec;   INR: 1.49          PTT - ( 08 Dec 2019 03:02 )  PTT:28.0 sec    ABG - ( 08 Dec 2019 02:43 )  pH, Arterial: 7.45  pH, Blood: x     /  pCO2: 41    /  pO2: 99    / HCO3: 28    / Base Excess: 3.7   /  SaO2: 97                  RADIOLOGY & ADDITIONAL STUDIES:    I have spent/provided stated minutes of critical care time to this patient: INTERVAL HPI/OVERNIGHT EVENTS:    11/23: emergent salvage AVR(Bio)/root-hemiarch replacement  EF 60%    68yo Male with Hx Ulcerative colitis, BPH/prostate surgery presenting with back pain/neck pain and weakness  and profound weakness     found profoundly hypotensive on assessment - SBP 40's with cyanosis     CT scan: Type A aortic dissection  pressors started/intubated - CT surgery was called and emergently transferred - taken to OR    to OR 11/23:   intraop: 2.5 L Crystalloid/6 U pRBC/4 FFP/10 Cryo/1000 FEIBA  arrived on Epi/levo/vaso    atel on xray - bronch performed 11/24  LA normalized  and pressors titrate down     fever noted post-op - Cx sent - started presumptively on Meropenem 11/25. ID (Zlantanic)  ongoing diuresis - lasix infusion started - held this am     11/26 - off [place] and Juan M off  diuresis with improvement in Fi02 requirements - currently 40%  tolerated some early mobility     11/27: Doppler LUE: Thrombus in the left cephalic vein protruding into the subclavian vein. Remainder of subclavian vein is patent. Thrombus in the left basilic with overlying soft tissue swelling.    11/29 - atrial fib/RVR - Amiodarone IV transitioned to po load  heparin infusion started for Fib and LUE clot    new midline placed 11/29  had been in sinus - Afib returned 12/2 4p  Extubated to HFNC 12/1    some concern for expressive aphasia post extubation  CT Head 12/2: No acute intracranial hemorrhage or transcortical infarct. Gray-white matter hypodensities in the subcortical bilateral frontal lobes which may reflect age-indeterminate ischemia, infection, inflammation amongst other etiologies. No acute events reported overnight     Neuro assessment:  hypodensities in the subcortical bilateral frontal lobes. Stroke etiology likely hypoperfusion secondary to cardiac arrest given bilateral frontal watershed hypodensities and recent hospital course, less likely cardioembolic or thrombotic.     remains on heparin infusion (pTT 55.4)  ENT evaluated for aphonia - assessment revealed able to phonate when provoked; good VC movement on direct visualization     speech reportedly improved over course of day/night   remains in fib  passed formal S/S assessment - mechanical soft with honey thick fluids - diet changed accordingly to recommendations    ongoing expressive aphasia/apraxia    persistent Afib - difficult to rate control -   ECHO 12/6 demonstrating large pericardial effusion  to IR 12/6 - drain placed in IR - intubated in IR periprocedural  extubated 12/7 to HFNC and remains on HFNC - right pigtail placed with reported significant technical difficulty as patient was not cooperative by report    remains in Afib - rate     no acute events reported overnight - remains on HFNC 40/70  per wife at bedside - patient markedly better - intermittently will speak - when reportedly decides to    PMHx includes but is not limited to:   BPH   Ulcerative colitis: Ulcerative colitis  States following surgery of eye and adnexa: straightened right eye  Other postprocedural status: History of hernia repair  Hemorrhoids: Hemorrhoids  Acute appendicitis with generalized peritonitis: Ruptured appendix    ICU Vital Signs Last 24 Hrs  T(C): 37.1 (08 Dec 2019 09:45), Max: 37.7 (07 Dec 2019 22:57)  T(F): 98.8 (08 Dec 2019 09:45), Max: 99.8 (07 Dec 2019 22:57)  HR: 102 (08 Dec 2019 12:00) (92 - 126) fib  BP: 82/58 (07 Dec 2019 19:00) (82/58 - 82/58)  BP(mean): 65 (07 Dec 2019 19:00) (65 - 65)  ABP: 114/68 (08 Dec 2019 12:00) (96/62 - 138/84)  ABP(mean): 84 (08 Dec 2019 12:00) (72 - 104)  SpO2: 98% (08 Dec 2019 12:00) (94% - 99%) HFNC    Qtts: None    I&O's Summary    07 Dec 2019 07:01  -  08 Dec 2019 07:00  --------------------------------------------------------  IN: 1860.1 mL / OUT: 3590 mL / NET: -1729.9 mL    08 Dec 2019 07:01  -  08 Dec 2019 12:25  --------------------------------------------------------  IN: 380 mL / OUT: 370 mL / NET: 10 mL    Physical Exam    Heart - irregular irregular (-)rub/gallop  Lungs - dec BS bases - no rhonchi/wheeze  Abd -(+)BS soft NTND (-)r/r/g  Ext - warm to touch; no cyanosis/clubbing  Chest - incision site clean and dry  Neuro - non-focal ; not speaking for me when asked questions directly - shakes head yes/no;   Skin - no rash     LABS:                        7.8    11.13 )-----------( 409      ( 08 Dec 2019 03:02 )             25.7     12-08    147<H>  |  112<H>  |  21  ----------------------------<  136<H>  3.9   |  26  |  1.23    Ca    8.4      08 Dec 2019 03:02  Phos  2.7     12-08  Mg     2.1     12-08    TPro  5.1<L>  /  Alb  3.3  /  TBili  0.6  /  DBili  x   /  AST  22  /  ALT  20  /  AlkPhos  55  12-08    PT/INR - ( 08 Dec 2019 03:02 )   PT: 17.1 sec;   INR: 1.49     PTT - ( 08 Dec 2019 03:02 )  PTT:28.0 sec    ABG - ( 08 Dec 2019 02:43 ) 7.45/41/99/97    RADIOLOGY & ADDITIONAL STUDIES: reviewed    Patient s/p emergent/salvage aortic dissection repair - post-op from 11/23 with expressive aphasia symptoms    1. CV  atrial fib   persistent with difficultly controlling rate - prompting ECHO  12/6 - IR drainage of pericardial effusion  ECHO 12/7: Pt tachycardic during the study. Normal LV/RV size and function. The posterior leaflet of the mitral valve is flail. Severe, anteriorly directed mitral regurgitation. s/p cabrol procedure. Small pericardial effusion without echocardiographic evidence of cardiac tamponade physiology.  once drain out and planned thoracentesis - likely restart systemic AC for afib AND UE clot noted on duplex    2. Pulm   recruitment maneuvers and serial bronchs performed prior   completed 10day Cx directed Abx today  remains on HFNC - transitioned back to NC post thoracentesis today  PT/OT ambulation     3. Renal   emergent type A repair  Cr 1.23  hypernatremia - 147 - on D5W for several hours prior  monitor UO/Lytes and Cr   CTa Abd 11/23: Right renal artery: Originates from the false lumen, diminished opacification. No occlusive thrombus versus dissection extending into the origin. Left renal artery: Originates from the true lumen and patent.    4. Neuro  post-op assessment - responsive and following simple commands, but ongoing expressive aphasia  CT Head reviewed and neuro following  non-focal - ongoing expressive aphasia/apraxia   maintain MAP higher - keyna as needed     5. ID  Afebrile   ID following   meropenem started 11/25 - now D#7 - d/c - per d/w ID ([facility]) CTX for 3 more days (10days total)  sputum Cx 11/25 - polymicrobial - normal jazmín/klebsiella oxytoca -     formal S/S assessment pending prior to consideration of PO intake   DVT and GI prophylaxis  PT/OT          I have spent/provided stated minutes of critical care time to this patient: 90 min INTERVAL HPI/OVERNIGHT EVENTS:    11/23: emergent salvage AVR(Bio)/root-hemiarch replacement  EF 60%    70yo Male with Hx Ulcerative colitis, BPH/prostate surgery presenting with back pain/neck pain and weakness  and profound weakness     found profoundly hypotensive on assessment - SBP 40's with cyanosis     CT scan: Type A aortic dissection  pressors started/intubated - CT surgery was called and emergently transferred - taken to OR    to OR 11/23:   intraop: 2.5 L Crystalloid/6 U pRBC/4 FFP/10 Cryo/1000 FEIBA  arrived on Epi/levo/vaso    atel on xray - bronch performed 11/24  LA normalized  and pressors titrate down     fever noted post-op - Cx sent - started presumptively on Meropenem 11/25. ID (Zlantanic)  ongoing diuresis - lasix infusion started - held this am     11/26 - off [place] and Juan M off  diuresis with improvement in Fi02 requirements - currently 40%  tolerated some early mobility     11/27: Doppler LUE: Thrombus in the left cephalic vein protruding into the subclavian vein. Remainder of subclavian vein is patent. Thrombus in the left basilic with overlying soft tissue swelling.    11/29 - atrial fib/RVR - Amiodarone IV transitioned to po load  heparin infusion started for Fib and LUE clot    new midline placed 11/29  had been in sinus - Afib returned 12/2 4p  Extubated to HFNC 12/1    some concern for expressive aphasia post extubation  CT Head 12/2: No acute intracranial hemorrhage or transcortical infarct. Gray-white matter hypodensities in the subcortical bilateral frontal lobes which may reflect age-indeterminate ischemia, infection, inflammation amongst other etiologies. No acute events reported overnight     Neuro assessment:  hypodensities in the subcortical bilateral frontal lobes. Stroke etiology likely hypoperfusion secondary to cardiac arrest given bilateral frontal watershed hypodensities and recent hospital course, less likely cardioembolic or thrombotic.     remains on heparin infusion (pTT 55.4)  ENT evaluated for aphonia - assessment revealed able to phonate when provoked; good VC movement on direct visualization     speech reportedly improved over course of day/night   remains in fib  passed formal S/S assessment - mechanical soft with honey thick fluids - diet changed accordingly to recommendations    ongoing expressive aphasia/apraxia    persistent Afib - difficult to rate control -   ECHO 12/6 demonstrating large pericardial effusion  to IR 12/6 - drain placed in IR - intubated in IR periprocedural  extubated 12/7 to HFNC and remains on HFNC - right pigtail placed with reported significant technical difficulty as patient was not cooperative by report    remains in Afib - rate     no acute events reported overnight - remains on HFNC 40/70  per wife at bedside - patient markedly better - intermittently will speak - when reportedly decides to    PMHx includes but is not limited to:   BPH   Ulcerative colitis: Ulcerative colitis  States following surgery of eye and adnexa: straightened right eye  Other postprocedural status: History of hernia repair  Hemorrhoids: Hemorrhoids  Acute appendicitis with generalized peritonitis: Ruptured appendix    ICU Vital Signs Last 24 Hrs  T(C): 37.1 (08 Dec 2019 09:45), Max: 37.7 (07 Dec 2019 22:57)  T(F): 98.8 (08 Dec 2019 09:45), Max: 99.8 (07 Dec 2019 22:57)  HR: 102 (08 Dec 2019 12:00) (92 - 126) fib  BP: 82/58 (07 Dec 2019 19:00) (82/58 - 82/58)  BP(mean): 65 (07 Dec 2019 19:00) (65 - 65)  ABP: 114/68 (08 Dec 2019 12:00) (96/62 - 138/84)  ABP(mean): 84 (08 Dec 2019 12:00) (72 - 104)  SpO2: 98% (08 Dec 2019 12:00) (94% - 99%) HFNC    Qtts: None    I&O's Summary    07 Dec 2019 07:01  -  08 Dec 2019 07:00  --------------------------------------------------------  IN: 1860.1 mL / OUT: 3590 mL / NET: -1729.9 mL    08 Dec 2019 07:01  -  08 Dec 2019 12:25  --------------------------------------------------------  IN: 380 mL / OUT: 370 mL / NET: 10 mL    Physical Exam    Heart - irregular irregular (-)rub/gallop  Lungs - dec BS bases - no rhonchi/wheeze  Abd -(+)BS soft NTND (-)r/r/g  Ext - warm to touch; no cyanosis/clubbing  Chest - incision site clean and dry  Neuro - non-focal ; not speaking for me when asked questions directly - shakes head yes/no;   Skin - no rash     LABS:                        7.8    11.13 )-----------( 409      ( 08 Dec 2019 03:02 )             25.7     12-08    147<H>  |  112<H>  |  21  ----------------------------<  136<H>  3.9   |  26  |  1.23    Ca    8.4      08 Dec 2019 03:02  Phos  2.7     12-08  Mg     2.1     12-08    TPro  5.1<L>  /  Alb  3.3  /  TBili  0.6  /  DBili  x   /  AST  22  /  ALT  20  /  AlkPhos  55  12-08    PT/INR - ( 08 Dec 2019 03:02 )   PT: 17.1 sec;   INR: 1.49     PTT - ( 08 Dec 2019 03:02 )  PTT:28.0 sec    ABG - ( 08 Dec 2019 02:43 ) 7.45/41/99/97    RADIOLOGY & ADDITIONAL STUDIES: reviewed    Patient s/p emergent/salvage aortic dissection repair - post-op from 11/23 with expressive aphasia symptoms    1. CV  atrial fib   persistent with difficultly controlling rate - prompting ECHO  12/6 - IR drainage of pericardial effusion  ECHO 12/7: Pt tachycardic during the study. Normal LV/RV size and function. The posterior leaflet of the mitral valve is flail. Severe, anteriorly directed mitral regurgitation. s/p cabrol procedure. Small pericardial effusion without echocardiographic evidence of cardiac tamponade physiology.  once drain out and planned thoracentesis - likely restart systemic AC for afib AND UE clot noted on duplex    2. Pulm   recruitment maneuvers and serial bronchs performed prior   completed 10day Cx directed Abx today  remains on HFNC - transitioned back to NC post thoracentesis today  PT/OT ambulation     3. Renal   emergent type A repair  Cr 1.23  hypernatremia - 147 - on D5W for several hours prior  monitor UO/Lytes and Cr   CTa Abd 11/23: Right renal artery: Originates from the false lumen, diminished opacification. No occlusive thrombus versus dissection extending into the origin. Left renal artery: Originates from the true lumen and patent.    4. Neuro  post-op assessment - responsive and following simple commands, but ongoing expressive aphasia  CT Head reviewed and neuro following  non-focal - ongoing expressive aphasia/apraxia   speech therapy/OT ongoing  maintain MAP higher - [place] as needed     5. ID  Afebrile   ID following   meropenem started 11/25 - now D#7 - d/c - per d/w ID ([facility]) CTX for 3 more days (10days total)  sputum Cx 11/25 - polymicrobial - normal jazmín/klebsiella oxytoca -     formal S/S assessment assessments ongoing - diet per recommendations  PT/OT    DVT and GI prophylaxis    d/w patient/wife present at bedside; staff, Neuro attending and CTS      I have spent/provided stated minutes of critical care time to this patient: 90 min

## 2019-12-08 NOTE — PROGRESS NOTE ADULT - SUBJECTIVE AND OBJECTIVE BOX
CC: DISSECTION AORTA    INTERVAL HISTORY:    * CKD stable. Hypernatremia improved. BP controlled.     ROS: Unobtainable. Lethargic and nonverbal.     PAST MEDICAL & SURGICAL HISTORY:  Benign prostatic hypertrophy without lower urinary tract symptoms: BPH (benign prostatic hypertrophy)  Ulcerative colitis: Ulcerative colitis  States following surgery of eye and adnexa: straightened right eye  Other postprocedural status: History of hernia repair  Hemorrhoids: Hemorrhoids  Acute appendicitis with generalized peritonitis: Ruptured appendix    PHYSICAL EXAM:  T(C): 37.2 (08 Dec 2019 17:52), Max: 37.7 (07 Dec 2019 22:57)  HR: 110 (08 Dec 2019 19:00)  BP: --  RR: 19 (08 Dec 2019 19:00)  SpO2: 95% (08 Dec 2019 19:00)      07 Dec 2019 07:01  -  08 Dec 2019 07:00  --------------------------------------------------------  IN:    Albumin 5%  - 250 mL: 500 mL    amiodarone Infusion: 50.1 mL    dextrose 5%.: 680 mL    IV PiggyBack: 300 mL    Oral Fluid: 200 mL    sodium chloride 0.9%.: 130 mL  Total IN: 1860.1 mL    OUT:    Chest Tube: 1210 mL    Estimated Blood Loss: 50 mL    Indwelling Catheter - Urethral: 2330 mL  Total OUT: 3590 mL    Total NET: -1729.9 mL      08 Dec 2019 07:01  -  08 Dec 2019 19:36  --------------------------------------------------------  IN:    dextrose 5%.: 60 mL    Oral Fluid: 360 mL  Total IN: 420 mL    OUT:    Chest Tube: 70 mL    Indwelling Catheter - Urethral: 1140 mL  Total OUT: 1210 mL    Total NET: -790 mL        Weight     Appearance: lethargic , pleasant, INAD.  ENT: oral mucosa moist, no pallor/cyanosis.  Neck: no JVD visible.  Cardiac: no rubs. no murmurs. Extremities: NO EDEMA.  Skin: no rashes.  Extremities (digits): no clubbing or cyanosis.  Respratory effort: no access muscle use. Lungs: CLEAR TO AUSCULTATION.  Abdomen: soft. nontender. no masses.  Psych affect: not depressed.    MEDICATIONS  (STANDING):  aMIOdarone    Tablet 200 milliGRAM(s) Oral daily  aspirin  chewable 81 milliGRAM(s) Oral daily  buDESOnide    Inhalation Suspension 0.5 milliGRAM(s) Inhalation every 12 hours  chlorhexidine 2% Cloths 1 Application(s) Topical daily  dextrose 5%. 1000 milliLiter(s) (40 mL/Hr) IV Continuous <Continuous>  dextrose 50% Injectable 50 milliLiter(s) IV Push every 15 minutes  diltiazem    Tablet 30 milliGRAM(s) Oral every 8 hours  heparin  Injectable 5000 Unit(s) SubCutaneous every 8 hours  lidocaine   Patch 1 Patch Transdermal daily  pantoprazole  Injectable 40 milliGRAM(s) IV Push daily  sertraline 25 milliGRAM(s) Oral daily  sodium chloride 0.9%. 1000 milliLiter(s) (10 mL/Hr) IV Continuous <Continuous>  testosterone patch 4 mG/24 Hr(s) 4 milliGRAM(s) Transdermal daily    MEDICATIONS  (PRN):  bisacodyl Suppository 10 milliGRAM(s) Rectal daily PRN Constipation    DATA:  147<H>  |  111<H>  |  20     ----------------------------<  138<H>  Ca:8.5   (08 Dec 2019 17:46)  4.1     |  26     |  1.05       eGFR if Non : 73  eGFR if : 84    TPro  5.3<L>  /  Alb  3.4    /  TBili  0.5    /  DBili  x      /  AST  12     /  ALT  16     /  AlkPhos  53     08 Dec 2019 17:46    SCr 1.05 [08 Dec 2019 17:46]  SCr 1.23 [08 Dec 2019 03:02]  SCr 1.16 [07 Dec 2019 22:02]  SCr 1.13 [07 Dec 2019 11:54]  SCr 1.03 [07 Dec 2019 03:30]                          8.2<L>  11.47<H> )-----------( 429<H>    ( 08 Dec 2019 17:46 )             27.1<L>    Phos:2.5 mg/dL M.2 mg/dL PTH:-- Uric acid:-- Serum Osm:--  Ferritin:-- Iron:-- TIBC:-- Tsat:--  B12:-- TSH:-- (08 Dec 2019 17:46)

## 2019-12-08 NOTE — PROGRESS NOTE ADULT - SUBJECTIVE AND OBJECTIVE BOX
Neurology Stroke Progress Note    INTERVAL HPI/OVERNIGHT EVENTS:  Patient seen and examined. Wife at bedside, more willing to communicate compared to exam on 12/6. Per wife, patient has been talking on the phone.     MEDICATIONS  (STANDING):  aMIOdarone    Tablet 200 milliGRAM(s) Oral daily  aspirin  chewable 81 milliGRAM(s) Oral daily  buDESOnide    Inhalation Suspension 0.5 milliGRAM(s) Inhalation every 12 hours  chlorhexidine 2% Cloths 1 Application(s) Topical daily  dextrose 5%. 1000 milliLiter(s) (40 mL/Hr) IV Continuous <Continuous>  dextrose 50% Injectable 50 milliLiter(s) IV Push every 15 minutes  diltiazem    Tablet 30 milliGRAM(s) Oral every 8 hours  heparin  Injectable 5000 Unit(s) SubCutaneous every 8 hours  lidocaine   Patch 1 Patch Transdermal daily  pantoprazole  Injectable 40 milliGRAM(s) IV Push daily  sertraline 25 milliGRAM(s) Oral daily  sodium chloride 0.9%. 1000 milliLiter(s) (10 mL/Hr) IV Continuous <Continuous>  testosterone patch 4 mG/24 Hr(s) 4 milliGRAM(s) Transdermal daily    MEDICATIONS  (PRN):  bisacodyl Suppository 10 milliGRAM(s) Rectal daily PRN Constipation    Allergies  penicillin (Unknown)    ROS: As per HPI, otherwise negative    Vital Signs Last 24 Hrs  T(C): 36.7 (08 Dec 2019 14:00), Max: 37.7 (07 Dec 2019 22:57)  T(F): 98 (08 Dec 2019 14:00), Max: 99.8 (07 Dec 2019 22:57)  HR: 108 (08 Dec 2019 15:00) (92 - 126)  BP: 82/58 (07 Dec 2019 19:00) (82/58 - 82/58)  BP(mean): 65 (07 Dec 2019 19:00) (65 - 65)  RR: 8 (08 Dec 2019 15:00) (8 - 26)  SpO2: 97% (08 Dec 2019 15:00) (94% - 100%)      Physical exam:  General: Awake and alert, lying comfortably in bed, in NAD.    Neurologic:  Mental status: Awake, alert, nods head appropriately to questions when asked. Follows simple commands, shows two fingers when asked.   Cranial nerves:   III, IV, VI: EOMI without nystagmus.   V:  V1-V3 facial sensation intact.   VII: No facial droop, face symmetric with normal eye closure and smile.   Motor: Normal bulk and tone, strength 5/5 in b/l UE and LE,  strength 5/5. Unwilling to cooperate in order to test arm drift (refused to put arms out).   Sensation: Intact to light touch. No neglect.  Gait: Deferred.     LABS:                        7.8    11.13 )-----------( 409      ( 08 Dec 2019 03:02 )             25.7     12-08    147<H>  |  112<H>  |  21  ----------------------------<  136<H>  3.9   |  26  |  1.23    Ca    8.4      08 Dec 2019 03:02  Phos  2.7     12-08  Mg     2.1     12-08    TPro  5.1<L>  /  Alb  3.3  /  TBili  0.6  /  DBili  x   /  AST  22  /  ALT  20  /  AlkPhos  55  12-08    PT/INR - ( 08 Dec 2019 03:02 )   PT: 17.1 sec;   INR: 1.49          PTT - ( 08 Dec 2019 03:02 )  PTT:28.0 sec      RADIOLOGY & ADDITIONAL TESTS:  - CT brain 12/2/2019  < from: CT Head No Cont (12.02.19 @ 15:10) >  IMPRESSION:  No acute intracranial hemorrhage or transcortical infarct.    Gray-white matter hypodensities in the subcortical bilateral frontal   lobes which may reflect age-indeterminate ischemia, infection,   inflammation amongst other etiologies. Further evaluation with an MRI   brain with and without IV contrast is recommended.    - No new neurologic/cerebral imaging 12/8.     Assessment and Plan  69y M with PMHx of colitis and prostate surgery presented with back pain and hypotension, s/p 11/23 emergent salvage AVR(Bio)/root-hemiarch replacement. Hospital course complicated by hemodynamic instability, aspiration pneumonia, acute respiratory distress, prolonged intubation, pleural effusion, left cephalic vein DVT with extension to subclavian, and Afib with RVR. On 12/1, patient extubated and noted to have expressive aphasia for which stroke neurology was consulted. HCT 12/2 revealed hypodensities in the subcortical bilateral frontal lobes, likely b/l watershed infarct. Stroke etiology likely hypoperfusion secondary to cardiac arrest, less likely embolic or thrombotic. On 12/6, patient had a pericardial drain placement for a large pericardial effusion. On 12/5, patient appeared to depressed with flat affect and therefore, Modafinil and Sertraline were started. On neuro assessment today 12/8, patient appears less depressed and more cooperative with exam, yet still converses only through head nods. Per wife, patient has been talking on the phone but refuses to talk in person. Will continue to monitor improvement of cognition/mood.       1)Secondary stroke prevention  -Continue with ASA 81mg PO daily, per [facility]    2)  Further workup   -Continue Modafinil 100mg PO and Sertraline 25mg PO    -Inpatient management per [facility]    3)DVT prophylaxis    -Heparin SQ and SCDs Neurology Stroke Progress Note    INTERVAL HPI/OVERNIGHT EVENTS:  Patient seen and examined. Wife at bedside, more willing to communicate compared to exam on 12/6. Per wife, patient has been talking on the phone.     MEDICATIONS  (STANDING):  aMIOdarone    Tablet 200 milliGRAM(s) Oral daily  aspirin  chewable 81 milliGRAM(s) Oral daily  buDESOnide    Inhalation Suspension 0.5 milliGRAM(s) Inhalation every 12 hours  chlorhexidine 2% Cloths 1 Application(s) Topical daily  dextrose 5%. 1000 milliLiter(s) (40 mL/Hr) IV Continuous <Continuous>  dextrose 50% Injectable 50 milliLiter(s) IV Push every 15 minutes  diltiazem    Tablet 30 milliGRAM(s) Oral every 8 hours  heparin  Injectable 5000 Unit(s) SubCutaneous every 8 hours  lidocaine   Patch 1 Patch Transdermal daily  pantoprazole  Injectable 40 milliGRAM(s) IV Push daily  sertraline 25 milliGRAM(s) Oral daily  sodium chloride 0.9%. 1000 milliLiter(s) (10 mL/Hr) IV Continuous <Continuous>  testosterone patch 4 mG/24 Hr(s) 4 milliGRAM(s) Transdermal daily    MEDICATIONS  (PRN):  bisacodyl Suppository 10 milliGRAM(s) Rectal daily PRN Constipation    Allergies  penicillin (Unknown)    ROS: As per HPI, otherwise negative    Vital Signs Last 24 Hrs  T(C): 36.7 (08 Dec 2019 14:00), Max: 37.7 (07 Dec 2019 22:57)  T(F): 98 (08 Dec 2019 14:00), Max: 99.8 (07 Dec 2019 22:57)  HR: 108 (08 Dec 2019 15:00) (92 - 126)  BP: 82/58 (07 Dec 2019 19:00) (82/58 - 82/58)  BP(mean): 65 (07 Dec 2019 19:00) (65 - 65)  RR: 8 (08 Dec 2019 15:00) (8 - 26)  SpO2: 97% (08 Dec 2019 15:00) (94% - 100%)      Physical exam:  General: Awake and alert, lying comfortably in bed, in NAD.    Neurologic:  Mental status: Awake, alert, nods head appropriately to questions when asked. Follows simple commands, shows two fingers when asked.   Cranial nerves:   III, IV, VI: EOMI without nystagmus.   V:  V1-V3 facial sensation intact.   VII: No facial droop, face symmetric with normal eye closure and smile.   Motor: Normal bulk and tone, strength 5/5 in b/l UE and LE,  strength 5/5. Unwilling to cooperate in order to test arm drift (refused to put arms out).   Sensation: Intact to light touch. No neglect.  Gait: Deferred.     LABS:                        7.8    11.13 )-----------( 409      ( 08 Dec 2019 03:02 )             25.7     12-08    147<H>  |  112<H>  |  21  ----------------------------<  136<H>  3.9   |  26  |  1.23    Ca    8.4      08 Dec 2019 03:02  Phos  2.7     12-08  Mg     2.1     12-08    TPro  5.1<L>  /  Alb  3.3  /  TBili  0.6  /  DBili  x   /  AST  22  /  ALT  20  /  AlkPhos  55  12-08    PT/INR - ( 08 Dec 2019 03:02 )   PT: 17.1 sec;   INR: 1.49     PTT - ( 08 Dec 2019 03:02 )  PTT:28.0 sec      RADIOLOGY & ADDITIONAL TESTS:  - CT brain 12/2/2019  < from: CT Head No Cont (12.02.19 @ 15:10) >  IMPRESSION:  No acute intracranial hemorrhage or transcortical infarct.    Gray-white matter hypodensities in the subcortical bilateral frontal   lobes which may reflect age-indeterminate ischemia, infection,   inflammation amongst other etiologies. Further evaluation with an MRI   brain with and without IV contrast is recommended.    - No new neurologic/cerebral imaging 12/8.     Assessment and Plan  69y M with PMHx of colitis and prostate surgery presented with back pain and hypotension, s/p 11/23 emergent salvage AVR(Bio)/root-hemiarch replacement. Hospital course complicated by hemodynamic instability, aspiration pneumonia, acute respiratory distress, prolonged intubation, pleural effusion, left cephalic vein DVT with extension to subclavian, and Afib with RVR. On 12/1, patient extubated and noted to have expressive aphasia for which stroke neurology was consulted. HCT 12/2 revealed hypodensities in the subcortical bilateral frontal lobes, likely b/l watershed infarct. Stroke etiology likely hypoperfusion secondary to cardiac arrest, less likely embolic or thrombotic. On 12/6, patient had a pericardial drain placement for a large pericardial effusion. On 12/5, patient appeared to depressed with flat affect and therefore, Modafinil and Sertraline were started. On neuro assessment today 12/8, patient appears less depressed and more cooperative with exam, yet still converses only through head nods. Per wife, patient has been talking on the phone but refuses to talk in person. Will continue to monitor improvement of cognition/mood.       1)Secondary stroke prevention  -Continue with ASA 81mg PO daily, per [facility]    2)  Further workup   -Continue Modafinil 100mg PO and Sertraline 25mg PO    -Inpatient management per [facility]    3)DVT prophylaxis    -Heparin SQ and SCDs

## 2019-12-08 NOTE — PROGRESS NOTE ADULT - ASSESSMENT
Dissection repair. FELY improved. Hypernatremia improving. BP controlled.    Suggest:    1. Continue free water.  2. Follow SNa.  3. Follow SCr.    Please call with any questions.    Lizandro Copeland MD, FACP, FASN | kidney.[facility]  Nephrology, Hypertension, and Internal Medicine  Mobile: [phone] (Daytime Hours Only)  Office/Answering Service: [phone]  Asst. Prof. of Medicine, [employer] at [facility] - Nephrology at [address]  [address], [address], [place], [place]

## 2019-12-08 NOTE — PROGRESS NOTE ADULT - ATTENDING COMMENTS
Patient seen and examined with PA.  Agree with above.  Patient seems to have trouble producing speech but is also uncooperative on exam with waving to send us outside.  His wife reports that he's talked on the phone in reasonable, interactive speech.  As I haven't witnessed this speech, it's hard to evaluate.  - Consider Speech Therapy evaluation for speech and cognition.  - Continue Zoloft 25mg for mood  - Agree with Modafinil if needed for awakeness.  His strength is otherwise reasonable.    Answered family's questions

## 2019-12-09 DIAGNOSIS — F43.24 ADJUSTMENT DISORDER WITH DISTURBANCE OF CONDUCT: ICD-10-CM

## 2019-12-09 LAB
ALBUMIN SERPL ELPH-MCNC: 3.2 G/DL — LOW (ref 3.3–5)
ALP SERPL-CCNC: 59 U/L — SIGNIFICANT CHANGE UP (ref 40–120)
ALT FLD-CCNC: 17 U/L — SIGNIFICANT CHANGE UP (ref 10–45)
ANION GAP SERPL CALC-SCNC: 11 MMOL/L — SIGNIFICANT CHANGE UP (ref 5–17)
APTT BLD: 29.8 SEC — SIGNIFICANT CHANGE UP (ref 27.5–36.3)
APTT BLD: 54.4 SEC — HIGH (ref 27.5–36.3)
AST SERPL-CCNC: 15 U/L — SIGNIFICANT CHANGE UP (ref 10–40)
BILIRUB SERPL-MCNC: 0.5 MG/DL — SIGNIFICANT CHANGE UP (ref 0.2–1.2)
BLD GP AB SCN SERPL QL: NEGATIVE — SIGNIFICANT CHANGE UP
BUN SERPL-MCNC: 18 MG/DL — SIGNIFICANT CHANGE UP (ref 7–23)
CALCIUM SERPL-MCNC: 8.1 MG/DL — LOW (ref 8.4–10.5)
CHLORIDE SERPL-SCNC: 109 MMOL/L — HIGH (ref 96–108)
CO2 SERPL-SCNC: 28 MMOL/L — SIGNIFICANT CHANGE UP (ref 22–31)
CREAT SERPL-MCNC: 0.98 MG/DL — SIGNIFICANT CHANGE UP (ref 0.5–1.3)
GAS PNL BLDA: SIGNIFICANT CHANGE UP
GAS PNL BLDA: SIGNIFICANT CHANGE UP
GLUCOSE SERPL-MCNC: 115 MG/DL — HIGH (ref 70–99)
HCT VFR BLD CALC: 27.7 % — LOW (ref 39–50)
HGB BLD-MCNC: 8.6 G/DL — LOW (ref 13–17)
INR BLD: 1.76 — HIGH (ref 0.88–1.16)
INR BLD: 2.01 — HIGH (ref 0.88–1.16)
MAGNESIUM SERPL-MCNC: 2.1 MG/DL — SIGNIFICANT CHANGE UP (ref 1.6–2.6)
MCHC RBC-ENTMCNC: 28.1 PG — SIGNIFICANT CHANGE UP (ref 27–34)
MCHC RBC-ENTMCNC: 31 GM/DL — LOW (ref 32–36)
MCV RBC AUTO: 90.5 FL — SIGNIFICANT CHANGE UP (ref 80–100)
NRBC # BLD: 0 /100 WBCS — SIGNIFICANT CHANGE UP (ref 0–0)
PHOSPHATE SERPL-MCNC: 3.3 MG/DL — SIGNIFICANT CHANGE UP (ref 2.5–4.5)
PLATELET # BLD AUTO: 423 K/UL — HIGH (ref 150–400)
POTASSIUM SERPL-MCNC: 4.5 MMOL/L — SIGNIFICANT CHANGE UP (ref 3.5–5.3)
POTASSIUM SERPL-SCNC: 4.5 MMOL/L — SIGNIFICANT CHANGE UP (ref 3.5–5.3)
PROT SERPL-MCNC: 5.3 G/DL — LOW (ref 6–8.3)
PROTHROM AB SERPL-ACNC: 20.2 SEC — HIGH (ref 10–12.9)
PROTHROM AB SERPL-ACNC: 23.2 SEC — HIGH (ref 10–12.9)
RBC # BLD: 3.06 M/UL — LOW (ref 4.2–5.8)
RBC # FLD: 15.4 % — HIGH (ref 10.3–14.5)
RH IG SCN BLD-IMP: POSITIVE — SIGNIFICANT CHANGE UP
SODIUM SERPL-SCNC: 148 MMOL/L — HIGH (ref 135–145)
WBC # BLD: 10.17 K/UL — SIGNIFICANT CHANGE UP (ref 3.8–10.5)
WBC # FLD AUTO: 10.17 K/UL — SIGNIFICANT CHANGE UP (ref 3.8–10.5)

## 2019-12-09 PROCEDURE — 71045 X-RAY EXAM CHEST 1 VIEW: CPT | Mod: 26

## 2019-12-09 PROCEDURE — 99233 SBSQ HOSP IP/OBS HIGH 50: CPT

## 2019-12-09 PROCEDURE — 99223 1ST HOSP IP/OBS HIGH 75: CPT

## 2019-12-09 PROCEDURE — 99232 SBSQ HOSP IP/OBS MODERATE 35: CPT

## 2019-12-09 PROCEDURE — 99291 CRITICAL CARE FIRST HOUR: CPT

## 2019-12-09 RX ORDER — HEPARIN SODIUM 5000 [USP'U]/ML
1200 INJECTION INTRAVENOUS; SUBCUTANEOUS
Qty: 25000 | Refills: 0 | Status: DISCONTINUED | OUTPATIENT
Start: 2019-12-09 | End: 2019-12-13

## 2019-12-09 RX ORDER — DIVALPROEX SODIUM 500 MG/1
250 TABLET, DELAYED RELEASE ORAL DAILY
Refills: 0 | Status: DISCONTINUED | OUTPATIENT
Start: 2019-12-09 | End: 2019-12-11

## 2019-12-09 RX ADMIN — Medication 4 MILLIGRAM(S): at 10:23

## 2019-12-09 RX ADMIN — Medication 0.5 MILLIGRAM(S): at 17:21

## 2019-12-09 RX ADMIN — DIVALPROEX SODIUM 250 MILLIGRAM(S): 500 TABLET, DELAYED RELEASE ORAL at 16:31

## 2019-12-09 RX ADMIN — Medication 30 MILLIGRAM(S): at 16:30

## 2019-12-09 RX ADMIN — HEPARIN SODIUM 12 UNIT(S)/HR: 5000 INJECTION INTRAVENOUS; SUBCUTANEOUS at 15:03

## 2019-12-09 RX ADMIN — Medication 0.5 MILLIGRAM(S): at 05:53

## 2019-12-09 RX ADMIN — Medication 20 MILLIGRAM(S): at 03:57

## 2019-12-09 RX ADMIN — LIDOCAINE 1 PATCH: 4 CREAM TOPICAL at 17:10

## 2019-12-09 RX ADMIN — Medication 4 MILLIGRAM(S): at 17:10

## 2019-12-09 RX ADMIN — Medication 4 MILLIGRAM(S): at 07:53

## 2019-12-09 RX ADMIN — Medication 81 MILLIGRAM(S): at 10:29

## 2019-12-09 RX ADMIN — AMIODARONE HYDROCHLORIDE 200 MILLIGRAM(S): 400 TABLET ORAL at 05:23

## 2019-12-09 RX ADMIN — LIDOCAINE 1 PATCH: 4 CREAM TOPICAL at 10:29

## 2019-12-09 RX ADMIN — HEPARIN SODIUM 5000 UNIT(S): 5000 INJECTION INTRAVENOUS; SUBCUTANEOUS at 05:24

## 2019-12-09 RX ADMIN — SERTRALINE 25 MILLIGRAM(S): 25 TABLET, FILM COATED ORAL at 10:29

## 2019-12-09 RX ADMIN — PANTOPRAZOLE SODIUM 40 MILLIGRAM(S): 20 TABLET, DELAYED RELEASE ORAL at 10:28

## 2019-12-09 RX ADMIN — CHLORHEXIDINE GLUCONATE 1 APPLICATION(S): 213 SOLUTION TOPICAL at 05:24

## 2019-12-09 RX ADMIN — Medication 4 MILLIGRAM(S): at 10:29

## 2019-12-09 RX ADMIN — LIDOCAINE 1 PATCH: 4 CREAM TOPICAL at 22:30

## 2019-12-09 RX ADMIN — Medication 30 MILLIGRAM(S): at 10:29

## 2019-12-09 RX ADMIN — Medication 30 MILLIGRAM(S): at 05:24

## 2019-12-09 NOTE — CONSULT NOTE ADULT - ASSESSMENT
69 year old male, with PMHx of colitis, prostate surgery @ [facility], BIBA to [facility] ED complaining of back pain and profound hypotension, emergently brought to the OR for salvage Type A dissection repair ,s/p aortic arch repair and ascending arch replacement, now with fever, susp Aspiration pneumonia
69 year old male, with PMHx of colitis, prostate surgery @ [facility], BIBA to [facility] ED complaining of back pain and profound hypotension, emergently brought to the OR for salvage Type A dissection repair    # FELY risk reduction - patient at high risk of ischaemic ATN due to hypoperfusion  - Cr 1.2  - solo unblocked post-op - made > 1 L urine output  - patient currently on multiple pressors  - monitor urine output  - send ulytes, renal sono  - Avoid ACE/ARB/NSAIDS/Contrast  - continue to monitor renal function  - maintain renal perfusion, maintain MAP 60-65
69 year old male, presented with Type A aortic dissection, s/p emergent salvage AVR (bio) / root-hemiarch replacement. Postop course significant for AFIB RVR starting 11/29, non-focal neuro deficit (expressive aphasia) likely from hypoperfusion, pericardial effusion (s/p drainage) and pleural effusion (pending tap tonight). Heparin gtt was discontinued on 12/6/19 due to pericardial effusion that needed drainage. He is on Amio and Diltiazem for rate control. LVEF normal.   - Pt has no acute indication for cardioversion at this time as he is hemodynamically stable and his rate is not too fast (HR 100s). Continue rate control agents. We would DCCV when pt is done with interventions that would entail Heparin gtt interruption.   - Case d/w Dr. Saldana and Dr. Loving

## 2019-12-09 NOTE — PROGRESS NOTE ADULT - SUBJECTIVE AND OBJECTIVE BOX
Neurology Stroke Progress Note    INTERVAL HPI/OVERNIGHT EVENTS:  Patient seen and examined today sleepy and napping at noon. Patient was able to say [facility] when asked the location but otherwise did not respond verbally to any other questions. He would grunt and gesticulate his answer. CTICU team noted that patient has moments of agitation when touched during nursing care that are difficult to redirect and is sometimes disinhibited.  Denies acute numbness, tingling, weakness, headache.         MEDICATIONS  (STANDING):  aMIOdarone    Tablet 200 milliGRAM(s) Oral daily  aspirin  chewable 81 milliGRAM(s) Oral daily  buDESOnide    Inhalation Suspension 0.5 milliGRAM(s) Inhalation every 12 hours  chlorhexidine 2% Cloths 1 Application(s) Topical daily  dextrose 5%. 1000 milliLiter(s) (40 mL/Hr) IV Continuous <Continuous>  dextrose 50% Injectable 50 milliLiter(s) IV Push every 15 minutes  diltiazem    Tablet 30 milliGRAM(s) Oral every 8 hours  diltiazem    Tablet 30 milliGRAM(s) Oral once  diVALproex  milliGRAM(s) Oral daily  heparin  Infusion 1200 Unit(s)/Hr (12 mL/Hr) IV Continuous <Continuous>  lidocaine   Patch 1 Patch Transdermal daily  pantoprazole  Injectable 40 milliGRAM(s) IV Push daily  sertraline 25 milliGRAM(s) Oral daily  sodium chloride 0.9%. 1000 milliLiter(s) (10 mL/Hr) IV Continuous <Continuous>  testosterone patch 4 mG/24 Hr(s) 4 milliGRAM(s) Transdermal daily    MEDICATIONS  (PRN):  bisacodyl Suppository 10 milliGRAM(s) Rectal daily PRN Constipation      Allergies    penicillin (Unknown)    Intolerances        Vital Signs Last 24 Hrs  T(C): 37.1 (09 Dec 2019 08:50), Max: 37.2 (08 Dec 2019 17:52)  T(F): 98.7 (09 Dec 2019 08:50), Max: 99 (08 Dec 2019 17:52)  HR: 102 (09 Dec 2019 14:00) (92 - 124)  BP: --  BP(mean): --  RR: 16 (09 Dec 2019 14:00) (8 - 22)  SpO2: 96% (09 Dec 2019 14:00) (93% - 98%)    Physical exam:  General: No acute distress, sleepy but wakes with verbal stimulation.   Neurologic:  -Mental status: Sleepy, oriented to person, place, and time.  Stated "[facility]" when asked the location, does not verbally answer other questions. Patient with depressed affect but appropriate nonverbal responses, is able to grunt and gesticulate his answer. Follows commands. Attention/concentration intact.   -Cranial nerves:   II: Visual fields are full to confrontation.  III, IV, VI: Extraocular movements are intact without nystagmus. Pupils equally round and reactive to light  V:  Facial sensation V1-V3 equal and intact   VII: Face is symmetric with normal eye closure and smile  Motor: Normal bulk and tone. No pronator drift. Strength bilateral upper extremity 5/5, bilateral lower extremities 5/5.  Sensation: Intact to light touch bilaterally  Coordination: No dysmetria on finger-to-nose    LABS:                        8.6    10.17 )-----------( 423      ( 09 Dec 2019 11:23 )             27.7     12-09    148<H>  |  109<H>  |  18  ----------------------------<  115<H>  4.5   |  28  |  0.98    Ca    8.1<L>      09 Dec 2019 11:23  Phos  3.3     12-09  Mg     2.1     12-09    TPro  5.3<L>  /  Alb  3.2<L>  /  TBili  0.5  /  DBili  x   /  AST  15  /  ALT  17  /  AlkPhos  59  12-09    PT/INR - ( 09 Dec 2019 11:23 )   PT: 20.2 sec;   INR: 1.76          PTT - ( 09 Dec 2019 11:23 )  PTT:29.8 sec      RADIOLOGY & ADDITIONAL TESTS:      Assessment and Plan  69y M with PMHx of colitis and prostate surgery presented with back pain and hypotension, s/p 11/23 emergent salvage AVR(Bio)/root-hemiarch replacement. Hospital course complicated by hemodynamic instability, aspiration pneumonia, acute respiratory distress, prolonged intubation, pleural effusion, left cephalic vein DVT with extension to subclavian, and Afib with RVR. On 12/1, patient extubated and noted to have expressive aphasia for which stroke neurology was consulted. HCT 12/2 revealed hypodensities in the subcortical bilateral frontal lobes, likely b/l watershed infarct. Stroke etiology likely hypoperfusion secondary to cardiac arrest, less likely embolic or thrombotic. Patient noted to have periodic agitation and rapid mood shifts by primary team, likely related to frontal lobe injury secondary to stroke.     - Continue with ASA 81mg PO daily, per CTICU  - Continue Modafinil 100mg PO and Sertraline 25mg PO    - Start depakote 250mg daily for mood stabilization  - Heparin SQ and SCDs  - Inpatient management per CTICU       Juanita Cooper  Neurology Stroke NP  [phone] Neurology Stroke Progress Note    INTERVAL HPI/OVERNIGHT EVENTS:  Patient seen and examined today sleepy and napping at noon. Patient was able to say [facility] when asked the location but otherwise did not respond verbally to any other questions. He would grunt and gesticulate his answer. CTICU team noted that patient has moments of agitation when touched during nursing care that are difficult to redirect and is sometimes disinhibited.  Denies acute numbness, tingling, weakness, headache.         MEDICATIONS  (STANDING):  aMIOdarone    Tablet 200 milliGRAM(s) Oral daily  aspirin  chewable 81 milliGRAM(s) Oral daily  buDESOnide    Inhalation Suspension 0.5 milliGRAM(s) Inhalation every 12 hours  chlorhexidine 2% Cloths 1 Application(s) Topical daily  dextrose 5%. 1000 milliLiter(s) (40 mL/Hr) IV Continuous <Continuous>  dextrose 50% Injectable 50 milliLiter(s) IV Push every 15 minutes  diltiazem    Tablet 30 milliGRAM(s) Oral every 8 hours  diltiazem    Tablet 30 milliGRAM(s) Oral once  diVALproex  milliGRAM(s) Oral daily  heparin  Infusion 1200 Unit(s)/Hr (12 mL/Hr) IV Continuous <Continuous>  lidocaine   Patch 1 Patch Transdermal daily  pantoprazole  Injectable 40 milliGRAM(s) IV Push daily  sertraline 25 milliGRAM(s) Oral daily  sodium chloride 0.9%. 1000 milliLiter(s) (10 mL/Hr) IV Continuous <Continuous>  testosterone patch 4 mG/24 Hr(s) 4 milliGRAM(s) Transdermal daily    MEDICATIONS  (PRN):  bisacodyl Suppository 10 milliGRAM(s) Rectal daily PRN Constipation      Allergies    penicillin (Unknown)    Intolerances        Vital Signs Last 24 Hrs  T(C): 37.1 (09 Dec 2019 08:50), Max: 37.2 (08 Dec 2019 17:52)  T(F): 98.7 (09 Dec 2019 08:50), Max: 99 (08 Dec 2019 17:52)  HR: 102 (09 Dec 2019 14:00) (92 - 124)  BP: --  BP(mean): --  RR: 16 (09 Dec 2019 14:00) (8 - 22)  SpO2: 96% (09 Dec 2019 14:00) (93% - 98%)    Physical exam:  General: No acute distress, sleepy but wakes with verbal stimulation.   Neurologic:  -Mental status: Sleepy, oriented to person, place, and time.  Stated "[facility]" when asked the location, does not verbally answer other questions. Patient with depressed affect but appropriate nonverbal responses, is able to grunt and gesticulate his answer. Follows commands. Attention/concentration intact.   -Cranial nerves:   II: Visual fields are full to confrontation.  III, IV, VI: Extraocular movements are intact without nystagmus. Pupils equally round and reactive to light  V:  Facial sensation V1-V3 equal and intact   VII: Face is symmetric with normal eye closure and smile  Motor: Normal bulk and tone. No pronator drift. Strength bilateral upper extremity 5/5, bilateral lower extremities 5/5.  Sensation: Intact to light touch bilaterally  Coordination: No dysmetria on finger-to-nose    LABS:                        8.6    10.17 )-----------( 423      ( 09 Dec 2019 11:23 )             27.7     12-09    148<H>  |  109<H>  |  18  ----------------------------<  115<H>  4.5   |  28  |  0.98    Ca    8.1<L>      09 Dec 2019 11:23  Phos  3.3     12-09  Mg     2.1     12-09    TPro  5.3<L>  /  Alb  3.2<L>  /  TBili  0.5  /  DBili  x   /  AST  15  /  ALT  17  /  AlkPhos  59  12-09    PT/INR - ( 09 Dec 2019 11:23 )   PT: 20.2 sec;   INR: 1.76          PTT - ( 09 Dec 2019 11:23 )  PTT:29.8 sec      RADIOLOGY & ADDITIONAL TESTS:      Assessment and Plan  69y M with PMHx of colitis and prostate surgery presented with back pain and hypotension, s/p 11/23 emergent salvage AVR(Bio)/root-hemiarch replacement. Hospital course complicated by hemodynamic instability, aspiration pneumonia, acute respiratory distress, prolonged intubation, pleural effusion, left cephalic vein DVT with extension to subclavian, and Afib with RVR. On 12/1, patient extubated and noted to have expressive aphasia for which stroke neurology was consulted. HCT 12/2 revealed hypodensities in the subcortical bilateral frontal lobes, likely b/l watershed infarct. Stroke etiology likely hypoperfusion secondary to cardiac arrest, less likely embolic or thrombotic. Patient noted to have periodic agitation and rapid mood shifts by primary team, likely related to frontal lobe injury secondary to stroke.     - Continue with ASA 81mg PO daily, per CTICU  - Continue Modafinil 100mg PO and Sertraline 25mg PO    - Continue heparin gtt per CTICU, will need anticoagulation long term for afib with RVR for stroke prevention  - Start depakote 250mg daily for mood stabilization  - SCDs  - Inpatient management per CTICU       Juanita Cooper  Neurology Stroke NP  [phone] Neurology Stroke Progress Note    INTERVAL HPI/OVERNIGHT EVENTS:  Patient seen and examined today sleepy and napping at noon. Patient was able to say [facility] when asked the location but otherwise did not respond verbally to any other questions. He would grunt and gesticulate his answer. CTICU team noted that patient has moments of agitation when touched during nursing care that are difficult to redirect and is sometimes disinhibited.  Denies acute numbness, tingling, weakness, headache.         MEDICATIONS  (STANDING):  aMIOdarone    Tablet 200 milliGRAM(s) Oral daily  aspirin  chewable 81 milliGRAM(s) Oral daily  buDESOnide    Inhalation Suspension 0.5 milliGRAM(s) Inhalation every 12 hours  chlorhexidine 2% Cloths 1 Application(s) Topical daily  dextrose 5%. 1000 milliLiter(s) (40 mL/Hr) IV Continuous <Continuous>  dextrose 50% Injectable 50 milliLiter(s) IV Push every 15 minutes  diltiazem    Tablet 30 milliGRAM(s) Oral every 8 hours  diltiazem    Tablet 30 milliGRAM(s) Oral once  diVALproex  milliGRAM(s) Oral daily  heparin  Infusion 1200 Unit(s)/Hr (12 mL/Hr) IV Continuous <Continuous>  lidocaine   Patch 1 Patch Transdermal daily  pantoprazole  Injectable 40 milliGRAM(s) IV Push daily  sertraline 25 milliGRAM(s) Oral daily  sodium chloride 0.9%. 1000 milliLiter(s) (10 mL/Hr) IV Continuous <Continuous>  testosterone patch 4 mG/24 Hr(s) 4 milliGRAM(s) Transdermal daily    MEDICATIONS  (PRN):  bisacodyl Suppository 10 milliGRAM(s) Rectal daily PRN Constipation      Allergies    penicillin (Unknown)      Vital Signs Last 24 Hrs  T(C): 37.1 (09 Dec 2019 08:50), Max: 37.2 (08 Dec 2019 17:52)  T(F): 98.7 (09 Dec 2019 08:50), Max: 99 (08 Dec 2019 17:52)  HR: 102 (09 Dec 2019 14:00) (92 - 124)  RR: 16 (09 Dec 2019 14:00) (8 - 22)  SpO2: 96% (09 Dec 2019 14:00) (93% - 98%)    Physical exam:  General: No acute distress, sleepy but wakes with verbal stimulation.   Neurologic:  -Mental status: Sleepy, oriented to person, place, and time.  Stated "[facility]" when asked the location, does not verbally answer other questions. Patient with depressed affect but appropriate nonverbal responses, is able to grunt and gesticulate his answer. Follows commands. Attention/concentration intact.   -Cranial nerves:   II: Visual fields are full to confrontation.  III, IV, VI: Extraocular movements are intact without nystagmus. Pupils equally round and reactive to light  V:  Facial sensation V1-V3 equal and intact   VII: Face is symmetric with normal eye closure and smile  Motor: Normal bulk and tone. No pronator drift. Strength bilateral upper extremity 5/5, bilateral lower extremities 5/5.  Sensation: Intact to light touch bilaterally  Coordination: No dysmetria on finger-to-nose    LABS:                        8.6    10.17 )-----------( 423      ( 09 Dec 2019 11:23 )             27.7     12-09    148<H>  |  109<H>  |  18  ----------------------------<  115<H>  4.5   |  28  |  0.98    Ca    8.1<L>      09 Dec 2019 11:23  Phos  3.3     12-09  Mg     2.1     12-09    TPro  5.3<L>  /  Alb  3.2<L>  /  TBili  0.5  /  DBili  x   /  AST  15  /  ALT  17  /  AlkPhos  59  12-09    PT/INR - ( 09 Dec 2019 11:23 )   PT: 20.2 sec;   INR: 1.76     PTT - ( 09 Dec 2019 11:23 )  PTT:29.8 sec      RADIOLOGY & ADDITIONAL TESTS:      Assessment and Plan  69y M with PMHx of colitis and prostate surgery presented with back pain and hypotension, s/p 11/23 emergent salvage AVR(Bio)/root-hemiarch replacement. Hospital course complicated by hemodynamic instability, aspiration pneumonia, acute respiratory distress, prolonged intubation, pleural effusion, left cephalic vein DVT with extension to subclavian, and Afib with RVR. On 12/1, patient extubated and noted to have expressive aphasia for which stroke neurology was consulted. HCT 12/2 revealed hypodensities in the subcortical bilateral frontal lobes, likely b/l watershed infarct. Stroke etiology likely hypoperfusion secondary to cardiac arrest, less likely embolic or thrombotic. Patient noted to have periodic agitation and rapid mood shifts by primary team, likely related to frontal lobe injury secondary to stroke.     - Continue with ASA 81mg PO daily, per CTICU  - Continue Modafinil 100mg PO and Sertraline 25mg PO    - Continue heparin gtt per CTICU, will need anticoagulation long term for afib with RVR for stroke prevention  - Start depakote 250mg daily for mood stabilization  - SCDs  - Inpatient management per CTICU       Juanita Cooper  Neurology Stroke NP  [phone]

## 2019-12-09 NOTE — PROGRESS NOTE ADULT - SUBJECTIVE AND OBJECTIVE BOX
CTICU  CRITICAL  CARE  attending     Hand off received 					   Pertinent clinical, laboratory, radiographic, hemodynamic, echocardiographic, respiratory data, microbiologic data and chart were reviewed and analyzed frequently throughout the course of the day and night  Patient seen and examined with CTS/ SH attending at bedside    Pt is a 69y , Male,  post op day # 16; s/p emergent Type A dissection repair;    post op:    acute CVA; bilat frontal lobe infarcts+ watershed areas  pericardial effusion; s/p drainage  bilateral pleural effusions/ s/p right pigtail thoracentesis;  atrial fibrillation with variable VR  acute post H'gic anemia    currently    encephalopathy 2/2 bifrontal CVA  atrial fibrillation with RVR      Leukocytosis, unspecified type: Leukocytosis, unspecified type  Hypernatremia: Hypernatremia  Bacterial pneumonia: Bacterial pneumonia  Fever, postprocedural: Fever, postprocedural  FELY (acute kidney injury): FELY (acute kidney injury)      , FAMILY HISTORY:  PAST MEDICAL & SURGICAL HISTORY:  Benign prostatic hypertrophy without lower urinary tract symptoms: BPH (benign prostatic hypertrophy)  Ulcerative colitis: Ulcerative colitis  States following surgery of eye and adnexa: straightened right eye  Other postprocedural status: History of hernia repair  Hemorrhoids: Hemorrhoids  Acute appendicitis with generalized peritonitis: Ruptured appendix    Patient is a 69y old  Male who presents with a chief complaint of Aortic dissection (09 Dec 2019 15:14)      14 system review was unremarkable  acute changes include acute respiratory failure  Vital signs, hemodynamic and respiratory parameters were reviewed from the bedside nursing flowsheet.  ICU Vital Signs Last 24 Hrs  T(C): 37.2 (09 Dec 2019 14:00), Max: 37.2 (08 Dec 2019 17:52)  T(F): 98.9 (09 Dec 2019 14:00), Max: 99 (08 Dec 2019 17:52)  HR: 104 (09 Dec 2019 15:26) (92 - 124)  BP: --  BP(mean): --  ABP: 106/66 (09 Dec 2019 15:00) (96/62 - 138/86)  ABP(mean): 80 (09 Dec 2019 15:00) (70 - 104)  RR: 18 (09 Dec 2019 15:26) (14 - 22)  SpO2: 96% (09 Dec 2019 15:26) (93% - 98%)    Adult Advanced Hemodynamics Last 24 Hrs  CVP(mm Hg): --  CVP(cm H2O): --  CO: --  CI: --  PA: --  PA(mean): --  PCWP: --  SVR: --  SVRI: --  PVR: --  PVRI: --, ABG - ( 09 Dec 2019 11:21 )  pH, Arterial: 7.49  pH, Blood: x     /  pCO2: 40    /  pO2: 99    / HCO3: 30    / Base Excess: 5.7   /  SaO2: 98                  Intake and output was reviewed and the fluid balance was calculated  Daily     Daily   I&O's Summary    08 Dec 2019 07:01  -  09 Dec 2019 07:00  --------------------------------------------------------  IN: 475 mL / OUT: 3005 mL / NET: -2530 mL    09 Dec 2019 07:01  -  09 Dec 2019 15:54  --------------------------------------------------------  IN: 49 mL / OUT: 495 mL / NET: -446 mL        All lines and drain sites were assessed  [facility]      POCT Blood Glucose.: 137 mg/dL (07 Dec 2019 21:56)    No acute change in mental status  Auscultation of the chest reveals equal bs  Abdomen is soft  Extremities are warm and well perfused  Wounds appear clean and unremarkable  Antibiotics are periop    labs  CBC Full  -  ( 09 Dec 2019 11:23 )  WBC Count : 10.17 K/uL  RBC Count : 3.06 M/uL  Hemoglobin : 8.6 g/dL  Hematocrit : 27.7 %  Platelet Count - Automated : 423 K/uL  Mean Cell Volume : 90.5 fl  Mean Cell Hemoglobin : 28.1 pg  Mean Cell Hemoglobin Concentration : 31.0 gm/dL  Auto Neutrophil # : x  Auto Lymphocyte # : x  Auto Monocyte # : x  Auto Eosinophil # : x  Auto Basophil # : x  Auto Neutrophil % : x  Auto Lymphocyte % : x  Auto Monocyte % : x  Auto Eosinophil % : x  Auto Basophil % : x    12-09    148<H>  |  109<H>  |  18  ----------------------------<  115<H>  4.5   |  28  |  0.98    Ca    8.1<L>      09 Dec 2019 11:23  Phos  3.3     12-09  Mg     2.1     12-09    TPro  5.3<L>  /  Alb  3.2<L>  /  TBili  0.5  /  DBili  x   /  AST  15  /  ALT  17  /  AlkPhos  59  12-09    PT/INR - ( 09 Dec 2019 11:23 )   PT: 20.2 sec;   INR: 1.76          PTT - ( 09 Dec 2019 11:23 )  PTT:29.8 sec  The current medications were reviewed   MEDICATIONS  (STANDING):  aMIOdarone    Tablet 200 milliGRAM(s) Oral daily  aspirin  chewable 81 milliGRAM(s) Oral daily  buDESOnide    Inhalation Suspension 0.5 milliGRAM(s) Inhalation every 12 hours  chlorhexidine 2% Cloths 1 Application(s) Topical daily  dextrose 5%. 1000 milliLiter(s) (40 mL/Hr) IV Continuous <Continuous>  dextrose 50% Injectable 50 milliLiter(s) IV Push every 15 minutes  diltiazem    Tablet 30 milliGRAM(s) Oral every 8 hours  diltiazem    Tablet 30 milliGRAM(s) Oral once  diVALproex  milliGRAM(s) Oral daily  heparin  Infusion 1200 Unit(s)/Hr (12 mL/Hr) IV Continuous <Continuous>  lidocaine   Patch 1 Patch Transdermal daily  pantoprazole  Injectable 40 milliGRAM(s) IV Push daily  sertraline 25 milliGRAM(s) Oral daily  sodium chloride 0.9%. 1000 milliLiter(s) (10 mL/Hr) IV Continuous <Continuous>  testosterone patch 4 mG/24 Hr(s) 4 milliGRAM(s) Transdermal daily    MEDICATIONS  (PRN):  bisacodyl Suppository 10 milliGRAM(s) Rectal daily PRN Constipation       PROBLEM LIST/ ASSESSMENT:  HEALTH ISSUES - PROBLEM Dx:    Leukocytosis, unspecified type: Leukocytosis, unspecified type  Hypernatremia: Hypernatremia  Bacterial pneumonia: Bacterial pneumonia  Fever, postprocedural: Fever, postprocedural  FELY (acute kidney injury): FELY (acute kidney injury)      ,   Patient is a 69y old  Male who presents with a chief complaint of Aortic dissection (09 Dec 2019 15:14)     s/p emergent Type A dissection repair;  acute changes include acute respiratory failure    My plan includes :  close hemodynamic, ventilatory and drain monitoring and management per post op routine    Monitor for arrhythmias and monitor parameters for organ perfusion  monitor neurologic status  Head of the bed should remain elevated to 45 deg .   chest PT and IS will be encouraged  monitor adequacy of oxygenation and ventilation and attempt to wean oxygen  Nutritional goals will be met using po eventually , ensure adequate caloric intake and montior the same  Stress ulcer and VTE prophylaxis will be achieved    Glycemic control is satisfactory  Electrolytes have been repleted as necessary and wound care has been carried out. Pain control has been achieved.   agressive physical therapy and early mobility and ambulation goals will be met   The family was updated about the course and plan  CRITICAL CARE TIME SPENT in evaluation and management, reassessments, review and interpretation of labs and x-rays, ventilator and hemodynamic management, formulating a plan and coordinating care: __55____ MIN.  Time does not include procedural time.  CTICU ATTENDING     					    Luis Loving MD

## 2019-12-09 NOTE — PROGRESS NOTE BEHAVIORAL HEALTH - SUMMARY
Patient is a 69 year old domiciled, , , English speaking male with a PMHx of colitis and prostate surgery and no PPHx.  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  Psychiatry consulted at the request of his family due to concerns regarding agitation and irritability.    Patient remained non-verbal during interview today.  No evidence of agitation noted, but per his medical team this is generally occurs when people try to touch/move him.  Patient does not appear delirious given no waxing/waning of symptoms and it’s possible his agitation/irritability is sequelae from his stroke or him responding to the changes in his health.     Recommendations  1. Neuro recs appreciated  1. Would recommend Depakote 125mg PO BID instead of 250mg daily for mood/impulsivity   2. Continue Zoloft 25mg daily  3. Would be hesitant to give Modafinil given his intermittent agitation.  4. C/L to continue to follow.

## 2019-12-09 NOTE — CONSULT NOTE ADULT - CONSULT REASON
AFIB
Aspiration pneumonia
FELY risk reduction perioperatively s/p emergent salvage surgical intervention for aortic dissection
aphonia
expressive aphasia

## 2019-12-09 NOTE — PROGRESS NOTE ADULT - NSHPATTENDINGPLANDISCUSS_GEN_ALL_CORE
Surgical staff
cardiothoracic staff
CTICU Attending
Dr Luis (anesthesia), Dr Reis (CTICU), ER attending physician, GLORIA Quintanilla, patient's wife.
medical staff
CT Surgery attending Dr Rizzo
CT Surgery team
CT Surgery team
CT staff
CTICU Attending
medical staff
CT Surgery team

## 2019-12-09 NOTE — PROGRESS NOTE ADULT - SUBJECTIVE AND OBJECTIVE BOX
CTICU  CRITICAL  CARE  attending     Hand off received 					   Pertinent clinical, laboratory, radiographic, hemodynamic, echocardiographic, respiratory data, microbiologic data and chart were reviewed and analyzed frequently throughout the course of the day and night    Patient seen and examined with CTS/ SH attending at bedside      69 years old male with H/O colitis. He started having pain in the anterior cervical area around  1:00 AM. Simultaneously he had lower back pain. He felt dizzy and diaphoretic. He felt weakness in both lower extremities. No loss of consciousness.  He was brought in to [facility] emergency room by an ambulance. His BP was 60 by palpation and appeared cyanotic.   CT angio  showed   1. 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  2. Moderate hemopericardium.  3. Dissection extending into the right brachiocephalic artery though the right carotid artery and right subclavian artery remain patent off true lumen.  4. Dissection involving the proximal left subclavian artery which remains patent more distally.  5. The left carotid artery comes off the true lumen of the arch    He was emergently taken to the operating room and cardiopulmonary bypass instituted. Blood evacuated from the pericardium.  Ascending aorta and aortic root were replaced and coronary buttons were reimplanted.    HEALTH ISSUES - PROBLEM Dx:  Adjustment disorder with disturbance of conduct  Leukocytosis, unspecified type: Leukocytosis, unspecified type  Hypernatremia: Hypernatremia  Bacterial pneumonia: Bacterial pneumonia  Fever, postprocedural: Fever, postprocedural  FELY (acute kidney injury): FELY (acute kidney injury)      FAMILY HISTORY:  PAST MEDICAL & SURGICAL HISTORY:  Benign prostatic hypertrophy without lower urinary tract symptoms: BPH (benign prostatic hypertrophy)  Ulcerative colitis: Ulcerative colitis  States following surgery of eye and adnexa: straightened right eye  Other postprocedural status: History of hernia repair  Hemorrhoids: Hemorrhoids  Acute appendicitis with generalized peritonitis: Ruptured appendix    Patient is a 69y old  Male who presents with a chief complaint of Aortic dissection (09 Dec 2019 15:53)     14 system review was unremarkable    Vital signs, hemodynamic and respiratory parameters were reviewed from the bedside nursing flow sheet.  ICU Vital Signs Last 24 Hrs  T(C): 36.9 (09 Dec 2019 21:15), Max: 37.2 (09 Dec 2019 14:00)  T(F): 98.4 (09 Dec 2019 21:15), Max: 98.9 (09 Dec 2019 14:00)  HR: 102 (09 Dec 2019 22:00) (92 - 124)  BP: --  BP(mean): --  ABP: 92/58 (09 Dec 2019 22:00) (90/52 - 138/86)  ABP(mean): 68 (09 Dec 2019 22:00) (64 - 104)  RR: 17 (09 Dec 2019 22:00) (13 - 20)  SpO2: 98% (09 Dec 2019 22:00) (93% - 98%)    Adult Advanced Hemodynamics Last 24 Hrs  CVP(mm Hg): --  CVP(cm H2O): --  CO: --  CI: --  PA: --  PA(mean): --  PCWP: --  SVR: --  SVRI: --  PVR: --  PVRI: --, ABG - ( 09 Dec 2019 11:21 )  pH, Arterial: 7.49  pH, Blood: x     /  pCO2: 40    /  pO2: 99    / HCO3: 30    / Base Excess: 5.7   /  SaO2: 98                  Intake and output was reviewed and the fluid balance was calculated  Daily     Daily   I&O's Summary    08 Dec 2019 07:01  -  09 Dec 2019 07:00  --------------------------------------------------------  IN: 475 mL / OUT: 3005 mL / NET: -2530 mL    09 Dec 2019 07:01  -  09 Dec 2019 22:28  --------------------------------------------------------  IN: 273 mL / OUT: 930 mL / NET: -657 mL        All lines and drain sites were assessed    Neuro: Expressive aphasia. Moves all 4 extremities. Follows commands.  Agitated off and on.  Neck: No JVD.  CVS: S1, S1, No S3.  Lungs: Good air entry bilaterally.  Abd: Soft. No tenderness. + Bowel sounds.  Vascular: + DP/PT.  Extremities: No edema.  Lymphatic: Normal.  Skin: No abnormalities.      labs  CBC Full  -  ( 09 Dec 2019 11:23 )  WBC Count : 10.17 K/uL  RBC Count : 3.06 M/uL  Hemoglobin : 8.6 g/dL  Hematocrit : 27.7 %  Platelet Count - Automated : 423 K/uL  Mean Cell Volume : 90.5 fl  Mean Cell Hemoglobin : 28.1 pg  Mean Cell Hemoglobin Concentration : 31.0 gm/dL  Auto Neutrophil # : x  Auto Lymphocyte # : x  Auto Monocyte # : x  Auto Eosinophil # : x  Auto Basophil # : x  Auto Neutrophil % : x  Auto Lymphocyte % : x  Auto Monocyte % : x  Auto Eosinophil % : x  Auto Basophil % : x    12-09    148<H>  |  109<H>  |  18  ----------------------------<  115<H>  4.5   |  28  |  0.98    Ca    8.1<L>      09 Dec 2019 11:23  Phos  3.3     12-09  Mg     2.1     12-09    TPro  5.3<L>  /  Alb  3.2<L>  /  TBili  0.5  /  DBili  x   /  AST  15  /  ALT  17  /  AlkPhos  59  12-09    PT/INR - ( 09 Dec 2019 21:11 )   PT: 23.2 sec;   INR: 2.01          PTT - ( 09 Dec 2019 21:11 )  PTT:54.4 sec  The current medications were reviewed   MEDICATIONS  (STANDING):  aMIOdarone    Tablet 200 milliGRAM(s) Oral daily  aspirin  chewable 81 milliGRAM(s) Oral daily  buDESOnide    Inhalation Suspension 0.5 milliGRAM(s) Inhalation every 12 hours  chlorhexidine 2% Cloths 1 Application(s) Topical daily  dextrose 5%. 1000 milliLiter(s) (40 mL/Hr) IV Continuous <Continuous>  dextrose 50% Injectable 50 milliLiter(s) IV Push every 15 minutes  diltiazem    Tablet 30 milliGRAM(s) Oral every 8 hours  diVALproex  milliGRAM(s) Oral daily  heparin  Infusion 1200 Unit(s)/Hr (12 mL/Hr) IV Continuous <Continuous>  lidocaine   Patch 1 Patch Transdermal daily  pantoprazole  Injectable 40 milliGRAM(s) IV Push daily  sertraline 25 milliGRAM(s) Oral daily  sodium chloride 0.9%. 1000 milliLiter(s) (10 mL/Hr) IV Continuous <Continuous>  testosterone patch 4 mG/24 Hr(s) 4 milliGRAM(s) Transdermal daily    MEDICATIONS  (PRN):  bisacodyl Suppository 10 milliGRAM(s) Rectal daily PRN Constipation         PROBLEM LIST/ ASSESSMENT:  HEALTH ISSUES - PROBLEM Dx:  Adjustment disorder with disturbance of conduct  Leukocytosis, unspecified type: Leukocytosis, unspecified type  Hypernatremia: Hypernatremia  Bacterial pneumonia: Bacterial pneumonia  Fever, postprocedural: Fever, postprocedural  FELY (acute kidney injury): FELY (acute kidney injury)    Patient is a 69y old  Male who presents with cardiogenic shock due to acute Type A aortic dissection with rupture and cardiac tamponade.  S/P Aortic Root Replacement  S/P BioBental and CABROL.  S/P replacement of ascending aorta and hemiarch.  Post operative course complicated by  renal insufficiency, Hematuria,  hypokalemia,  hypernatremia, klebsiella pneumonia, UE thrombosis, expressive aphasia.  Initial CT head negative for transcortical acute infarct or bleed. Bilateral frontal lobe infarcts.     Hemodynamically stable.  Good oxygenation.  Fair urine out put.  Overall doing well.      My plan includes :  Neuro Rehabilitation.   Statin and Betablocker.  Close hemodynamic, ventilatory and drain monitoring and management  Monitor for arrhythmias and monitor parameters for organ perfusion  Monitor neurologic status  Monitor renal function.  Head of the bed should remain elevated to 45 deg .   Chest PT and IS will be encouraged  Monitor adequacy of oxygenation and ventilation and attempt to wean oxygen  Nutritional goals will be met using po eventually , ensure adequate caloric intake and monitor the same  Stress ulcer and VTE prophylaxis will be achieved    Glycemic control is satisfactory  Electrolytes have been repleted as necessary and wound care has been carried out. Pain control has been achieved.   Aggressive physical therapy and early mobility and ambulation goals will be met   The family was updated about the course and plan  CRITICAL CARE TIME SPENT in evaluation and management, reassessments, review and interpretation of labs and x-rays, ventilator and hemodynamic management, formulating a plan and coordinating care: ___30____ MIN.  Time does not include procedural time.  CTICU ATTENDING     					    Giovanni Reis MD

## 2019-12-09 NOTE — CONSULT NOTE ADULT - SUBJECTIVE AND OBJECTIVE BOX
Electrophysiology Consult Note:     CHIEF COMPLAINT:  Patient is a 69y old  Male who presents with a chief complaint of Aortic dissection (09 Dec 2019 14:30)        HISTORY OF PRESENT ILLNESS:   69 year old male, with PMHx of colitis, prostate surgery @ [facility], presented with Type A aortic dissection, s/p emergent salvage AVR (bio) / root-hemiarch replacement. Postop course significant for AFIB RVR starting 11/29, non-focal neuro deficit (expressive aphasia) likely from hypoperfusion, pericardial effusion (s/p drainage) and pleural effusion (pending tap tonight).   He has been placed on Amio and dilt for rate control     PAST MEDICAL & SURGICAL HISTORY:  Benign prostatic hypertrophy without lower urinary tract symptoms: BPH (benign prostatic hypertrophy)  Ulcerative colitis: Ulcerative colitis  States following surgery of eye and adnexa: straightened right eye  Other postprocedural status: History of hernia repair  Hemorrhoids: Hemorrhoids  Acute appendicitis with generalized peritonitis: Ruptured appendix      FAMILY HISTORY:      SOCIAL HISTORY:    [ ] Non-smoker  [ ] Smoker  [ ] Alcohol  [ ] illicit drugs    Allergies    penicillin (Unknown)    Intolerances    	    MEDICATIONS:  MEDICATIONS  (STANDING):  aMIOdarone    Tablet 200 milliGRAM(s) Oral daily  aspirin  chewable 81 milliGRAM(s) Oral daily  buDESOnide    Inhalation Suspension 0.5 milliGRAM(s) Inhalation every 12 hours  chlorhexidine 2% Cloths 1 Application(s) Topical daily  dextrose 5%. 1000 milliLiter(s) (40 mL/Hr) IV Continuous <Continuous>  dextrose 50% Injectable 50 milliLiter(s) IV Push every 15 minutes  diltiazem    Tablet 30 milliGRAM(s) Oral every 8 hours  diltiazem    Tablet 30 milliGRAM(s) Oral once  diVALproex  milliGRAM(s) Oral daily  heparin  Infusion 1200 Unit(s)/Hr (12 mL/Hr) IV Continuous <Continuous>  lidocaine   Patch 1 Patch Transdermal daily  pantoprazole  Injectable 40 milliGRAM(s) IV Push daily  sertraline 25 milliGRAM(s) Oral daily  sodium chloride 0.9%. 1000 milliLiter(s) (10 mL/Hr) IV Continuous <Continuous>  testosterone patch 4 mG/24 Hr(s) 4 milliGRAM(s) Transdermal daily    MEDICATIONS  (PRN):  bisacodyl Suppository 10 milliGRAM(s) Rectal daily PRN Constipation        REVIEW OF SYSTEMS:  CONSTITUTIONAL: No fever, weight loss, or fatigue  EYES: No eye pain, visual disturbances, or discharge  ENMT:  No difficulty hearing, tinnitus, vertigo; No sinus or throat pain  NECK: No pain or stiffness  BREASTS: No pain, masses, or nipple discharge  RESPIRATORY: No cough, wheezing, chills or hemoptysis; No Shortness of Breath  CARDIOVASCULAR: No chest pain, palpitations, dizziness, or leg swelling  GASTROINTESTINAL: No abdominal or epigastric pain. No nausea, vomiting, or hematemesis; No diarrhea or constipation. No melena or hematochezia.  GENITOURINARY: No dysuria, frequency, hematuria, or incontinence  NEUROLOGICAL: No headaches, memory loss, loss of strength, numbness, or tremors  SKIN: No itching, burning, rashes, or lesions   LYMPH Nodes: No enlarged glands  ENDOCRINE: No heat or cold intolerance; No hair loss  MUSCULOSKELETAL: No joint pain or swelling; No muscle, back, or extremity pain  PSYCHIATRIC: No depression, anxiety, mood swings, or difficulty sleeping  HEME/LYMPH: No easy bruising, or bleeding gums  ALLERY AND IMMUNOLOGIC: No hives or eczema	      PHYSICAL EXAM:  Vital Signs Last 24 Hrs  T(C): 37.2 (09 Dec 2019 14:00), Max: 37.2 (08 Dec 2019 17:52)  T(F): 98.9 (09 Dec 2019 14:00), Max: 99 (08 Dec 2019 17:52)  HR: 112 (09 Dec 2019 15:00) (92 - 124)  BP: --  BP(mean): --  RR: 17 (09 Dec 2019 15:00) (14 - 22)  SpO2: 95% (09 Dec 2019 15:00) (93% - 98%)  Daily     Daily     Constitutional: NAD	  HEENT:   Normal oral mucosa, PERRL, EOMI	  Neck: No JVD  CVS: Normal S1 / S2, RRR, No murmurs  Pulm: CTA. No wheeze or rale  GI:  + BS, soft, NT / ND   Ext: No LE edema  Vascular: Peripheral pulses palpable 2+ bilaterally  MS: full range of motion in all joints  Neurologic: A&O x 3, Non-focal  Psych: Pleasant, has good insight  Skin: No rash or lesion       	  LABS:	                         8.6    10.17 )-----------( 423      ( 09 Dec 2019 11:23 )             27.7     12-09    148<H>  |  109<H>  |  18  ----------------------------<  115<H>  4.5   |  28  |  0.98    Ca    8.1<L>      09 Dec 2019 11:23  Phos  3.3     12-09  Mg     2.1     12-09    TPro  5.3<L>  /  Alb  3.2<L>  /  TBili  0.5  /  DBili  x   /  AST  15  /  ALT  17  /  AlkPhos  59  12-09    proBNP:   Lipid Profile:   HgA1c:   TSH: 	      EKG:   Telemetry:     Echo: Electrophysiology Consult Note:     CHIEF COMPLAINT:  Patient is a 69y old  Male who presents with a chief complaint of Aortic dissection (09 Dec 2019 14:30)      HISTORY OF PRESENT ILLNESS:   69 year old male, with PMHx of colitis, prostate surgery @ [facility], presented with Type A aortic dissection, s/p emergent salvage AVR (bio) / root-hemiarch replacement. Postop course significant for AFIB RVR starting , non-focal neuro deficit (expressive aphasia) likely from hypoperfusion, pericardial effusion (s/p drainage) and pleural effusion (pending tap tonight).   He has been placed on Amio and dilt for rate control. Heparin gtt was discontinued on  due to pericardial effusion.    EPS is called to manage AFIB with possible cardioversion.       PAST MEDICAL & SURGICAL HISTORY:  Benign prostatic hypertrophy without lower urinary tract symptoms: BPH (benign prostatic hypertrophy)  Ulcerative colitis: Ulcerative colitis  States following surgery of eye and adnexa: straightened right eye  Other postprocedural status: History of hernia repair  Hemorrhoids: Hemorrhoids  Acute appendicitis with generalized peritonitis: Ruptured appendix      FAMILY HISTORY: Unable to obtain (pt is aphasia)       SOCIAL HISTORY:    Unable to obtain (pt is aphasia)     Allergies:  penicillin (Unknown)    	    MEDICATIONS  (STANDING):  aMIOdarone    Tablet 200 milliGRAM(s) Oral daily  aspirin  chewable 81 milliGRAM(s) Oral daily  buDESOnide    Inhalation Suspension 0.5 milliGRAM(s) Inhalation every 12 hours  chlorhexidine 2% Cloths 1 Application(s) Topical daily  dextrose 5%. 1000 milliLiter(s) (40 mL/Hr) IV Continuous <Continuous>  dextrose 50% Injectable 50 milliLiter(s) IV Push every 15 minutes  diltiazem    Tablet 30 milliGRAM(s) Oral every 8 hours  diltiazem    Tablet 30 milliGRAM(s) Oral once  diVALproex  milliGRAM(s) Oral daily  heparin  Infusion 1200 Unit(s)/Hr (12 mL/Hr) IV Continuous <Continuous>  lidocaine   Patch 1 Patch Transdermal daily  pantoprazole  Injectable 40 milliGRAM(s) IV Push daily  sertraline 25 milliGRAM(s) Oral daily  sodium chloride 0.9%. 1000 milliLiter(s) (10 mL/Hr) IV Continuous <Continuous>  testosterone patch 4 mG/24 Hr(s) 4 milliGRAM(s) Transdermal daily    MEDICATIONS  (PRN):  bisacodyl Suppository 10 milliGRAM(s) Rectal daily PRN Constipation        REVIEW OF SYSTEMS:  CONSTITUTIONAL: No fever, weight loss, or fatigue  EYES: No eye pain, visual disturbances, or discharge  ENMT:  No difficulty hearing, tinnitus, vertigo; No sinus or throat pain  NECK: No pain or stiffness  BREASTS: No pain, masses, or nipple discharge  RESPIRATORY: No cough, wheezing, chills or hemoptysis; No Shortness of Breath  CARDIOVASCULAR: Some sternal wound pain Unaware of palpitations.   GASTROINTESTINAL: No abdominal or epigastric pain. No nausea, vomiting, or hematemesis; No diarrhea or constipation. No melena or hematochezia.  GENITOURINARY: No dysuria, frequency, hematuria, or incontinence  NEUROLOGICAL: Aphasia. Denies h/a, dizziness  SKIN: No itching, burning, rashes, or lesions   LYMPH Nodes: No enlarged glands  ENDOCRINE: No heat or cold intolerance; No hair loss  MUSCULOSKELETAL: No joint pain or swelling; No muscle, back, or extremity pain  PSYCHIATRIC: No depression, anxiety, mood swings, or difficulty sleeping  HEME/LYMPH: No easy bruising, or bleeding gums  ALLERY AND IMMUNOLOGIC: No hives or eczema	      PHYSICAL EXAM:  Vital Signs Last 24 Hrs  T(C): 37.2 (09 Dec 2019 14:00), Max: 37.2 (08 Dec 2019 17:52)  T(F): 98.9 (09 Dec 2019 14:00), Max: 99 (08 Dec 2019 17:52)  HR: 112 (09 Dec 2019 15:00) (92 - 124)  BP: 106/66   RR: 17 (09 Dec 2019 15:00) (14 - 22)  SpO2: 95% (09 Dec 2019 15:00) (93% - 98%)      Constitutional: NAD	  HEENT:   Normal oral mucosa, PERRL, EOMI	  Neck: No JVD  CVS: Normal S1 / S2, irregular, tachycardic, sternal wound I/C/d. External epicardial pacing wires in place  Pulm: Diminished BS bilaterally.   GI:  + BS, soft, NT / ND   Ext: 1+ LE edema b/l   Vascular: Peripheral pulses palpable 2+ bilaterally  Neurologic: strength 5/5 in arms and legs  Skin: No rash or lesion       	  LABS:	                         8.6    10.17 )-----------( 423      ( 09 Dec 2019 11:23 )             27.7         148<H>  |  109<H>  |  18  ----------------------------<  115<H>  4.5   |  28  |  0.98    Ca    8.1<L>      09 Dec 2019 11:23  Phos  3.3       Mg     2.1         TPro  5.3<L>  /  Alb  3.2<L>  /  TBili  0.5  /  DBili  x   /  AST  15  /  ALT  17  /  AlkPhos  59        EK19: NSR 69 bpm, , , Qtc 432.      Telemetry: AFIB HR 100s.      Echo: (19 @ 12:18)    1. Patient was tachycardic during the study.   2. Normal left and right ventricular size and function.   3. The posterior leaflet of the mitral valve is flail. Severe, anteriorly directed mitral regurgitation.   4. Status post cabrol procedure (bioprosthetic aortic valve and ascending aorta replacement).   5. Small pericardial effusion without echocardiographic evidence of cardiac tamponade physiology.   6. Compared to the previous TTE performed on 2019, the pericardial   effusion is smaller, post drainage.

## 2019-12-10 LAB
ALBUMIN SERPL ELPH-MCNC: 3 G/DL — LOW (ref 3.3–5)
ALP SERPL-CCNC: 56 U/L — SIGNIFICANT CHANGE UP (ref 40–120)
ALT FLD-CCNC: 15 U/L — SIGNIFICANT CHANGE UP (ref 10–45)
ANION GAP SERPL CALC-SCNC: 10 MMOL/L — SIGNIFICANT CHANGE UP (ref 5–17)
APTT BLD: 53.4 SEC — HIGH (ref 27.5–36.3)
APTT BLD: 60.8 SEC — HIGH (ref 27.5–36.3)
APTT BLD: 64.3 SEC — HIGH (ref 27.5–36.3)
AST SERPL-CCNC: 17 U/L — SIGNIFICANT CHANGE UP (ref 10–40)
BILIRUB SERPL-MCNC: 0.4 MG/DL — SIGNIFICANT CHANGE UP (ref 0.2–1.2)
BUN SERPL-MCNC: 24 MG/DL — HIGH (ref 7–23)
CALCIUM SERPL-MCNC: 8.4 MG/DL — SIGNIFICANT CHANGE UP (ref 8.4–10.5)
CHLORIDE SERPL-SCNC: 107 MMOL/L — SIGNIFICANT CHANGE UP (ref 96–108)
CO2 SERPL-SCNC: 28 MMOL/L — SIGNIFICANT CHANGE UP (ref 22–31)
CREAT SERPL-MCNC: 1 MG/DL — SIGNIFICANT CHANGE UP (ref 0.5–1.3)
GAS PNL BLDA: SIGNIFICANT CHANGE UP
GAS PNL BLDA: SIGNIFICANT CHANGE UP
GLUCOSE SERPL-MCNC: 115 MG/DL — HIGH (ref 70–99)
HCT VFR BLD CALC: 27.4 % — LOW (ref 39–50)
HCT VFR BLD CALC: 28.1 % — LOW (ref 39–50)
HGB BLD-MCNC: 8.4 G/DL — LOW (ref 13–17)
HGB BLD-MCNC: 8.6 G/DL — LOW (ref 13–17)
INR BLD: 2.03 — HIGH (ref 0.88–1.16)
INR BLD: 2.12 — HIGH (ref 0.88–1.16)
INR BLD: 2.16 — HIGH (ref 0.88–1.16)
MAGNESIUM SERPL-MCNC: 2.2 MG/DL — SIGNIFICANT CHANGE UP (ref 1.6–2.6)
MAGNESIUM SERPL-MCNC: 2.4 MG/DL — SIGNIFICANT CHANGE UP (ref 1.6–2.6)
MCHC RBC-ENTMCNC: 27.7 PG — SIGNIFICANT CHANGE UP (ref 27–34)
MCHC RBC-ENTMCNC: 27.8 PG — SIGNIFICANT CHANGE UP (ref 27–34)
MCHC RBC-ENTMCNC: 30.6 GM/DL — LOW (ref 32–36)
MCHC RBC-ENTMCNC: 30.7 GM/DL — LOW (ref 32–36)
MCV RBC AUTO: 90.6 FL — SIGNIFICANT CHANGE UP (ref 80–100)
MCV RBC AUTO: 90.7 FL — SIGNIFICANT CHANGE UP (ref 80–100)
NRBC # BLD: 0 /100 WBCS — SIGNIFICANT CHANGE UP (ref 0–0)
NRBC # BLD: 0 /100 WBCS — SIGNIFICANT CHANGE UP (ref 0–0)
PHOSPHATE SERPL-MCNC: 2.9 MG/DL — SIGNIFICANT CHANGE UP (ref 2.5–4.5)
PHOSPHATE SERPL-MCNC: 3.4 MG/DL — SIGNIFICANT CHANGE UP (ref 2.5–4.5)
PLATELET # BLD AUTO: 413 K/UL — HIGH (ref 150–400)
PLATELET # BLD AUTO: 459 K/UL — HIGH (ref 150–400)
POTASSIUM SERPL-MCNC: 4 MMOL/L — SIGNIFICANT CHANGE UP (ref 3.5–5.3)
POTASSIUM SERPL-SCNC: 4 MMOL/L — SIGNIFICANT CHANGE UP (ref 3.5–5.3)
PROT SERPL-MCNC: 5.1 G/DL — LOW (ref 6–8.3)
PROTHROM AB SERPL-ACNC: 23.4 SEC — HIGH (ref 10–12.9)
PROTHROM AB SERPL-ACNC: 24.5 SEC — HIGH (ref 10–12.9)
PROTHROM AB SERPL-ACNC: 25 SEC — HIGH (ref 10–12.9)
RBC # BLD: 3.02 M/UL — LOW (ref 4.2–5.8)
RBC # BLD: 3.1 M/UL — LOW (ref 4.2–5.8)
RBC # FLD: 15.1 % — HIGH (ref 10.3–14.5)
RBC # FLD: 15.3 % — HIGH (ref 10.3–14.5)
SODIUM SERPL-SCNC: 145 MMOL/L — SIGNIFICANT CHANGE UP (ref 135–145)
WBC # BLD: 8.85 K/UL — SIGNIFICANT CHANGE UP (ref 3.8–10.5)
WBC # BLD: 9.89 K/UL — SIGNIFICANT CHANGE UP (ref 3.8–10.5)
WBC # FLD AUTO: 8.85 K/UL — SIGNIFICANT CHANGE UP (ref 3.8–10.5)
WBC # FLD AUTO: 9.89 K/UL — SIGNIFICANT CHANGE UP (ref 3.8–10.5)

## 2019-12-10 PROCEDURE — 99291 CRITICAL CARE FIRST HOUR: CPT

## 2019-12-10 PROCEDURE — 99233 SBSQ HOSP IP/OBS HIGH 50: CPT

## 2019-12-10 PROCEDURE — 71045 X-RAY EXAM CHEST 1 VIEW: CPT | Mod: 26

## 2019-12-10 RX ORDER — PANTOPRAZOLE SODIUM 20 MG/1
40 TABLET, DELAYED RELEASE ORAL
Refills: 0 | Status: DISCONTINUED | OUTPATIENT
Start: 2019-12-10 | End: 2019-12-26

## 2019-12-10 RX ORDER — ALPRAZOLAM 0.25 MG
0.25 TABLET ORAL
Refills: 0 | Status: DISCONTINUED | OUTPATIENT
Start: 2019-12-10 | End: 2019-12-17

## 2019-12-10 RX ADMIN — Medication 4 MILLIGRAM(S): at 10:07

## 2019-12-10 RX ADMIN — CHLORHEXIDINE GLUCONATE 1 APPLICATION(S): 213 SOLUTION TOPICAL at 06:15

## 2019-12-10 RX ADMIN — AMIODARONE HYDROCHLORIDE 200 MILLIGRAM(S): 400 TABLET ORAL at 06:14

## 2019-12-10 RX ADMIN — DIVALPROEX SODIUM 250 MILLIGRAM(S): 500 TABLET, DELAYED RELEASE ORAL at 12:25

## 2019-12-10 RX ADMIN — Medication 81 MILLIGRAM(S): at 12:25

## 2019-12-10 RX ADMIN — LIDOCAINE 1 PATCH: 4 CREAM TOPICAL at 17:21

## 2019-12-10 RX ADMIN — Medication 4 MILLIGRAM(S): at 12:25

## 2019-12-10 RX ADMIN — Medication 30 MILLIGRAM(S): at 14:37

## 2019-12-10 RX ADMIN — LIDOCAINE 1 PATCH: 4 CREAM TOPICAL at 12:25

## 2019-12-10 RX ADMIN — Medication 0.25 MILLIGRAM(S): at 17:57

## 2019-12-10 RX ADMIN — Medication 0.25 MILLIGRAM(S): at 09:12

## 2019-12-10 RX ADMIN — Medication 30 MILLIGRAM(S): at 06:14

## 2019-12-10 RX ADMIN — Medication 4 MILLIGRAM(S): at 17:21

## 2019-12-10 RX ADMIN — Medication 0.5 MILLIGRAM(S): at 18:30

## 2019-12-10 RX ADMIN — Medication 4 MILLIGRAM(S): at 08:02

## 2019-12-10 RX ADMIN — Medication 0.5 MILLIGRAM(S): at 05:55

## 2019-12-10 RX ADMIN — SERTRALINE 25 MILLIGRAM(S): 25 TABLET, FILM COATED ORAL at 12:25

## 2019-12-10 RX ADMIN — PANTOPRAZOLE SODIUM 40 MILLIGRAM(S): 20 TABLET, DELAYED RELEASE ORAL at 12:24

## 2019-12-10 RX ADMIN — Medication 30 MILLIGRAM(S): at 22:04

## 2019-12-10 NOTE — SPEECH LANGUAGE PATHOLOGY EVALUATION - SLP ABLE TO FOLLOW COMMANDS
Pt inconsistently followed simple 1-step verbal directions. Despite cues and modeling, pt turned his head away from the therapist and avoided interaction.

## 2019-12-10 NOTE — PROGRESS NOTE ADULT - SUBJECTIVE AND OBJECTIVE BOX
CTICU  CRITICAL  CARE  attending     Hand off received 					   Pertinent clinical, laboratory, radiographic, hemodynamic, echocardiographic, respiratory data, microbiologic data and chart were reviewed and analyzed frequently throughout the course of the day and night    Patient seen and examined with CTS/ SH attending at bedside    69 years old male with H/O colitis. He started having pain in the anterior cervical area around  1:00 AM. Simultaneously he had lower back pain. He felt dizzy and diaphoretic. He felt weakness in both lower extremities. No loss of consciousness.  He was brought in to [facility] emergency room by an ambulance. His BP was 60 by palpation and appeared cyanotic.   CT angio  showed   1. 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  2. Moderate hemopericardium.  3. Dissection extending into the right brachiocephalic artery though the right carotid artery and right subclavian artery remain patent off true lumen.  4. Dissection involving the proximal left subclavian artery which remains patent more distally.  5. The left carotid artery comes off the true lumen of the arch    He was emergently taken to the operating room and cardiopulmonary bypass instituted. Blood evacuated from the pericardium.  Ascending aorta and aortic root were replaced and coronary buttons were reimplanted.    HEALTH ISSUES - PROBLEM Dx:  Adjustment disorder with disturbance of conduct  Leukocytosis, unspecified type: Leukocytosis, unspecified type  Hypernatremia: Hypernatremia  Bacterial pneumonia: Bacterial pneumonia  Fever, postprocedural: Fever, postprocedural  FELY (acute kidney injury): FELY (acute kidney injury)      FAMILY HISTORY:  PAST MEDICAL & SURGICAL HISTORY:  Benign prostatic hypertrophy without lower urinary tract symptoms: BPH (benign prostatic hypertrophy)  Ulcerative colitis: Ulcerative colitis  States following surgery of eye and adnexa: straightened right eye  Other postprocedural status: History of hernia repair  Hemorrhoids: Hemorrhoids  Acute appendicitis with generalized peritonitis: Ruptured appendix    Patient is a 69y old  Male who presents with a chief complaint of Aortic dissection (10 Dec 2019 12:19)     14 system review was unremarkable    Vital signs, hemodynamic and respiratory parameters were reviewed from the bedside nursing flow sheet.  ICU Vital Signs Last 24 Hrs  T(C): 36.2 (10 Dec 2019 22:14), Max: 36.8 (10 Dec 2019 14:06)  T(F): 97.2 (10 Dec 2019 22:14), Max: 98.2 (10 Dec 2019 14:06)  HR: 82 (10 Dec 2019 23:00) (78 - 112)  BP: 115/73 (10 Dec 2019 14:00) (115/73 - 115/73)  BP(mean): 95 (10 Dec 2019 14:00) (95 - 95)  ABP: 100/54 (10 Dec 2019 23:00) (92/52 - 130/74)  ABP(mean): 70 (10 Dec 2019 23:00) (64 - 96)  RR: 16 (10 Dec 2019 23:00) (7 - 19)  SpO2: 94% (10 Dec 2019 23:00) (92% - 99%)    Adult Advanced Hemodynamics Last 24 Hrs  CVP(mm Hg): --  CVP(cm H2O): --  CO: --  CI: --  PA: --  PA(mean): --  PCWP: --  SVR: --  SVRI: --  PVR: --  PVRI: --, ABG - ( 10 Dec 2019 15:17 )  pH, Arterial: 7.48  pH, Blood: x     /  pCO2: 32    /  pO2: 73    / HCO3: 23    / Base Excess: 0.4   /  SaO2: 94                  Intake and output was reviewed and the fluid balance was calculated  Daily     Daily   I&O's Summary    09 Dec 2019 07:01  -  10 Dec 2019 07:00  --------------------------------------------------------  IN: 409 mL / OUT: 1340 mL / NET: -931 mL    10 Dec 2019 07:01  -  10 Dec 2019 23:33  --------------------------------------------------------  IN: 602 mL / OUT: 750 mL / NET: -148 mL        All lines and drain sites were assessed      Neuro: Limited Expressive aphasia. Moves all 4 extremities. Follows commands.  Agitated off and on.  Neck: No JVD.  CVS: S1, S1, No S3.  Lungs: Good air entry bilaterally.  Abd: Soft. No tenderness. + Bowel sounds.  Vascular: + DP/PT.  Extremities: No edema.  Lymphatic: Normal.  Skin: No abnormalities.          labs  CBC Full  -  ( 10 Dec 2019 15:11 )  WBC Count : 9.89 K/uL  RBC Count : 3.10 M/uL  Hemoglobin : 8.6 g/dL  Hematocrit : 28.1 %  Platelet Count - Automated : 459 K/uL  Mean Cell Volume : 90.6 fl  Mean Cell Hemoglobin : 27.7 pg  Mean Cell Hemoglobin Concentration : 30.6 gm/dL  Auto Neutrophil # : x  Auto Lymphocyte # : x  Auto Monocyte # : x  Auto Eosinophil # : x  Auto Basophil # : x  Auto Neutrophil % : x  Auto Lymphocyte % : x  Auto Monocyte % : x  Auto Eosinophil % : x  Auto Basophil % : x    12-10    145  |  107  |  24<H>  ----------------------------<  115<H>  4.0   |  28  |  1.00    Ca    8.4      10 Dec 2019 02:47  Phos  2.9     12-10  Mg     2.4     12-10    TPro  5.1<L>  /  Alb  3.0<L>  /  TBili  0.4  /  DBili  x   /  AST  17  /  ALT  15  /  AlkPhos  56  12-10    PT/INR - ( 10 Dec 2019 15:11 )   PT: 24.5 sec;   INR: 2.12          PTT - ( 10 Dec 2019 15:11 )  PTT:53.4 sec  The current medications were reviewed   MEDICATIONS  (STANDING):  ALPRAZolam 0.25 milliGRAM(s) Oral two times a day  aMIOdarone    Tablet 200 milliGRAM(s) Oral daily  aspirin  chewable 81 milliGRAM(s) Oral daily  buDESOnide    Inhalation Suspension 0.5 milliGRAM(s) Inhalation every 12 hours  chlorhexidine 2% Cloths 1 Application(s) Topical daily  dextrose 5%. 1000 milliLiter(s) (40 mL/Hr) IV Continuous <Continuous>  dextrose 50% Injectable 50 milliLiter(s) IV Push every 15 minutes  diltiazem    Tablet 30 milliGRAM(s) Oral every 8 hours  diVALproex  milliGRAM(s) Oral daily  heparin  Infusion 1200 Unit(s)/Hr (12 mL/Hr) IV Continuous <Continuous>  lidocaine   Patch 1 Patch Transdermal daily  pantoprazole    Tablet 40 milliGRAM(s) Oral before breakfast  sertraline 25 milliGRAM(s) Oral daily  sodium chloride 0.9%. 1000 milliLiter(s) (10 mL/Hr) IV Continuous <Continuous>  testosterone patch 4 mG/24 Hr(s) 4 milliGRAM(s) Transdermal daily    MEDICATIONS  (PRN):  bisacodyl Suppository 10 milliGRAM(s) Rectal daily PRN Constipation        PROBLEM LIST/ ASSESSMENT:  HEALTH ISSUES - PROBLEM Dx:  Adjustment disorder with disturbance of conduct  Leukocytosis, unspecified type: Leukocytosis, unspecified type  Hypernatremia: Hypernatremia  Bacterial pneumonia: Bacterial pneumonia  Fever, postprocedural: Fever, postprocedural  FELY (acute kidney injury): FELY (acute kidney injury)        Patient is a 69y old  Male who presents with cardiogenic shock due to acute Type A aortic dissection with rupture and cardiac tamponade.  S/P Aortic Root Replacement  S/P BioBental and CABROL.  S/P replacement of ascending aorta and hemiarch.  Post operative course complicated by  renal insufficiency, Hematuria,  hypokalemia,  hypernatremia, klebsiella pneumonia, UE thrombosis, expressive aphasia.  Initial CT head negative for transcortical acute infarct or bleed. Bilateral frontal lobe infarcts.   Hemodynamically stable.  Metabolic alkaosis.  Good oxygenation.  Fair urine out put.  Overall doing well.      My plan includes :  Statin and Betablocker.  Close hemodynamic, ventilatory and drain monitoring and management  Monitor for arrhythmias and monitor parameters for organ perfusion  Monitor neurologic status  Monitor renal function.  Head of the bed should remain elevated to 45 deg .   Chest PT and IS will be encouraged  Monitor adequacy of oxygenation and ventilation and attempt to wean oxygen  Nutritional goals will be met using po eventually , ensure adequate caloric intake and monitor the same  Stress ulcer and VTE prophylaxis will be achieved    Glycemic control is satisfactory  Electrolytes have been repleted as necessary and wound care has been carried out. Pain control has been achieved.   Aggressive physical therapy and early mobility and ambulation goals will be met   The family was updated about the course and plan  CRITICAL CARE TIME SPENT in evaluation and management, reassessments, review and interpretation of labs and x-rays, ventilator and hemodynamic management, formulating a plan and coordinating care: ___30____ MIN.  Time does not include procedural time.  CTICU ATTENDING     					    Giovanni Reis MD

## 2019-12-10 NOTE — PROGRESS NOTE BEHAVIORAL HEALTH - OTHER
n/a, unable to walk unable to talk at time of exam; expressive aphasia difficult to assess given nonverbal, appears to have normal reasoning unable to assess unable to assess, unable to speak

## 2019-12-10 NOTE — PROGRESS NOTE ADULT - SUBJECTIVE AND OBJECTIVE BOX
INTERVAL HPI/OVERNIGHT EVENTS:    11/23: emergent salvage AVR(Bio)/root-hemiarch replacement  EF 60%    70yo Male with Hx Ulcerative colitis, BPH/prostate surgery presenting with back pain/neck pain and weakness  and profound weakness     found profoundly hypotensive on assessment - SBP 40's with cyanosis     CT scan: Type A aortic dissection  pressors started/intubated - CT surgery was called and emergently transferred - taken to OR    to OR 11/23:   intraop: 2.5 L Crystalloid/6 U pRBC/4 FFP/10 Cryo/1000 FEIBA  arrived on Epi/levo/vaso    atel on xray - bronch performed 11/24  LA normalized  and pressors titrate down     fever noted post-op - Cx sent - started presumptively on Meropenem 11/25. ID (Zlantanic)  ongoing diuresis - lasix infusion started - held this am     11/26 - off [place] and Juan M off  diuresis with improvement in Fi02 requirements - currently 40%  tolerated some early mobility     11/27: Doppler LUE: Thrombus in the left cephalic vein protruding into the subclavian vein. Remainder of subclavian vein is patent. Thrombus in the left basilic with overlying soft tissue swelling.    11/29 - atrial fib/RVR - Amiodarone IV transitioned to po load  heparin infusion started for Fib and LUE clot    new midline placed 11/29  had been in sinus - Afib returned 12/2 4p  Extubated to HFNC 12/1    some concern for expressive aphasia post extubation  CT Head 12/2: No acute intracranial hemorrhage or transcortical infarct. Gray-white matter hypodensities in the subcortical bilateral frontal lobes which may reflect age-indeterminate ischemia, infection, inflammation amongst other etiologies. No acute events reported overnight     Neuro assessment:  hypodensities in the subcortical bilateral frontal lobes. Stroke etiology likely hypoperfusion secondary to cardiac arrest given bilateral frontal watershed hypodensities and recent hospital course, less likely cardioembolic or thrombotic.     remains on heparin infusion (pTT 55.4)  ENT evaluated for aphonia - assessment revealed able to phonate when provoked; good VC movement on direct visualization     speech reportedly improved over course of day/night   remains in fib  passed formal S/S assessment - mechanical soft with honey thick fluids - diet changed accordingly to recommendations    ongoing expressive aphasia/apraxia    persistent Afib - difficult to rate control -   ECHO 12/6 demonstrating large pericardial effusion  to IR 12/6 - drain placed in IR - intubated in IR periprocedural  extubated 12/7 to HFNC and remains on HFNC - right pigtail placed with reported significant technical difficulty as patient was not cooperative by report    remains in sinus    no acute events reported overnight -    PMHx includes but is not limited to:   BPH   Ulcerative colitis: Ulcerative colitis  States following surgery of eye and adnexa: straightened right eye  Other postprocedural status: History of hernia repair  Hemorrhoids: Hemorrhoids  Acute appendicitis with generalized peritonitis: Ruptured appendix    ICU Vital Signs Last 24 Hrs  T(C): 36.6 (10 Dec 2019 09:05), Max: 37.2 (09 Dec 2019 14:00)  T(F): 97.8 (10 Dec 2019 09:05), Max: 98.9 (09 Dec 2019 14:00)  HR: 81 (10 Dec 2019 12:05) (80 - 113)  ABP: 108/62 (10 Dec 2019 12:00) (90/52 - 130/74)  ABP(mean): 78 (10 Dec 2019 12:00) (64 - 96)  SpO2: 98% (10 Dec 2019 12:05) (92% - 99%) HFNC 40/70    Qtts: Heparin 1200 U/Hr    I&O's Summary    09 Dec 2019 07:01  -  10 Dec 2019 07:00  --------------------------------------------------------  IN: 409 mL / OUT: 1340 mL / NET: -931 mL    10 Dec 2019 07:01  -  10 Dec 2019 12:20  --------------------------------------------------------  IN: 145 mL / OUT: 245 mL / NET: -100 mL    Physical Exam    Heart - irregular irregular (-)rub/gallop  Lungs - dec BS bases - no rhonchi/wheeze  Abd -(+)BS soft NTND (-)r/r/g  Ext - warm to touch; no cyanosis/clubbing  Chest - incision site clean and dry  Neuro - non-focal ; not speaking for me when asked questions directly - shakes head yes/no;   Skin - no rash       LABS:                        8.4    8.85  )-----------( 413      ( 10 Dec 2019 02:47 )             27.4     12-10    145  |  107  |  24<H>  ----------------------------<  115<H>  4.0   |  28  |  1.00    Ca    8.4      10 Dec 2019 02:47  Phos  3.4     12-10  Mg     2.2     12-10    TPro  5.1<L>  /  Alb  3.0<L>  /  TBili  0.4  /  DBili  x   /  AST  17  /  ALT  15  /  AlkPhos  56  12-10    PT/INR - ( 10 Dec 2019 09:20 )   PT: 25.0 sec;   INR: 2.16          PTT - ( 10 Dec 2019 09:20 )  PTT:64.3 sec    ABG - ( 10 Dec 2019 02:45 )  pH, Arterial: 7.51  pH, Blood: x     /  pCO2: 36    /  pO2: 111   / HCO3: 28    / Base Excess: 4.7   /  SaO2: 98        RADIOLOGY & ADDITIONAL STUDIES:  Patient s/p emergent/salvage aortic dissection repair - post-op from 11/23 with expressive aphasia/apraxia  symptoms    1. CV  prior Afib  - now in sinus  persistent with difficultly controlling rate - prompting ECHO  12/6 - IR drainage of pericardial effusion  ECHO 12/7: Pt tachycardic during the study. Normal LV/RV size and function. The posterior leaflet of the mitral valve is flail. Severe, anteriorly directed mitral regurgitation. s/p cabrol procedure. Small pericardial effusion without echocardiographic evidence of cardiac tamponade physiology.  drain out - patient continues to refuse thoracentesis -  system AC for afib AND UE clot noted on duplex    2. Pulm   recruitment maneuvers and serial bronchs performed prior   completed 10day Cx directed Abx - off Abx  remains on HFNC - transition back to NC   PT/OT ambulation   attempt ongoing ambulation as patient will allow    3. Renal   emergent type A repair  Cr 1.0  prior Hypernatremia - resolved 145  monitor UO/Lytes and Cr   CTa Abd 11/23: Right renal artery: Originates from the false lumen, diminished opacification. No occlusive thrombus versus dissection extending into the origin. Left renal artery: Originates from the true lumen and patent.    4. Neuro  post-op assessment - responsive and following simple commands, but ongoing expressive aphasia  CT Head reviewed and neuro following  non-focal - ongoing expressive aphasia/apraxia   speech therapy/OT ongoing  psych consulted to assist with depression and anxiety as suspect contributing to patients lack of desire tocooperate and hindering his progress    5. ID  Afebrile   ID following   meropenem started 11/25 - now D#7 - d/c - per d/w ID ([facility]) CTX for 3 more days (10days total)  sputum Cx 11/25 - polymicrobial - normal jazmín/klebsiella oxytoca -     PT/OT    DVT and GI prophylaxis      I have spent/provided stated minutes of critical care time to this patient: 60

## 2019-12-10 NOTE — SPEECH LANGUAGE PATHOLOGY EVALUATION - SLP DIAGNOSIS
Speech and language skills appear to be WFL. Higher level cognitive deficits may be present and should be assessed using standardized testing. Despite education and prompting, pt was non-participatory and disengaged at times. He preferred to write/use gestures to respond. However, when pt did verbalize, responses were appropriate and fluent. No dysarthria/apraxia was observed. Question behavioral/psych component?

## 2019-12-10 NOTE — PROGRESS NOTE ADULT - SUBJECTIVE AND OBJECTIVE BOX
EPS Progress Note    S: Refusing to verbalize but shakes head "no" to experiencing palpitations, lightheadedness and active c/p.    T(C): 36.6 (12-10-19 @ 09:05), Max: 37.2 (12-09-19 @ 14:00)  HR: 80 (12-10-19 @ 11:00) (80 - 113)  RR: 17 (12-10-19 @ 11:00) (13 - 19)  SpO2: 98% (12-10-19 @ 11:00) (92% - 99%)     Telemetry: Pt converted to SR at 5am today, now SR 80 bpm           Constitutional: NAD	  HEENT:   Normal oral mucosa, PERRL, EOMI	  Neck: No JVD  CVS: Normal S1 / S2, irregular, tachycardic, sternal wound I/C/d. External epicardial pacing wires in place  Pulm: Diminished BS bilaterally.   GI:  + BS, soft, NT / ND   Ext: 1+ LE edema b/l   Vascular: Peripheral pulses palpable 2+ bilaterally  Neurologic: strength 5/5 in arms and legs  Skin: No rash or lesion     MEDICATIONS  (STANDING):  ALPRAZolam 0.25 milliGRAM(s) Oral two times a day  aMIOdarone    Tablet 200 milliGRAM(s) Oral daily  aspirin  chewable 81 milliGRAM(s) Oral daily  buDESOnide    Inhalation Suspension 0.5 milliGRAM(s) Inhalation every 12 hours  chlorhexidine 2% Cloths 1 Application(s) Topical daily  dextrose 5%. 1000 milliLiter(s) (40 mL/Hr) IV Continuous <Continuous>  dextrose 50% Injectable 50 milliLiter(s) IV Push every 15 minutes  diltiazem    Tablet 30 milliGRAM(s) Oral every 8 hours  diVALproex  milliGRAM(s) Oral daily  heparin  Infusion 1200 Unit(s)/Hr (12 mL/Hr) IV Continuous <Continuous>  lidocaine   Patch 1 Patch Transdermal daily  pantoprazole    Tablet 40 milliGRAM(s) Oral before breakfast  sertraline 25 milliGRAM(s) Oral daily  sodium chloride 0.9%. 1000 milliLiter(s) (10 mL/Hr) IV Continuous <Continuous>  testosterone patch 4 mG/24 Hr(s) 4 milliGRAM(s) Transdermal daily    MEDICATIONS  (PRN):  bisacodyl Suppository 10 milliGRAM(s) Rectal daily PRN Constipation    LABS:                                          8.4    8.85  )-----------( 413      ( 10 Dec 2019 02:47 )             27.4     12-10    145  |  107  |  24<H>  ----------------------------<  115<H>  4.0   |  28  |  1.00    Ca    8.4      10 Dec 2019 02:47  Phos  3.4     12-10  Mg     2.2     12-10    TPro  5.1<L>  /  Alb  3.0<L>  /  TBili  0.4  /  DBili  x   /  AST  17  /  ALT  15  /  AlkPhos  56  12-10    PT/INR - ( 10 Dec 2019 09:20 )   PT: 25.0 sec;   INR: 2.16     PTT - ( 10 Dec 2019 09:20 )  PTT:64.3 sec    Thyroid Stimulating Hormone, Serum (11.25.19 @ 10:51)    Thyroid Stimulating Hormone, Serum: 0.859 uIU/mL

## 2019-12-10 NOTE — SPEECH LANGUAGE PATHOLOGY EVALUATION - COMMENTS
CT of the head on 12/2 revealed, "Gray-white matter hypodensities in the subcortical bilateral frontal lobes which may reflect age-indeterminate ischemia, infection, inflammation amongst other etiologies." Pt was typing on a document prior to SLP's arrival. Upon entering, pt was asked if his laptop could be moved in an effort to increase participation and decrease distractions. Pt shook his head no and did not allow the therapist to move his laptop. Throughout the evaluation, pt avoided eye contact, typed on a Word document, and was overall disengaged. Pt refused to verbalize during informal testing. However, when pt was given water following the assessment, verbal responses were appropriate, fluent, and without anomia. Pt explained, "This is horrible. It is thickened (Referring to the water." When SLP explained that it was regular water, without thickener, he said, "Well, it tastes like thickener."   Pt responded to all expressive language tasks using written responses. He labeled pictured items, Pt refused to verbalize during informal testing with the exception of a single automatic speech task. However, when pt was given water following the assessment, verbal responses were appropriate, fluent, and without anomia. Pt explained, "This is horrible. It is thickened (Referring to the water." When SLP explained that it was regular water, without thickener, he said, "Well, it tastes like thickener."   Pt responded to all expressive language tasks using written responses. He labeled pictured items, identified objects based on their verbal description, and responded to wh- questions with 100% accuracy. Pt was asked specifics about his job as well as biographical information including his address. Pt accurately described the Cookie Theft Picture using complete sentences. Unless prompted to write his responses, pt responded to questions using gestures and head nods. For example, when asked what you use to tell time, pt pointed to his wrist to indicate a wrist watch. Pt did not read anything out loud. However, he did write written responses to yes/no comprehension questions with 100% accuracy. Accuracy did, however, decrease when following written directions. Pt followed 1 of 4 written directions correctly. With the exception of 1 spelling error (coky/cookie), spelling was intact. Writing was legible and sentences were syntactically and semantically appropriate. WFL

## 2019-12-10 NOTE — PROGRESS NOTE BEHAVIORAL HEALTH - SUMMARY
Summary (include case differential, formulation and patient response to therapy): Patient is a 69 year old domiciled, , , English speaking male with a PMHx of colitis and prostate surgery and no PPHx.  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  Psychiatry consulted at the request of his family due to concerns regarding agitation and irritability, agitation slightly improved but now more problems with withdrawal from care and becoming nonparticipatory, affecting ability to go to rehab.     Patient remained non-verbal during interview today but communicated with nods and gesturing. He has had intermittent episodes of agitation when being moved which he is aware of and appears to be due to frustration with his new limitations, per patient his back pain during movement is also a factor. Does not appear to be due to delirium or confusion.     Recommendations: **INCOMPLETE, to be discussed**  1.   2.  3.  4.  5. Summary (include case differential, formulation and patient response to therapy): Patient is a 69 year old domiciled, , , English speaking male with a PMHx of colitis and prostate surgery and no PPHx.  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  Psychiatry consulted at the request of his family due to concerns regarding agitation and irritability, agitation slightly improved but now more problems with withdrawal from care and becoming nonparticipatory, affecting ability to go to rehab.     Patient remained non-verbal during interview today but communicated with nods and gesturing. He has had intermittent episodes of agitation when being moved which he is aware of and appears to be due to frustration with his new limitations, per patient his back pain during movement is also a factor. Does not appear to be due to delirium or confusion.     Recommendations: **INCOMPLETE, to be discussed**  1. Change depakote to 125mg bid instead of 250mg qd  2. Continue Zoloft 25mg daily  3. Limit Xanax if possible as this may increase agitation  4. Avoid any stimulants for treatment of depressive symptoms which are likely adjustment disorder. Would avoid given major cardiac risk factors. Patient participating with exam other than not verbalizing/writing. Appears motivated to go to rehab. Would continue to reinforce that he must participate to ensure rehab placement.   5. Avoid seroquel as patient has no dementia/delirium, is not confused. Summary (include case differential, formulation and patient response to therapy): Patient is a 69 year old domiciled, , , English speaking male with a PMHx of colitis and prostate surgery and no PPHx.  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  Psychiatry consulted at the request of his family due to concerns regarding agitation and irritability, agitation slightly improved but now more problems with withdrawal from care and becoming nonparticipatory, affecting ability to go to rehab.     Patient remained non-verbal during interview today but communicated with nods and gesturing. He has had intermittent episodes of agitation when being moved which he is aware of and appears to be due to frustration with his new limitations, per patient his back pain during movement is also a factor. Does not appear to be due to delirium or confusion.     Recommendations:   1. Change depakote to 125mg bid instead of 250mg qd  2. Continue Zoloft 25mg daily  3. Limit Xanax if possible as this may increase agitation  4. Avoid any stimulants for treatment of depressive symptoms which are likely adjustment disorder. Would avoid given major cardiac risk factors. Patient participating with exam other than not verbalizing/writing. Appears motivated to go to rehab. Would continue to reinforce that he must participate to ensure rehab placement.   5. Avoid seroquel as patient has no dementia/delirium, is not confused.    Discussed with Dr. Harrington, attending psychiatrist. We will continue to follow. Summary (include case differential, formulation and patient response to therapy): Patient is a 69 year old domiciled, , , English speaking male with a PMHx of colitis and prostate surgery and no PPHx.  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  Psychiatry consulted at the request of his family due to concerns regarding agitation and irritability, agitation slightly improved but now more problems with withdrawal from care and becoming nonparticipatory, affecting ability to go to rehab.     Patient remained non-verbal during interview today but communicated with nods and gesturing. He has had intermittent episodes of agitation when being moved which he is aware of and appears to be due to frustration with his new limitations, per patient his back pain during movement is also a factor. Does not appear to be due to delirium or confusion.     Recommendations:   1. Change depakote to 125mg bid instead of 250mg qd  2. Continue Zoloft 25mg daily  3. Limit Xanax if possible as this may increase agitation  4. Avoid any stimulants for treatment of depressive symptoms which are likely adjustment disorder. Would avoid given major cardiac risk factors. Patient participating with exam other than not verbalizing/writing. Appears motivated to go to rehab. Would continue to reinforce that he must participate to ensure rehab placement.   5. Would d/c testosterone patch which may be contributing to patient's increased aggression.   5. Avoid seroquel as patient has no dementia/delirium, is not confused.    Discussed with Dr. Harrington, attending psychiatrist. We will continue to follow.

## 2019-12-10 NOTE — PROGRESS NOTE ADULT - ASSESSMENT
69 year old male, presented with Type A aortic dissection, s/p emergent salvage AVR (bio) / root-hemiarch replacement. Postop course significant for AFIB RVR starting 11/29, non-focal neuro deficit (expressive aphasia) likely from hypoperfusion, pericardial effusion (s/p drainage) and pleural effusions. Heparin gtt was discontinued on 12/6/19 due to pericardial effusion that needed drainage. Heparin gtt has now resumed. He is also on Amio and Diltiazem for rate control. LVEF normal.     This morning at 5am, the patient spontaneously converted to SR. He is now in SR in 80s.   - Continue heparin gtt.   - Continue AMIO and dilt.    - EP will continue to follow.

## 2019-12-11 LAB
ALBUMIN SERPL ELPH-MCNC: 2.7 G/DL — LOW (ref 3.3–5)
ALP SERPL-CCNC: 58 U/L — SIGNIFICANT CHANGE UP (ref 40–120)
ALT FLD-CCNC: 20 U/L — SIGNIFICANT CHANGE UP (ref 10–45)
ANION GAP SERPL CALC-SCNC: 10 MMOL/L — SIGNIFICANT CHANGE UP (ref 5–17)
APTT BLD: 63.9 SEC — HIGH (ref 27.5–36.3)
AST SERPL-CCNC: 26 U/L — SIGNIFICANT CHANGE UP (ref 10–40)
BILIRUB SERPL-MCNC: 0.4 MG/DL — SIGNIFICANT CHANGE UP (ref 0.2–1.2)
BUN SERPL-MCNC: 23 MG/DL — SIGNIFICANT CHANGE UP (ref 7–23)
CALCIUM SERPL-MCNC: 8.5 MG/DL — SIGNIFICANT CHANGE UP (ref 8.4–10.5)
CHLORIDE SERPL-SCNC: 108 MMOL/L — SIGNIFICANT CHANGE UP (ref 96–108)
CO2 SERPL-SCNC: 26 MMOL/L — SIGNIFICANT CHANGE UP (ref 22–31)
CREAT SERPL-MCNC: 0.94 MG/DL — SIGNIFICANT CHANGE UP (ref 0.5–1.3)
CULTURE RESULTS: NO GROWTH — SIGNIFICANT CHANGE UP
GAS PNL BLDA: SIGNIFICANT CHANGE UP
GLUCOSE SERPL-MCNC: 114 MG/DL — HIGH (ref 70–99)
HCT VFR BLD CALC: 28.8 % — LOW (ref 39–50)
HGB BLD-MCNC: 8.8 G/DL — LOW (ref 13–17)
INR BLD: 2.12 — HIGH (ref 0.88–1.16)
MAGNESIUM SERPL-MCNC: 2.2 MG/DL — SIGNIFICANT CHANGE UP (ref 1.6–2.6)
MCHC RBC-ENTMCNC: 27.8 PG — SIGNIFICANT CHANGE UP (ref 27–34)
MCHC RBC-ENTMCNC: 30.6 GM/DL — LOW (ref 32–36)
MCV RBC AUTO: 90.9 FL — SIGNIFICANT CHANGE UP (ref 80–100)
NRBC # BLD: 0 /100 WBCS — SIGNIFICANT CHANGE UP (ref 0–0)
PHOSPHATE SERPL-MCNC: 2.9 MG/DL — SIGNIFICANT CHANGE UP (ref 2.5–4.5)
PLATELET # BLD AUTO: 418 K/UL — HIGH (ref 150–400)
POTASSIUM SERPL-MCNC: 3.9 MMOL/L — SIGNIFICANT CHANGE UP (ref 3.5–5.3)
POTASSIUM SERPL-SCNC: 3.9 MMOL/L — SIGNIFICANT CHANGE UP (ref 3.5–5.3)
PROT SERPL-MCNC: 5 G/DL — LOW (ref 6–8.3)
PROTHROM AB SERPL-ACNC: 24.5 SEC — HIGH (ref 10–12.9)
RBC # BLD: 3.17 M/UL — LOW (ref 4.2–5.8)
RBC # FLD: 15.1 % — HIGH (ref 10.3–14.5)
SODIUM SERPL-SCNC: 144 MMOL/L — SIGNIFICANT CHANGE UP (ref 135–145)
SPECIMEN SOURCE: SIGNIFICANT CHANGE UP
WBC # BLD: 9.04 K/UL — SIGNIFICANT CHANGE UP (ref 3.8–10.5)
WBC # FLD AUTO: 9.04 K/UL — SIGNIFICANT CHANGE UP (ref 3.8–10.5)

## 2019-12-11 PROCEDURE — 99233 SBSQ HOSP IP/OBS HIGH 50: CPT

## 2019-12-11 PROCEDURE — 99291 CRITICAL CARE FIRST HOUR: CPT

## 2019-12-11 PROCEDURE — 71045 X-RAY EXAM CHEST 1 VIEW: CPT | Mod: 26

## 2019-12-11 PROCEDURE — 74018 RADEX ABDOMEN 1 VIEW: CPT | Mod: 26

## 2019-12-11 RX ORDER — DIVALPROEX SODIUM 500 MG/1
250 TABLET, DELAYED RELEASE ORAL
Refills: 0 | Status: DISCONTINUED | OUTPATIENT
Start: 2019-12-11 | End: 2019-12-12

## 2019-12-11 RX ORDER — FUROSEMIDE 40 MG
20 TABLET ORAL ONCE
Refills: 0 | Status: COMPLETED | OUTPATIENT
Start: 2019-12-11 | End: 2019-12-11

## 2019-12-11 RX ORDER — FUROSEMIDE 40 MG
20 TABLET ORAL ONCE
Refills: 0 | Status: DISCONTINUED | OUTPATIENT
Start: 2019-12-11 | End: 2019-12-13

## 2019-12-11 RX ORDER — POLYETHYLENE GLYCOL 3350 17 G/17G
17 POWDER, FOR SOLUTION ORAL ONCE
Refills: 0 | Status: COMPLETED | OUTPATIENT
Start: 2019-12-11 | End: 2019-12-11

## 2019-12-11 RX ADMIN — Medication 4 MILLIGRAM(S): at 05:51

## 2019-12-11 RX ADMIN — Medication 20 MILLIGRAM(S): at 13:56

## 2019-12-11 RX ADMIN — LIDOCAINE 1 PATCH: 4 CREAM TOPICAL at 00:43

## 2019-12-11 RX ADMIN — Medication 30 MILLIGRAM(S): at 05:56

## 2019-12-11 RX ADMIN — DIVALPROEX SODIUM 250 MILLIGRAM(S): 500 TABLET, DELAYED RELEASE ORAL at 19:51

## 2019-12-11 RX ADMIN — LIDOCAINE 1 PATCH: 4 CREAM TOPICAL at 13:55

## 2019-12-11 RX ADMIN — Medication 0.25 MILLIGRAM(S): at 05:56

## 2019-12-11 RX ADMIN — Medication 10 MILLIGRAM(S): at 13:55

## 2019-12-11 RX ADMIN — POLYETHYLENE GLYCOL 3350 17 GRAM(S): 17 POWDER, FOR SOLUTION ORAL at 10:30

## 2019-12-11 RX ADMIN — AMIODARONE HYDROCHLORIDE 200 MILLIGRAM(S): 400 TABLET ORAL at 05:56

## 2019-12-11 RX ADMIN — CHLORHEXIDINE GLUCONATE 1 APPLICATION(S): 213 SOLUTION TOPICAL at 05:56

## 2019-12-11 RX ADMIN — Medication 30 MILLIGRAM(S): at 13:55

## 2019-12-11 RX ADMIN — SERTRALINE 25 MILLIGRAM(S): 25 TABLET, FILM COATED ORAL at 13:55

## 2019-12-11 RX ADMIN — Medication 0.5 MILLIGRAM(S): at 19:51

## 2019-12-11 RX ADMIN — Medication 0.25 MILLIGRAM(S): at 19:51

## 2019-12-11 RX ADMIN — LIDOCAINE 1 PATCH: 4 CREAM TOPICAL at 19:18

## 2019-12-11 RX ADMIN — Medication 81 MILLIGRAM(S): at 13:55

## 2019-12-11 RX ADMIN — Medication 30 MILLIGRAM(S): at 22:51

## 2019-12-11 RX ADMIN — Medication 4 MILLIGRAM(S): at 16:28

## 2019-12-11 RX ADMIN — Medication 0.5 MILLIGRAM(S): at 06:30

## 2019-12-11 NOTE — PROGRESS NOTE BEHAVIORAL HEALTH - OTHER
n/a, unable to walk unable to talk at time of exam; expressive aphasia difficult to assess given nonverbal, appears to have normal reasoning unable to assess unable to assess, unable to speak unable/unwilling to talk at time of exam; expressive aphasia vs abulia

## 2019-12-11 NOTE — PROGRESS NOTE ADULT - SUBJECTIVE AND OBJECTIVE BOX
CTICU  CRITICAL  CARE  attending     Hand off received 					   Pertinent clinical, laboratory, radiographic, hemodynamic, echocardiographic, respiratory data, microbiologic data and chart were reviewed and analyzed frequently throughout the course of the day and night    Patient seen and examined with CTS/ SH attending at bedside     69 years old male with H/O colitis. He started having pain in the anterior cervical area around  1:00 AM. Simultaneously he had lower back pain. He felt dizzy and diaphoretic. He felt weakness in both lower extremities. No loss of consciousness.  He was brought in to [facility] emergency room by an ambulance. His BP was 60 by palpation and appeared cyanotic.   CT angio  showed   1. 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  2. Moderate hemopericardium.  3. Dissection extending into the right brachiocephalic artery though the right carotid artery and right subclavian artery remain patent off true lumen.  4. Dissection involving the proximal left subclavian artery which remains patent more distally.  5. The left carotid artery comes off the true lumen of the arch    He was emergently taken to the operating room and cardiopulmonary bypass instituted. Blood evacuated from the pericardium.  Ascending aorta and aortic root were replaced and coronary buttons were reimplanted.    HEALTH ISSUES - PROBLEM Dx:  Adjustment disorder with disturbance of conduct  Leukocytosis, unspecified type: Leukocytosis, unspecified type  Hypernatremia: Hypernatremia  Bacterial pneumonia: Bacterial pneumonia  Fever, postprocedural: Fever, postprocedural  FELY (acute kidney injury): FELY (acute kidney injury)      , FAMILY HISTORY:  PAST MEDICAL & SURGICAL HISTORY:  Benign prostatic hypertrophy without lower urinary tract symptoms: BPH (benign prostatic hypertrophy)  Ulcerative colitis: Ulcerative colitis  States following surgery of eye and adnexa: straightened right eye  Other postprocedural status: History of hernia repair  Hemorrhoids: Hemorrhoids  Acute appendicitis with generalized peritonitis: Ruptured appendix    Patient is a 69y old  Male who presents with a chief complaint of Aortic dissection (11 Dec 2019 15:35)     14 system review was unremarkable    Vital signs, hemodynamic and respiratory parameters were reviewed from the bedside nursing flow sheet.  ICU Vital Signs Last 24 Hrs  T(C): 37.2 (11 Dec 2019 22:19), Max: 37.2 (11 Dec 2019 22:19)  T(F): 98.9 (11 Dec 2019 22:19), Max: 98.9 (11 Dec 2019 22:19)  HR: 80 (11 Dec 2019 23:00) (76 - 90)  BP: 126/81 (11 Dec 2019 21:00) (126/81 - 126/81)  BP(mean): 92 (11 Dec 2019 21:00) (92 - 92)  ABP: 130/70 (11 Dec 2019 23:00) (100/54 - 134/76)  ABP(mean): 92 (11 Dec 2019 23:00) (70 - 98)  RR: 17 (11 Dec 2019 23:00) (7 - 18)  SpO2: 94% (11 Dec 2019 23:00) (91% - 99%)    Adult Advanced Hemodynamics Last 24 Hrs  CVP(mm Hg): --  CVP(cm H2O): --  CO: --  CI: --  PA: --  PA(mean): --  PCWP: --  SVR: --  SVRI: --  PVR: --  PVRI: --, ABG - ( 11 Dec 2019 03:37 )  pH, Arterial: 7.48  pH, Blood: x     /  pCO2: 41    /  pO2: 71    / HCO3: 30    / Base Excess: 5.5   /  SaO2: 93                  Intake and output was reviewed and the fluid balance was calculated  Daily     Daily   I&O's Summary    10 Dec 2019 07:01  -  11 Dec 2019 07:00  --------------------------------------------------------  IN: 778 mL / OUT: 1190 mL / NET: -412 mL    11 Dec 2019 07:01  -  11 Dec 2019 23:57  --------------------------------------------------------  IN: 472 mL / OUT: 1550 mL / NET: -1078 mL        All lines and drain sites were assessed    Neuro: Follows commands. Moves all 4 extremities. He prefers talking to selective people otherwise  uses sign language.   Uses laptop computer well.   Neck: No JVD.  CVS: S1, S1, No S3.  Lungs: Good air entry bilaterally.   Abd: Soft. No tenderness. + Bowel sounds.  Vascular: + DP/PT.  Extremities: No edema.  Lymphatic: Normal.  Skin: No abnormalities.      labs  CBC Full  -  ( 11 Dec 2019 03:43 )  WBC Count : 9.04 K/uL  RBC Count : 3.17 M/uL  Hemoglobin : 8.8 g/dL  Hematocrit : 28.8 %  Platelet Count - Automated : 418 K/uL  Mean Cell Volume : 90.9 fl  Mean Cell Hemoglobin : 27.8 pg  Mean Cell Hemoglobin Concentration : 30.6 gm/dL  Auto Neutrophil # : x  Auto Lymphocyte # : x  Auto Monocyte # : x  Auto Eosinophil # : x  Auto Basophil # : x  Auto Neutrophil % : x  Auto Lymphocyte % : x  Auto Monocyte % : x  Auto Eosinophil % : x  Auto Basophil % : x    12-11    144  |  108  |  23  ----------------------------<  114<H>  3.9   |  26  |  0.94    Ca    8.5      11 Dec 2019 03:42  Phos  2.9     12-11  Mg     2.2     12-11    TPro  5.0<L>  /  Alb  2.7<L>  /  TBili  0.4  /  DBili  x   /  AST  26  /  ALT  20  /  AlkPhos  58  12-11    PT/INR - ( 11 Dec 2019 03:42 )   PT: 24.5 sec;   INR: 2.12          PTT - ( 11 Dec 2019 03:42 )  PTT:63.9 sec  The current medications were reviewed   MEDICATIONS  (STANDING):  ALPRAZolam 0.25 milliGRAM(s) Oral two times a day  aMIOdarone    Tablet 200 milliGRAM(s) Oral daily  aspirin  chewable 81 milliGRAM(s) Oral daily  buDESOnide    Inhalation Suspension 0.5 milliGRAM(s) Inhalation every 12 hours  chlorhexidine 2% Cloths 1 Application(s) Topical daily  dextrose 5%. 1000 milliLiter(s) (40 mL/Hr) IV Continuous <Continuous>  dextrose 50% Injectable 50 milliLiter(s) IV Push every 15 minutes  diltiazem    Tablet 30 milliGRAM(s) Oral every 8 hours  diVALproex  milliGRAM(s) Oral two times a day  furosemide    Tablet 20 milliGRAM(s) Oral once  heparin  Infusion 1200 Unit(s)/Hr (12 mL/Hr) IV Continuous <Continuous>  lidocaine   Patch 1 Patch Transdermal daily  pantoprazole    Tablet 40 milliGRAM(s) Oral before breakfast  saline laxative (FLEET) Rectal Enema 1 Enema Rectal once  sertraline 25 milliGRAM(s) Oral daily  sodium chloride 0.9%. 1000 milliLiter(s) (10 mL/Hr) IV Continuous <Continuous>    MEDICATIONS  (PRN):  bisacodyl Suppository 10 milliGRAM(s) Rectal daily PRN Constipation        PROBLEM LIST/ ASSESSMENT:  HEALTH ISSUES - PROBLEM Dx:  Adjustment disorder with disturbance of conduct  Leukocytosis, unspecified type: Leukocytosis, unspecified type  Hypernatremia: Hypernatremia  Bacterial pneumonia: Bacterial pneumonia  Fever, postprocedural: Fever, postprocedural  FELY (acute kidney injury): FELY (acute kidney injury)          Patient is a 69y old  Male who presents with cardiogenic shock due to acute Type A aortic dissection with rupture and cardiac tamponade.  S/P Aortic Root Replacement  S/P BioBental and CABROL.  S/P replacement of ascending aorta and hemiarch.  Post operative course complicated by  renal insufficiency, Hematuria,  hypokalemia,  hypernatremia, klebsiella pneumonia, UE thrombosis, expressive aphasia.  Initial CT head negative for transcortical acute infarct or bleed. Bilateral frontal lobe infarcts.   Hemodynamically stable.  Metabolic alkaosis.  Resolving ILEUS  Good oxygenation.  Fair urine out put.  Overall doing well.      My plan includes :  Repeat CT head.  IV anticoagulation.  D/C diltiazem.  Close hemodynamic, ventilatory and drain monitoring and management  Monitor for arrhythmias and monitor parameters for organ perfusion  Monitor neurologic status  Monitor renal function.  Head of the bed should remain elevated to 45 deg .   Chest PT and IS will be encouraged  Monitor  adequacy of oxygenation and ventilation and attempt to wean oxygen  Nutritional goals will be met using po eventually , ensure adequate caloric intake and monitor the same  Stress ulcer and VTE prophylaxis will be achieved    Glycemic control is satisfactory  Electrolytes have been repleted as necessary and wound care has been carried out. Pain control has been achieved.   Aggressive physical therapy and early mobility and ambulation goals will be met   The family was updated about the course and plan  CRITICAL CARE TIME SPENT in evaluation and management, reassessments, review and interpretation of labs and x-rays, ventilator and hemodynamic management, formulating a plan and coordinating care: ___30____ MIN.  Time does not include procedural time.  CTICU ATTENDING     					    Giovanni Reis MD

## 2019-12-11 NOTE — PROGRESS NOTE ADULT - SUBJECTIVE AND OBJECTIVE BOX
INTERVAL HPI/OVERNIGHT EVENTS:    11/23: emergent salvage AVR(Bio)/root-hemiarch replacement  EF 60%    68yo Male with Hx Ulcerative colitis, BPH/prostate surgery presenting with back pain/neck pain and weakness  and profound weakness     found profoundly hypotensive on assessment - SBP 40's with cyanosis     CT scan: Type A aortic dissection  pressors started/intubated - CT surgery was called and emergently transferred - taken to OR    to OR 11/23:   intraop: 2.5 L Crystalloid/6 U pRBC/4 FFP/10 Cryo/1000 FEIBA  arrived on Epi/levo/vaso    atel on xray - bronch performed 11/24  LA normalized  and pressors titrate down     fever noted post-op - Cx sent - started presumptively on Meropenem 11/25. ID (Zlantanic)  ongoing diuresis - lasix infusion started - held this am     11/26 - off ken[place] and Juan M off  diuresis with improvement in Fi02 requirements - currently 40%  tolerated some early mobility     11/27: Doppler LUE: Thrombus in the left cephalic vein protruding into the subclavian vein. Remainder of subclavian vein is patent. Thrombus in the left basilic with overlying soft tissue swelling.    11/29 - atrial fib/RVR - Amiodarone IV transitioned to po load  heparin infusion started for Fib and LUE clot    new midline placed 11/29  had been in sinus - Afib returned 12/2 4p  Extubated to HFNC 12/1    some concern for expressive aphasia post extubation  CT Head 12/2: No acute intracranial hemorrhage or transcortical infarct. Gray-white matter hypodensities in the subcortical bilateral frontal lobes which may reflect age-indeterminate ischemia, infection, inflammation amongst other etiologies. No acute events reported overnight     Neuro assessment:  hypodensities in the subcortical bilateral frontal lobes. Stroke etiology likely hypoperfusion secondary to cardiac arrest given bilateral frontal watershed hypodensities and recent hospital course, less likely cardioembolic or thrombotic.     remains on heparin infusion (pTT 55.4)  ENT evaluated for aphonia - assessment revealed able to phonate when provoked; good VC movement on direct visualization     speech reportedly improved over course of day/night   remains in fib  passed formal S/S assessment - mechanical soft with honey thick fluids - diet changed accordingly to recommendations    ongoing expressive aphasia/apraxia    persistent Afib - difficult to rate control -   ECHO 12/6 demonstrating large pericardial effusion  to IR 12/6 - drain placed in IR - intubated in IR periprocedural  extubated 12/7 to HFNC and remains on HFNC - right pigtail placed with reported significant technical difficulty as patient was not cooperative by report    ongoing belligerent behavior - refusing all care including medications intermittently/nursing care/getting OOB etc  remains in sinus    significant abd distention and discomfort to palpation - no BM in several days - KUB demonstrating large amount of stool    PMHx includes but is not limited to:   BPH   Ulcerative colitis: Ulcerative colitis  States following surgery of eye and adnexa: straightened right eye  Other postprocedural status: History of hernia repair  Hemorrhoids: Hemorrhoids  Acute appendicitis with generalized peritonitis: Ruptured appendix    ICU Vital Signs Last 24 Hrs  T(C): 36.8 (11 Dec 2019 14:00), Max: 36.8 (11 Dec 2019 14:00)  T(F): 98.3 (11 Dec 2019 14:00), Max: 98.3 (11 Dec 2019 14:00)  HR: 90 (11 Dec 2019 13:00) (76 - 90) sinus  ABP: 100/54 (11 Dec 2019 13:00) (92/52 - 126/64)  ABP(mean): 70 (11 Dec 2019 13:00) (66 - 90)  SpO2: 96% (11 Dec 2019 13:00) (91% - 99%) on 6L NC     Qtts: Heparin 1200 U/Hr    I&O's Summary    10 Dec 2019 07:01  -  11 Dec 2019 07:00  --------------------------------------------------------  IN: 778 mL / OUT: 1190 mL / NET: -412 mL    11 Dec 2019 07:01  -  11 Dec 2019 15:36  --------------------------------------------------------  IN: 110 mL / OUT: 240 mL / NET: -130 mL    Physical Exam    Heart regular (-)rub/gallop  Lungs - dec BS bases - no rhonchi/wheeze  Abd - (+)distended - tender to touch diffusely  Ext- warm to touch; no cyanosis/clubbing  Chest - incision site clean and dry  Neuro - alert/interactive - only speaks when wants to - speech clear ; non-focal  Skin - no rash     LABS:                        8.8    9.04  )-----------( 418      ( 11 Dec 2019 03:43 )             28.8     12-11    144  |  108  |  23  ----------------------------<  114<H>  3.9   |  26  |  0.94    Ca    8.5      11 Dec 2019 03:42  Phos  2.9     12-11  Mg     2.2     12-11    TPro  5.0<L>  /  Alb  2.7<L>  /  TBili  0.4  /  DBili  x   /  AST  26  /  ALT  20  /  AlkPhos  58  12-11    PT/INR - ( 11 Dec 2019 03:42 )   PT: 24.5 sec;   INR: 2.12     PTT - ( 11 Dec 2019 03:42 )  PTT:63.9 sec    ABG - ( 11 Dec 2019 03:37 ) 7.48/41/71/93    RADIOLOGY & ADDITIONAL STUDIES: reviewed     Patient s/p emergent/salvage aortic dissection repair - post-op from 11/23 with expressive aphasia/apraxia  symptoms completely withdrawing from care or any therapy to improve.     1. CV  prior Afib  - now in sinus  prior difficultly controlling rate - prompting ECHO  12/6 - IR drainage of pericardial effusion  ECHO 12/7: Pt tachycardic during the study. Normal LV/RV size and function. The posterior leaflet of the mitral valve is flail. Severe, anteriorly directed mitral regurgitation. s/p cabrol procedure. Small pericardial effusion without echocardiographic evidence of cardiac tamponade physiology.  drain out - patient continues to refuse thoracentesis -  [facility] AC for afib AND UE clot noted on duplex - to this point have held on transition to NOAC as patient should have thoracentesis for effusion - not agreeable to procedure and combative nature of any patient interaction makes procedure unsafe    2. Pulm   recruitment maneuvers and serial bronchs performed prior   completed 10day Cx directed Abx - off Abx  on HFNC - 6L - titrate supplemental oxygen down   PT/OT ambulation - but patient not willing to participate in care or gettting OOB/walking  attempt ongoing ambulation as patient will allow    3. Renal   emergent type A repair  Cr 0.94  prior Hypernatremia - resolved 144  monitor UO/Lytes and Cr   CTa Abd 11/23: Right renal artery: Originates from the false lumen, diminished opacification. No occlusive thrombus versus dissection extending into the origin. Left renal artery: Originates from the true lumen and patent.    4. Neuro/Psych  post-op assessment - responsive and following simple commands, but ongoing expressive aphasia  CT Head reviewed and neuro following  non-focal - ongoing expressive aphasia/apraxia   speech therapy/OT ongoing attempts - patient not willing to cooperate  psych consulted to assist with depression and anxiety as suspect contributing to patients lack of desire to cooperate and hindering his progress - clearly discussed with patient and wife     5. ID  Afebrile   ID following   meropenem started 11/25 - now D#7 - d/c - per d/w ID ([place]) CTX for 3 more days (10days total)  sputum Cx 11/25 - polymicrobial - normal jazmín/klebsiella oxytoca -     PT/OT    DVT and GI prophylaxis      I have spent/provided stated minutes of critical care time to this patient: 60 INTERVAL HPI/OVERNIGHT EVENTS:    11/23: emergent salvage AVR(Bio)/root-hemiarch replacement  EF 60%    68yo Male with Hx Ulcerative colitis, BPH/prostate surgery presenting with back pain/neck pain and weakness  and profound weakness     found profoundly hypotensive on assessment - SBP 40's with cyanosis     CT scan: Type A aortic dissection  pressors started/intubated - CT surgery was called and emergently transferred - taken to OR    to OR 11/23:   intraop: 2.5 L Crystalloid/6 U pRBC/4 FFP/10 Cryo/1000 FEIBA  arrived on Epi/levo/vaso    atel on xray - bronch performed 11/24  LA normalized  and pressors titrate down     fever noted post-op - Cx sent - started presumptively on Meropenem 11/25. ID (Zlantanic)  ongoing diuresis - lasix infusion started - held this am     11/26 - off ken[place] and Juan M off  diuresis with improvement in Fi02 requirements - currently 40%  tolerated some early mobility     11/27: Doppler LUE: Thrombus in the left cephalic vein protruding into the subclavian vein. Remainder of subclavian vein is patent. Thrombus in the left basilic with overlying soft tissue swelling.    11/29 - atrial fib/RVR - Amiodarone IV transitioned to po load  heparin infusion started for Fib and LUE clot    new midline placed 11/29  had been in sinus - Afib returned 12/2 4p  Extubated to HFNC 12/1    some concern for expressive aphasia post extubation  CT Head 12/2: No acute intracranial hemorrhage or transcortical infarct. Gray-white matter hypodensities in the subcortical bilateral frontal lobes which may reflect age-indeterminate ischemia, infection, inflammation amongst other etiologies. No acute events reported overnight     Neuro assessment:  hypodensities in the subcortical bilateral frontal lobes. Stroke etiology likely hypoperfusion secondary to cardiac arrest given bilateral frontal watershed hypodensities and recent hospital course, less likely cardioembolic or thrombotic.     remains on heparin infusion (pTT 55.4)  ENT evaluated for aphonia - assessment revealed able to phonate when provoked; good VC movement on direct visualization     speech reportedly improved over course of day/night   remains in fib  passed formal S/S assessment - mechanical soft with honey thick fluids - diet changed accordingly to recommendations    ongoing expressive aphasia/apraxia    persistent Afib - difficult to rate control -   ECHO 12/6 demonstrating large pericardial effusion  to IR 12/6 - drain placed in IR - intubated in IR periprocedural  extubated 12/7 to HFNC and remains on HFNC - right pigtail placed with reported significant technical difficulty as patient was not cooperative by report    ongoing belligerent behavior - refusing all care including medications intermittently/nursing care/getting OOB etc  remains in sinus    significant abd distention and discomfort to palpation - no BM in several days - KUB demonstrating large amount of stool    PMHx includes but is not limited to:   BPH   Ulcerative colitis: Ulcerative colitis  States following surgery of eye and adnexa: straightened right eye  Other postprocedural status: History of hernia repair  Hemorrhoids: Hemorrhoids  Acute appendicitis with generalized peritonitis: Ruptured appendix    ICU Vital Signs Last 24 Hrs  T(C): 36.8 (11 Dec 2019 14:00), Max: 36.8 (11 Dec 2019 14:00)  T(F): 98.3 (11 Dec 2019 14:00), Max: 98.3 (11 Dec 2019 14:00)  HR: 90 (11 Dec 2019 13:00) (76 - 90) sinus  ABP: 100/54 (11 Dec 2019 13:00) (92/52 - 126/64)  ABP(mean): 70 (11 Dec 2019 13:00) (66 - 90)  SpO2: 96% (11 Dec 2019 13:00) (91% - 99%) on 6L NC     Qtts: Heparin 1200 U/Hr    I&O's Summary    10 Dec 2019 07:01  -  11 Dec 2019 07:00  --------------------------------------------------------  IN: 778 mL / OUT: 1190 mL / NET: -412 mL    11 Dec 2019 07:01  -  11 Dec 2019 15:36  --------------------------------------------------------  IN: 110 mL / OUT: 240 mL / NET: -130 mL    Physical Exam    Heart regular (-)rub/gallop  Lungs - dec BS bases - no rhonchi/wheeze  Abd - (+)distended - tender to touch diffusely  Ext- warm to touch; no cyanosis/clubbing  Chest - incision site clean and dry  Neuro - alert/interactive - only speaks when wants to - speech clear ; non-focal  Skin - no rash     LABS:                        8.8    9.04  )-----------( 418      ( 11 Dec 2019 03:43 )             28.8     12-11    144  |  108  |  23  ----------------------------<  114<H>  3.9   |  26  |  0.94    Ca    8.5      11 Dec 2019 03:42  Phos  2.9     12-11  Mg     2.2     12-11    TPro  5.0<L>  /  Alb  2.7<L>  /  TBili  0.4  /  DBili  x   /  AST  26  /  ALT  20  /  AlkPhos  58  12-11    PT/INR - ( 11 Dec 2019 03:42 )   PT: 24.5 sec;   INR: 2.12     PTT - ( 11 Dec 2019 03:42 )  PTT:63.9 sec    ABG - ( 11 Dec 2019 03:37 ) 7.48/41/71/93    RADIOLOGY & ADDITIONAL STUDIES: reviewed     Patient s/p emergent/salvage aortic dissection repair - post-op from 11/23 with expressive aphasia/apraxia  symptoms completely withdrawing from care or any therapy to improve.     1. CV  prior Afib  - now in sinus  prior difficultly controlling rate - prompting ECHO  12/6 - IR drainage of pericardial effusion  ECHO 12/7: Pt tachycardic during the study. Normal LV/RV size and function. The posterior leaflet of the mitral valve is flail. Severe, anteriorly directed mitral regurgitation. s/p cabrol procedure. Small pericardial effusion without echocardiographic evidence of cardiac tamponade physiology.  drain out - patient continues to refuse thoracentesis -  [facility] AC for afib AND UE clot noted on duplex - to this point have held on transition to NOAC as patient should have thoracentesis for effusion - not agreeable to procedure and combative nature of any patient interaction makes procedure unsafe    2. Pulm   recruitment maneuvers and serial bronchs performed prior   completed 10day Cx directed Abx - off Abx  on HFNC - 6L - titrate supplemental oxygen down   PT/OT ambulation - but patient not willing to participate in care or gettting OOB/walking  attempt ongoing ambulation as patient will allow    3. Renal   emergent type A repair  Cr 0.94  prior Hypernatremia - resolved 144  monitor UO/Lytes and Cr   CTa Abd 11/23: Right renal artery: Originates from the false lumen, diminished opacification. No occlusive thrombus versus dissection extending into the origin. Left renal artery: Originates from the true lumen and patent.    4. Neuro/Psych  post-op assessment - responsive and following simple commands, but ongoing expressive aphasia  CT Head reviewed and neuro following  non-focal - ongoing expressive aphasia/apraxia   speech therapy/OT ongoing attempts - patient not willing to cooperate  psych consulted to assist with depression and anxiety as suspect contributing to patients lack of desire to cooperate and hindering his progress - clearly discussed with patient and wife - they demonstrate understanding    5. GI  constipation  suppository placed after significant conversation and coaxing of patient    PT/OT    DVT and GI prophylaxis    d/w patient/wife/psych/CTS    I have spent/provided stated minutes of critical care time to this patient: 60

## 2019-12-11 NOTE — CHART NOTE - NSCHARTNOTEFT_GEN_A_CORE
Admitting Diagnosis:   Patient is a 69y old  Male who presents with a chief complaint of Aortic dissection (10 Dec 2019 23:32)      PAST MEDICAL & SURGICAL HISTORY:  Benign prostatic hypertrophy without lower urinary tract symptoms: BPH (benign prostatic hypertrophy)  Ulcerative colitis: Ulcerative colitis  States following surgery of eye and adnexa: straightened right eye  Other postprocedural status: History of hernia repair  Hemorrhoids: Hemorrhoids  Acute appendicitis with generalized peritonitis: Ruptured appendix      Current Nutrition Order:   Mechanical Soft w/ Honey Thickened Liquids  DASH/TLC  CSTCHO  EnsureEnlive BID (700kcal, 40g pro)    PO Intake: Good (%) [   ]  Fair (50-75%) [   ] Poor (<25%) [  x ]  Suspect 25% of meals being consumed. Observed applesauce consumed on tray table. Pt shook his head no to drinking ONS. Was agreeable to trying other flavors.     GI Issues:   No apparent GI distress  Last BM 12/8    Pain:  No pain reported  Being medically managed    Skin Integrity:  MSI  beth score 14  Posterior midline skin tear    Labs:   12-11    144  |  108  |  23  ----------------------------<  114<H>  3.9   |  26  |  0.94    Ca    8.5      11 Dec 2019 03:42  Phos  2.9     12-11  Mg     2.2     12-11    TPro  5.0<L>  /  Alb  2.7<L>  /  TBili  0.4  /  DBili  x   /  AST  26  /  ALT  20  /  AlkPhos  58  12-11    CAPILLARY BLOOD GLUCOSE          Medications:  MEDICATIONS  (STANDING):  ALPRAZolam 0.25 milliGRAM(s) Oral two times a day  aMIOdarone    Tablet 200 milliGRAM(s) Oral daily  aspirin  chewable 81 milliGRAM(s) Oral daily  buDESOnide    Inhalation Suspension 0.5 milliGRAM(s) Inhalation every 12 hours  chlorhexidine 2% Cloths 1 Application(s) Topical daily  dextrose 5%. 1000 milliLiter(s) (40 mL/Hr) IV Continuous <Continuous>  dextrose 50% Injectable 50 milliLiter(s) IV Push every 15 minutes  diltiazem    Tablet 30 milliGRAM(s) Oral every 8 hours  diVALproex  milliGRAM(s) Oral two times a day  furosemide    Tablet 20 milliGRAM(s) Oral once  heparin  Infusion 1200 Unit(s)/Hr (12 mL/Hr) IV Continuous <Continuous>  lidocaine   Patch 1 Patch Transdermal daily  pantoprazole    Tablet 40 milliGRAM(s) Oral before breakfast  polyethylene glycol 3350 17 Gram(s) Oral once  saline laxative (FLEET) Rectal Enema 1 Enema Rectal once  sertraline 25 milliGRAM(s) Oral daily  sodium chloride 0.9%. 1000 milliLiter(s) (10 mL/Hr) IV Continuous <Continuous>    MEDICATIONS  (PRN):  bisacodyl Suppository 10 milliGRAM(s) Rectal daily PRN Constipation      Weight:   185lbs (11/23)  202lbs (12/11- [facility])  Height: 6'0", IBW 178lbs+/-10%, %%, BMI 25.1   Daily     Weight Change:   No known wt changes PTA per pt.    Please obtain updated weight for trending.     Estimated energy needs:   ABW (84.1kg) used for calculations as pt between % of IBW.   Nutrient needs based on [facility] standards of care for maintenance in older adults.   Needs adjusted for age, post-op healing, inflammatory state  2102-2523kcal/day (25-30kcal/kg)  101-118g pro/day (1.2-1.4g pro/kg)  Fluids per team 2/2 increased MAP requirements    Subjective:   68yo M w/ PMH ulcerative colitis, BIBA to [facility] w/ c/o back pain and profound hypotension. Was emergently taken to the OR for salvage Type A dissection repair. Pt now s/p aortic arch repair and ascending arch replacement, Cabrol reconstruction of coronary ostia, replacement of ascending aorta/maynor arch, and frozen elephant trunk (11/23). Nephrology consulted for FELY. Was successfully extubated 12/1. Post op course c/b expressive aphasia s/p extubation; concern for stroke etiology 2/2 hypoperfusion. 12/5 Pt was found to have pericardial effusion s/p IR drainage 12/6 requiring urgent reintubation for procedure- extubated 12/7 to [facility].  Pt continues to refuse thoracentesis for pericardial effusion. Pt seen in room, resting in bed. MAP 84 at time of assessment, not requiring pressors at this time, heparin gtt infusing. Per SLP recommendations, pt is ordered for a mechanical soft diet w/ honey thickened liquids. Consumed juice and a banana this am w/ feeding assistance from PCA, has not been drinking Ensures. D/w RN obtaining updated weight for trending 2/2 insufficient PO intake. No apparent GI distress, last BM 12/8. Continue w/ current diet order- honor pts food preferences, willing to try different flavors of ONS. Consider SLP re-eval to assess for possible diet liberalization. Please encourage Ensures at or between meals. Appreciate support provided at meal time. RD to follow.     Previous Nutrition Diagnosis:  increased nutrient needs RT increased demand for kcal/pro AEB s/p aortic arch repair and ascending arch replacement, Cabrol reconstruction of coronary ostia, replacement of ascending aorta/maynor arch, and frozen elephant trunk  Active [  x ]  Resolved [   ]    If resolved, new PES:     Goal:  Pt to consistently meet % of estimated needs PO     Recommendations:  1. Continue on current diet order  2. Consistency per SLP recs  3. Monitor for s/s intolerance, issues chewing/swallowing  4. Obtain updated weight.   5. Appreciate support and encouragement provided at meal time    Education:   Option of ONS. Modified consistencies. Increased needs post-op.    Risk Level: High [ x  ] Moderate [   ] Low [   ].

## 2019-12-12 LAB
ALBUMIN SERPL ELPH-MCNC: 2.9 G/DL — LOW (ref 3.3–5)
ALBUMIN SERPL ELPH-MCNC: 3 G/DL — LOW (ref 3.3–5)
ALP SERPL-CCNC: 57 U/L — SIGNIFICANT CHANGE UP (ref 40–120)
ALP SERPL-CCNC: 60 U/L — SIGNIFICANT CHANGE UP (ref 40–120)
ALT FLD-CCNC: 18 U/L — SIGNIFICANT CHANGE UP (ref 10–45)
ALT FLD-CCNC: 18 U/L — SIGNIFICANT CHANGE UP (ref 10–45)
ANION GAP SERPL CALC-SCNC: 11 MMOL/L — SIGNIFICANT CHANGE UP (ref 5–17)
ANION GAP SERPL CALC-SCNC: 12 MMOL/L — SIGNIFICANT CHANGE UP (ref 5–17)
APTT BLD: 31.5 SEC — SIGNIFICANT CHANGE UP (ref 27.5–36.3)
APTT BLD: 66 SEC — HIGH (ref 27.5–36.3)
AST SERPL-CCNC: 17 U/L — SIGNIFICANT CHANGE UP (ref 10–40)
AST SERPL-CCNC: 18 U/L — SIGNIFICANT CHANGE UP (ref 10–40)
BILIRUB SERPL-MCNC: 0.3 MG/DL — SIGNIFICANT CHANGE UP (ref 0.2–1.2)
BILIRUB SERPL-MCNC: 0.4 MG/DL — SIGNIFICANT CHANGE UP (ref 0.2–1.2)
BUN SERPL-MCNC: 18 MG/DL — SIGNIFICANT CHANGE UP (ref 7–23)
BUN SERPL-MCNC: 18 MG/DL — SIGNIFICANT CHANGE UP (ref 7–23)
CALCIUM SERPL-MCNC: 8.4 MG/DL — SIGNIFICANT CHANGE UP (ref 8.4–10.5)
CALCIUM SERPL-MCNC: 8.5 MG/DL — SIGNIFICANT CHANGE UP (ref 8.4–10.5)
CHLORIDE SERPL-SCNC: 102 MMOL/L — SIGNIFICANT CHANGE UP (ref 96–108)
CHLORIDE SERPL-SCNC: 102 MMOL/L — SIGNIFICANT CHANGE UP (ref 96–108)
CO2 SERPL-SCNC: 26 MMOL/L — SIGNIFICANT CHANGE UP (ref 22–31)
CO2 SERPL-SCNC: 29 MMOL/L — SIGNIFICANT CHANGE UP (ref 22–31)
CREAT SERPL-MCNC: 0.92 MG/DL — SIGNIFICANT CHANGE UP (ref 0.5–1.3)
CREAT SERPL-MCNC: 0.94 MG/DL — SIGNIFICANT CHANGE UP (ref 0.5–1.3)
GAS PNL BLDA: SIGNIFICANT CHANGE UP
GAS PNL BLDA: SIGNIFICANT CHANGE UP
GLUCOSE BLDC GLUCOMTR-MCNC: 132 MG/DL — HIGH (ref 70–99)
GLUCOSE SERPL-MCNC: 105 MG/DL — HIGH (ref 70–99)
GLUCOSE SERPL-MCNC: 135 MG/DL — HIGH (ref 70–99)
HCT VFR BLD CALC: 28.7 % — LOW (ref 39–50)
HCT VFR BLD CALC: 28.8 % — LOW (ref 39–50)
HGB BLD-MCNC: 8.8 G/DL — LOW (ref 13–17)
HGB BLD-MCNC: 8.9 G/DL — LOW (ref 13–17)
INR BLD: 2.22 — HIGH (ref 0.88–1.16)
INR BLD: 2.27 — HIGH (ref 0.88–1.16)
MAGNESIUM SERPL-MCNC: 2.1 MG/DL — SIGNIFICANT CHANGE UP (ref 1.6–2.6)
MAGNESIUM SERPL-MCNC: 2.2 MG/DL — SIGNIFICANT CHANGE UP (ref 1.6–2.6)
MCHC RBC-ENTMCNC: 27.4 PG — SIGNIFICANT CHANGE UP (ref 27–34)
MCHC RBC-ENTMCNC: 27.6 PG — SIGNIFICANT CHANGE UP (ref 27–34)
MCHC RBC-ENTMCNC: 30.7 GM/DL — LOW (ref 32–36)
MCHC RBC-ENTMCNC: 30.9 GM/DL — LOW (ref 32–36)
MCV RBC AUTO: 89.2 FL — SIGNIFICANT CHANGE UP (ref 80–100)
MCV RBC AUTO: 89.4 FL — SIGNIFICANT CHANGE UP (ref 80–100)
NRBC # BLD: 0 /100 WBCS — SIGNIFICANT CHANGE UP (ref 0–0)
NRBC # BLD: 0 /100 WBCS — SIGNIFICANT CHANGE UP (ref 0–0)
PHOSPHATE SERPL-MCNC: 2.6 MG/DL — SIGNIFICANT CHANGE UP (ref 2.5–4.5)
PHOSPHATE SERPL-MCNC: 3 MG/DL — SIGNIFICANT CHANGE UP (ref 2.5–4.5)
PLATELET # BLD AUTO: 372 K/UL — SIGNIFICANT CHANGE UP (ref 150–400)
PLATELET # BLD AUTO: 387 K/UL — SIGNIFICANT CHANGE UP (ref 150–400)
POTASSIUM SERPL-MCNC: 3.7 MMOL/L — SIGNIFICANT CHANGE UP (ref 3.5–5.3)
POTASSIUM SERPL-MCNC: 3.8 MMOL/L — SIGNIFICANT CHANGE UP (ref 3.5–5.3)
POTASSIUM SERPL-SCNC: 3.7 MMOL/L — SIGNIFICANT CHANGE UP (ref 3.5–5.3)
POTASSIUM SERPL-SCNC: 3.8 MMOL/L — SIGNIFICANT CHANGE UP (ref 3.5–5.3)
PROT SERPL-MCNC: 5.1 G/DL — LOW (ref 6–8.3)
PROT SERPL-MCNC: 5.2 G/DL — LOW (ref 6–8.3)
PROTHROM AB SERPL-ACNC: 25.7 SEC — HIGH (ref 10–12.9)
PROTHROM AB SERPL-ACNC: 26.3 SEC — HIGH (ref 10–12.9)
RBC # BLD: 3.21 M/UL — LOW (ref 4.2–5.8)
RBC # BLD: 3.23 M/UL — LOW (ref 4.2–5.8)
RBC # FLD: 14.8 % — HIGH (ref 10.3–14.5)
RBC # FLD: 14.8 % — HIGH (ref 10.3–14.5)
SODIUM SERPL-SCNC: 140 MMOL/L — SIGNIFICANT CHANGE UP (ref 135–145)
SODIUM SERPL-SCNC: 142 MMOL/L — SIGNIFICANT CHANGE UP (ref 135–145)
WBC # BLD: 8.63 K/UL — SIGNIFICANT CHANGE UP (ref 3.8–10.5)
WBC # BLD: 9.71 K/UL — SIGNIFICANT CHANGE UP (ref 3.8–10.5)
WBC # FLD AUTO: 8.63 K/UL — SIGNIFICANT CHANGE UP (ref 3.8–10.5)
WBC # FLD AUTO: 9.71 K/UL — SIGNIFICANT CHANGE UP (ref 3.8–10.5)

## 2019-12-12 PROCEDURE — 99233 SBSQ HOSP IP/OBS HIGH 50: CPT

## 2019-12-12 PROCEDURE — 99291 CRITICAL CARE FIRST HOUR: CPT

## 2019-12-12 PROCEDURE — 71045 X-RAY EXAM CHEST 1 VIEW: CPT | Mod: 26

## 2019-12-12 RX ORDER — ACETAMINOPHEN 500 MG
1000 TABLET ORAL ONCE
Refills: 0 | Status: COMPLETED | OUTPATIENT
Start: 2019-12-12 | End: 2019-12-14

## 2019-12-12 RX ORDER — POTASSIUM CHLORIDE 20 MEQ
20 PACKET (EA) ORAL ONCE
Refills: 0 | Status: COMPLETED | OUTPATIENT
Start: 2019-12-12 | End: 2019-12-12

## 2019-12-12 RX ORDER — SERTRALINE 25 MG/1
50 TABLET, FILM COATED ORAL DAILY
Refills: 0 | Status: DISCONTINUED | OUTPATIENT
Start: 2019-12-12 | End: 2019-12-26

## 2019-12-12 RX ORDER — DIVALPROEX SODIUM 500 MG/1
500 TABLET, DELAYED RELEASE ORAL
Refills: 0 | Status: DISCONTINUED | OUTPATIENT
Start: 2019-12-12 | End: 2019-12-12

## 2019-12-12 RX ORDER — DIVALPROEX SODIUM 500 MG/1
250 TABLET, DELAYED RELEASE ORAL EVERY 12 HOURS
Refills: 0 | Status: DISCONTINUED | OUTPATIENT
Start: 2019-12-12 | End: 2019-12-14

## 2019-12-12 RX ADMIN — Medication 0.5 MILLIGRAM(S): at 06:17

## 2019-12-12 RX ADMIN — SERTRALINE 50 MILLIGRAM(S): 25 TABLET, FILM COATED ORAL at 21:58

## 2019-12-12 RX ADMIN — Medication 0.25 MILLIGRAM(S): at 19:38

## 2019-12-12 RX ADMIN — Medication 30 MILLIGRAM(S): at 13:36

## 2019-12-12 RX ADMIN — DIVALPROEX SODIUM 500 MILLIGRAM(S): 500 TABLET, DELAYED RELEASE ORAL at 21:59

## 2019-12-12 RX ADMIN — SERTRALINE 25 MILLIGRAM(S): 25 TABLET, FILM COATED ORAL at 12:34

## 2019-12-12 RX ADMIN — Medication 30 MILLIGRAM(S): at 21:59

## 2019-12-12 RX ADMIN — Medication 0.25 MILLIGRAM(S): at 07:26

## 2019-12-12 RX ADMIN — CHLORHEXIDINE GLUCONATE 1 APPLICATION(S): 213 SOLUTION TOPICAL at 06:00

## 2019-12-12 RX ADMIN — DIVALPROEX SODIUM 250 MILLIGRAM(S): 500 TABLET, DELAYED RELEASE ORAL at 07:25

## 2019-12-12 RX ADMIN — AMIODARONE HYDROCHLORIDE 200 MILLIGRAM(S): 400 TABLET ORAL at 07:26

## 2019-12-12 RX ADMIN — LIDOCAINE 1 PATCH: 4 CREAM TOPICAL at 13:00

## 2019-12-12 RX ADMIN — DIVALPROEX SODIUM 250 MILLIGRAM(S): 500 TABLET, DELAYED RELEASE ORAL at 19:38

## 2019-12-12 RX ADMIN — LIDOCAINE 1 PATCH: 4 CREAM TOPICAL at 19:39

## 2019-12-12 RX ADMIN — PANTOPRAZOLE SODIUM 40 MILLIGRAM(S): 20 TABLET, DELAYED RELEASE ORAL at 07:25

## 2019-12-12 RX ADMIN — Medication 100 MILLIEQUIVALENT(S): at 05:00

## 2019-12-12 RX ADMIN — Medication 81 MILLIGRAM(S): at 12:34

## 2019-12-12 RX ADMIN — LIDOCAINE 1 PATCH: 4 CREAM TOPICAL at 12:34

## 2019-12-12 RX ADMIN — Medication 0.5 MILLIGRAM(S): at 17:31

## 2019-12-12 RX ADMIN — Medication 30 MILLIGRAM(S): at 07:25

## 2019-12-12 RX ADMIN — Medication 100 MILLIEQUIVALENT(S): at 21:59

## 2019-12-12 NOTE — PROGRESS NOTE ADULT - SUBJECTIVE AND OBJECTIVE BOX
CTICU  CRITICAL  CARE  attending     Hand off received 					   Pertinent clinical, laboratory, radiographic, hemodynamic, echocardiographic, respiratory data, microbiologic data and chart were reviewed and analyzed frequently throughout the course of the day and night    Patient seen and examined with CTS/ SH attending at bedside    69 years old male with H/O colitis. He started having pain in the anterior cervical area around  1:00 AM. Simultaneously he had lower back pain. He felt dizzy and diaphoretic. He felt weakness in both lower extremities. No loss of consciousness.  He was brought in to [facility] emergency room by an ambulance. His BP was 60 by palpation and appeared cyanotic.   CT angio  showed   1. 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  2. Moderate hemopericardium.  3. Dissection extending into the right brachiocephalic artery though the right carotid artery and right subclavian artery remain patent off true lumen.  4. Dissection involving the proximal left subclavian artery which remains patent more distally.  5. The left carotid artery comes off the true lumen of the arch    He was emergently taken to the operating room and cardiopulmonary bypass instituted. Blood evacuated from the pericardium.  Ascending aorta and aortic root were replaced and coronary buttons were reimplanted.      HEALTH ISSUES - PROBLEM Dx:  Adjustment disorder with disturbance of conduct  Leukocytosis, unspecified type: Leukocytosis, unspecified type  Hypernatremia: Hypernatremia  Bacterial pneumonia: Bacterial pneumonia  Fever, postprocedural: Fever, postprocedural  FELY (acute kidney injury): FELY (acute kidney injury)      FAMILY HISTORY:  PAST MEDICAL & SURGICAL HISTORY:  Benign prostatic hypertrophy without lower urinary tract symptoms: BPH (benign prostatic hypertrophy)  Ulcerative colitis: Ulcerative colitis  States following surgery of eye and adnexa: straightened right eye  Other postprocedural status: History of hernia repair  Hemorrhoids: Hemorrhoids  Acute appendicitis with generalized peritonitis: Ruptured appendix        14 system review was unremarkable    Vital signs, hemodynamic and respiratory parameters were reviewed from the bedside nursing flow sheet.  ICU Vital Signs Last 24 Hrs  T(C): 36.7 (12 Dec 2019 17:55), Max: 37.2 (12 Dec 2019 14:10)  T(F): 98.1 (12 Dec 2019 17:55), Max: 98.9 (12 Dec 2019 14:10)  HR: 84 (12 Dec 2019 22:00) (76 - 86)  BP: --  BP(mean): --  ABP: 112/66 (12 Dec 2019 22:00) (100/54 - 142/72)  ABP(mean): 84 (12 Dec 2019 22:00) (70 - 98)  RR: 17 (12 Dec 2019 22:00) (8 - 21)  SpO2: 94% (12 Dec 2019 22:00) (91% - 99%)    Adult Advanced Hemodynamics Last 24 Hrs  CVP(mm Hg): --  CVP(cm H2O): --  CO: --  CI: --  PA: --  PA(mean): --  PCWP: --  SVR: --  SVRI: --  PVR: --  PVRI: --, ABG - ( 12 Dec 2019 11:07 )  pH, Arterial: 7.50  pH, Blood: x     /  pCO2: 37    /  pO2: 85    / HCO3: 28    / Base Excess: 4.8   /  SaO2: 97                  Intake and output was reviewed and the fluid balance was calculated  Daily     Daily   I&O's Summary    11 Dec 2019 07:01  -  12 Dec 2019 07:00  --------------------------------------------------------  IN: 648 mL / OUT: 2440 mL / NET: -1792 mL    12 Dec 2019 07:01  -  12 Dec 2019 22:32  --------------------------------------------------------  IN: 537 mL / OUT: 795 mL / NET: -258 mL        Neuro: Follows commands. Moves all 4 extremities. He prefers talking to selective people otherwise  uses sign language.   Uses laptop computer well. Writing skills relatively preserved.  Neck: No JVD.  CVS: S1, S1, No S3.  Lungs: Good air entry bilaterally.   Abd: Soft. No tenderness. + Bowel sounds.  Vascular: + DP/PT.  Extremities: No edema.  Lymphatic: Normal.  Skin: No abnormalities.        labs  CBC Full  -  ( 12 Dec 2019 11:15 )  WBC Count : 8.63 K/uL  RBC Count : 3.21 M/uL  Hemoglobin : 8.8 g/dL  Hematocrit : 28.7 %  Platelet Count - Automated : 372 K/uL  Mean Cell Volume : 89.4 fl  Mean Cell Hemoglobin : 27.4 pg  Mean Cell Hemoglobin Concentration : 30.7 gm/dL  Auto Neutrophil # : x  Auto Lymphocyte # : x  Auto Monocyte # : x  Auto Eosinophil # : x  Auto Basophil # : x  Auto Neutrophil % : x  Auto Lymphocyte % : x  Auto Monocyte % : x  Auto Eosinophil % : x  Auto Basophil % : x    12-12    140  |  102  |  18  ----------------------------<  135<H>  3.8   |  26  |  0.92    Ca    8.5      12 Dec 2019 11:15  Phos  2.6     12-12  Mg     2.1     12-12    TPro  5.2<L>  /  Alb  2.9<L>  /  TBili  0.3  /  DBili  x   /  AST  17  /  ALT  18  /  AlkPhos  60  12-12    PT/INR - ( 12 Dec 2019 11:15 )   PT: 25.7 sec;   INR: 2.22          PTT - ( 12 Dec 2019 11:15 )  PTT:31.5 sec  The current medications were reviewed   MEDICATIONS  (STANDING):  acetaminophen  IVPB .. 1000 milliGRAM(s) IV Intermittent once  ALPRAZolam 0.25 milliGRAM(s) Oral two times a day  aMIOdarone    Tablet 200 milliGRAM(s) Oral daily  aspirin  chewable 81 milliGRAM(s) Oral daily  buDESOnide    Inhalation Suspension 0.5 milliGRAM(s) Inhalation every 12 hours  chlorhexidine 2% Cloths 1 Application(s) Topical daily  dextrose 5%. 1000 milliLiter(s) (40 mL/Hr) IV Continuous <Continuous>  dextrose 50% Injectable 50 milliLiter(s) IV Push every 15 minutes  diltiazem    Tablet 30 milliGRAM(s) Oral every 8 hours  diVALproex  milliGRAM(s) Oral two times a day  furosemide    Tablet 20 milliGRAM(s) Oral once  heparin  Infusion 1200 Unit(s)/Hr (12 mL/Hr) IV Continuous <Continuous>  lidocaine   Patch 1 Patch Transdermal daily  pantoprazole    Tablet 40 milliGRAM(s) Oral before breakfast  saline laxative (FLEET) Rectal Enema 1 Enema Rectal once  sertraline 50 milliGRAM(s) Oral daily  sodium chloride 0.9%. 1000 milliLiter(s) (10 mL/Hr) IV Continuous <Continuous>    MEDICATIONS  (PRN):  bisacodyl Suppository 10 milliGRAM(s) Rectal daily PRN Constipation        PROBLEM LIST/ ASSESSMENT:  HEALTH ISSUES - PROBLEM Dx:  Adjustment disorder with disturbance of conduct  Leukocytosis, unspecified type: Leukocytosis, unspecified type  Hypernatremia: Hypernatremia  Bacterial pneumonia: Bacterial pneumonia  Fever, postprocedural: Fever, postprocedural  FELY (acute kidney injury): FELY (acute kidney injury)      Patient is a 69y old  Male who presented  with cardiogenic shock due to acute Type A aortic dissection with rupture and cardiac tamponade.  S/P Aortic Root Replacement  S/P BioBental and CABROL.  S/P replacement of ascending aorta and hemiarch.  Post operative course complicated by  renal insufficiency, Hematuria,  hypokalemia,  hypernatremia, klebsiella pneumonia, UE thrombosis, expressive aphasia.  Initial CT head negative for transcortical acute infarct or bleed. Bilateral frontal lobe infarcts.   Hemodynamically stable.  Metabolic alkaosis.  Resolving ILEUS  Good oxygenation.  Fair urine out put.  Overall doing well.        My plan includes :  Increase Zoloft to 50 mg.  Continue depakote 250mg BID.  Statin and Betablocker.  Hold IV heparin.  NOAC  Close hemodynamic, ventilatory and drain monitoring and management  Monitor for arrhythmias and monitor parameters for organ perfusion  Monitor neurologic status  Monitor renal function.  Head of the bed should remain elevated to 45 deg .   Chest PT and IS will be encouraged  Monitor adequacy of oxygenation and ventilation and attempt to wean oxygen  Nutritional goals will be met using po eventually , ensure adequate caloric intake and monitor the same  Stress ulcer and VTE prophylaxis will be achieved    Glycemic control is satisfactory  Electrolytes have been repleted as necessary and wound care has been carried out. Pain control has been achieved.   Aggressive physical therapy and early mobility and ambulation goals will be met   The family was updated about the course and plan  CRITICAL CARE TIME SPENT in evaluation and management, reassessments, review and interpretation of labs and x-rays, ventilator and hemodynamic management, formulating a plan and coordinating care: ___30____ MIN.  Time does not include procedural time.  CTICU ATTENDING     					    Giovanni Reis MD

## 2019-12-12 NOTE — PROGRESS NOTE BEHAVIORAL HEALTH - OTHER
n/a, unable to walk unable/unwilling to talk at time of exam; expressive aphasia vs abulia difficult to assess given nonverbal, appears to have normal reasoning unable to assess unable to assess, unable to speak

## 2019-12-12 NOTE — PROGRESS NOTE BEHAVIORAL HEALTH - SUMMARY
Patient is a 69 year old domiciled, , , English speaking male with a PMHx of colitis and prostate surgery and no PPHx.  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  Psychiatry consulted at the request of his family due to concerns regarding agitation and irritability, agitation slightly improved but now more problems with withdrawal from care and becoming nonparticipatory, affecting ability to go to rehab.     Patient is exhibiting gradual improvement. He performed more complicated writing tasks today with less hesitation and even briefly spoke during the interview. He still complains of mood symptoms including frustration and depression, minimal anxiety. He continues to express motivation to improve his situation and go to rehab despite significant difficulty participating when challenged.     Recommendations:   1. Continue depakote 250mg bid.  2. When approaching patient participation, recommend giving options to patient and allowing him to retain some control over his care, may increase participation. Patient still stating motivation to go to rehab. Patient is gradually becoming more engaged with psychiatry visits.   3. Continue Zoloft 25mg daily  4. Limit Xanax if possible as this may increase agitation  5. Avoid any stimulants for treatment of depressive symptoms which are likely adjustment disorder/underlying personality trait. Would avoid given major cardiac risk factors.   6. Avoid seroquel as patient has no dementia/delirium, is not confused.    Discussed with Dr. Harrington, attending psychiatrist. We will continue to follow.

## 2019-12-13 LAB
ALBUMIN SERPL ELPH-MCNC: 2.6 G/DL — LOW (ref 3.3–5)
ALP SERPL-CCNC: 59 U/L — SIGNIFICANT CHANGE UP (ref 40–120)
ALT FLD-CCNC: 18 U/L — SIGNIFICANT CHANGE UP (ref 10–45)
ANION GAP SERPL CALC-SCNC: 10 MMOL/L — SIGNIFICANT CHANGE UP (ref 5–17)
AST SERPL-CCNC: 17 U/L — SIGNIFICANT CHANGE UP (ref 10–40)
BILIRUB SERPL-MCNC: 0.4 MG/DL — SIGNIFICANT CHANGE UP (ref 0.2–1.2)
BUN SERPL-MCNC: 20 MG/DL — SIGNIFICANT CHANGE UP (ref 7–23)
CALCIUM SERPL-MCNC: 8.4 MG/DL — SIGNIFICANT CHANGE UP (ref 8.4–10.5)
CHLORIDE SERPL-SCNC: 104 MMOL/L — SIGNIFICANT CHANGE UP (ref 96–108)
CO2 SERPL-SCNC: 26 MMOL/L — SIGNIFICANT CHANGE UP (ref 22–31)
CREAT SERPL-MCNC: 0.96 MG/DL — SIGNIFICANT CHANGE UP (ref 0.5–1.3)
GAS PNL BLDA: SIGNIFICANT CHANGE UP
GLUCOSE SERPL-MCNC: 96 MG/DL — SIGNIFICANT CHANGE UP (ref 70–99)
HCT VFR BLD CALC: 27 % — LOW (ref 39–50)
HGB BLD-MCNC: 8.4 G/DL — LOW (ref 13–17)
INR BLD: 1.77 — HIGH (ref 0.88–1.16)
MAGNESIUM SERPL-MCNC: 2.1 MG/DL — SIGNIFICANT CHANGE UP (ref 1.6–2.6)
MCHC RBC-ENTMCNC: 27.5 PG — SIGNIFICANT CHANGE UP (ref 27–34)
MCHC RBC-ENTMCNC: 31.1 GM/DL — LOW (ref 32–36)
MCV RBC AUTO: 88.5 FL — SIGNIFICANT CHANGE UP (ref 80–100)
NRBC # BLD: 0 /100 WBCS — SIGNIFICANT CHANGE UP (ref 0–0)
PHOSPHATE SERPL-MCNC: 2.9 MG/DL — SIGNIFICANT CHANGE UP (ref 2.5–4.5)
PLATELET # BLD AUTO: 306 K/UL — SIGNIFICANT CHANGE UP (ref 150–400)
POTASSIUM SERPL-MCNC: 4 MMOL/L — SIGNIFICANT CHANGE UP (ref 3.5–5.3)
POTASSIUM SERPL-SCNC: 4 MMOL/L — SIGNIFICANT CHANGE UP (ref 3.5–5.3)
PROT SERPL-MCNC: 4.9 G/DL — LOW (ref 6–8.3)
PROTHROM AB SERPL-ACNC: 20.4 SEC — HIGH (ref 10–12.9)
RBC # BLD: 3.05 M/UL — LOW (ref 4.2–5.8)
RBC # FLD: 14.9 % — HIGH (ref 10.3–14.5)
SODIUM SERPL-SCNC: 140 MMOL/L — SIGNIFICANT CHANGE UP (ref 135–145)
WBC # BLD: 8.89 K/UL — SIGNIFICANT CHANGE UP (ref 3.8–10.5)
WBC # FLD AUTO: 8.89 K/UL — SIGNIFICANT CHANGE UP (ref 3.8–10.5)

## 2019-12-13 PROCEDURE — 99291 CRITICAL CARE FIRST HOUR: CPT

## 2019-12-13 PROCEDURE — 99233 SBSQ HOSP IP/OBS HIGH 50: CPT

## 2019-12-13 PROCEDURE — 71045 X-RAY EXAM CHEST 1 VIEW: CPT | Mod: 26

## 2019-12-13 RX ORDER — HEPARIN SODIUM 5000 [USP'U]/ML
5000 INJECTION INTRAVENOUS; SUBCUTANEOUS EVERY 8 HOURS
Refills: 0 | Status: DISCONTINUED | OUTPATIENT
Start: 2019-12-13 | End: 2019-12-14

## 2019-12-13 RX ADMIN — LIDOCAINE 1 PATCH: 4 CREAM TOPICAL at 18:30

## 2019-12-13 RX ADMIN — Medication 30 MILLIGRAM(S): at 06:30

## 2019-12-13 RX ADMIN — Medication 30 MILLIGRAM(S): at 21:23

## 2019-12-13 RX ADMIN — HEPARIN SODIUM 5000 UNIT(S): 5000 INJECTION INTRAVENOUS; SUBCUTANEOUS at 22:14

## 2019-12-13 RX ADMIN — Medication 0.25 MILLIGRAM(S): at 06:31

## 2019-12-13 RX ADMIN — Medication 30 MILLIGRAM(S): at 14:44

## 2019-12-13 RX ADMIN — PANTOPRAZOLE SODIUM 40 MILLIGRAM(S): 20 TABLET, DELAYED RELEASE ORAL at 06:32

## 2019-12-13 RX ADMIN — DIVALPROEX SODIUM 250 MILLIGRAM(S): 500 TABLET, DELAYED RELEASE ORAL at 06:32

## 2019-12-13 RX ADMIN — CHLORHEXIDINE GLUCONATE 1 APPLICATION(S): 213 SOLUTION TOPICAL at 06:33

## 2019-12-13 RX ADMIN — Medication 0.25 MILLIGRAM(S): at 20:01

## 2019-12-13 RX ADMIN — Medication 81 MILLIGRAM(S): at 14:44

## 2019-12-13 RX ADMIN — SERTRALINE 50 MILLIGRAM(S): 25 TABLET, FILM COATED ORAL at 14:44

## 2019-12-13 RX ADMIN — DIVALPROEX SODIUM 250 MILLIGRAM(S): 500 TABLET, DELAYED RELEASE ORAL at 19:24

## 2019-12-13 RX ADMIN — Medication 0.5 MILLIGRAM(S): at 05:55

## 2019-12-13 RX ADMIN — LIDOCAINE 1 PATCH: 4 CREAM TOPICAL at 00:38

## 2019-12-13 RX ADMIN — LIDOCAINE 1 PATCH: 4 CREAM TOPICAL at 14:44

## 2019-12-13 RX ADMIN — Medication 0.5 MILLIGRAM(S): at 17:18

## 2019-12-13 RX ADMIN — AMIODARONE HYDROCHLORIDE 200 MILLIGRAM(S): 400 TABLET ORAL at 06:32

## 2019-12-13 NOTE — PROGRESS NOTE ADULT - SUBJECTIVE AND OBJECTIVE BOX
CTICU  CRITICAL  CARE  attending     Hand off received 					   Pertinent clinical, laboratory, radiographic, hemodynamic, echocardiographic, respiratory data, microbiologic data and chart were reviewed and analyzed frequently throughout the course of the day and night  Patient seen and examined with CTS/ SH attending at bedside    Pt is a 69y , Male, post op day # 20; s/p emergent Type A dissection repair;    post op:    acute CVA; bilat frontal lobe infarcts+ watershed areas  pericardial effusion; s/p drainage  bilateral pleural effusions/ s/p right pigtail thoracentesis;  atrial fibrillation with variable VR  acute post H'gic anemia    Adjustment disorder with disturbance of conduct  Leukocytosis, unspecified type: Leukocytosis, unspecified type  Hypernatremia: Hypernatremia  Bacterial pneumonia: Bacterial pneumonia  Fever, postprocedural: Fever, postprocedural  FELY (acute kidney injury): FELY (acute kidney injury)      , FAMILY HISTORY:  PAST MEDICAL & SURGICAL HISTORY:  Benign prostatic hypertrophy without lower urinary tract symptoms: BPH (benign prostatic hypertrophy)  Ulcerative colitis: Ulcerative colitis  States following surgery of eye and adnexa: straightened right eye  Other postprocedural status: History of hernia repair  Hemorrhoids: Hemorrhoids  Acute appendicitis with generalized peritonitis: Ruptured appendix    Patient is a 69y old  Male who presents with a chief complaint of Aortic dissection (14 Dec 2019 15:34)      14 system review was unremarkable  acute changes include acute respiratory failure  Vital signs, hemodynamic and respiratory parameters were reviewed from the bedside nursing flowsheet.  ICU Vital Signs Last 24 Hrs  T(C): 36.9 (14 Dec 2019 14:00), Max: 37.2 (14 Dec 2019 01:01)  T(F): 98.4 (14 Dec 2019 14:00), Max: 99 (14 Dec 2019 01:01)  HR: 72 (14 Dec 2019 16:00) (68 - 82)  BP: 99/67 (14 Dec 2019 11:00) (99/67 - 99/67)  BP(mean): 76 (14 Dec 2019 11:00) (76 - 76)  ABP: 102/52 (14 Dec 2019 16:00) (102/52 - 158/84)  ABP(mean): 72 (14 Dec 2019 16:00) (70 - 112)  RR: 15 (14 Dec 2019 16:00) (8 - 19)  SpO2: 95% (14 Dec 2019 16:00) (91% - 100%)    Adult Advanced Hemodynamics Last 24 Hrs  CVP(mm Hg): --  CVP(cm H2O): --  CO: --  CI: --  PA: --  PA(mean): --  PCWP: --  SVR: --  SVRI: --  PVR: --  PVRI: --, ABG - ( 14 Dec 2019 03:52 )  pH, Arterial: 7.48  pH, Blood: x     /  pCO2: 38    /  pO2: 66    / HCO3: 28    / Base Excess: 4.0   /  SaO2: 94                  Intake and output was reviewed and the fluid balance was calculated  Daily     Daily   I&O's Summary    13 Dec 2019 07:01  -  14 Dec 2019 07:00  --------------------------------------------------------  IN: 640 mL / OUT: 1915 mL / NET: -1275 mL    14 Dec 2019 07:01  -  14 Dec 2019 16:56  --------------------------------------------------------  IN: 824.5 mL / OUT: 1215 mL / NET: -390.5 mL        All lines and drain sites were assessed  Glycemic trend was reviewedCAPILLARY BLOOD GLUCOSE        No acute change in mental status  Auscultation of the chest reveals equal bs  Abdomen is soft  Extremities are warm and well perfused  Wounds appear clean and unremarkable  Antibiotics are periop    labs  CBC Full  -  ( 14 Dec 2019 03:58 )  WBC Count : 9.22 K/uL  RBC Count : 3.16 M/uL  Hemoglobin : 8.8 g/dL  Hematocrit : 28.0 %  Platelet Count - Automated : 294 K/uL  Mean Cell Volume : 88.6 fl  Mean Cell Hemoglobin : 27.8 pg  Mean Cell Hemoglobin Concentration : 31.4 gm/dL  Auto Neutrophil # : x  Auto Lymphocyte # : x  Auto Monocyte # : x  Auto Eosinophil # : x  Auto Basophil # : x  Auto Neutrophil % : x  Auto Lymphocyte % : x  Auto Monocyte % : x  Auto Eosinophil % : x  Auto Basophil % : x    12-14    140  |  104  |  18  ----------------------------<  95  4.0   |  26  |  0.86    Ca    8.7      14 Dec 2019 03:58  Phos  3.1     12-14  Mg     2.2     12-14    TPro  5.1<L>  /  Alb  2.8<L>  /  TBili  0.5  /  DBili  x   /  AST  20  /  ALT  20  /  AlkPhos  67  12-14    PT/INR - ( 14 Dec 2019 03:58 )   PT: 15.1 sec;   INR: 1.32          PTT - ( 14 Dec 2019 03:58 )  PTT:28.8 sec  The current medications were reviewed   MEDICATIONS  (STANDING):  ALPRAZolam 0.25 milliGRAM(s) Oral two times a day  aMIOdarone    Tablet 200 milliGRAM(s) Oral daily  aspirin  chewable 81 milliGRAM(s) Oral daily  buDESOnide    Inhalation Suspension 0.5 milliGRAM(s) Inhalation every 12 hours  chlorhexidine 2% Cloths 1 Application(s) Topical daily  dexMEDEtomidine Infusion 0.3 MICROgram(s)/kG/Hr (6.293 mL/Hr) IV Continuous <Continuous>  dextrose 50% Injectable 50 milliLiter(s) IV Push every 15 minutes  diltiazem    Tablet 30 milliGRAM(s) Oral every 8 hours  diVALproex  milliGRAM(s) Oral every 12 hours  heparin  Infusion 1200 Unit(s)/Hr (12 mL/Hr) IV Continuous <Continuous>  lidocaine   Patch 1 Patch Transdermal daily  pantoprazole    Tablet 40 milliGRAM(s) Oral before breakfast  sertraline 50 milliGRAM(s) Oral daily  sodium chloride 0.9%. 1000 milliLiter(s) (10 mL/Hr) IV Continuous <Continuous>    MEDICATIONS  (PRN):  bisacodyl Suppository 10 milliGRAM(s) Rectal daily PRN Constipation  simethicone 80 milliGRAM(s) Chew three times a day PRN Gas       PROBLEM LIST/ ASSESSMENT:  HEALTH ISSUES - PROBLEM Dx:  Adjustment disorder with disturbance of conduct  Leukocytosis, unspecified type: Leukocytosis, unspecified type  Hypernatremia: Hypernatremia  Bacterial pneumonia: Bacterial pneumonia  Fever, postprocedural: Fever, postprocedural  FELY (acute kidney injury): FELY (acute kidney injury)      ,   Patient is a 69y old  Male who presents with a chief complaint of Aortic dissection (14 Dec 2019 15:34)     s/p emergent Type A dissection repair;  acute changes include acute respiratory failure    My plan includes :  close hemodynamic, ventilatory and drain monitoring and management per post op routine    Monitor for arrhythmias and monitor parameters for organ perfusion  monitor neurologic status  Head of the bed should remain elevated to 45 deg .   chest PT and IS will be encouraged  monitor adequacy of oxygenation and ventilation and attempt to wean oxygen  Nutritional goals will be met using po eventually , ensure adequate caloric intake and montior the same  Stress ulcer and VTE prophylaxis will be achieved    Glycemic control is satisfactory  Electrolytes have been repleted as necessary and wound care has been carried out. Pain control has been achieved.   agressive physical therapy and early mobility and ambulation goals will be met   The family was updated about the course and plan  CRITICAL CARE TIME SPENT in evaluation and management, reassessments, review and interpretation of labs and x-rays, ventilator and hemodynamic management, formulating a plan and coordinating care: ___55____ MIN.  Time does not include procedural time.  CTICU ATTENDING     					    Luis Loving MD

## 2019-12-13 NOTE — PROGRESS NOTE BEHAVIORAL HEALTH - OTHER
n/a, unable to walk unable/unwilling to talk at time of exam; expressive aphasia vs abulia difficult to assess given nonverbal, appears to have normal reasoning unable to assess unable to assess, unable to speak n/a, was able to stand with PT

## 2019-12-13 NOTE — PROGRESS NOTE BEHAVIORAL HEALTH - CASE SUMMARY
69 year old domiciled, , , English speaking male with a PMHx of colitis and prostate surgery and no PPHx.  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  Psychiatry consulted at the request of his family due to concerns regarding agitation and irritability, agitation slightly improved but now more problems with withdrawal from care and becoming nonparticipatory, affecting ability to go to rehab. Patient's episodes of agitation (?frustration) are likely in context of an adjustment disorder vs abulia sec to stroke vs underlying personality traits exacerbated by current stressors.
69 year old domiciled, , , English speaking male with a PMHx of colitis and prostate surgery and no PPHx.  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  Psychiatry consulted at the request of his family due to concerns regarding agitation and irritability, agitation slightly improved but now more problems with withdrawal from care and becoming nonparticipatory, affecting ability to go to rehab. Patient's episodes of agitation (?frustration) are likely in context of an adjustment disorder vs abulia sec to stroke vs underlying personality traits exacerbated by current stressors. He appears to be more relaxed with speaking when he is refocused on topics that are comfortable to him (his past, profession, family).
69 year old domiciled, , , English speaking male with a PMHx of colitis and prostate surgery and no PPHx.  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  Psychiatry consulted at the request of his family due to concerns regarding agitation and irritability, agitation slightly improved but now more problems with withdrawal from care and becoming nonparticipatory, affecting ability to go to rehab. Patient's episodes of agitation (?frustration) are likely in context of an adjustment disorder vs abulia sec to stroke vs underlying personality traits exacerbated by current stressors.
9 year old domiciled, , , English speaking male with a PMHx of colitis and prostate surgery and no PPHx.  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  Psychiatry consulted at the request of his family due to concerns regarding agitation and irritability, agitation slightly improved but now more problems with withdrawal from care and becoming nonparticipatory, affecting ability to go to rehab. Patient presents AAOx3 and is aware of his current medical care and deficits. The interview is limited due to patient's refusal to verbalize but at appears that his current behavior is a reaction to the stress of being in the hospital, his new neurological deficits and pain.

## 2019-12-13 NOTE — PROGRESS NOTE BEHAVIORAL HEALTH - SUMMARY
Patient is a 69 year old domiciled, , , English speaking male with a PMHx of colitis and prostate surgery and no PPHx.  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  Psychiatry consulted at the request of his family due to concerns regarding agitation and irritability, agitation slightly improved but now more problems with withdrawal from care and becoming nonparticipatory, affecting ability to go to rehab.     Patient is exhibiting gradual improvement. Yesterday he was speaking with interviewer and today he is interactive and vocalizing, though still mostly conversing through gestures. He states no change in his mood but appears to be in a more positive mood today. His participation with other services is still limited requiring maximum encouragement but is has increased slightly from previous days.     Recommendations:   1. Continue depakote 250mg bid.  2. When approaching patient participation, recommend giving options to patient and allowing him to retain some control over his care, may increase participation. Patient still stating motivation to go to rehab. Patient is gradually becoming more engaged with psychiatry visits.   3. Continue Zoloft 25mg daily  4. Limit Xanax if possible as this may increase agitation  5. Avoid any stimulants for treatment of depressive symptoms which are likely adjustment disorder/underlying personality trait. Would avoid given major cardiac risk factors.   6. Avoid seroquel as patient has no dementia/delirium, is not confused.    Discussed with Dr. Harrington, attending psychiatrist. We will continue to follow.

## 2019-12-14 LAB
ALBUMIN SERPL ELPH-MCNC: 2.8 G/DL — LOW (ref 3.3–5)
ALBUMIN SERPL ELPH-MCNC: 3.1 G/DL — LOW (ref 3.3–5)
ALP SERPL-CCNC: 62 U/L — SIGNIFICANT CHANGE UP (ref 40–120)
ALP SERPL-CCNC: 67 U/L — SIGNIFICANT CHANGE UP (ref 40–120)
ALT FLD-CCNC: 17 U/L — SIGNIFICANT CHANGE UP (ref 10–45)
ALT FLD-CCNC: 20 U/L — SIGNIFICANT CHANGE UP (ref 10–45)
ANION GAP SERPL CALC-SCNC: 10 MMOL/L — SIGNIFICANT CHANGE UP (ref 5–17)
ANION GAP SERPL CALC-SCNC: 12 MMOL/L — SIGNIFICANT CHANGE UP (ref 5–17)
APTT BLD: 28.8 SEC — SIGNIFICANT CHANGE UP (ref 27.5–36.3)
APTT BLD: 53.2 SEC — HIGH (ref 27.5–36.3)
AST SERPL-CCNC: 20 U/L — SIGNIFICANT CHANGE UP (ref 10–40)
AST SERPL-CCNC: 21 U/L — SIGNIFICANT CHANGE UP (ref 10–40)
BILIRUB SERPL-MCNC: 0.4 MG/DL — SIGNIFICANT CHANGE UP (ref 0.2–1.2)
BILIRUB SERPL-MCNC: 0.5 MG/DL — SIGNIFICANT CHANGE UP (ref 0.2–1.2)
BUN SERPL-MCNC: 18 MG/DL — SIGNIFICANT CHANGE UP (ref 7–23)
BUN SERPL-MCNC: 18 MG/DL — SIGNIFICANT CHANGE UP (ref 7–23)
CALCIUM SERPL-MCNC: 8.5 MG/DL — SIGNIFICANT CHANGE UP (ref 8.4–10.5)
CALCIUM SERPL-MCNC: 8.7 MG/DL — SIGNIFICANT CHANGE UP (ref 8.4–10.5)
CHLORIDE SERPL-SCNC: 104 MMOL/L — SIGNIFICANT CHANGE UP (ref 96–108)
CHLORIDE SERPL-SCNC: 98 MMOL/L — SIGNIFICANT CHANGE UP (ref 96–108)
CO2 SERPL-SCNC: 26 MMOL/L — SIGNIFICANT CHANGE UP (ref 22–31)
CO2 SERPL-SCNC: 26 MMOL/L — SIGNIFICANT CHANGE UP (ref 22–31)
CREAT SERPL-MCNC: 0.85 MG/DL — SIGNIFICANT CHANGE UP (ref 0.5–1.3)
CREAT SERPL-MCNC: 0.86 MG/DL — SIGNIFICANT CHANGE UP (ref 0.5–1.3)
GAS PNL BLDA: SIGNIFICANT CHANGE UP
GAS PNL BLDA: SIGNIFICANT CHANGE UP
GLUCOSE SERPL-MCNC: 103 MG/DL — HIGH (ref 70–99)
GLUCOSE SERPL-MCNC: 95 MG/DL — SIGNIFICANT CHANGE UP (ref 70–99)
HCT VFR BLD CALC: 27.5 % — LOW (ref 39–50)
HCT VFR BLD CALC: 28 % — LOW (ref 39–50)
HGB BLD-MCNC: 8.6 G/DL — LOW (ref 13–17)
HGB BLD-MCNC: 8.8 G/DL — LOW (ref 13–17)
INR BLD: 1.32 — HIGH (ref 0.88–1.16)
INR BLD: 1.4 — HIGH (ref 0.88–1.16)
LACTATE SERPL-SCNC: 0.8 MMOL/L — SIGNIFICANT CHANGE UP (ref 0.5–2)
MAGNESIUM SERPL-MCNC: 2.2 MG/DL — SIGNIFICANT CHANGE UP (ref 1.6–2.6)
MAGNESIUM SERPL-MCNC: 2.2 MG/DL — SIGNIFICANT CHANGE UP (ref 1.6–2.6)
MCHC RBC-ENTMCNC: 27.7 PG — SIGNIFICANT CHANGE UP (ref 27–34)
MCHC RBC-ENTMCNC: 27.8 PG — SIGNIFICANT CHANGE UP (ref 27–34)
MCHC RBC-ENTMCNC: 31.3 GM/DL — LOW (ref 32–36)
MCHC RBC-ENTMCNC: 31.4 GM/DL — LOW (ref 32–36)
MCV RBC AUTO: 88.4 FL — SIGNIFICANT CHANGE UP (ref 80–100)
MCV RBC AUTO: 88.6 FL — SIGNIFICANT CHANGE UP (ref 80–100)
NRBC # BLD: 0 /100 WBCS — SIGNIFICANT CHANGE UP (ref 0–0)
NRBC # BLD: 0 /100 WBCS — SIGNIFICANT CHANGE UP (ref 0–0)
PHOSPHATE SERPL-MCNC: 3.1 MG/DL — SIGNIFICANT CHANGE UP (ref 2.5–4.5)
PHOSPHATE SERPL-MCNC: 3.3 MG/DL — SIGNIFICANT CHANGE UP (ref 2.5–4.5)
PLATELET # BLD AUTO: 283 K/UL — SIGNIFICANT CHANGE UP (ref 150–400)
PLATELET # BLD AUTO: 294 K/UL — SIGNIFICANT CHANGE UP (ref 150–400)
POTASSIUM SERPL-MCNC: 3.9 MMOL/L — SIGNIFICANT CHANGE UP (ref 3.5–5.3)
POTASSIUM SERPL-MCNC: 4 MMOL/L — SIGNIFICANT CHANGE UP (ref 3.5–5.3)
POTASSIUM SERPL-SCNC: 3.9 MMOL/L — SIGNIFICANT CHANGE UP (ref 3.5–5.3)
POTASSIUM SERPL-SCNC: 4 MMOL/L — SIGNIFICANT CHANGE UP (ref 3.5–5.3)
PROT SERPL-MCNC: 5.1 G/DL — LOW (ref 6–8.3)
PROT SERPL-MCNC: 5.4 G/DL — LOW (ref 6–8.3)
PROTHROM AB SERPL-ACNC: 15.1 SEC — HIGH (ref 10–12.9)
PROTHROM AB SERPL-ACNC: 16 SEC — HIGH (ref 10–12.9)
RBC # BLD: 3.11 M/UL — LOW (ref 4.2–5.8)
RBC # BLD: 3.16 M/UL — LOW (ref 4.2–5.8)
RBC # FLD: 15 % — HIGH (ref 10.3–14.5)
RBC # FLD: 15.2 % — HIGH (ref 10.3–14.5)
SODIUM SERPL-SCNC: 136 MMOL/L — SIGNIFICANT CHANGE UP (ref 135–145)
SODIUM SERPL-SCNC: 140 MMOL/L — SIGNIFICANT CHANGE UP (ref 135–145)
WBC # BLD: 8.78 K/UL — SIGNIFICANT CHANGE UP (ref 3.8–10.5)
WBC # BLD: 9.22 K/UL — SIGNIFICANT CHANGE UP (ref 3.8–10.5)
WBC # FLD AUTO: 8.78 K/UL — SIGNIFICANT CHANGE UP (ref 3.8–10.5)
WBC # FLD AUTO: 9.22 K/UL — SIGNIFICANT CHANGE UP (ref 3.8–10.5)

## 2019-12-14 PROCEDURE — 99291 CRITICAL CARE FIRST HOUR: CPT | Mod: 25

## 2019-12-14 PROCEDURE — 32557 INSERT CATH PLEURA W/ IMAGE: CPT

## 2019-12-14 PROCEDURE — 71045 X-RAY EXAM CHEST 1 VIEW: CPT | Mod: 26,77

## 2019-12-14 PROCEDURE — 71045 X-RAY EXAM CHEST 1 VIEW: CPT | Mod: 26

## 2019-12-14 RX ORDER — HEPARIN SODIUM 5000 [USP'U]/ML
1200 INJECTION INTRAVENOUS; SUBCUTANEOUS
Qty: 25000 | Refills: 0 | Status: DISCONTINUED | OUTPATIENT
Start: 2019-12-14 | End: 2019-12-18

## 2019-12-14 RX ORDER — DIVALPROEX SODIUM 500 MG/1
250 TABLET, DELAYED RELEASE ORAL EVERY 12 HOURS
Refills: 0 | Status: DISCONTINUED | OUTPATIENT
Start: 2019-12-14 | End: 2019-12-26

## 2019-12-14 RX ORDER — SIMETHICONE 80 MG/1
80 TABLET, CHEWABLE ORAL THREE TIMES A DAY
Refills: 0 | Status: DISCONTINUED | OUTPATIENT
Start: 2019-12-14 | End: 2019-12-26

## 2019-12-14 RX ORDER — FENTANYL CITRATE 50 UG/ML
25 INJECTION INTRAVENOUS ONCE
Refills: 0 | Status: DISCONTINUED | OUTPATIENT
Start: 2019-12-14 | End: 2019-12-14

## 2019-12-14 RX ORDER — ALBUMIN HUMAN 25 %
250 VIAL (ML) INTRAVENOUS ONCE
Refills: 0 | Status: COMPLETED | OUTPATIENT
Start: 2019-12-14 | End: 2019-12-14

## 2019-12-14 RX ORDER — DEXMEDETOMIDINE HYDROCHLORIDE IN 0.9% SODIUM CHLORIDE 4 UG/ML
0.3 INJECTION INTRAVENOUS
Qty: 200 | Refills: 0 | Status: DISCONTINUED | OUTPATIENT
Start: 2019-12-14 | End: 2019-12-14

## 2019-12-14 RX ADMIN — Medication 650 MILLIGRAM(S): at 22:00

## 2019-12-14 RX ADMIN — DEXMEDETOMIDINE HYDROCHLORIDE IN 0.9% SODIUM CHLORIDE 6.29 MICROGRAM(S)/KG/HR: 4 INJECTION INTRAVENOUS at 12:02

## 2019-12-14 RX ADMIN — DIVALPROEX SODIUM 250 MILLIGRAM(S): 500 TABLET, DELAYED RELEASE ORAL at 21:16

## 2019-12-14 RX ADMIN — Medication 400 MILLIGRAM(S): at 13:08

## 2019-12-14 RX ADMIN — Medication 30 MILLIGRAM(S): at 05:20

## 2019-12-14 RX ADMIN — HEPARIN SODIUM 12 UNIT(S)/HR: 5000 INJECTION INTRAVENOUS; SUBCUTANEOUS at 14:11

## 2019-12-14 RX ADMIN — LIDOCAINE 1 PATCH: 4 CREAM TOPICAL at 02:47

## 2019-12-14 RX ADMIN — DIVALPROEX SODIUM 250 MILLIGRAM(S): 500 TABLET, DELAYED RELEASE ORAL at 05:20

## 2019-12-14 RX ADMIN — Medication 30 MILLIGRAM(S): at 14:12

## 2019-12-14 RX ADMIN — AMIODARONE HYDROCHLORIDE 200 MILLIGRAM(S): 400 TABLET ORAL at 05:20

## 2019-12-14 RX ADMIN — CHLORHEXIDINE GLUCONATE 1 APPLICATION(S): 213 SOLUTION TOPICAL at 05:21

## 2019-12-14 RX ADMIN — Medication 0.5 MILLIGRAM(S): at 19:02

## 2019-12-14 RX ADMIN — Medication 30 MILLIGRAM(S): at 21:16

## 2019-12-14 RX ADMIN — Medication 125 MILLILITER(S): at 14:13

## 2019-12-14 RX ADMIN — HEPARIN SODIUM 5000 UNIT(S): 5000 INJECTION INTRAVENOUS; SUBCUTANEOUS at 05:20

## 2019-12-14 RX ADMIN — SIMETHICONE 80 MILLIGRAM(S): 80 TABLET, CHEWABLE ORAL at 21:17

## 2019-12-14 RX ADMIN — Medication 0.25 MILLIGRAM(S): at 05:19

## 2019-12-14 RX ADMIN — LIDOCAINE 1 PATCH: 4 CREAM TOPICAL at 12:50

## 2019-12-14 RX ADMIN — PANTOPRAZOLE SODIUM 40 MILLIGRAM(S): 20 TABLET, DELAYED RELEASE ORAL at 06:13

## 2019-12-14 RX ADMIN — Medication 0.5 MILLIGRAM(S): at 06:54

## 2019-12-14 RX ADMIN — Medication 650 MILLIGRAM(S): at 21:00

## 2019-12-14 RX ADMIN — Medication 1000 MILLIGRAM(S): at 13:53

## 2019-12-14 RX ADMIN — LIDOCAINE 1 PATCH: 4 CREAM TOPICAL at 18:39

## 2019-12-14 RX ADMIN — SERTRALINE 50 MILLIGRAM(S): 25 TABLET, FILM COATED ORAL at 12:49

## 2019-12-14 RX ADMIN — Medication 81 MILLIGRAM(S): at 12:49

## 2019-12-14 RX ADMIN — FENTANYL CITRATE 25 MICROGRAM(S): 50 INJECTION INTRAVENOUS at 12:13

## 2019-12-14 RX ADMIN — FENTANYL CITRATE 25 MICROGRAM(S): 50 INJECTION INTRAVENOUS at 11:11

## 2019-12-14 RX ADMIN — Medication 0.25 MILLIGRAM(S): at 18:38

## 2019-12-14 NOTE — PROCEDURE NOTE - NSSITEPREP_SKIN_A_CORE
chlorhexidine
chlorhexidine/Adherence to aseptic technique: hand hygiene prior to donning barriers (gown, gloves), don cap and mask, sterile drape over patient
chlorhexidine

## 2019-12-14 NOTE — PROCEDURE NOTE - NSINFORMCONSENT_GEN_A_CORE
Benefits, risks, and possible complications of procedure explained to patient/caregiver who verbalized understanding and gave verbal consent.
This was an emergent procedure.

## 2019-12-14 NOTE — PROCEDURE NOTE - PROCEDURE DATE TIME, MLM
02-Dec-2019 22:29
06-Dec-2019 22:30
23-Nov-2019 02:31
25-Nov-2019 17:30
29-Nov-2019 13:57
14-Dec-2019 11:30
25-Nov-2019 14:24

## 2019-12-14 NOTE — PROCEDURE NOTE - NSINDICATIONS_GEN_A_CORE
arterial puncture to obtain ABG's/critical patient/cannulation purposes/monitoring purposes
venous access
cardiac arrest/airway protection
pleural effusion
respiratory failure/airway protection
monitoring purposes/arterial puncture to obtain ABG's/cannulation purposes/blood sampling/critical patient
pleural effusion

## 2019-12-14 NOTE — PROCEDURE NOTE - NSPOSTCAREGUIDE_GEN_A_CORE
Care for catheter as per unit/ICU protocols
Care for catheter as per unit/ICU protocols
Instructed patient/caregiver to follow-up with primary care physician/Instructed patient/caregiver regarding signs and symptoms of infection/Care for catheter as per unit/ICU protocols/Verbal/written post procedure instructions were given to patient/caregiver/Keep the cast/splint/dressing clean and dry
Care for catheter as per unit/ICU protocols

## 2019-12-14 NOTE — PROGRESS NOTE ADULT - SUBJECTIVE AND OBJECTIVE BOX
CTICU  CRITICAL  CARE  attending     Hand off received 					   Pertinent clinical, laboratory, radiographic, hemodynamic, echocardiographic, respiratory data, microbiologic data and chart were reviewed and analyzed frequently throughout the course of the day and night  Patient seen and examined with CTS/ SH attending at bedside    Pt is a 69y , Male, post op day # 21; s/p emergent Type A dissection repair;    post op:    acute CVA; bilat frontal lobe infarcts+ watershed areas  pericardial effusion; s/p drainage  bilateral pleural effusions/ s/p right pigtail thoracentesis;  atrial fibrillation with variable VR  acute post H'gic anemia    currently    bilateral pleural effusions; s/p left pigtail drainage,   behavioural issues;        Adjustment disorder with disturbance of conduct  Leukocytosis, unspecified type: Leukocytosis, unspecified type  Hypernatremia: Hypernatremia  Bacterial pneumonia: Bacterial pneumonia  Fever, postprocedural: Fever, postprocedural  FELY (acute kidney injury): FELY (acute kidney injury)      , FAMILY HISTORY:  PAST MEDICAL & SURGICAL HISTORY:  Benign prostatic hypertrophy without lower urinary tract symptoms: BPH (benign prostatic hypertrophy)  Ulcerative colitis: Ulcerative colitis  States following surgery of eye and adnexa: straightened right eye  Other postprocedural status: History of hernia repair  Hemorrhoids: Hemorrhoids  Acute appendicitis with generalized peritonitis: Ruptured appendix    Patient is a 69y old  Male who presents with a chief complaint of Aortic dissection (12 Dec 2019 22:31)      14 system review was unremarkable  acute changes include acute respiratory failure  Vital signs, hemodynamic and respiratory parameters were reviewed from the bedside nursing flowsheet.  ICU Vital Signs Last 24 Hrs  T(C): 36.9 (14 Dec 2019 14:00), Max: 37.2 (14 Dec 2019 01:01)  T(F): 98.4 (14 Dec 2019 14:00), Max: 99 (14 Dec 2019 01:01)  HR: 74 (14 Dec 2019 15:00) (68 - 82)  BP: 99/67 (14 Dec 2019 11:00) (99/67 - 99/67)  BP(mean): 76 (14 Dec 2019 11:00) (76 - 76)  ABP: 102/54 (14 Dec 2019 15:00) (102/54 - 158/84)  ABP(mean): 70 (14 Dec 2019 15:00) (70 - 112)  RR: 14 (14 Dec 2019 15:00) (8 - 19)  SpO2: 94% (14 Dec 2019 15:00) (91% - 100%)    Adult Advanced Hemodynamics Last 24 Hrs  CVP(mm Hg): --  CVP(cm H2O): --  CO: --  CI: --  PA: --  PA(mean): --  PCWP: --  SVR: --  SVRI: --  PVR: --  PVRI: --, ABG - ( 14 Dec 2019 03:52 )  pH, Arterial: 7.48  pH, Blood: x     /  pCO2: 38    /  pO2: 66    / HCO3: 28    / Base Excess: 4.0   /  SaO2: 94                  Intake and output was reviewed and the fluid balance was calculated  Daily     Daily   I&O's Summary    13 Dec 2019 07:01  -  14 Dec 2019 07:00  --------------------------------------------------------  IN: 640 mL / OUT: 1915 mL / NET: -1275 mL    14 Dec 2019 07:01  -  14 Dec 2019 15:34  --------------------------------------------------------  IN: 824.5 mL / OUT: 1215 mL / NET: -390.5 mL        All lines and drain sites were assessed  Glycemic trend was reviewedCAPILLARY BLOOD GLUCOSE        No acute change in mental status  Auscultation of the chest reveals equal bs  Abdomen is soft  Extremities are warm and well perfused  Wounds appear clean and unremarkable  Antibiotics are periop    labs  CBC Full  -  ( 14 Dec 2019 03:58 )  WBC Count : 9.22 K/uL  RBC Count : 3.16 M/uL  Hemoglobin : 8.8 g/dL  Hematocrit : 28.0 %  Platelet Count - Automated : 294 K/uL  Mean Cell Volume : 88.6 fl  Mean Cell Hemoglobin : 27.8 pg  Mean Cell Hemoglobin Concentration : 31.4 gm/dL  Auto Neutrophil # : x  Auto Lymphocyte # : x  Auto Monocyte # : x  Auto Eosinophil # : x  Auto Basophil # : x  Auto Neutrophil % : x  Auto Lymphocyte % : x  Auto Monocyte % : x  Auto Eosinophil % : x  Auto Basophil % : x    12-14    140  |  104  |  18  ----------------------------<  95  4.0   |  26  |  0.86    Ca    8.7      14 Dec 2019 03:58  Phos  3.1     12-14  Mg     2.2     12-14    TPro  5.1<L>  /  Alb  2.8<L>  /  TBili  0.5  /  DBili  x   /  AST  20  /  ALT  20  /  AlkPhos  67  12-14    PT/INR - ( 14 Dec 2019 03:58 )   PT: 15.1 sec;   INR: 1.32          PTT - ( 14 Dec 2019 03:58 )  PTT:28.8 sec  The current medications were reviewed   MEDICATIONS  (STANDING):  ALPRAZolam 0.25 milliGRAM(s) Oral two times a day  aMIOdarone    Tablet 200 milliGRAM(s) Oral daily  aspirin  chewable 81 milliGRAM(s) Oral daily  buDESOnide    Inhalation Suspension 0.5 milliGRAM(s) Inhalation every 12 hours  chlorhexidine 2% Cloths 1 Application(s) Topical daily  dexMEDEtomidine Infusion 0.3 MICROgram(s)/kG/Hr (6.293 mL/Hr) IV Continuous <Continuous>  dextrose 50% Injectable 50 milliLiter(s) IV Push every 15 minutes  diltiazem    Tablet 30 milliGRAM(s) Oral every 8 hours  diVALproex  milliGRAM(s) Oral every 12 hours  heparin  Infusion 1200 Unit(s)/Hr (12 mL/Hr) IV Continuous <Continuous>  lidocaine   Patch 1 Patch Transdermal daily  pantoprazole    Tablet 40 milliGRAM(s) Oral before breakfast  sertraline 50 milliGRAM(s) Oral daily  sodium chloride 0.9%. 1000 milliLiter(s) (10 mL/Hr) IV Continuous <Continuous>    MEDICATIONS  (PRN):  bisacodyl Suppository 10 milliGRAM(s) Rectal daily PRN Constipation       PROBLEM LIST/ ASSESSMENT:  HEALTH ISSUES - PROBLEM Dx:  acute CVA  Adjustment disorder with disturbance of conduct  Leukocytosis, unspecified type: Leukocytosis, unspecified type  Hypernatremia: Hypernatremia  Bacterial pneumonia: Bacterial pneumonia  Fever, postprocedural: Fever, postprocedural  FELY (acute kidney injury): FELY (acute kidney injury)      ,   Patient is a 69y old  Male who presents with a chief complaint of Aortic dissection (12 Dec 2019 22:31)     s/p emergent Type A dissection repair;    acute changes include acute respiratory failure    My plan includes :  close hemodynamic, ventilatory and drain monitoring and management per post op routine    Monitor for arrhythmias and monitor parameters for organ perfusion  monitor neurologic status  Head of the bed should remain elevated to 45 deg .   chest PT and IS will be encouraged  monitor adequacy of oxygenation and ventilation and attempt to wean oxygen  Nutritional goals will be met using po eventually , ensure adequate caloric intake and montior the same  Stress ulcer and VTE prophylaxis will be achieved    Glycemic control is satisfactory  Electrolytes have been repleted as necessary and wound care has been carried out. Pain control has been achieved.   agressive physical therapy and early mobility and ambulation goals will be met   The family was updated about the course and plan  CRITICAL CARE TIME SPENT in evaluation and management, reassessments, review and interpretation of labs and x-rays, ventilator and hemodynamic management, formulating a plan and coordinating care: __55____ MIN.  Time does not include procedural time.  CTICU ATTENDING     					    Luis Loivng MD

## 2019-12-14 NOTE — PROCEDURE NOTE - NSPROCDETAILS_GEN_ALL_CORE
location identified, draped/prepped, sterile technique used/sterile technique, catheter placed/ultrasound guidance/supine position/sterile dressing applied/ultrasound assessment
sutured in place/all materials/supplies accounted for at end of procedure/hemostasis with direct pressure, dressing applied/location identified, draped/prepped, sterile technique used, needle inserted/introduced/connected to a pressurized flush line/positive blood return obtained via catheter/Seldinger technique
ultrasound assessment of fluid (location)/ultrasound assessment of fluid (size)
positive blood return obtained via catheter/connected to a pressurized flush line/all materials/supplies accounted for at end of procedure/sutured in place/hemostasis with direct pressure, dressing applied/location identified, draped/prepped, sterile technique used, needle inserted/introduced/Seldinger technique/ultrasound guidance
thoracostomy tube placed percutaneously/ultrasound assessment of fluid (size)/dressing applied/percutaneous/Seldinger technique/secured in place/ultrasound assessment of fluid (location)

## 2019-12-15 LAB
ALBUMIN SERPL ELPH-MCNC: 3.2 G/DL — LOW (ref 3.3–5)
ALP SERPL-CCNC: 60 U/L — SIGNIFICANT CHANGE UP (ref 40–120)
ALT FLD-CCNC: 17 U/L — SIGNIFICANT CHANGE UP (ref 10–45)
ANION GAP SERPL CALC-SCNC: 9 MMOL/L — SIGNIFICANT CHANGE UP (ref 5–17)
APTT BLD: 68.5 SEC — HIGH (ref 27.5–36.3)
APTT BLD: 73.4 SEC — HIGH (ref 27.5–36.3)
AST SERPL-CCNC: 17 U/L — SIGNIFICANT CHANGE UP (ref 10–40)
BILIRUB SERPL-MCNC: 0.4 MG/DL — SIGNIFICANT CHANGE UP (ref 0.2–1.2)
BUN SERPL-MCNC: 14 MG/DL — SIGNIFICANT CHANGE UP (ref 7–23)
CALCIUM SERPL-MCNC: 8.3 MG/DL — LOW (ref 8.4–10.5)
CHLORIDE SERPL-SCNC: 103 MMOL/L — SIGNIFICANT CHANGE UP (ref 96–108)
CO2 SERPL-SCNC: 27 MMOL/L — SIGNIFICANT CHANGE UP (ref 22–31)
CREAT SERPL-MCNC: 0.83 MG/DL — SIGNIFICANT CHANGE UP (ref 0.5–1.3)
GAS PNL BLDA: SIGNIFICANT CHANGE UP
GLUCOSE BLDC GLUCOMTR-MCNC: 135 MG/DL — HIGH (ref 70–99)
GLUCOSE SERPL-MCNC: 104 MG/DL — HIGH (ref 70–99)
HCT VFR BLD CALC: 28 % — LOW (ref 39–50)
HGB BLD-MCNC: 8.6 G/DL — LOW (ref 13–17)
INR BLD: 1.38 — HIGH (ref 0.88–1.16)
MAGNESIUM SERPL-MCNC: 2.1 MG/DL — SIGNIFICANT CHANGE UP (ref 1.6–2.6)
MCHC RBC-ENTMCNC: 27.2 PG — SIGNIFICANT CHANGE UP (ref 27–34)
MCHC RBC-ENTMCNC: 30.7 GM/DL — LOW (ref 32–36)
MCV RBC AUTO: 88.6 FL — SIGNIFICANT CHANGE UP (ref 80–100)
NRBC # BLD: 0 /100 WBCS — SIGNIFICANT CHANGE UP (ref 0–0)
PHOSPHATE SERPL-MCNC: 2.8 MG/DL — SIGNIFICANT CHANGE UP (ref 2.5–4.5)
PLATELET # BLD AUTO: 267 K/UL — SIGNIFICANT CHANGE UP (ref 150–400)
POTASSIUM SERPL-MCNC: 3.7 MMOL/L — SIGNIFICANT CHANGE UP (ref 3.5–5.3)
POTASSIUM SERPL-SCNC: 3.7 MMOL/L — SIGNIFICANT CHANGE UP (ref 3.5–5.3)
PROT SERPL-MCNC: 5.3 G/DL — LOW (ref 6–8.3)
PROTHROM AB SERPL-ACNC: 15.7 SEC — HIGH (ref 10–12.9)
RBC # BLD: 3.16 M/UL — LOW (ref 4.2–5.8)
RBC # FLD: 14.8 % — HIGH (ref 10.3–14.5)
SODIUM SERPL-SCNC: 139 MMOL/L — SIGNIFICANT CHANGE UP (ref 135–145)
WBC # BLD: 8.89 K/UL — SIGNIFICANT CHANGE UP (ref 3.8–10.5)
WBC # FLD AUTO: 8.89 K/UL — SIGNIFICANT CHANGE UP (ref 3.8–10.5)

## 2019-12-15 PROCEDURE — 71045 X-RAY EXAM CHEST 1 VIEW: CPT | Mod: 26,77

## 2019-12-15 PROCEDURE — 71045 X-RAY EXAM CHEST 1 VIEW: CPT | Mod: 26

## 2019-12-15 PROCEDURE — 99291 CRITICAL CARE FIRST HOUR: CPT

## 2019-12-15 RX ORDER — DILTIAZEM HCL 120 MG
30 CAPSULE, EXT RELEASE 24 HR ORAL EVERY 6 HOURS
Refills: 0 | Status: DISCONTINUED | OUTPATIENT
Start: 2019-12-15 | End: 2019-12-26

## 2019-12-15 RX ORDER — POTASSIUM CHLORIDE 20 MEQ
20 PACKET (EA) ORAL
Refills: 0 | Status: COMPLETED | OUTPATIENT
Start: 2019-12-15 | End: 2019-12-15

## 2019-12-15 RX ORDER — ACETAMINOPHEN 500 MG
650 TABLET ORAL EVERY 6 HOURS
Refills: 0 | Status: DISCONTINUED | OUTPATIENT
Start: 2019-12-15 | End: 2019-12-26

## 2019-12-15 RX ORDER — POTASSIUM CHLORIDE 20 MEQ
40 PACKET (EA) ORAL ONCE
Refills: 0 | Status: COMPLETED | OUTPATIENT
Start: 2019-12-15 | End: 2019-12-15

## 2019-12-15 RX ADMIN — Medication 30 MILLIGRAM(S): at 17:06

## 2019-12-15 RX ADMIN — HEPARIN SODIUM 12 UNIT(S)/HR: 5000 INJECTION INTRAVENOUS; SUBCUTANEOUS at 21:28

## 2019-12-15 RX ADMIN — DIVALPROEX SODIUM 250 MILLIGRAM(S): 500 TABLET, DELAYED RELEASE ORAL at 17:07

## 2019-12-15 RX ADMIN — PANTOPRAZOLE SODIUM 40 MILLIGRAM(S): 20 TABLET, DELAYED RELEASE ORAL at 07:44

## 2019-12-15 RX ADMIN — Medication 81 MILLIGRAM(S): at 11:41

## 2019-12-15 RX ADMIN — Medication 650 MILLIGRAM(S): at 20:41

## 2019-12-15 RX ADMIN — Medication 30 MILLIGRAM(S): at 11:41

## 2019-12-15 RX ADMIN — DIVALPROEX SODIUM 250 MILLIGRAM(S): 500 TABLET, DELAYED RELEASE ORAL at 05:08

## 2019-12-15 RX ADMIN — Medication 650 MILLIGRAM(S): at 05:08

## 2019-12-15 RX ADMIN — Medication 650 MILLIGRAM(S): at 19:05

## 2019-12-15 RX ADMIN — Medication 100 MILLIEQUIVALENT(S): at 10:22

## 2019-12-15 RX ADMIN — CHLORHEXIDINE GLUCONATE 1 APPLICATION(S): 213 SOLUTION TOPICAL at 06:00

## 2019-12-15 RX ADMIN — Medication 40 MILLIEQUIVALENT(S): at 07:44

## 2019-12-15 RX ADMIN — Medication 0.5 MILLIGRAM(S): at 07:43

## 2019-12-15 RX ADMIN — LIDOCAINE 1 PATCH: 4 CREAM TOPICAL at 23:46

## 2019-12-15 RX ADMIN — LIDOCAINE 1 PATCH: 4 CREAM TOPICAL at 00:30

## 2019-12-15 RX ADMIN — Medication 0.25 MILLIGRAM(S): at 17:06

## 2019-12-15 RX ADMIN — Medication 0.5 MILLIGRAM(S): at 18:00

## 2019-12-15 RX ADMIN — SERTRALINE 50 MILLIGRAM(S): 25 TABLET, FILM COATED ORAL at 11:41

## 2019-12-15 RX ADMIN — LIDOCAINE 1 PATCH: 4 CREAM TOPICAL at 19:15

## 2019-12-15 RX ADMIN — Medication 650 MILLIGRAM(S): at 13:30

## 2019-12-15 RX ADMIN — Medication 0.25 MILLIGRAM(S): at 05:08

## 2019-12-15 RX ADMIN — Medication 650 MILLIGRAM(S): at 11:40

## 2019-12-15 RX ADMIN — Medication 30 MILLIGRAM(S): at 05:08

## 2019-12-15 RX ADMIN — LIDOCAINE 1 PATCH: 4 CREAM TOPICAL at 11:14

## 2019-12-15 RX ADMIN — AMIODARONE HYDROCHLORIDE 200 MILLIGRAM(S): 400 TABLET ORAL at 05:08

## 2019-12-15 RX ADMIN — Medication 30 MILLIGRAM(S): at 23:46

## 2019-12-15 RX ADMIN — Medication 650 MILLIGRAM(S): at 06:08

## 2019-12-15 NOTE — PROGRESS NOTE ADULT - SUBJECTIVE AND OBJECTIVE BOX
CTICU  CRITICAL  CARE  ATTENDING:   				   Pertinent clinical, laboratory, radiographic, hemodynamic, echocardiographic, respiratory data, microbiologic data and chart were reviewed and analyzed frequently throughout the course of the day and night    Patient seen and examined with CTS/ SH attending at bedside    Pt is a 69y Male admitted for aortic dissection     HEALTH ISSUES - PROBLEM Dx:  Adjustment disorder with disturbance of conduct  Leukocytosis, unspecified type: Leukocytosis, unspecified type  Hypernatremia: Hypernatremia  Bacterial pneumonia: Bacterial pneumonia  Fever, postprocedural: Fever, postprocedural  FELY (acute kidney injury): FELY (acute kidney injury)         FAMILY HISTORY:  PAST MEDICAL & SURGICAL HISTORY:  Benign prostatic hypertrophy without lower urinary tract symptoms: BPH (benign prostatic hypertrophy)  Ulcerative colitis: Ulcerative colitis  States following surgery of eye and adnexa: straightened right eye  Other postprocedural status: History of hernia repair  Hemorrhoids: Hemorrhoids  Acute appendicitis with generalized peritonitis: Ruptured appendix      14 system review was unremarkable      Vital signs, hemodynamic and respiratory parameters were reviewed from the bedside nursing flowsheet.  ICU Vital Signs Last 24 Hrs  T(C): 37.2 (15 Dec 2019 08:50), Max: 37.2 (15 Dec 2019 08:50)  T(F): 99 (15 Dec 2019 08:50), Max: 99 (15 Dec 2019 08:50)  HR: 76 (15 Dec 2019 10:00) (68 - 78)  BP: --  BP(mean): --  ABP: 92/60 (15 Dec 2019 10:00) (92/60 - 152/84)  ABP(mean): 76 (15 Dec 2019 10:00) (70 - 110)  RR: 25 (15 Dec 2019 10:00) (11 - 29)  SpO2: 98% (15 Dec 2019 09:00) (92% - 98%)    Adult Advanced Hemodynamics Last 24 Hrs  CVP(mm Hg): --  CVP(cm H2O): --  CO: --  CI: --  PA: --  PA(mean): --  PCWP: --  SVR: --  SVRI: --  PVR: --  PVRI: --, ABG - ( 15 Dec 2019 05:28 )  pH, Arterial: 7.48  pH, Blood: x     /  pCO2: 38    /  pO2: 80    / HCO3: 28    / Base Excess: 4.0   /  SaO2: 96                  Intake and output was reviewed and the fluid balance was calculated  Daily     Daily   I&O's Summary    14 Dec 2019 07:01  -  15 Dec 2019 07:00  --------------------------------------------------------  IN: 1100.5 mL / OUT: 1660 mL / NET: -559.5 mL        All lines and drain sites were assessed  Glycemic trend was reviewedCAPILLARY BLOOD GLUCOSE            Exam:   Gen: NAD   Neuro: Mental status  Lungs: Auscultation of the chest reveals equal bs  Abd: soft, nt/nd  Ext: warm and well perfused  Wound: appears clean and unremarkable  Antibiotics are periop      labs  CBC Full  -  ( 15 Dec 2019 05:30 )  WBC Count : 8.89 K/uL  RBC Count : 3.16 M/uL  Hemoglobin : 8.6 g/dL  Hematocrit : 28.0 %  Platelet Count - Automated : 267 K/uL  Mean Cell Volume : 88.6 fl  Mean Cell Hemoglobin : 27.2 pg  Mean Cell Hemoglobin Concentration : 30.7 gm/dL  Auto Neutrophil # : x  Auto Lymphocyte # : x  Auto Monocyte # : x  Auto Eosinophil # : x  Auto Basophil # : x  Auto Neutrophil % : x  Auto Lymphocyte % : x  Auto Monocyte % : x  Auto Eosinophil % : x  Auto Basophil % : x    12-15    139  |  103  |  14  ----------------------------<  104<H>  3.7   |  27  |  0.83    Ca    8.3<L>      15 Dec 2019 05:30  Phos  2.8     12-15  Mg     2.1     12-15    TPro  5.3<L>  /  Alb  3.2<L>  /  TBili  0.4  /  DBili  x   /  AST  17  /  ALT  17  /  AlkPhos  60  12-15    PT/INR - ( 15 Dec 2019 05:30 )   PT: 15.7 sec;   INR: 1.38          PTT - ( 15 Dec 2019 05:30 )  PTT:73.4 sec    The current medications were reviewed   MEDICATIONS  (STANDING):  ALPRAZolam 0.25 milliGRAM(s) Oral two times a day  aMIOdarone    Tablet 200 milliGRAM(s) Oral daily  aspirin  chewable 81 milliGRAM(s) Oral daily  buDESOnide    Inhalation Suspension 0.5 milliGRAM(s) Inhalation every 12 hours  chlorhexidine 2% Cloths 1 Application(s) Topical daily  dextrose 50% Injectable 50 milliLiter(s) IV Push every 15 minutes  diltiazem    Tablet 30 milliGRAM(s) Oral every 6 hours  diVALproex Sprinkle 250 milliGRAM(s) Oral every 12 hours  heparin  Infusion 1200 Unit(s)/Hr (12 mL/Hr) IV Continuous <Continuous>  lidocaine   Patch 1 Patch Transdermal daily  pantoprazole    Tablet 40 milliGRAM(s) Oral before breakfast  sertraline 50 milliGRAM(s) Oral daily  sodium chloride 0.9%. 1000 milliLiter(s) (10 mL/Hr) IV Continuous <Continuous>    MEDICATIONS  (PRN):  acetaminophen    Suspension .. 650 milliGRAM(s) Oral every 6 hours PRN Mild Pain (1 - 3)  bisacodyl Suppository 10 milliGRAM(s) Rectal daily PRN Constipation  simethicone 80 milliGRAM(s) Chew three times a day PRN Gas       PROBLEM LIST/ ASSESSMENT:  HEALTH ISSUES - PROBLEM Dx:  Adjustment disorder with disturbance of conduct  Leukocytosis, unspecified type: Leukocytosis, unspecified type  Hypernatremia: Hypernatremia  Bacterial pneumonia: Bacterial pneumonia  Fever, postprocedural: Fever, postprocedural  FELY (acute kidney injury): FELY (acute kidney injury)         Patient is a 69y old  Male who presents with a chief complaint of Aortic dissection (14 Dec 2019 15:34) s/p replacement      My plan includes :  -Close hemodynamic, ventilatory and drain monitoring and management per post op routine  -Monitor for arrhythmias and monitor parameters for organ perfusion  -Monitor neurologic status  -Head of the bed should remain elevated to 45 deg .   -Chest PT and IS will be encouraged  -Monitor adequacy of oxygenation and ventilation and attempt to wean oxygen  -Nutritional goals will be met using po eventually , ensure adequate caloric intake and monitor the same  -Stress ulcer and VTE prophylaxis will be achieved    -Glycemic control is satisfactory  -Electrolytes have been repleated as necessary and wound care has been carried out. Pain control has been achieved.   -Agressive physical therapy and early mobility and ambulation goals will be met   The family was updated about the course and plan    CRITICAL CARE TIME SPENT in evaluation and management, reassessments, review and interpretation of labs and x-rays, ventilator and hemodynamic management, formulating a plan and coordinating care: ___60____ MIN.  Time does not include procedural time.      CTICU ATTENDING:      		  Radha Rubio MD

## 2019-12-16 LAB
ALBUMIN SERPL ELPH-MCNC: 2.9 G/DL — LOW (ref 3.3–5)
ALBUMIN SERPL ELPH-MCNC: 3.5 G/DL — SIGNIFICANT CHANGE UP (ref 3.3–5)
ALP SERPL-CCNC: 66 U/L — SIGNIFICANT CHANGE UP (ref 40–120)
ALP SERPL-CCNC: 75 U/L — SIGNIFICANT CHANGE UP (ref 40–120)
ALT FLD-CCNC: 16 U/L — SIGNIFICANT CHANGE UP (ref 10–45)
ALT FLD-CCNC: 16 U/L — SIGNIFICANT CHANGE UP (ref 10–45)
ANION GAP SERPL CALC-SCNC: 13 MMOL/L — SIGNIFICANT CHANGE UP (ref 5–17)
ANION GAP SERPL CALC-SCNC: 8 MMOL/L — SIGNIFICANT CHANGE UP (ref 5–17)
APTT BLD: 31 SEC — SIGNIFICANT CHANGE UP (ref 27.5–36.3)
APTT BLD: 71.1 SEC — HIGH (ref 27.5–36.3)
AST SERPL-CCNC: 14 U/L — SIGNIFICANT CHANGE UP (ref 10–40)
AST SERPL-CCNC: 17 U/L — SIGNIFICANT CHANGE UP (ref 10–40)
BILIRUB SERPL-MCNC: 0.4 MG/DL — SIGNIFICANT CHANGE UP (ref 0.2–1.2)
BILIRUB SERPL-MCNC: 0.5 MG/DL — SIGNIFICANT CHANGE UP (ref 0.2–1.2)
BUN SERPL-MCNC: 14 MG/DL — SIGNIFICANT CHANGE UP (ref 7–23)
BUN SERPL-MCNC: 16 MG/DL — SIGNIFICANT CHANGE UP (ref 7–23)
CALCIUM SERPL-MCNC: 8.7 MG/DL — SIGNIFICANT CHANGE UP (ref 8.4–10.5)
CALCIUM SERPL-MCNC: 9.2 MG/DL — SIGNIFICANT CHANGE UP (ref 8.4–10.5)
CHLORIDE SERPL-SCNC: 101 MMOL/L — SIGNIFICANT CHANGE UP (ref 96–108)
CHLORIDE SERPL-SCNC: 102 MMOL/L — SIGNIFICANT CHANGE UP (ref 96–108)
CO2 SERPL-SCNC: 25 MMOL/L — SIGNIFICANT CHANGE UP (ref 22–31)
CO2 SERPL-SCNC: 27 MMOL/L — SIGNIFICANT CHANGE UP (ref 22–31)
CREAT SERPL-MCNC: 0.84 MG/DL — SIGNIFICANT CHANGE UP (ref 0.5–1.3)
CREAT SERPL-MCNC: 0.88 MG/DL — SIGNIFICANT CHANGE UP (ref 0.5–1.3)
GAS PNL BLDA: SIGNIFICANT CHANGE UP
GAS PNL BLDA: SIGNIFICANT CHANGE UP
GLUCOSE SERPL-MCNC: 101 MG/DL — HIGH (ref 70–99)
GLUCOSE SERPL-MCNC: 111 MG/DL — HIGH (ref 70–99)
HCT VFR BLD CALC: 27.3 % — LOW (ref 39–50)
HCT VFR BLD CALC: 28.8 % — LOW (ref 39–50)
HGB BLD-MCNC: 8.5 G/DL — LOW (ref 13–17)
HGB BLD-MCNC: 9 G/DL — LOW (ref 13–17)
INR BLD: 1.32 — HIGH (ref 0.88–1.16)
INR BLD: 1.34 — HIGH (ref 0.88–1.16)
MAGNESIUM SERPL-MCNC: 2.1 MG/DL — SIGNIFICANT CHANGE UP (ref 1.6–2.6)
MAGNESIUM SERPL-MCNC: 2.2 MG/DL — SIGNIFICANT CHANGE UP (ref 1.6–2.6)
MCHC RBC-ENTMCNC: 27.3 PG — SIGNIFICANT CHANGE UP (ref 27–34)
MCHC RBC-ENTMCNC: 27.7 PG — SIGNIFICANT CHANGE UP (ref 27–34)
MCHC RBC-ENTMCNC: 31.1 GM/DL — LOW (ref 32–36)
MCHC RBC-ENTMCNC: 31.3 GM/DL — LOW (ref 32–36)
MCV RBC AUTO: 87.3 FL — SIGNIFICANT CHANGE UP (ref 80–100)
MCV RBC AUTO: 88.9 FL — SIGNIFICANT CHANGE UP (ref 80–100)
NRBC # BLD: 0 /100 WBCS — SIGNIFICANT CHANGE UP (ref 0–0)
NRBC # BLD: 0 /100 WBCS — SIGNIFICANT CHANGE UP (ref 0–0)
PHOSPHATE SERPL-MCNC: 3.3 MG/DL — SIGNIFICANT CHANGE UP (ref 2.5–4.5)
PHOSPHATE SERPL-MCNC: 3.4 MG/DL — SIGNIFICANT CHANGE UP (ref 2.5–4.5)
PLATELET # BLD AUTO: 242 K/UL — SIGNIFICANT CHANGE UP (ref 150–400)
PLATELET # BLD AUTO: 254 K/UL — SIGNIFICANT CHANGE UP (ref 150–400)
POTASSIUM SERPL-MCNC: 3.9 MMOL/L — SIGNIFICANT CHANGE UP (ref 3.5–5.3)
POTASSIUM SERPL-MCNC: 4.5 MMOL/L — SIGNIFICANT CHANGE UP (ref 3.5–5.3)
POTASSIUM SERPL-SCNC: 3.9 MMOL/L — SIGNIFICANT CHANGE UP (ref 3.5–5.3)
POTASSIUM SERPL-SCNC: 4.5 MMOL/L — SIGNIFICANT CHANGE UP (ref 3.5–5.3)
PROT SERPL-MCNC: 5.1 G/DL — LOW (ref 6–8.3)
PROT SERPL-MCNC: 5.6 G/DL — LOW (ref 6–8.3)
PROTHROM AB SERPL-ACNC: 15.1 SEC — HIGH (ref 10–12.9)
PROTHROM AB SERPL-ACNC: 15.3 SEC — HIGH (ref 10–12.9)
RBC # BLD: 3.07 M/UL — LOW (ref 4.2–5.8)
RBC # BLD: 3.3 M/UL — LOW (ref 4.2–5.8)
RBC # FLD: 15.1 % — HIGH (ref 10.3–14.5)
RBC # FLD: 15.2 % — HIGH (ref 10.3–14.5)
SODIUM SERPL-SCNC: 137 MMOL/L — SIGNIFICANT CHANGE UP (ref 135–145)
SODIUM SERPL-SCNC: 139 MMOL/L — SIGNIFICANT CHANGE UP (ref 135–145)
WBC # BLD: 8.44 K/UL — SIGNIFICANT CHANGE UP (ref 3.8–10.5)
WBC # BLD: 8.59 K/UL — SIGNIFICANT CHANGE UP (ref 3.8–10.5)
WBC # FLD AUTO: 8.44 K/UL — SIGNIFICANT CHANGE UP (ref 3.8–10.5)
WBC # FLD AUTO: 8.59 K/UL — SIGNIFICANT CHANGE UP (ref 3.8–10.5)

## 2019-12-16 PROCEDURE — 99232 SBSQ HOSP IP/OBS MODERATE 35: CPT | Mod: 25

## 2019-12-16 PROCEDURE — 99231 SBSQ HOSP IP/OBS SF/LOW 25: CPT

## 2019-12-16 PROCEDURE — 99291 CRITICAL CARE FIRST HOUR: CPT

## 2019-12-16 PROCEDURE — 71045 X-RAY EXAM CHEST 1 VIEW: CPT | Mod: 26

## 2019-12-16 RX ORDER — KETOROLAC TROMETHAMINE 30 MG/ML
15 SYRINGE (ML) INJECTION ONCE
Refills: 0 | Status: DISCONTINUED | OUTPATIENT
Start: 2019-12-16 | End: 2019-12-16

## 2019-12-16 RX ORDER — POTASSIUM CHLORIDE 20 MEQ
20 PACKET (EA) ORAL ONCE
Refills: 0 | Status: COMPLETED | OUTPATIENT
Start: 2019-12-16 | End: 2019-12-16

## 2019-12-16 RX ADMIN — Medication 0.25 MILLIGRAM(S): at 18:48

## 2019-12-16 RX ADMIN — Medication 650 MILLIGRAM(S): at 17:00

## 2019-12-16 RX ADMIN — Medication 30 MILLIGRAM(S): at 23:57

## 2019-12-16 RX ADMIN — Medication 10 MILLIGRAM(S): at 05:56

## 2019-12-16 RX ADMIN — LIDOCAINE 1 PATCH: 4 CREAM TOPICAL at 23:00

## 2019-12-16 RX ADMIN — Medication 30 MILLIGRAM(S): at 18:48

## 2019-12-16 RX ADMIN — Medication 0.5 MILLIGRAM(S): at 05:06

## 2019-12-16 RX ADMIN — DIVALPROEX SODIUM 250 MILLIGRAM(S): 500 TABLET, DELAYED RELEASE ORAL at 06:25

## 2019-12-16 RX ADMIN — Medication 81 MILLIGRAM(S): at 11:32

## 2019-12-16 RX ADMIN — Medication 0.5 MILLIGRAM(S): at 17:51

## 2019-12-16 RX ADMIN — LIDOCAINE 1 PATCH: 4 CREAM TOPICAL at 19:05

## 2019-12-16 RX ADMIN — HEPARIN SODIUM 12 UNIT(S)/HR: 5000 INJECTION INTRAVENOUS; SUBCUTANEOUS at 20:39

## 2019-12-16 RX ADMIN — CHLORHEXIDINE GLUCONATE 1 APPLICATION(S): 213 SOLUTION TOPICAL at 05:00

## 2019-12-16 RX ADMIN — SERTRALINE 50 MILLIGRAM(S): 25 TABLET, FILM COATED ORAL at 11:32

## 2019-12-16 RX ADMIN — DIVALPROEX SODIUM 250 MILLIGRAM(S): 500 TABLET, DELAYED RELEASE ORAL at 18:48

## 2019-12-16 RX ADMIN — AMIODARONE HYDROCHLORIDE 200 MILLIGRAM(S): 400 TABLET ORAL at 05:55

## 2019-12-16 RX ADMIN — Medication 15 MILLIGRAM(S): at 00:20

## 2019-12-16 RX ADMIN — Medication 0.25 MILLIGRAM(S): at 06:11

## 2019-12-16 RX ADMIN — LIDOCAINE 1 PATCH: 4 CREAM TOPICAL at 11:33

## 2019-12-16 RX ADMIN — Medication 30 MILLIGRAM(S): at 05:56

## 2019-12-16 RX ADMIN — Medication 650 MILLIGRAM(S): at 16:02

## 2019-12-16 RX ADMIN — Medication 100 MILLIEQUIVALENT(S): at 04:37

## 2019-12-16 RX ADMIN — PANTOPRAZOLE SODIUM 40 MILLIGRAM(S): 20 TABLET, DELAYED RELEASE ORAL at 06:12

## 2019-12-16 RX ADMIN — Medication 15 MILLIGRAM(S): at 01:00

## 2019-12-16 RX ADMIN — Medication 30 MILLIGRAM(S): at 11:32

## 2019-12-16 NOTE — PROGRESS NOTE BEHAVIORAL HEALTH - RISK ASSESSMENT
Low suicide risk; no history of SI/HI; future oriented; has risk of becoming agitated due to recent medical complications and location of strokes
Low risk: no history of SI/SA; supportive family, motivation to improve,
low risk, no SI
low risk, no SI
Low risk: no history of SI/SA; supportive family, motivation to improve,

## 2019-12-16 NOTE — PROGRESS NOTE BEHAVIORAL HEALTH - MUSCLE TONE / STRENGTH
Abnormal muscle tone/strength
Normal muscle tone/strength
Abnormal muscle tone/strength
Abnormal muscle tone/strength

## 2019-12-16 NOTE — PROGRESS NOTE BEHAVIORAL HEALTH - AXIS III
s/p surgery on the aorta

## 2019-12-16 NOTE — PROGRESS NOTE BEHAVIORAL HEALTH - PRIMARY DX
Adjustment disorder with disturbance of conduct

## 2019-12-16 NOTE — PROGRESS NOTE BEHAVIORAL HEALTH - NSBHADMITCOUNSEL_PSY_A_CORE
diagnostic results/impressions and/or recommended studies/importance of adherence to chosen treatment
diagnostic results/impressions and/or recommended studies/risks and benefits of treatment options/importance of adherence to chosen treatment

## 2019-12-16 NOTE — PROGRESS NOTE BEHAVIORAL HEALTH - NSBHCONSULTFOLLOWAFTERCARE_PSY_A_CORE FT
to be discussed at discharge
to address at discharge
to be determined at discharge
to be determined at discharge
to be discussed at discharge
to be discussed at discharge

## 2019-12-16 NOTE — PROGRESS NOTE ADULT - SUBJECTIVE AND OBJECTIVE BOX
CTICU  CRITICAL  CARE  attending     Hand off received 					   Pertinent clinical, laboratory, radiographic, hemodynamic, echocardiographic, respiratory data, microbiologic data and chart were reviewed and analyzed frequently throughout the course of the day and night  Patient seen and examined with CTS/ SH attending at bedside  Pt is a 69y , Male, HEALTH ISSUES - PROBLEM Dx:  Adjustment disorder with disturbance of conduct  Leukocytosis, unspecified type: Leukocytosis, unspecified type  Hypernatremia: Hypernatremia  Bacterial pneumonia: Bacterial pneumonia  Fever, postprocedural: Fever, postprocedural  FELY (acute kidney injury): FELY (acute kidney injury)      , FAMILY HISTORY:  PAST MEDICAL & SURGICAL HISTORY:  Benign prostatic hypertrophy without lower urinary tract symptoms: BPH (benign prostatic hypertrophy)  Ulcerative colitis: Ulcerative colitis  States following surgery of eye and adnexa: straightened right eye  Other postprocedural status: History of hernia repair  Hemorrhoids: Hemorrhoids  Acute appendicitis with generalized peritonitis: Ruptured appendix    Patient is a 69y old  Male who presents with a chief complaint of Aortic dissection (15 Dec 2019 11:05)      14 system review was unremarkable    Vital signs, hemodynamic and respiratory parameters were reviewed from the bedside nursing flowsheet.  ICU Vital Signs Last 24 Hrs  T(C): 36.4 (16 Dec 2019 18:16), Max: 36.9 (16 Dec 2019 14:00)  T(F): 97.6 (16 Dec 2019 18:16), Max: 98.4 (16 Dec 2019 14:00)  HR: 74 (16 Dec 2019 21:00) (66 - 82)  BP: 132/72 (16 Dec 2019 21:00) (94/63 - 141/81)  BP(mean): 87 (16 Dec 2019 21:00) (74 - 111)  ABP: 126/66 (16 Dec 2019 21:00) (98/58 - 148/84)  ABP(mean): 88 (16 Dec 2019 21:00) (68 - 108)  RR: 11 (16 Dec 2019 21:00) (11 - 20)  SpO2: 94% (16 Dec 2019 21:00) (88% - 100%)    Adult Advanced Hemodynamics Last 24 Hrs  CVP(mm Hg): --  CVP(cm H2O): --  CO: --  CI: --  PA: --  PA(mean): --  PCWP: --  SVR: --  SVRI: --  PVR: --  PVRI: --, ABG - ( 16 Dec 2019 14:29 )  pH, Arterial: 7.49  pH, Blood: x     /  pCO2: 36    /  pO2: 74    / HCO3: 27    / Base Excess: 3.6   /  SaO2: 95                  Intake and output was reviewed and the fluid balance was calculated  Daily     Daily   I&O's Summary    15 Dec 2019 07:01  -  16 Dec 2019 07:00  --------------------------------------------------------  IN: 1078 mL / OUT: 1335 mL / NET: -257 mL    16 Dec 2019 07:01  -  16 Dec 2019 22:08  --------------------------------------------------------  IN: 0 mL / OUT: 230 mL / NET: -230 mL        All lines and drain sites were assessed  [facility]      POCT Blood Glucose.: 135 mg/dL (15 Dec 2019 11:16)    No acute change in mental status  Auscultation of the chest reveals equal bs  Abdomen is soft  Extremities are warm and well perfused  Wounds appear clean and unremarkable  Antibiotics are periop    labs  CBC Full  -  ( 16 Dec 2019 14:33 )  WBC Count : 8.44 K/uL  RBC Count : 3.30 M/uL  Hemoglobin : 9.0 g/dL  Hematocrit : 28.8 %  Platelet Count - Automated : 254 K/uL  Mean Cell Volume : 87.3 fl  Mean Cell Hemoglobin : 27.3 pg  Mean Cell Hemoglobin Concentration : 31.3 gm/dL  Auto Neutrophil # : x  Auto Lymphocyte # : x  Auto Monocyte # : x  Auto Eosinophil # : x  Auto Basophil # : x  Auto Neutrophil % : x  Auto Lymphocyte % : x  Auto Monocyte % : x  Auto Eosinophil % : x  Auto Basophil % : x    12-16    139  |  101  |  16  ----------------------------<  111<H>  4.5   |  25  |  0.84    Ca    9.2      16 Dec 2019 14:33  Phos  3.3     12-16  Mg     2.2     12-16    TPro  5.6<L>  /  Alb  3.5  /  TBili  0.5  /  DBili  x   /  AST  14  /  ALT  16  /  AlkPhos  75  12-16    PT/INR - ( 16 Dec 2019 14:33 )   PT: 15.1 sec;   INR: 1.32          PTT - ( 16 Dec 2019 14:33 )  PTT:31.0 sec  The current medications were reviewed   MEDICATIONS  (STANDING):  ALPRAZolam 0.25 milliGRAM(s) Oral two times a day  aMIOdarone    Tablet 200 milliGRAM(s) Oral daily  aspirin  chewable 81 milliGRAM(s) Oral daily  buDESOnide    Inhalation Suspension 0.5 milliGRAM(s) Inhalation every 12 hours  chlorhexidine 2% Cloths 1 Application(s) Topical daily  dextrose 50% Injectable 50 milliLiter(s) IV Push every 15 minutes  diltiazem    Tablet 30 milliGRAM(s) Oral every 6 hours  diVALproex Sprinkle 250 milliGRAM(s) Oral every 12 hours  heparin  Infusion 1200 Unit(s)/Hr (12 mL/Hr) IV Continuous <Continuous>  lidocaine   Patch 1 Patch Transdermal daily  pantoprazole    Tablet 40 milliGRAM(s) Oral before breakfast  sertraline 50 milliGRAM(s) Oral daily  sodium chloride 0.9%. 1000 milliLiter(s) (10 mL/Hr) IV Continuous <Continuous>    MEDICATIONS  (PRN):  acetaminophen    Suspension .. 650 milliGRAM(s) Oral every 6 hours PRN Mild Pain (1 - 3)  bisacodyl Suppository 10 milliGRAM(s) Rectal daily PRN Constipation  simethicone 80 milliGRAM(s) Chew three times a day PRN Gas       PROBLEM LIST/ ASSESSMENT:  HEALTH ISSUES - PROBLEM Dx:  Adjustment disorder with disturbance of conduct  Leukocytosis, unspecified type: Leukocytosis, unspecified type  Hypernatremia: Hypernatremia  Bacterial pneumonia: Bacterial pneumonia  Fever, postprocedural: Fever, postprocedural  FELY (acute kidney injury): FELY (acute kidney injury)      ,   Patient is a 69y old  Male who presents with a chief complaint of Aortic dissection (15 Dec 2019 11:05)     s/p cardiac surgery              My plan includes :  close hemodynamic, ventilatory and drain monitoring and management per post op routine    Monitor for arrhythmias and monitor parameters for organ perfusion  beta blockade not administered due to hemodynamic instability and bradycardia  monitor neurologic status  Head of the bed should remain elevated to 45 deg .   chest PT and IS will be encouraged  monitor adequacy of oxygenation and ventilation and attempt to wean oxygen  antibiotic regimen will be tailored to the clinical, laboratory and microbiologic data  Nutritional goals will be met using po eventually , ensure adequate caloric intake and montior the same  Stress ulcer and VTE prophylaxis will be achieved    Glycemic control is satisfactory  Electrolytes have been repleted as necessary and wound care has been carried out. Pain control has been achieved.   agressive physical therapy and early mobility and ambulation goals will be met   The family was updated about the course and plan  CRITICAL CARE TIME personally provided by me  in evaluation and management, reassessments, review and interpretation of labs and x-rays, ventilator and hemodynamic management, formulating a plan and coordinating care: ___90____ MIN.  Time does not include procedural time.  CTICU ATTENDING     					    Jaret Hebert MD

## 2019-12-16 NOTE — PROGRESS NOTE BEHAVIORAL HEALTH - PERCEPTIONS
No abnormalities/Other
Other/No abnormalities
No abnormalities/Other

## 2019-12-16 NOTE — PROGRESS NOTE ADULT - SUBJECTIVE AND OBJECTIVE BOX
CTICU  CRITICAL  CARE  attending     Hand off received 					   Pertinent clinical, laboratory, radiographic, hemodynamic, echocardiographic, respiratory data, microbiologic data and chart were reviewed and analyzed frequently throughout the course of the day and night  Patient seen and examined with CTS/ SH attending at bedside    Pt is a 69y , Male, Male, post op day # 23; s/p emergent Type A dissection repair;    post op:    acute CVA; bilat frontal lobe infarcts+ watershed areas  pericardial effusion; s/p drainage  bilateral pleural effusions/ s/p right pigtail thoracentesis;  atrial fibrillation with variable VR  acute post H'gic anemia    currently    bilateral pleural effusions; s/p left pigtail drainage,   behavioural issues;      Adjustment disorder with disturbance of conduct  Leukocytosis, unspecified type: Leukocytosis, unspecified type  Hypernatremia: Hypernatremia  Bacterial pneumonia: Bacterial pneumonia  Fever, postprocedural: Fever, postprocedural  FELY (acute kidney injury): FELY (acute kidney injury)      , FAMILY HISTORY:  PAST MEDICAL & SURGICAL HISTORY:  Benign prostatic hypertrophy without lower urinary tract symptoms: BPH (benign prostatic hypertrophy)  Ulcerative colitis: Ulcerative colitis  States following surgery of eye and adnexa: straightened right eye  Other postprocedural status: History of hernia repair  Hemorrhoids: Hemorrhoids  Acute appendicitis with generalized peritonitis: Ruptured appendix    Patient is a 69y old  Male who presents with a chief complaint of Aortic dissection (16 Dec 2019 22:07)      14 system review was unremarkable  acute changes include acute respiratory failure  Vital signs, hemodynamic and respiratory parameters were reviewed from the bedside nursing flowsheet.  ICU Vital Signs Last 24 Hrs  T(C): 37.2 (16 Dec 2019 22:57), Max: 37.2 (16 Dec 2019 22:57)  T(F): 98.9 (16 Dec 2019 22:57), Max: 98.9 (16 Dec 2019 22:57)  HR: 74 (16 Dec 2019 22:00) (66 - 82)  BP: 127/83 (16 Dec 2019 22:00) (94/63 - 141/81)  BP(mean): 97 (16 Dec 2019 22:00) (74 - 111)  ABP: 126/64 (16 Dec 2019 22:00) (98/58 - 148/84)  ABP(mean): 86 (16 Dec 2019 22:00) (68 - 108)  RR: 15 (16 Dec 2019 22:00) (11 - 20)  SpO2: 86% (16 Dec 2019 22:00) (86% - 100%)    Adult Advanced Hemodynamics Last 24 Hrs  CVP(mm Hg): --  CVP(cm H2O): --  CO: --  CI: --  PA: --  PA(mean): --  PCWP: --  SVR: --  SVRI: --  PVR: --  PVRI: --, ABG - ( 16 Dec 2019 14:29 )  pH, Arterial: 7.49  pH, Blood: x     /  pCO2: 36    /  pO2: 74    / HCO3: 27    / Base Excess: 3.6   /  SaO2: 95                  Intake and output was reviewed and the fluid balance was calculated  Daily     Daily   I&O's Summary    15 Dec 2019 07:01  -  16 Dec 2019 07:00  --------------------------------------------------------  IN: 1078 mL / OUT: 1335 mL / NET: -257 mL    16 Dec 2019 07:01  -  16 Dec 2019 23:15  --------------------------------------------------------  IN: 0 mL / OUT: 230 mL / NET: -230 mL        All lines and drain sites were assessed  Glycemic trend was reviewedCAPILLARY BLOOD GLUCOSE      POCT Blood Glucose.: 135 mg/dL (15 Dec 2019 11:16)    No acute change in mental status  Auscultation of the chest reveals equal bs  Abdomen is soft  Extremities are warm and well perfused  Wounds appear clean and unremarkable  Antibiotics are periop    labs  CBC Full  -  ( 16 Dec 2019 14:33 )  WBC Count : 8.44 K/uL  RBC Count : 3.30 M/uL  Hemoglobin : 9.0 g/dL  Hematocrit : 28.8 %  Platelet Count - Automated : 254 K/uL  Mean Cell Volume : 87.3 fl  Mean Cell Hemoglobin : 27.3 pg  Mean Cell Hemoglobin Concentration : 31.3 gm/dL  Auto Neutrophil # : x  Auto Lymphocyte # : x  Auto Monocyte # : x  Auto Eosinophil # : x  Auto Basophil # : x  Auto Neutrophil % : x  Auto Lymphocyte % : x  Auto Monocyte % : x  Auto Eosinophil % : x  Auto Basophil % : x    12-16    139  |  101  |  16  ----------------------------<  111<H>  4.5   |  25  |  0.84    Ca    9.2      16 Dec 2019 14:33  Phos  3.3     12-16  Mg     2.2     12-16    TPro  5.6<L>  /  Alb  3.5  /  TBili  0.5  /  DBili  x   /  AST  14  /  ALT  16  /  AlkPhos  75  12-16    PT/INR - ( 16 Dec 2019 14:33 )   PT: 15.1 sec;   INR: 1.32          PTT - ( 16 Dec 2019 14:33 )  PTT:31.0 sec  The current medications were reviewed   MEDICATIONS  (STANDING):  ALPRAZolam 0.25 milliGRAM(s) Oral two times a day  aMIOdarone    Tablet 200 milliGRAM(s) Oral daily  aspirin  chewable 81 milliGRAM(s) Oral daily  buDESOnide    Inhalation Suspension 0.5 milliGRAM(s) Inhalation every 12 hours  chlorhexidine 2% Cloths 1 Application(s) Topical daily  dextrose 50% Injectable 50 milliLiter(s) IV Push every 15 minutes  diltiazem    Tablet 30 milliGRAM(s) Oral every 6 hours  diVALproex Sprinkle 250 milliGRAM(s) Oral every 12 hours  heparin  Infusion 1200 Unit(s)/Hr (12 mL/Hr) IV Continuous <Continuous>  lidocaine   Patch 1 Patch Transdermal daily  pantoprazole    Tablet 40 milliGRAM(s) Oral before breakfast  sertraline 50 milliGRAM(s) Oral daily  sodium chloride 0.9%. 1000 milliLiter(s) (10 mL/Hr) IV Continuous <Continuous>    MEDICATIONS  (PRN):  acetaminophen    Suspension .. 650 milliGRAM(s) Oral every 6 hours PRN Mild Pain (1 - 3)  bisacodyl Suppository 10 milliGRAM(s) Rectal daily PRN Constipation  simethicone 80 milliGRAM(s) Chew three times a day PRN Gas       PROBLEM LIST/ ASSESSMENT:  HEALTH ISSUES - PROBLEM Dx:  Adjustment disorder with disturbance of conduct  Leukocytosis, unspecified type: Leukocytosis, unspecified type  Hypernatremia: Hypernatremia  Bacterial pneumonia: Bacterial pneumonia  Fever, postprocedural: Fever, postprocedural  FELY (acute kidney injury): FELY (acute kidney injury)      ,   Patient is a 69y old  Male who presents with a chief complaint of Aortic dissection (16 Dec 2019 22:07)     s/p emergent Type A dissection repair;  acute changes include acute respiratory failure    My plan includes :  close hemodynamic, ventilatory and drain monitoring and management per post op routine    Monitor for arrhythmias and monitor parameters for organ perfusion  monitor neurologic status  Head of the bed should remain elevated to 45 deg .   chest PT and IS will be encouraged  monitor adequacy of oxygenation and ventilation and attempt to wean oxygen  Nutritional goals will be met using po eventually , ensure adequate caloric intake and montior the same  Stress ulcer and VTE prophylaxis will be achieved    Glycemic control is satisfactory  Electrolytes have been repleted as necessary and wound care has been carried out. Pain control has been achieved.   agressive physical therapy and early mobility and ambulation goals will be met   The family was updated about the course and plan  CRITICAL CARE TIME SPENT in evaluation and management, reassessments, review and interpretation of labs and x-rays, ventilator and hemodynamic management, formulating a plan and coordinating care  :30____ MIN.  Time does not include procedural time.  CTICU ATTENDING     					    Luis Loving MD

## 2019-12-16 NOTE — PROGRESS NOTE BEHAVIORAL HEALTH - NSBHFUPINTERVALHXFT_PSY_A_CORE
Patient is a 69 year old domiciled, , , English speaking male with a PMHx of colitis and prostate surgery and no PPHx.  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  Psychiatry consulted at the request of his family due to concerns regarding agitation and irritability.    Patient interviewed at the bedside A & O x3 and while displaying expressive aphasia he is able to participate in a limited interview by nodding yes or no.  Currently denies any complaints and was able to confirm the correct date/year when given choices.  Denies SI/SA/VH/AH/PI.  Difficult to ascertain any mood symptoms given his aphasia.     When speaking with his nurse and medical team they report patient appears calm and content when left alone, but when attempting to for example turn him or change an IV he becomes agitated and irritable preventing people from touching him.  Medical team reports that patient is able to speak simple statements such as “don’t touch” and the chart notes that his wife reports having a full conversation over the phone with him.
Since most recent visit, patient's agitation has been somewhat better controlled. Per CTICU team it has been manageable. The patient states that he is aware of his episodes of agitation, relates them to difficulty coping with the stroke, and also complains that he becomes particularly agitated when being moved because of back pain. At the time of interview patient is only communicating through head nods and gestures, though per CTICU team has been intermittently able to communicate brief sentences and write/type, however he is not doing that at this time. Patient states he feels less depressed and more positive than yesterday but that he still feels significantly stressed and depressed due to frustration from his new limitations. Though requiring high flow nasal cannula, he denies any feelings of anxiety and states that his breathing is not distressing him. He denies SI/HI, hallucinations, confusion, or insomnia and states he is agreeable to going to rehab, though he has intermittently been refusing interventions on the unit.
Patient continues to have frustration, depressive symptoms, decreased motivation from baseline, and difficulty participating with his care. However these symptoms appear slightly improved from yesterday on examination today. Patient is more participatory on exam, complying with writing tasks and occasionally speaking as well. Today patient is sitting up in chair which is improvement from previous max of dangling at the edge of the bed, nursing states it took significant effort to get patient in chair as he became agitated during movement. He states minimal pain and reports his mood as similar to yesterday, communicating a "so-so" motion. He feels frustrated and depressed and is unsure if the medications have helped at all.
Per nursing staff patient has had fewer episodes of agitation but is still difficult to move. He states he did not want to sit in the chair today and is still having difficulty participating. When asked if his mood is any better or worse he states it is unchanged. He does not feel that the medications are necessarily doing anything. However he is also more participatory with interview, yesterday speaking to the interview, today he is more interactive and vocalizing though not saying any words. His mood appears better, patient smiling and occasionally laughing. With PT patient was able to perform sit to stand but still requires maximal encouragement, refused to stand with OT. There are no signs of lethargy, confusion, or other medication induced side effects.
Since most recent visit, patient has been unable to comply with interventions such as physical therapy, limited participation with speech therapy. Agitation has been more manageable and generally only occurs when being moved. On discussion with patient today, he is still not verbalizing but today is writing responses on paper. He is communicating via nods and gestures. Patient feels that it takes a significant mental effort to plan and execute activities but also that he feels less motivated and more frustrated due to his prolonged hospitalization and new limitations. He is exhibiting a need for increased control over his situation and requests to brush his teeth during the interview. He does not feel that his current medication regimen is helping with his depression and mood swings. He does feel that his pain has been better controlled.
Patient seen at bedside. He presented in the chair. He reports that he has been experiencing high back pain. He states that his pain has been contributing to his inability to participate in physical rehab. No other mood/psychotic sx verbalized.   As per the nursing staff, patient becomes agitated when the staff tries to mobilize him (?pain).

## 2019-12-16 NOTE — PROGRESS NOTE BEHAVIORAL HEALTH - NSBHFUPINTERVALCCFT_PSY_A_CORE
Depression, withdrawal from care
depression, agitation
s/p aorta surgery c/b stroke with expressive aphasia
Depression, agitation
Depression/lack of motivation
Depression, decreased motivation

## 2019-12-16 NOTE — PROGRESS NOTE BEHAVIORAL HEALTH - SUMMARY
Patient is a 69 year old domiciled, , , English speaking male with a PMHx of colitis and prostate surgery and no PPHx.  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  Psychiatry consulted at the request of his family due to concerns regarding agitation and irritability, agitation slightly improved but now more problems with withdrawal from care and becoming nonparticipatory, affecting ability to go to rehab.       Recommendations:   1. Continue depakote 250mg bid.  2. Consider starting a standing pain agent  3. Continue Zoloft 50mg daily  4. Limit Xanax if possible as this may increase agitation  5. Patient is not a candidate for ECT/stimulants Patient is a 69 year old domiciled, , , English speaking male with a PMHx of colitis and prostate surgery and no PPHx.  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  Psychiatry consulted at the request of his family due to concerns regarding agitation and irritability, agitation slightly improved but now more problems with withdrawal from care and becoming nonparticipatory, affecting ability to go to rehab.   Patient's episodes of agitation may be in context of poorly controlled pain. His mood are likely due to an adjustment disorder vs abulia sec to stroke vs underlying personality traits exacerbated by current stressors    Recommendations:   1. Continue depakote 250mg bid.  2. Consider starting a standing pain agent   3. Continue Zoloft 50mg daily  4. Limit Xanax if possible as this may increase agitation  5. Patient is not a candidate for ECT/stimulants

## 2019-12-16 NOTE — PROGRESS NOTE BEHAVIORAL HEALTH - NSBHATTESTSEENBY_PSY_A_CORE
attending Psychiatrist without NP/Trainee
Attending Psychiatrist supervising NP/Trainee, meeting pt...
attending Psychiatrist without NP/Trainee
Attending Psychiatrist supervising NP/Trainee, meeting pt...

## 2019-12-16 NOTE — CHART NOTE - NSCHARTNOTEFT_GEN_A_CORE
Admitting Diagnosis:   Patient is a 69y old  Male who presents with a chief complaint of Aortic dissection (15 Dec 2019 11:05)      PAST MEDICAL & SURGICAL HISTORY:  Benign prostatic hypertrophy without lower urinary tract symptoms: BPH (benign prostatic hypertrophy)  Ulcerative colitis: Ulcerative colitis  States following surgery of eye and adnexa: straightened right eye  Other postprocedural status: History of hernia repair  Hemorrhoids: Hemorrhoids  Acute appendicitis with generalized peritonitis: Ruptured appendix      Current Nutrition Order:   Mechanical Soft w/ Honey Thickened Liquids  DASH/TLC  CSTCHO  EnsureEnlive BID (700kcal, 40g pro)    PO Intake: Good (%) [   ]  Fair (50-75%) [   ] Poor (<25%) [  x ]  Suspect 25% of meals being consumed. Pt shook his head no to consuming Ensures. Per RN, pt has been eating but requires total support and encouragement at meal times. Will not drink/eat on his own.     GI Issues:   No apparent GI distress  Last BM 12/13 per flowsheet    Pain:  No pain reported  Being medically managed    Skin Integrity:  MSI  beth score 14  Posterior midline skin tear      Labs:   12-16    137  |  102  |  14  ----------------------------<  101<H>  3.9   |  27  |  0.88    Ca    8.7      16 Dec 2019 02:42  Phos  3.4     12-16  Mg     2.1     12-16    TPro  5.1<L>  /  Alb  2.9<L>  /  TBili  0.4  /  DBili  x   /  AST  17  /  ALT  16  /  AlkPhos  66  12-16    CAPILLARY BLOOD GLUCOSE          Medications:  MEDICATIONS  (STANDING):  ALPRAZolam 0.25 milliGRAM(s) Oral two times a day  aMIOdarone    Tablet 200 milliGRAM(s) Oral daily  aspirin  chewable 81 milliGRAM(s) Oral daily  buDESOnide    Inhalation Suspension 0.5 milliGRAM(s) Inhalation every 12 hours  chlorhexidine 2% Cloths 1 Application(s) Topical daily  dextrose 50% Injectable 50 milliLiter(s) IV Push every 15 minutes  diltiazem    Tablet 30 milliGRAM(s) Oral every 6 hours  diVALproex Sprinkle 250 milliGRAM(s) Oral every 12 hours  heparin  Infusion 1200 Unit(s)/Hr (12 mL/Hr) IV Continuous <Continuous>  lidocaine   Patch 1 Patch Transdermal daily  pantoprazole    Tablet 40 milliGRAM(s) Oral before breakfast  sertraline 50 milliGRAM(s) Oral daily  sodium chloride 0.9%. 1000 milliLiter(s) (10 mL/Hr) IV Continuous <Continuous>    MEDICATIONS  (PRN):  acetaminophen    Suspension .. 650 milliGRAM(s) Oral every 6 hours PRN Mild Pain (1 - 3)  bisacodyl Suppository 10 milliGRAM(s) Rectal daily PRN Constipation  simethicone 80 milliGRAM(s) Chew three times a day PRN Gas      Weight:   185lbs (11/23)  202lbs (12/11- bedscale)  Height: 6'0", IBW 178lbs+/-10%, %%, BMI 25.1   Daily     Weight Change:   No known wt changes PTA per pt.    Please obtain updated weight for trending. Pt has been sitting in chair at last few follow-ups. Unable to obtain new bedscale weight.     Estimated energy needs:   ABW (84.1kg) used for calculations as pt between % of IBW.   Nutrient needs based on [facility] standards of care for maintenance in older adults.   Needs adjusted for age, post-op healing, inflammatory state  2102-2523kcal/day (25-30kcal/kg)  101-118g pro/day (1.2-1.4g pro/kg)  Fluids per team 2/2 increased MAP requirements    Subjective:   68yo M w/ PMH ulcerative colitis, BIBA to [facility] w/ c/o back pain and profound hypotension. Was emergently taken to the OR for salvage Type A dissection repair. Pt now s/p aortic arch repair and ascending arch replacement, Cabrol reconstruction of coronary ostia, replacement of ascending aorta/maynor arch, and frozen elephant trunk (11/23). Nephrology consulted for FELY. Was successfully extubated 12/1. Post op course c/b expressive aphasia s/p extubation; concern for stroke etiology 2/2 hypoperfusion. 12/5 Pt was found to have pericardial effusion s/p IR drainage 12/6 requiring urgent reintubation for procedure- extubated 12/7 to [facility].  Pt continues to be intermittently aggitated and combative with staff- prolonging medical course 2/2 refusing treatments/PT/OT. Pt is now ready for acute rehab mid-week, pending response from facilities. Pt seen in room, resting in bed. MAP 92 at time of assessment, not requiring pressors at this time, heparin gtt infusing. Per SLP recommendations, pt is ordered for a mechanical soft diet w/ honey thickened liquids. Per RN, pt is eating but requires support and encouragement-will not eat on his own, requires feeding assistance from RN/PCA. Pt denied having Ensures- educated on importance of adequate nutrition intake, pt appeared disinterested. No apparent GI distress, last BM 12/13. Continue w/ current diet order- honor pts food preferences, was willing to try different flavors of ONS at prior f/u. Consider SLP re-eval to assess for possible diet liberalization. Please encourage Ensures at or between meals. Appreciate support provided at meal time. RD to follow.     Previous Nutrition Diagnosis:  increased nutrient needs RT increased demand for kcal/pro AEB s/p aortic arch repair and ascending arch replacement, Cabrol reconstruction of coronary ostia, replacement of ascending aorta/maynor arch, and frozen elephant trunk  Active [  x ]  Resolved [   ]    If resolved, new PES:     Goal:  Pt to consistently meet % of estimated needs PO     Recommendations:  1. Continue on current diet order  2. Consistency per SLP recs, consider f/u re-eval for possible upgrade in consistency.   3. Monitor for s/s intolerance, issues chewing/swallowing  4. Obtain updated weight.   5. Appreciate support and encouragement provided at meal time  6. MVI daily, not meeting 100% RDIs w/ continued poor PO intake.     Education:   Option of ONS. Modified consistencies. Increased needs post-op.    Risk Level: High [ x  ] Moderate [   ] Low [   ].

## 2019-12-17 LAB
ALBUMIN SERPL ELPH-MCNC: 3.2 G/DL — LOW (ref 3.3–5)
ALBUMIN SERPL ELPH-MCNC: 3.4 G/DL — SIGNIFICANT CHANGE UP (ref 3.3–5)
ALP SERPL-CCNC: 70 U/L — SIGNIFICANT CHANGE UP (ref 40–120)
ALP SERPL-CCNC: 79 U/L — SIGNIFICANT CHANGE UP (ref 40–120)
ALT FLD-CCNC: 13 U/L — SIGNIFICANT CHANGE UP (ref 10–45)
ALT FLD-CCNC: 16 U/L — SIGNIFICANT CHANGE UP (ref 10–45)
ANION GAP SERPL CALC-SCNC: 9 MMOL/L — SIGNIFICANT CHANGE UP (ref 5–17)
ANION GAP SERPL CALC-SCNC: 9 MMOL/L — SIGNIFICANT CHANGE UP (ref 5–17)
APTT BLD: 30.7 SEC — SIGNIFICANT CHANGE UP (ref 27.5–36.3)
APTT BLD: 67 SEC — HIGH (ref 27.5–36.3)
APTT BLD: 74.8 SEC — HIGH (ref 27.5–36.3)
APTT BLD: 84 SEC — HIGH (ref 27.5–36.3)
AST SERPL-CCNC: 15 U/L — SIGNIFICANT CHANGE UP (ref 10–40)
AST SERPL-CCNC: 16 U/L — SIGNIFICANT CHANGE UP (ref 10–40)
BILIRUB SERPL-MCNC: 0.4 MG/DL — SIGNIFICANT CHANGE UP (ref 0.2–1.2)
BILIRUB SERPL-MCNC: 0.4 MG/DL — SIGNIFICANT CHANGE UP (ref 0.2–1.2)
BUN SERPL-MCNC: 14 MG/DL — SIGNIFICANT CHANGE UP (ref 7–23)
BUN SERPL-MCNC: 17 MG/DL — SIGNIFICANT CHANGE UP (ref 7–23)
CALCIUM SERPL-MCNC: 8.6 MG/DL — SIGNIFICANT CHANGE UP (ref 8.4–10.5)
CALCIUM SERPL-MCNC: 9.3 MG/DL — SIGNIFICANT CHANGE UP (ref 8.4–10.5)
CHLORIDE SERPL-SCNC: 101 MMOL/L — SIGNIFICANT CHANGE UP (ref 96–108)
CHLORIDE SERPL-SCNC: 101 MMOL/L — SIGNIFICANT CHANGE UP (ref 96–108)
CO2 SERPL-SCNC: 27 MMOL/L — SIGNIFICANT CHANGE UP (ref 22–31)
CO2 SERPL-SCNC: 28 MMOL/L — SIGNIFICANT CHANGE UP (ref 22–31)
CREAT SERPL-MCNC: 0.87 MG/DL — SIGNIFICANT CHANGE UP (ref 0.5–1.3)
CREAT SERPL-MCNC: 0.92 MG/DL — SIGNIFICANT CHANGE UP (ref 0.5–1.3)
GAS PNL BLDA: SIGNIFICANT CHANGE UP
GLUCOSE SERPL-MCNC: 111 MG/DL — HIGH (ref 70–99)
GLUCOSE SERPL-MCNC: 97 MG/DL — SIGNIFICANT CHANGE UP (ref 70–99)
HCT VFR BLD CALC: 27.6 % — LOW (ref 39–50)
HCT VFR BLD CALC: 28.9 % — LOW (ref 39–50)
HGB BLD-MCNC: 8.7 G/DL — LOW (ref 13–17)
HGB BLD-MCNC: 8.8 G/DL — LOW (ref 13–17)
INR BLD: 1.31 — HIGH (ref 0.88–1.16)
INR BLD: 1.4 — HIGH (ref 0.88–1.16)
MAGNESIUM SERPL-MCNC: 2.2 MG/DL — SIGNIFICANT CHANGE UP (ref 1.6–2.6)
MAGNESIUM SERPL-MCNC: 2.2 MG/DL — SIGNIFICANT CHANGE UP (ref 1.6–2.6)
MCHC RBC-ENTMCNC: 27.2 PG — SIGNIFICANT CHANGE UP (ref 27–34)
MCHC RBC-ENTMCNC: 27.5 PG — SIGNIFICANT CHANGE UP (ref 27–34)
MCHC RBC-ENTMCNC: 30.4 GM/DL — LOW (ref 32–36)
MCHC RBC-ENTMCNC: 31.5 GM/DL — LOW (ref 32–36)
MCV RBC AUTO: 87.3 FL — SIGNIFICANT CHANGE UP (ref 80–100)
MCV RBC AUTO: 89.2 FL — SIGNIFICANT CHANGE UP (ref 80–100)
NRBC # BLD: 0 /100 WBCS — SIGNIFICANT CHANGE UP (ref 0–0)
NRBC # BLD: 0 /100 WBCS — SIGNIFICANT CHANGE UP (ref 0–0)
PHOSPHATE SERPL-MCNC: 3.3 MG/DL — SIGNIFICANT CHANGE UP (ref 2.5–4.5)
PHOSPHATE SERPL-MCNC: 3.8 MG/DL — SIGNIFICANT CHANGE UP (ref 2.5–4.5)
PLATELET # BLD AUTO: 219 K/UL — SIGNIFICANT CHANGE UP (ref 150–400)
PLATELET # BLD AUTO: 272 K/UL — SIGNIFICANT CHANGE UP (ref 150–400)
POTASSIUM SERPL-MCNC: 4.1 MMOL/L — SIGNIFICANT CHANGE UP (ref 3.5–5.3)
POTASSIUM SERPL-MCNC: 4.6 MMOL/L — SIGNIFICANT CHANGE UP (ref 3.5–5.3)
POTASSIUM SERPL-SCNC: 4.1 MMOL/L — SIGNIFICANT CHANGE UP (ref 3.5–5.3)
POTASSIUM SERPL-SCNC: 4.6 MMOL/L — SIGNIFICANT CHANGE UP (ref 3.5–5.3)
PROT SERPL-MCNC: 5.4 G/DL — LOW (ref 6–8.3)
PROT SERPL-MCNC: 5.7 G/DL — LOW (ref 6–8.3)
PROTHROM AB SERPL-ACNC: 14.9 SEC — HIGH (ref 10–12.9)
PROTHROM AB SERPL-ACNC: 16 SEC — HIGH (ref 10–12.9)
RBC # BLD: 3.16 M/UL — LOW (ref 4.2–5.8)
RBC # BLD: 3.24 M/UL — LOW (ref 4.2–5.8)
RBC # FLD: 15 % — HIGH (ref 10.3–14.5)
RBC # FLD: 15.2 % — HIGH (ref 10.3–14.5)
SODIUM SERPL-SCNC: 137 MMOL/L — SIGNIFICANT CHANGE UP (ref 135–145)
SODIUM SERPL-SCNC: 138 MMOL/L — SIGNIFICANT CHANGE UP (ref 135–145)
WBC # BLD: 7.46 K/UL — SIGNIFICANT CHANGE UP (ref 3.8–10.5)
WBC # BLD: 8.31 K/UL — SIGNIFICANT CHANGE UP (ref 3.8–10.5)
WBC # FLD AUTO: 7.46 K/UL — SIGNIFICANT CHANGE UP (ref 3.8–10.5)
WBC # FLD AUTO: 8.31 K/UL — SIGNIFICANT CHANGE UP (ref 3.8–10.5)

## 2019-12-17 PROCEDURE — 99291 CRITICAL CARE FIRST HOUR: CPT

## 2019-12-17 PROCEDURE — 99292 CRITICAL CARE ADDL 30 MIN: CPT

## 2019-12-17 PROCEDURE — 71045 X-RAY EXAM CHEST 1 VIEW: CPT | Mod: 26,76

## 2019-12-17 RX ORDER — MIDAZOLAM HYDROCHLORIDE 1 MG/ML
5 INJECTION, SOLUTION INTRAMUSCULAR; INTRAVENOUS ONCE
Refills: 0 | Status: DISCONTINUED | OUTPATIENT
Start: 2019-12-17 | End: 2019-12-17

## 2019-12-17 RX ADMIN — Medication 30 MILLIGRAM(S): at 11:04

## 2019-12-17 RX ADMIN — Medication 0.25 MILLIGRAM(S): at 17:33

## 2019-12-17 RX ADMIN — Medication 0.25 MILLIGRAM(S): at 05:14

## 2019-12-17 RX ADMIN — Medication 30 MILLIGRAM(S): at 23:45

## 2019-12-17 RX ADMIN — Medication 30 MILLIGRAM(S): at 17:33

## 2019-12-17 RX ADMIN — Medication 0.5 MILLIGRAM(S): at 06:12

## 2019-12-17 RX ADMIN — Medication 650 MILLIGRAM(S): at 07:20

## 2019-12-17 RX ADMIN — CHLORHEXIDINE GLUCONATE 1 APPLICATION(S): 213 SOLUTION TOPICAL at 05:02

## 2019-12-17 RX ADMIN — DIVALPROEX SODIUM 250 MILLIGRAM(S): 500 TABLET, DELAYED RELEASE ORAL at 17:33

## 2019-12-17 RX ADMIN — SERTRALINE 50 MILLIGRAM(S): 25 TABLET, FILM COATED ORAL at 11:04

## 2019-12-17 RX ADMIN — MIDAZOLAM HYDROCHLORIDE 5 MILLIGRAM(S): 1 INJECTION, SOLUTION INTRAMUSCULAR; INTRAVENOUS at 12:00

## 2019-12-17 RX ADMIN — Medication 81 MILLIGRAM(S): at 11:04

## 2019-12-17 RX ADMIN — AMIODARONE HYDROCHLORIDE 200 MILLIGRAM(S): 400 TABLET ORAL at 05:14

## 2019-12-17 RX ADMIN — Medication 650 MILLIGRAM(S): at 06:54

## 2019-12-17 RX ADMIN — HEPARIN SODIUM 12 UNIT(S)/HR: 5000 INJECTION INTRAVENOUS; SUBCUTANEOUS at 23:41

## 2019-12-17 RX ADMIN — Medication 0.5 MILLIGRAM(S): at 17:10

## 2019-12-17 RX ADMIN — Medication 30 MILLIGRAM(S): at 05:14

## 2019-12-17 RX ADMIN — PANTOPRAZOLE SODIUM 40 MILLIGRAM(S): 20 TABLET, DELAYED RELEASE ORAL at 05:14

## 2019-12-17 RX ADMIN — DIVALPROEX SODIUM 250 MILLIGRAM(S): 500 TABLET, DELAYED RELEASE ORAL at 05:14

## 2019-12-17 RX ADMIN — SIMETHICONE 80 MILLIGRAM(S): 80 TABLET, CHEWABLE ORAL at 21:39

## 2019-12-17 NOTE — PROGRESS NOTE ADULT - SUBJECTIVE AND OBJECTIVE BOX
CTICU  CRITICAL  CARE  attending     Hand off received 					   Pertinent clinical, laboratory, radiographic, hemodynamic, echocardiographic, respiratory data, microbiologic data and chart were reviewed and analyzed frequently throughout the course of the day and night  Patient seen and examined with CTS/ SH attending at bedside  Pt is a 69y , Male, HEALTH ISSUES - PROBLEM Dx:  Adjustment disorder with disturbance of conduct  Leukocytosis, unspecified type: Leukocytosis, unspecified type  Hypernatremia: Hypernatremia  Bacterial pneumonia: Bacterial pneumonia  Fever, postprocedural: Fever, postprocedural  FELY (acute kidney injury): FELY (acute kidney injury)      , FAMILY HISTORY:  PAST MEDICAL & SURGICAL HISTORY:  Benign prostatic hypertrophy without lower urinary tract symptoms: BPH (benign prostatic hypertrophy)  Ulcerative colitis: Ulcerative colitis  States following surgery of eye and adnexa: straightened right eye  Other postprocedural status: History of hernia repair  Hemorrhoids: Hemorrhoids  Acute appendicitis with generalized peritonitis: Ruptured appendix    Patient is a 69y old  Male who presents with a chief complaint of Aortic dissection (16 Dec 2019 23:14)      14 system review was unremarkable    Vital signs, hemodynamic and respiratory parameters were reviewed from the bedside nursing flowsheet.  ICU Vital Signs Last 24 Hrs  T(C): 36.7 (17 Dec 2019 22:09), Max: 37.6 (17 Dec 2019 01:01)  T(F): 98 (17 Dec 2019 22:09), Max: 99.6 (17 Dec 2019 01:01)  HR: 78 (17 Dec 2019 22:00) (66 - 84)  BP: 122/70 (17 Dec 2019 13:00) (98/65 - 144/77)  BP(mean): 95 (17 Dec 2019 13:00) (69 - 101)  ABP: 112/64 (17 Dec 2019 22:00) (84/48 - 138/68)  ABP(mean): 82 (17 Dec 2019 22:00) (60 - 94)  RR: 10 (17 Dec 2019 22:00) (9 - 39)  SpO2: 94% (17 Dec 2019 22:00) (78% - 98%)    Adult Advanced Hemodynamics Last 24 Hrs  CVP(mm Hg): --  CVP(cm H2O): --  CO: --  CI: --  PA: --  PA(mean): --  PCWP: --  SVR: --  SVRI: --  PVR: --  PVRI: --, ABG - ( 17 Dec 2019 05:10 )  pH, Arterial: 7.49  pH, Blood: x     /  pCO2: 39    /  pO2: 67    / HCO3: 29    / Base Excess: 5.2   /  SaO2: 93                  Intake and output was reviewed and the fluid balance was calculated  Daily     Daily   I&O's Summary    16 Dec 2019 07:01  -  17 Dec 2019 07:00  --------------------------------------------------------  IN: 120 mL / OUT: 300 mL / NET: -180 mL    17 Dec 2019 07:01  -  17 Dec 2019 22:11  --------------------------------------------------------  IN: 0 mL / OUT: 20 mL / NET: -20 mL        All lines and drain sites were assessed  Glycemic trend was reviewedCAPILLARY BLOOD GLUCOSE        No acute change in mental status  Auscultation of the chest reveals equal bs  Abdomen is soft  Extremities are warm and well perfused  Wounds appear clean and unremarkable  Antibiotics are periop    labs  CBC Full  -  ( 17 Dec 2019 13:35 )  WBC Count : 8.31 K/uL  RBC Count : 3.24 M/uL  Hemoglobin : 8.8 g/dL  Hematocrit : 28.9 %  Platelet Count - Automated : 272 K/uL  Mean Cell Volume : 89.2 fl  Mean Cell Hemoglobin : 27.2 pg  Mean Cell Hemoglobin Concentration : 30.4 gm/dL  Auto Neutrophil # : x  Auto Lymphocyte # : x  Auto Monocyte # : x  Auto Eosinophil # : x  Auto Basophil # : x  Auto Neutrophil % : x  Auto Lymphocyte % : x  Auto Monocyte % : x  Auto Eosinophil % : x  Auto Basophil % : x    12-17    138  |  101  |  17  ----------------------------<  111<H>  4.6   |  28  |  0.92    Ca    9.3      17 Dec 2019 13:35  Phos  3.8     12-17  Mg     2.2     12-17    TPro  5.7<L>  /  Alb  3.4  /  TBili  0.4  /  DBili  x   /  AST  16  /  ALT  16  /  AlkPhos  79  12-17    PT/INR - ( 17 Dec 2019 13:35 )   PT: 14.9 sec;   INR: 1.31          PTT - ( 17 Dec 2019 21:34 )  PTT:74.8 sec  The current medications were reviewed   MEDICATIONS  (STANDING):  aMIOdarone    Tablet 200 milliGRAM(s) Oral daily  aspirin  chewable 81 milliGRAM(s) Oral daily  buDESOnide    Inhalation Suspension 0.5 milliGRAM(s) Inhalation every 12 hours  chlorhexidine 2% Cloths 1 Application(s) Topical daily  dextrose 50% Injectable 50 milliLiter(s) IV Push every 15 minutes  diltiazem    Tablet 30 milliGRAM(s) Oral every 6 hours  diVALproex Sprinkle 250 milliGRAM(s) Oral every 12 hours  heparin  Infusion 1200 Unit(s)/Hr (12 mL/Hr) IV Continuous <Continuous>  lidocaine   Patch 1 Patch Transdermal daily  pantoprazole    Tablet 40 milliGRAM(s) Oral before breakfast  sertraline 50 milliGRAM(s) Oral daily  sodium chloride 0.9%. 1000 milliLiter(s) (10 mL/Hr) IV Continuous <Continuous>    MEDICATIONS  (PRN):  acetaminophen    Suspension .. 650 milliGRAM(s) Oral every 6 hours PRN Mild Pain (1 - 3)  bisacodyl Suppository 10 milliGRAM(s) Rectal daily PRN Constipation  simethicone 80 milliGRAM(s) Chew three times a day PRN Gas       PROBLEM LIST/ ASSESSMENT:  HEALTH ISSUES - PROBLEM Dx:  Adjustment disorder with disturbance of conduct  Leukocytosis, unspecified type: Leukocytosis, unspecified type  Hypernatremia: Hypernatremia  Bacterial pneumonia: Bacterial pneumonia  Fever, postprocedural: Fever, postprocedural  FELY (acute kidney injury): FELY (acute kidney injury)      ,   Patient is a 69y old  Male who presents with a chief complaint of Aortic dissection (16 Dec 2019 23:14)     s/p cardiac surgery              My plan includes :  close hemodynamic, ventilatory and drain monitoring and management per post op routine    Monitor for arrhythmias and monitor parameters for organ perfusion  beta blockade not administered due to hemodynamic instability and bradycardia  monitor neurologic status  Head of the bed should remain elevated to 45 deg .   chest PT and IS will be encouraged  monitor adequacy of oxygenation and ventilation and attempt to wean oxygen  antibiotic regimen will be tailored to the clinical, laboratory and microbiologic data  Nutritional goals will be met using po eventually , ensure adequate caloric intake and montior the same  Stress ulcer and VTE prophylaxis will be achieved    Glycemic control is satisfactory  Electrolytes have been repleted as necessary and wound care has been carried out. Pain control has been achieved.   agressive physical therapy and early mobility and ambulation goals will be met   The family was updated about the course and plan  CRITICAL CARE TIME personally provided by me  in evaluation and management, reassessments, review and interpretation of labs and x-rays, ventilator and hemodynamic management, formulating a plan and coordinating care: ___90____ MIN.  Time does not include procedural time.  CTICU ATTENDING     					    Jaret Hebert MD

## 2019-12-18 LAB
ALBUMIN SERPL ELPH-MCNC: 3.1 G/DL — LOW (ref 3.3–5)
ALP SERPL-CCNC: 73 U/L — SIGNIFICANT CHANGE UP (ref 40–120)
ALT FLD-CCNC: 15 U/L — SIGNIFICANT CHANGE UP (ref 10–45)
ANION GAP SERPL CALC-SCNC: 7 MMOL/L — SIGNIFICANT CHANGE UP (ref 5–17)
APTT BLD: 32.8 SEC — SIGNIFICANT CHANGE UP (ref 27.5–36.3)
APTT BLD: 84.3 SEC — HIGH (ref 27.5–36.3)
AST SERPL-CCNC: 18 U/L — SIGNIFICANT CHANGE UP (ref 10–40)
BILIRUB SERPL-MCNC: 0.3 MG/DL — SIGNIFICANT CHANGE UP (ref 0.2–1.2)
BUN SERPL-MCNC: 16 MG/DL — SIGNIFICANT CHANGE UP (ref 7–23)
CALCIUM SERPL-MCNC: 8.9 MG/DL — SIGNIFICANT CHANGE UP (ref 8.4–10.5)
CHLORIDE SERPL-SCNC: 100 MMOL/L — SIGNIFICANT CHANGE UP (ref 96–108)
CO2 SERPL-SCNC: 28 MMOL/L — SIGNIFICANT CHANGE UP (ref 22–31)
CREAT SERPL-MCNC: 0.91 MG/DL — SIGNIFICANT CHANGE UP (ref 0.5–1.3)
GLUCOSE SERPL-MCNC: 99 MG/DL — SIGNIFICANT CHANGE UP (ref 70–99)
HCT VFR BLD CALC: 27.6 % — LOW (ref 39–50)
HGB BLD-MCNC: 8.5 G/DL — LOW (ref 13–17)
INR BLD: 1.37 — HIGH (ref 0.88–1.16)
MAGNESIUM SERPL-MCNC: 2.2 MG/DL — SIGNIFICANT CHANGE UP (ref 1.6–2.6)
MCHC RBC-ENTMCNC: 27.2 PG — SIGNIFICANT CHANGE UP (ref 27–34)
MCHC RBC-ENTMCNC: 30.8 GM/DL — LOW (ref 32–36)
MCV RBC AUTO: 88.5 FL — SIGNIFICANT CHANGE UP (ref 80–100)
NRBC # BLD: 0 /100 WBCS — SIGNIFICANT CHANGE UP (ref 0–0)
PHOSPHATE SERPL-MCNC: 3.4 MG/DL — SIGNIFICANT CHANGE UP (ref 2.5–4.5)
PLATELET # BLD AUTO: 183 K/UL — SIGNIFICANT CHANGE UP (ref 150–400)
POTASSIUM SERPL-MCNC: 4.1 MMOL/L — SIGNIFICANT CHANGE UP (ref 3.5–5.3)
POTASSIUM SERPL-SCNC: 4.1 MMOL/L — SIGNIFICANT CHANGE UP (ref 3.5–5.3)
PROT SERPL-MCNC: 5.2 G/DL — LOW (ref 6–8.3)
PROTHROM AB SERPL-ACNC: 15.6 SEC — HIGH (ref 10–12.9)
RBC # BLD: 3.12 M/UL — LOW (ref 4.2–5.8)
RBC # FLD: 15.2 % — HIGH (ref 10.3–14.5)
SODIUM SERPL-SCNC: 135 MMOL/L — SIGNIFICANT CHANGE UP (ref 135–145)
WBC # BLD: 7.73 K/UL — SIGNIFICANT CHANGE UP (ref 3.8–10.5)
WBC # FLD AUTO: 7.73 K/UL — SIGNIFICANT CHANGE UP (ref 3.8–10.5)

## 2019-12-18 PROCEDURE — 71045 X-RAY EXAM CHEST 1 VIEW: CPT | Mod: 26

## 2019-12-18 PROCEDURE — 99232 SBSQ HOSP IP/OBS MODERATE 35: CPT

## 2019-12-18 PROCEDURE — 99291 CRITICAL CARE FIRST HOUR: CPT

## 2019-12-18 PROCEDURE — 99292 CRITICAL CARE ADDL 30 MIN: CPT

## 2019-12-18 RX ORDER — APIXABAN 2.5 MG/1
2.5 TABLET, FILM COATED ORAL EVERY 12 HOURS
Refills: 0 | Status: DISCONTINUED | OUTPATIENT
Start: 2019-12-18 | End: 2019-12-26

## 2019-12-18 RX ORDER — MIDAZOLAM HYDROCHLORIDE 1 MG/ML
4 INJECTION, SOLUTION INTRAMUSCULAR; INTRAVENOUS ONCE
Refills: 0 | Status: DISCONTINUED | OUTPATIENT
Start: 2019-12-18 | End: 2019-12-18

## 2019-12-18 RX ADMIN — Medication 30 MILLIGRAM(S): at 05:34

## 2019-12-18 RX ADMIN — Medication 30 MILLIGRAM(S): at 11:55

## 2019-12-18 RX ADMIN — Medication 81 MILLIGRAM(S): at 11:55

## 2019-12-18 RX ADMIN — CHLORHEXIDINE GLUCONATE 1 APPLICATION(S): 213 SOLUTION TOPICAL at 05:06

## 2019-12-18 RX ADMIN — Medication 0.5 MILLIGRAM(S): at 06:10

## 2019-12-18 RX ADMIN — Medication 30 MILLIGRAM(S): at 23:25

## 2019-12-18 RX ADMIN — MIDAZOLAM HYDROCHLORIDE 4 MILLIGRAM(S): 1 INJECTION, SOLUTION INTRAMUSCULAR; INTRAVENOUS at 12:40

## 2019-12-18 RX ADMIN — PANTOPRAZOLE SODIUM 40 MILLIGRAM(S): 20 TABLET, DELAYED RELEASE ORAL at 05:34

## 2019-12-18 RX ADMIN — LIDOCAINE 1 PATCH: 4 CREAM TOPICAL at 22:44

## 2019-12-18 RX ADMIN — SERTRALINE 50 MILLIGRAM(S): 25 TABLET, FILM COATED ORAL at 11:55

## 2019-12-18 RX ADMIN — AMIODARONE HYDROCHLORIDE 200 MILLIGRAM(S): 400 TABLET ORAL at 05:34

## 2019-12-18 RX ADMIN — APIXABAN 2.5 MILLIGRAM(S): 2.5 TABLET, FILM COATED ORAL at 22:44

## 2019-12-18 RX ADMIN — Medication 30 MILLIGRAM(S): at 18:11

## 2019-12-18 RX ADMIN — DIVALPROEX SODIUM 250 MILLIGRAM(S): 500 TABLET, DELAYED RELEASE ORAL at 05:34

## 2019-12-18 RX ADMIN — LIDOCAINE 1 PATCH: 4 CREAM TOPICAL at 11:55

## 2019-12-18 RX ADMIN — DIVALPROEX SODIUM 250 MILLIGRAM(S): 500 TABLET, DELAYED RELEASE ORAL at 18:11

## 2019-12-18 NOTE — CHART NOTE - NSCHARTNOTEFT_GEN_A_CORE
Admitting Diagnosis:   Patient is a 69y old  Male who presents with a chief complaint of Aortic dissection (18 Dec 2019 10:23)      PAST MEDICAL & SURGICAL HISTORY:  Benign prostatic hypertrophy without lower urinary tract symptoms: BPH (benign prostatic hypertrophy)  Ulcerative colitis: Ulcerative colitis  States following surgery of eye and adnexa: straightened right eye  Other postprocedural status: History of hernia repair  Hemorrhoids: Hemorrhoids  Acute appendicitis with generalized peritonitis: Ruptured appendix      Current Nutrition Order:  Mechanical Soft w/ Honey Thickened Liquids  DASH/TLC   EnsureEnlive BID (700kcal, 40g pro)    PO Intake: Good (%) [   ]  Fair (50-75%) [ x  ] Poor (<25%) [ x  ]- pt lethargic however nodding in response to appetite being adequate     GI Issues: denies n/v/c + diarrhea noted     Pain: denies     Skin Integrity: MSI     Labs:   12-18    135  |  100  |  16  ----------------------------<  99  4.1   |  28  |  0.91    Ca    8.9      18 Dec 2019 03:23  Phos  3.4     12-18  Mg     2.2     12-18    TPro  5.2<L>  /  Alb  3.1<L>  /  TBili  0.3  /  DBili  x   /  AST  18  /  ALT  15  /  AlkPhos  73  12-18    CAPILLARY BLOOD GLUCOSE          Medications:  MEDICATIONS  (STANDING):  aMIOdarone    Tablet 200 milliGRAM(s) Oral daily  aspirin  chewable 81 milliGRAM(s) Oral daily  buDESOnide    Inhalation Suspension 0.5 milliGRAM(s) Inhalation every 12 hours  chlorhexidine 2% Cloths 1 Application(s) Topical daily  dextrose 50% Injectable 50 milliLiter(s) IV Push every 15 minutes  diltiazem    Tablet 30 milliGRAM(s) Oral every 6 hours  diVALproex Sprinkle 250 milliGRAM(s) Oral every 12 hours  heparin  Infusion 1200 Unit(s)/Hr (12 mL/Hr) IV Continuous <Continuous>  lidocaine   Patch 1 Patch Transdermal daily  pantoprazole    Tablet 40 milliGRAM(s) Oral before breakfast  sertraline 50 milliGRAM(s) Oral daily  sodium chloride 0.9%. 1000 milliLiter(s) (10 mL/Hr) IV Continuous <Continuous>    MEDICATIONS  (PRN):  acetaminophen    Suspension .. 650 milliGRAM(s) Oral every 6 hours PRN Mild Pain (1 - 3)  bisacodyl Suppository 10 milliGRAM(s) Rectal daily PRN Constipation  simethicone 80 milliGRAM(s) Chew three times a day PRN Gas      Weight:  185lbs (11/23)  [facility]    Weight Change: please continue to trend wts, discrepancy noted     Nutrition Focused Physical Exam: Completed [   ]  Not Pertinent [ x  ]    Estimated energy needs:   Height: 6'0", IBW 178lbs+/-10%, %%, ABW 185lbs, BMI 25.1   ABW 11/23 (84.1kg) used for calculations as pt between % of IBW.   Nutrient needs based on [facility] standards of care for maintenance in older adults.   Needs adjusted for age, post-op healing, inflammatory state  2102-2523kcal/day (25-30kcal/kg)  101-118g pro/day (1.2-1.4g pro/kg)  Fluids per team     Subjective:   70yo M w/ PMH ulcerative colitis, BIBA to [facility] w/ c/o back pain and profound hypotension. Was emergently taken to the OR for salvage Type A dissection repair. Pt now s/p aortic arch repair and ascending arch replacement, Cabrol reconstruction of coronary ostia, replacement of ascending aorta/maynor arch, and frozen elephant trunk (11/23). Nephrology consulted for FELY. Was successfully extubated 12/1. Post op course c/b expressive aphasia s/p extubation; concern for stroke etiology 2/2 hypoperfusion. 12/5 Pt was found to have pericardial effusion s/p IR drainage 12/6 requiring urgent reintubation for procedure- extubated 12/7 to [facility].  Pt continues to be intermittently agitated and combative with staff- prolonging medical course 2/2 refusing treatments/PT/OT. Pt is now ready for acute rehab, pending response from facilities, accepted to [facility], will likely step down to 9L while awaits insurance approval. MAP 90 at time of assessment, not requiring pressors at this time. Continue w/ current diet order- honor pts food preferences, was willing to try different flavors of ONS at prior f/u. Consider SLP re-eval to assess for possible diet liberalization. Upon exam pt resting in bed unwilling to respond to questioning, continues to close eyes during assessment, nodded head to tolerating diet/ fair intake, unable to illicit further information, x1 ensure at bedside not consumed. Encouraged intake through day and ONS to best meet needs. Will continue to follow per protocol.     Previous Nutrition Diagnosis:  Increased nutrient needs RT increased demand for kcal/pro AEB s/p aortic arch repair and ascending arch replacement, Cabrol reconstruction of coronary ostia, replacement of ascending aorta/maynor arch, and frozen elephant trunk    Active [ x  ]  Resolved [   ]    If resolved, new PES:     Goal: Pt to consistently meet % of estimated needs PO     Recommendations:  1. Continue on current diet order  2. Consistency per SLP recs, consider f/u re-eval for possible upgrade in consistency.   3. Monitor for s/s intolerance, issues chewing/swallowing  4. Obtain updated weight.   5. Appreciate support and encouragement provided at meal time  6. MVI daily, not meeting 100% RDIs w/ continued poor PO intake.   7. Honor food preferences     Education: not interested at this time, encouraged intake     Risk Level: High [ x  ] Moderate [   ] Low [   ]

## 2019-12-18 NOTE — CHART NOTE - NSCHARTNOTEFT_GEN_A_CORE
Patient required 4mg Versed for sedation prior to removal of R axillary a-line due to agitation. R Axillary a-line removed, manual digital pressure held for 30 minutes. Axilla soft without hematoma at the end of manual pressure, confirmed by nursing staff. Will perform q5min axilla checks x3, then q15min. Discussed with nursing.

## 2019-12-18 NOTE — PROGRESS NOTE ADULT - SUBJECTIVE AND OBJECTIVE BOX
Neurology Stroke Progress Note    INTERVAL HPI/OVERNIGHT EVENTS:  Patient seen and examined. Did not look at me during the exam, said "okay" when asked if he could be examined, followed commands to lift his legs but would not put his phone down to examine his upper body    MEDICATIONS  (STANDING):  aMIOdarone    Tablet 200 milliGRAM(s) Oral daily  aspirin  chewable 81 milliGRAM(s) Oral daily  buDESOnide    Inhalation Suspension 0.5 milliGRAM(s) Inhalation every 12 hours  chlorhexidine 2% Cloths 1 Application(s) Topical daily  dextrose 50% Injectable 50 milliLiter(s) IV Push every 15 minutes  diltiazem    Tablet 30 milliGRAM(s) Oral every 6 hours  diVALproex Sprinkle 250 milliGRAM(s) Oral every 12 hours  heparin  Infusion 1200 Unit(s)/Hr (12 mL/Hr) IV Continuous <Continuous>  lidocaine   Patch 1 Patch Transdermal daily  pantoprazole    Tablet 40 milliGRAM(s) Oral before breakfast  sertraline 50 milliGRAM(s) Oral daily  sodium chloride 0.9%. 1000 milliLiter(s) (10 mL/Hr) IV Continuous <Continuous>    MEDICATIONS  (PRN):  acetaminophen    Suspension .. 650 milliGRAM(s) Oral every 6 hours PRN Mild Pain (1 - 3)  bisacodyl Suppository 10 milliGRAM(s) Rectal daily PRN Constipation  simethicone 80 milliGRAM(s) Chew three times a day PRN Gas      Allergies    penicillin (Unknown)    Intolerances        ROS: As per HPI, otherwise negative    Vital Signs Last 24 Hrs  T(C): 36.9 (18 Dec 2019 05:01), Max: 37.1 (17 Dec 2019 13:59)  T(F): 98.4 (18 Dec 2019 05:01), Max: 98.7 (17 Dec 2019 13:59)  HR: 74 (18 Dec 2019 09:00) (66 - 84)  BP: 122/70 (17 Dec 2019 13:00) (100/67 - 122/70)  BP(mean): 95 (17 Dec 2019 13:00) (82 - 95)  RR: 16 (18 Dec 2019 09:00) (9 - 39)  SpO2: 92% (18 Dec 2019 09:00) (78% - 98%)    Physical exam:  General: awake and alert, sitting comfortably, no acute distress    Neurologic:  Mental status: awake, alert, did not answer most questions, said "okay" when asked to raise his legs, did not look at me.  Cranial nerves:   VII: no facial droop  Motor: Normal bulk and tone, able to lift both legs up. Holding his phone with both hands and did not raise them to command.  Sensation: intact to light touch.   Coordination: did not cooperate        LABS:                        8.5    7.73  )-----------( 183      ( 18 Dec 2019 03:23 )             27.6     12-18    135  |  100  |  16  ----------------------------<  99  4.1   |  28  |  0.91    Ca    8.9      18 Dec 2019 03:23  Phos  3.4     12-18  Mg     2.2     12-18    TPro  5.2<L>  /  Alb  3.1<L>  /  TBili  0.3  /  DBili  x   /  AST  18  /  ALT  15  /  AlkPhos  73  12-18    PT/INR - ( 18 Dec 2019 03:23 )   PT: 15.6 sec;   INR: 1.37          PTT - ( 18 Dec 2019 03:23 )  PTT:84.3 sec      RADIOLOGY & ADDITIONAL TESTS:  < from: CT Head No Cont (12.02.19 @ 15:10) >  IMPRESSION:  No acute intracranial hemorrhage or transcortical infarct.    Gray-white matter hypodensities in the subcortical bilateral frontal   lobes which may reflect age-indeterminate ischemia, infection,   inflammation amongst other etiologies. Further evaluation with an MRI   brain with and without IV contrast is recommended.    < end of copied text >        Assessment and Plan  69y Male MHx of colitis and prostate surgery presented with back pain and hypotension, s/p 11/23 emergent salvage AVR(Bio)/root-hemiarch replacement. Hospital course complicated by hemodynamic instability, aspiration pneumonia, acute respiratory distress, prolonged intubation, pleural effusion, left cephalic vein DVT with extension to subclavian, and Afib with RVR. On 12/1, patient extubated and noted to have expressive aphasia for which stroke neurology was consulted. HCT 12/2 revealed hypodensities in the subcortical bilateral frontal lobes, likely b/l watershed infarct. Stroke etiology likely hypoperfusion secondary to cardiac arrest, less likely embolic or thrombotic. Patient noted to have periodic agitation and rapid mood shifts by primary team, likely related to frontal lobe injury secondary to stroke.     1)Secondary stroke prevention  -ASA 81 and heparin drip per CTICU  -Atorvastatin 80    2) Further workup   -Continue Depakote  -Psych following  -Called and updated pt's wife today - discussed behavior changes with frontal lobe strokes and possible partial ICU delirium.     DVT prophylaxis   -Heparin drip and SCDs

## 2019-12-18 NOTE — PROGRESS NOTE ADULT - SUBJECTIVE AND OBJECTIVE BOX
CTICU  CRITICAL  CARE  attending     Hand off received 					   Pertinent clinical, laboratory, radiographic, hemodynamic, echocardiographic, respiratory data, microbiologic data and chart were reviewed and analyzed frequently throughout the course of the day and night  Patient seen and examined with CTS/ SH attending at bedside  Pt is a 69y , Male, HEALTH ISSUES - PROBLEM Dx:  Adjustment disorder with disturbance of conduct  Leukocytosis, unspecified type: Leukocytosis, unspecified type  Hypernatremia: Hypernatremia  Bacterial pneumonia: Bacterial pneumonia  Fever, postprocedural: Fever, postprocedural  FELY (acute kidney injury): FELY (acute kidney injury)      , FAMILY HISTORY:  PAST MEDICAL & SURGICAL HISTORY:  Benign prostatic hypertrophy without lower urinary tract symptoms: BPH (benign prostatic hypertrophy)  Ulcerative colitis: Ulcerative colitis  States following surgery of eye and adnexa: straightened right eye  Other postprocedural status: History of hernia repair  Hemorrhoids: Hemorrhoids  Acute appendicitis with generalized peritonitis: Ruptured appendix    Patient is a 69y old  Male who presents with a chief complaint of Aortic dissection (18 Dec 2019 10:23)      14 system review was unremarkable    Vital signs, hemodynamic and respiratory parameters were reviewed from the bedside nursing flowsheet.  ICU Vital Signs Last 24 Hrs  T(C): 37.2 (18 Dec 2019 21:41), Max: 37.2 (18 Dec 2019 21:41)  T(F): 98.9 (18 Dec 2019 21:41), Max: 98.9 (18 Dec 2019 21:41)  HR: 78 (18 Dec 2019 19:00) (70 - 78)  BP: 107/61 (18 Dec 2019 19:00) (107/61 - 148/84)  BP(mean): 79 (18 Dec 2019 19:00) (79 - 113)  ABP: 138/74 (18 Dec 2019 12:30) (112/64 - 166/84)  ABP(mean): 98 (18 Dec 2019 12:30) (80 - 116)  RR: 14 (18 Dec 2019 19:00) (11 - 20)  SpO2: 90% (18 Dec 2019 19:00) (90% - 99%)    Adult Advanced Hemodynamics Last 24 Hrs  CVP(mm Hg): --  CVP(cm H2O): --  CO: --  CI: --  PA: --  PA(mean): --  PCWP: --  SVR: --  SVRI: --  PVR: --  PVRI: --, ABG - ( 17 Dec 2019 05:10 )  pH, Arterial: 7.49  pH, Blood: x     /  pCO2: 39    /  pO2: 67    / HCO3: 29    / Base Excess: 5.2   /  SaO2: 93                  Intake and output was reviewed and the fluid balance was calculated  Daily     Daily   I&O's Summary    17 Dec 2019 07:01  -  18 Dec 2019 07:00  --------------------------------------------------------  IN: 129 mL / OUT: 20 mL / NET: 109 mL    18 Dec 2019 07:01  -  18 Dec 2019 22:27  --------------------------------------------------------  IN: 11 mL / OUT: 945 mL / NET: -934 mL        All lines and drain sites were assessed  Glycemic trend was reviewedCAPILLARY BLOOD GLUCOSE        No acute change in mental status  Auscultation of the chest reveals equal bs  Abdomen is soft  Extremities are warm and well perfused  Wounds appear clean and unremarkable  Antibiotics are periop    labs  CBC Full  -  ( 18 Dec 2019 03:23 )  WBC Count : 7.73 K/uL  RBC Count : 3.12 M/uL  Hemoglobin : 8.5 g/dL  Hematocrit : 27.6 %  Platelet Count - Automated : 183 K/uL  Mean Cell Volume : 88.5 fl  Mean Cell Hemoglobin : 27.2 pg  Mean Cell Hemoglobin Concentration : 30.8 gm/dL  Auto Neutrophil # : x  Auto Lymphocyte # : x  Auto Monocyte # : x  Auto Eosinophil # : x  Auto Basophil # : x  Auto Neutrophil % : x  Auto Lymphocyte % : x  Auto Monocyte % : x  Auto Eosinophil % : x  Auto Basophil % : x    12-18    135  |  100  |  16  ----------------------------<  99  4.1   |  28  |  0.91    Ca    8.9      18 Dec 2019 03:23  Phos  3.4     12-18  Mg     2.2     12-18    TPro  5.2<L>  /  Alb  3.1<L>  /  TBili  0.3  /  DBili  x   /  AST  18  /  ALT  15  /  AlkPhos  73  12-18    PT/INR - ( 18 Dec 2019 03:23 )   PT: 15.6 sec;   INR: 1.37          PTT - ( 18 Dec 2019 12:03 )  PTT:32.8 sec  The current medications were reviewed   MEDICATIONS  (STANDING):  aMIOdarone    Tablet 200 milliGRAM(s) Oral daily  apixaban 2.5 milliGRAM(s) Oral every 12 hours  aspirin  chewable 81 milliGRAM(s) Oral daily  buDESOnide    Inhalation Suspension 0.5 milliGRAM(s) Inhalation every 12 hours  chlorhexidine 2% Cloths 1 Application(s) Topical daily  dextrose 50% Injectable 50 milliLiter(s) IV Push every 15 minutes  diltiazem    Tablet 30 milliGRAM(s) Oral every 6 hours  diVALproex Sprinkle 250 milliGRAM(s) Oral every 12 hours  lidocaine   Patch 1 Patch Transdermal daily  pantoprazole    Tablet 40 milliGRAM(s) Oral before breakfast  sertraline 50 milliGRAM(s) Oral daily  sodium chloride 0.9%. 1000 milliLiter(s) (10 mL/Hr) IV Continuous <Continuous>    MEDICATIONS  (PRN):  acetaminophen    Suspension .. 650 milliGRAM(s) Oral every 6 hours PRN Mild Pain (1 - 3)  bisacodyl Suppository 10 milliGRAM(s) Rectal daily PRN Constipation  simethicone 80 milliGRAM(s) Chew three times a day PRN Gas       PROBLEM LIST/ ASSESSMENT:  HEALTH ISSUES - PROBLEM Dx:  Adjustment disorder with disturbance of conduct  Leukocytosis, unspecified type: Leukocytosis, unspecified type  Hypernatremia: Hypernatremia  Bacterial pneumonia: Bacterial pneumonia  Fever, postprocedural: Fever, postprocedural  FELY (acute kidney injury): FELY (acute kidney injury)      ,   Patient is a 69y old  Male who presents with a chief complaint of Aortic dissection (18 Dec 2019 10:23)     s/p cardiac surgery              My plan includes :  close hemodynamic, ventilatory and drain monitoring and management per post op routine    Monitor for arrhythmias and monitor parameters for organ perfusion  beta blockade not administered due to hemodynamic instability and bradycardia  monitor neurologic status  Head of the bed should remain elevated to 45 deg .   chest PT and IS will be encouraged  monitor adequacy of oxygenation and ventilation and attempt to wean oxygen  antibiotic regimen will be tailored to the clinical, laboratory and microbiologic data  Nutritional goals will be met using po eventually , ensure adequate caloric intake and montior the same  Stress ulcer and VTE prophylaxis will be achieved    Glycemic control is satisfactory  Electrolytes have been repleted as necessary and wound care has been carried out. Pain control has been achieved.   agressive physical therapy and early mobility and ambulation goals will be met   The family was updated about the course and plan  CRITICAL CARE TIME personally provided by me  in evaluation and management, reassessments, review and interpretation of labs and x-rays, ventilator and hemodynamic management, formulating a plan and coordinating care: ___90____ MIN.  Time does not include procedural time.  CTICU ATTENDING     					    Jaret Hebert MD

## 2019-12-19 ENCOUNTER — TRANSCRIPTION ENCOUNTER (OUTPATIENT)
Age: 69
End: 2019-12-19

## 2019-12-19 LAB
ANION GAP SERPL CALC-SCNC: 10 MMOL/L — SIGNIFICANT CHANGE UP (ref 5–17)
APTT BLD: 40.2 SEC — HIGH (ref 27.5–36.3)
BUN SERPL-MCNC: 15 MG/DL — SIGNIFICANT CHANGE UP (ref 7–23)
CALCIUM SERPL-MCNC: 9.2 MG/DL — SIGNIFICANT CHANGE UP (ref 8.4–10.5)
CHLORIDE SERPL-SCNC: 98 MMOL/L — SIGNIFICANT CHANGE UP (ref 96–108)
CO2 SERPL-SCNC: 26 MMOL/L — SIGNIFICANT CHANGE UP (ref 22–31)
CREAT SERPL-MCNC: 1.13 MG/DL — SIGNIFICANT CHANGE UP (ref 0.5–1.3)
GLUCOSE SERPL-MCNC: 94 MG/DL — SIGNIFICANT CHANGE UP (ref 70–99)
HCT VFR BLD CALC: 29.9 % — LOW (ref 39–50)
HGB BLD-MCNC: 9.4 G/DL — LOW (ref 13–17)
INR BLD: 1.34 — HIGH (ref 0.88–1.16)
MAGNESIUM SERPL-MCNC: 2.2 MG/DL — SIGNIFICANT CHANGE UP (ref 1.6–2.6)
MCHC RBC-ENTMCNC: 27.7 PG — SIGNIFICANT CHANGE UP (ref 27–34)
MCHC RBC-ENTMCNC: 31.4 GM/DL — LOW (ref 32–36)
MCV RBC AUTO: 88.2 FL — SIGNIFICANT CHANGE UP (ref 80–100)
NRBC # BLD: 0 /100 WBCS — SIGNIFICANT CHANGE UP (ref 0–0)
PHOSPHATE SERPL-MCNC: 3.7 MG/DL — SIGNIFICANT CHANGE UP (ref 2.5–4.5)
PLATELET # BLD AUTO: 190 K/UL — SIGNIFICANT CHANGE UP (ref 150–400)
POTASSIUM SERPL-MCNC: 4.4 MMOL/L — SIGNIFICANT CHANGE UP (ref 3.5–5.3)
POTASSIUM SERPL-SCNC: 4.4 MMOL/L — SIGNIFICANT CHANGE UP (ref 3.5–5.3)
PROTHROM AB SERPL-ACNC: 15.3 SEC — HIGH (ref 10–12.9)
RBC # BLD: 3.39 M/UL — LOW (ref 4.2–5.8)
RBC # FLD: 15.4 % — HIGH (ref 10.3–14.5)
SODIUM SERPL-SCNC: 134 MMOL/L — LOW (ref 135–145)
WBC # BLD: 8.99 K/UL — SIGNIFICANT CHANGE UP (ref 3.8–10.5)
WBC # FLD AUTO: 8.99 K/UL — SIGNIFICANT CHANGE UP (ref 3.8–10.5)

## 2019-12-19 PROCEDURE — 71045 X-RAY EXAM CHEST 1 VIEW: CPT | Mod: 26

## 2019-12-19 PROCEDURE — 99233 SBSQ HOSP IP/OBS HIGH 50: CPT

## 2019-12-19 RX ORDER — FERROUS SULFATE 325(65) MG
325 TABLET ORAL DAILY
Refills: 0 | Status: DISCONTINUED | OUTPATIENT
Start: 2019-12-19 | End: 2019-12-26

## 2019-12-19 RX ORDER — FOLIC ACID 0.8 MG
1 TABLET ORAL DAILY
Refills: 0 | Status: DISCONTINUED | OUTPATIENT
Start: 2019-12-19 | End: 2019-12-26

## 2019-12-19 RX ORDER — SODIUM CHLORIDE 9 MG/ML
3 INJECTION INTRAMUSCULAR; INTRAVENOUS; SUBCUTANEOUS EVERY 8 HOURS
Refills: 0 | Status: DISCONTINUED | OUTPATIENT
Start: 2019-12-19 | End: 2019-12-26

## 2019-12-19 RX ADMIN — SODIUM CHLORIDE 3 MILLILITER(S): 9 INJECTION INTRAMUSCULAR; INTRAVENOUS; SUBCUTANEOUS at 14:42

## 2019-12-19 RX ADMIN — CHLORHEXIDINE GLUCONATE 1 APPLICATION(S): 213 SOLUTION TOPICAL at 05:53

## 2019-12-19 RX ADMIN — DIVALPROEX SODIUM 250 MILLIGRAM(S): 500 TABLET, DELAYED RELEASE ORAL at 20:15

## 2019-12-19 RX ADMIN — Medication 30 MILLIGRAM(S): at 05:03

## 2019-12-19 RX ADMIN — AMIODARONE HYDROCHLORIDE 200 MILLIGRAM(S): 400 TABLET ORAL at 05:03

## 2019-12-19 RX ADMIN — Medication 30 MILLIGRAM(S): at 20:15

## 2019-12-19 RX ADMIN — Medication 30 MILLIGRAM(S): at 12:45

## 2019-12-19 RX ADMIN — Medication 0.5 MILLIGRAM(S): at 05:03

## 2019-12-19 RX ADMIN — Medication 81 MILLIGRAM(S): at 12:45

## 2019-12-19 RX ADMIN — Medication 650 MILLIGRAM(S): at 23:30

## 2019-12-19 RX ADMIN — Medication 650 MILLIGRAM(S): at 22:30

## 2019-12-19 RX ADMIN — APIXABAN 2.5 MILLIGRAM(S): 2.5 TABLET, FILM COATED ORAL at 05:02

## 2019-12-19 RX ADMIN — Medication 0.5 MILLIGRAM(S): at 21:59

## 2019-12-19 RX ADMIN — PANTOPRAZOLE SODIUM 40 MILLIGRAM(S): 20 TABLET, DELAYED RELEASE ORAL at 05:03

## 2019-12-19 RX ADMIN — SERTRALINE 50 MILLIGRAM(S): 25 TABLET, FILM COATED ORAL at 12:45

## 2019-12-19 RX ADMIN — DIVALPROEX SODIUM 250 MILLIGRAM(S): 500 TABLET, DELAYED RELEASE ORAL at 05:03

## 2019-12-19 RX ADMIN — APIXABAN 2.5 MILLIGRAM(S): 2.5 TABLET, FILM COATED ORAL at 20:15

## 2019-12-19 RX ADMIN — SODIUM CHLORIDE 3 MILLILITER(S): 9 INJECTION INTRAMUSCULAR; INTRAVENOUS; SUBCUTANEOUS at 21:10

## 2019-12-19 NOTE — DISCHARGE NOTE PROVIDER - PROVIDER TOKENS
PROVIDER:[[id number]]],PROVIDER:[TOKEN:[[id number]]] PROVIDER:[TOKEN:[[id number]]],PROVIDER:[TOKEN:[[id number]]] PROVIDER:[TOKEN:[[id number]]],PROVIDER:[TOKEN:[[id number]]],PROVIDER:[TOKEN:[[id number]]]

## 2019-12-19 NOTE — DISCHARGE NOTE PROVIDER - NSDCFUADDAPPT_GEN_ALL_CORE_FT
- Please follow up with  ___on ___.  The office is located at [facility], [facility], 4th floor. Call us with any questions #[phone]. - The office will reach out with a follow up appointment with Dr. Deluna and Dr. Pearce    - Please also follow up with psychiatry upon discharge from rehab - Please follow up with Dr. Deluna upon discharge from rehab.  The office is located at [facility], [facility], 4th floor. Call us with any questions #[phone]. Our office will reach out to you with an appointment.     - Please also follow up with neurology, Dr. Pearce, upon discharge from rehab. Our office will reach out to you with this appointment.     - Please also follow up with psychiatry, Dr. Harrington, upon discharge from rehab

## 2019-12-19 NOTE — DISCHARGE NOTE PROVIDER - NSDCMRMEDTOKEN_GEN_ALL_CORE_FT
Flomax 0.4 mg oral capsule: 1 cap(s) orally once a day  sulfADIAZINE: acetaminophen 160 mg/5 mL oral suspension: 20.31 milliliter(s) orally every 6 hours, As needed, Mild Pain (1 - 3)  amiodarone 200 mg oral tablet: 1 tab(s) orally once a day  apixaban 2.5 mg oral tablet: 1 tab(s) orally every 12 hours  aspirin 81 mg oral tablet, chewable: 1 tab(s) orally once a day  budesonide: 0.5 milligram(s) inhaled every 12 hours  dilTIAZem 30 mg oral tablet: 1 tab(s) orally every 6 hours  divalproex sodium 125 mg oral delayed release capsule: 2 cap(s) orally every 12 hours  FeroSul 325 mg (65 mg elemental iron) oral tablet: 1 tab(s) orally once a day   Flomax 0.4 mg oral capsule: 1 cap(s) orally once a day  folic acid 1 mg oral tablet: 1 tab(s) orally once a day  pantoprazole 40 mg oral delayed release tablet: 1 tab(s) orally once a day (before a meal)  polyethylene glycol 3350 oral powder for reconstitution: 17 gram(s) orally once a day  senna oral tablet: 2 tab(s) orally once a day (at bedtime)  sertraline 50 mg oral tablet: 1 tab(s) orally once a day  simethicone 80 mg oral tablet, chewable: 1 tab(s) orally 3 times a day, As needed, Gas acetaminophen 160 mg/5 mL oral suspension: 20.31 milliliter(s) orally every 6 hours, As needed, Mild Pain (1 - 3)  Ala-Kevin 1% topical cream: Apply topically to affected area every 12 hours   amiodarone 200 mg oral tablet: 1 tab(s) orally once a day  apixaban 2.5 mg oral tablet: 1 tab(s) orally every 12 hours  aspirin 81 mg oral tablet, chewable: 1 tab(s) orally once a day  budesonide: 0.5 milligram(s) inhaled every 12 hours  dilTIAZem 30 mg oral tablet: 1 tab(s) orally every 6 hours  divalproex sodium 125 mg oral delayed release capsule: 2 cap(s) orally every 12 hours  FeroSul 325 mg (65 mg elemental iron) oral tablet: 1 tab(s) orally once a day   Flomax 0.4 mg oral capsule: 1 cap(s) orally once a day  folic acid 1 mg oral tablet: 1 tab(s) orally once a day  Lipitor 80 mg oral tablet: 1 tab(s) orally once a day (at bedtime)  pantoprazole 40 mg oral delayed release tablet: 1 tab(s) orally once a day (before a meal)  polyethylene glycol 3350 oral powder for reconstitution: 17 gram(s) orally once a day  senna oral tablet: 2 tab(s) orally once a day (at bedtime)  sertraline 50 mg oral tablet: 1 tab(s) orally once a day  simethicone 80 mg oral tablet, chewable: 1 tab(s) orally 3 times a day, As needed, Gas

## 2019-12-19 NOTE — PROGRESS NOTE ADULT - SUBJECTIVE AND OBJECTIVE BOX
Patient discussed on morning rounds with Dr. Deluna    Operation / Date: 11/23/19 Repair type A dissection- aortic root replacement/reconstruction (biological Bentall-23mm Magna in 32 mm graft), Carbol reconstruction of coronary ostia, replacement ascending aorta/hemiarch, frozen elephant trunk    SUBJECTIVE ASSESSMENT:  69y Male assessed sitting in chair at bedside today. Patient unwilling to speak to PA today however allowed physical examination.    Vital Signs Last 24 Hrs  T(C): 37.1 (19 Dec 2019 13:45), Max: 37.2 (18 Dec 2019 21:41)  T(F): 98.7 (19 Dec 2019 13:45), Max: 98.9 (18 Dec 2019 21:41)  HR: 70 (19 Dec 2019 17:00) (70 - 84)  BP: 132/76 (19 Dec 2019 17:00) (107/61 - 132/76)  BP(mean): 95 (19 Dec 2019 17:00) (79 - 95)  RR: 19 (19 Dec 2019 17:00) (13 - 37)  SpO2: 91% (19 Dec 2019 17:00) (89% - 99%)  I&O's Detail    18 Dec 2019 07:01  -  19 Dec 2019 07:00  --------------------------------------------------------  IN:    heparin Infusion: 11 mL    Oral Fluid: 100 mL  Total IN: 111 mL    OUT:    Incontinent per Condom Catheter: 2095 mL  Total OUT: 2095 mL    Total NET: -1984 mL    CHEST TUBE:  No  DIETER DRAIN:  No.  EPICARDIAL WIRES: No.  TIE DOWNS: No.  MCNALLY: No.    PHYSICAL EXAM:  General: Patient is well appearing in NAD sitting upright in chair at bedside. Appears comfortable.   Neurological: Unable to assess orientation iso aphasia. Moving bilateral upper and lower extremities.   Cardiovascular: RRR, no murmurs, rubs, gallops. S1/S2 auscultated  Respiratory: Clear to auscultation posterior lung fields  Gastrointestinal: +BS, NT/ND  : Condom catheter   Extremities: No peripheral edema or calf tenderness. Full strength and ROM in bilateral upper extremities. Patient unwilling to test strength/ROM in lower extremities.   Vascular: 2+ bilateral distal pulses  Incision Sites: MSI clean, dry, intact. No sternal click.     LABS:                        9.4    8.99  )-----------( 190      ( 19 Dec 2019 02:44 )             29.9       COUMADIN:  No.     PT/INR - ( 19 Dec 2019 02:44 )   PT: 15.3 sec;   INR: 1.34          PTT - ( 19 Dec 2019 02:44 )  PTT:40.2 sec    12-19    134<L>  |  98  |  15  ----------------------------<  94  4.4   |  26  |  1.13    Ca    9.2      19 Dec 2019 02:44  Phos  3.7     12-19  Mg     2.2     12-19    TPro  5.2<L>  /  Alb  3.1<L>  /  TBili  0.3  /  DBili  x   /  AST  18  /  ALT  15  /  AlkPhos  73  12-18      MEDICATIONS  (STANDING):  aMIOdarone    Tablet 200 milliGRAM(s) Oral daily  apixaban 2.5 milliGRAM(s) Oral every 12 hours  aspirin  chewable 81 milliGRAM(s) Oral daily  buDESOnide    Inhalation Suspension 0.5 milliGRAM(s) Inhalation every 12 hours  chlorhexidine 2% Cloths 1 Application(s) Topical daily  diltiazem    Tablet 30 milliGRAM(s) Oral every 6 hours  diVALproex Sprinkle 250 milliGRAM(s) Oral every 12 hours  ferrous    sulfate 325 milliGRAM(s) Oral daily  folic acid 1 milliGRAM(s) Oral daily  lidocaine   Patch 1 Patch Transdermal daily  pantoprazole    Tablet 40 milliGRAM(s) Oral before breakfast  sertraline 50 milliGRAM(s) Oral daily  sodium chloride 0.9% lock flush 3 milliLiter(s) IV Push every 8 hours  sodium chloride 0.9%. 1000 milliLiter(s) (10 mL/Hr) IV Continuous <Continuous>    MEDICATIONS  (PRN):  acetaminophen    Suspension .. 650 milliGRAM(s) Oral every 6 hours PRN Mild Pain (1 - 3)  bisacodyl Suppository 10 milliGRAM(s) Rectal daily PRN Constipation  simethicone 80 milliGRAM(s) Chew three times a day PRN Gas    RADIOLOGY & ADDITIONAL TESTS:  < from: Xray Chest 1 View- PORTABLE-Routine (12.18.19 @ 04:39) >  IMPRESSION:  Improvement in aeration of the lower lung zones with persistent hazy   opacity of the right lower lung zone.    CXR 12/19: Stable from yesterday CXR. Hazy opacity in right lower lung zone    A/P:    69 y.o male with a PMHx of colitis and prostate surgery @[facility] who was brought to [facility] ED via ambulance with back pain and profound hypotension on 11/23/19. In the ED the patient was hypotensive with SBP 40 and cyanotic and he was found to have a Type A aortic dissection. CT surgery was consulted and he was emergently brought to the OR for salvage Type A dissection repair with Dr. Deluna and Dr. Latif. He underwent an aortic root replacement/reconstruction (biological Bentall-23mm Magna in 32 mm graft), Carbol reconstruction of coronary ostia, replacement ascending aorta/hemiarch, frozen elephant trunk. He was brought to the CTICU post operatively. His postoperative course was complicated by prolonged intubation secondary to hypoxia (extubated POD 8), atrial fibrillation/RVR given amio load and started on heparin gtt, and left cephalic DVT with extension to the subclavian. After extubation the patient was found to have expressive aphasia with a head CT showing bilateral frontal infarct and watershed areas with left posterior watershed secondary to cardiac arrest. Neurology was consulted and proceeded to follow for his admission. He was started on modafanil, zoloft, and depakote as per neurology (Dr. Arroyo) recommendations. The patient became agitated with delirium and refused nursing care and medications while in the CTICU and psychiatry was consulted. He was found to have pericardial effusion s/p IR drainage on POD#13. Heparin was transitioned to eliquis for left upper extremity clot. He was transferred to the floor on POD26 in NSR (on amiodarone and diltiazem) for probably discharge to [facility] tomorrow.     Neurovascular:   - Patient continues to have expressive aphasia secondary to CVA   - Neurology following, continue to follow recommendations   - Continue depakote 250 mg q12 per neuro recommendations  - Continue tylenol suspension PRN for pain control     Cardiovascular:   - POD26 s/p aortic root replacement/reconstruction (biological Bentall-23mm Magna in 32 mm graft), Carbol reconstruction of coronary ostia, replacement ascending aorta/hemiarch, frozen elephant trunk  - Pt HD stable on the floor with HR controlled to 70-84  - Post operative atrial fibrillation now in sinus rhythm, continue amiodarone 200 daily and diltiazem 30 mg q6  - BP controlled today 107//89  - Continue apixaban 2.5 mg q12 for LUE clot and atrial fibrillation    Respiratory:   - 02 Sat = 93% on RA.  -Encourage C+DB and Use of IS 10x / hr while awake.  -CXR 12/19: Stable from yesterday CXR. Hazy opacity in right lower lung zone    GI:   - Speech and swallow evaluated today, approved soft mechanical diet with thin liquids  - Continue GI PPX with protonix   - Continue simethicone 80 mg TID for gas   - Continue bisacodyl suppository PRN for constipation     Renal / :  - BUN/Cr stable at 15/1.13  -Continue to monitor renal function.  -Monitor I/O's.    Endocrine:    - No hx of diabetes or thyroid disease   -A1c. 4.9  -TSH. 0.859    Hematologic:  - H&H stable at 9.4/29.9. No signs of active bleeding. Persistent anemia, initiated ferrous sulfate and folic acid  - Continue apixaban 2.5 q12 hrs    ID:  -Pt remained afebrile with no elevation in WBC and no signs of infection   -Observe for SIRS/Sepsis Syndrome.    Prophylaxis:  -DVT prophylaxis with 5000 SubQ Heparin q8h.  -SCD's    Disposition:  - Floor for disposition to [facility] tomorrow Patient discussed on morning rounds with Dr. Deluna    Operation / Date: 11/23/19 Repair type A dissection- aortic root replacement/reconstruction (biological Bentall-23mm Magna in 32 mm graft), Carbol reconstruction of coronary ostia, replacement ascending aorta/hemiarch, frozen elephant trunk    SUBJECTIVE ASSESSMENT:  69y Male assessed sitting in chair at bedside today. Patient unwilling to speak to me today however allowed physical examination.    Vital Signs Last 24 Hrs  T(C): 37.1 (19 Dec 2019 13:45), Max: 37.2 (18 Dec 2019 21:41)  T(F): 98.7 (19 Dec 2019 13:45), Max: 98.9 (18 Dec 2019 21:41)  HR: 70 (19 Dec 2019 17:00) (70 - 84)  BP: 132/76 (19 Dec 2019 17:00) (107/61 - 132/76)  BP(mean): 95 (19 Dec 2019 17:00) (79 - 95)  RR: 19 (19 Dec 2019 17:00) (13 - 37)  SpO2: 91% (19 Dec 2019 17:00) (89% - 99%)  I&O's Detail    18 Dec 2019 07:01  -  19 Dec 2019 07:00  --------------------------------------------------------  IN:    heparin Infusion: 11 mL    Oral Fluid: 100 mL  Total IN: 111 mL    OUT:    Incontinent per Condom Catheter: 2095 mL  Total OUT: 2095 mL    Total NET: -1984 mL    CHEST TUBE:  No  DIETER DRAIN:  No.  EPICARDIAL WIRES: No.  TIE DOWNS: Yes.  MCNALLY: No.    PHYSICAL EXAM:  General: Patient is well appearing in NAD sitting upright in chair at bedside. Appears comfortable.   Neurological: Unable to assess orientation 2/2 aphasia. Moving bilateral upper and lower extremities.   Cardiovascular: RRR, no murmurs, rubs, gallops. S1/S2 auscultated  Respiratory: Clear to auscultation posterior lung fields  Gastrointestinal: +BS, NT/ND  : Condom catheter   Extremities: No peripheral edema or calf tenderness. Full strength and ROM in bilateral upper extremities. Patient unwilling to test strength/ROM in lower extremities.   Vascular: 2+ bilateral distal pulses  Incision Sites: MSI clean, dry, intact. No sternal click.     LABS:                        9.4    8.99  )-----------( 190      ( 19 Dec 2019 02:44 )             29.9       COUMADIN:  No.     PT/INR - ( 19 Dec 2019 02:44 )   PT: 15.3 sec;   INR: 1.34          PTT - ( 19 Dec 2019 02:44 )  PTT:40.2 sec    12-19    134<L>  |  98  |  15  ----------------------------<  94  4.4   |  26  |  1.13    Ca    9.2      19 Dec 2019 02:44  Phos  3.7     12-19  Mg     2.2     12-19    TPro  5.2<L>  /  Alb  3.1<L>  /  TBili  0.3  /  DBili  x   /  AST  18  /  ALT  15  /  AlkPhos  73  12-18      MEDICATIONS  (STANDING):  aMIOdarone    Tablet 200 milliGRAM(s) Oral daily  apixaban 2.5 milliGRAM(s) Oral every 12 hours  aspirin  chewable 81 milliGRAM(s) Oral daily  buDESOnide    Inhalation Suspension 0.5 milliGRAM(s) Inhalation every 12 hours  chlorhexidine 2% Cloths 1 Application(s) Topical daily  diltiazem    Tablet 30 milliGRAM(s) Oral every 6 hours  diVALproex Sprinkle 250 milliGRAM(s) Oral every 12 hours  ferrous    sulfate 325 milliGRAM(s) Oral daily  folic acid 1 milliGRAM(s) Oral daily  lidocaine   Patch 1 Patch Transdermal daily  pantoprazole    Tablet 40 milliGRAM(s) Oral before breakfast  sertraline 50 milliGRAM(s) Oral daily  sodium chloride 0.9% lock flush 3 milliLiter(s) IV Push every 8 hours  sodium chloride 0.9%. 1000 milliLiter(s) (10 mL/Hr) IV Continuous <Continuous>    MEDICATIONS  (PRN):  acetaminophen    Suspension .. 650 milliGRAM(s) Oral every 6 hours PRN Mild Pain (1 - 3)  bisacodyl Suppository 10 milliGRAM(s) Rectal daily PRN Constipation  simethicone 80 milliGRAM(s) Chew three times a day PRN Gas    RADIOLOGY & ADDITIONAL TESTS:  < from: Xray Chest 1 View- PORTABLE-Routine (12.18.19 @ 04:39) >  IMPRESSION:  Improvement in aeration of the lower lung zones with persistent hazy   opacity of the right lower lung zone.    CXR 12/19: Stable from yesterday CXR. Hazy opacity in right lower lung zone    A/P:    69 y.o male with a PMHx of colitis and prostate surgery @[facility] who was brought to [facility] ED via ambulance with back pain and profound hypotension on 11/23/19. In the ED the patient was hypotensive with SBP 40 and cyanotic and he was found to have a Type A aortic dissection. CT surgery was consulted and he was emergently brought to the OR for salvage Type A dissection repair with Dr. Deluna and Dr. Latif. He underwent an aortic root replacement/reconstruction (biological Bentall-23mm Magna in 32 mm graft), Carbol reconstruction of coronary ostia, replacement ascending aorta/hemiarch, frozen elephant trunk. He was brought to the CTICU post operatively. His postoperative course was complicated by prolonged intubation secondary to hypoxia (extubated POD 8), atrial fibrillation/RVR given amio load and started on heparin gtt, and left cephalic DVT with extension to the subclavian. After extubation the patient was found to have expressive aphasia with a head CT showing bilateral frontal infarct and watershed areas with left posterior watershed secondary to cardiac arrest. Neurology was consulted and proceeded to follow for his admission. He was started on modafanil, zoloft, and depakote as per neurology (Dr. Arroyo) recommendations. The patient became agitated with delirium and refused nursing care and medications while in the CTICU and psychiatry was consulted. He was found to have pericardial effusion s/p IR drainage on POD#13. Heparin was transitioned to eliquis for left upper extremity clot. He was transferred to the floor on POD26 in NSR (on amiodarone and diltiazem) for probably discharge to [facility] tomorrow.     Neurovascular:   - Patient continues to have expressive aphasia secondary to CVA   - Neurology following, continue to follow recommendations   - Continue depakote 250 mg q12 per neuro recommendations  - Continue tylenol suspension PRN for pain control     Cardiovascular:   - POD26 s/p aortic root replacement/reconstruction (biological Bentall-23mm Magna in 32 mm graft), Carbol reconstruction of coronary ostia, replacement ascending aorta/hemiarch, frozen elephant trunk  - Pt HD stable on the floor with HR controlled to 70-84  - Post operative atrial fibrillation now in sinus rhythm, continue amiodarone 200 daily and diltiazem 30 mg q6  - BP controlled today 107//89  - Continue apixaban 2.5 mg q12 for LUE clot and atrial fibrillation    Respiratory:   - 02 Sat = 93% on RA.  -Encourage C+DB and Use of IS 10x / hr while awake.  -CXR 12/19: Stable from yesterday CXR. Hazy opacity in right lower lung zone  - Postoperative pleural effusion s/p pigtails POD14 and 21    GI:   - Speech and swallow evaluated today, approved soft mechanical diet with thin liquids  - Continue GI PPX with protonix   - Continue simethicone 80 mg TID for gas   - Continue bisacodyl suppository PRN for constipation     Renal / :  - BUN/Cr stable at 15/1.13  - FELY postoperatively with peak of Cr 2.4, now resolved   -Continue to monitor renal function.  -Monitor I/O's.    Endocrine:    - No hx of diabetes or thyroid disease   -A1c. 4.9  -TSH. 0.859    Hematologic:  - H&H stable at 9.4/29.9. No signs of active bleeding. Persistent anemia, initiated ferrous sulfate and folic acid  - Continue apixaban 2.5 q12 hrs    ID:  -Pt remained afebrile with no elevation in WBC and no signs of infection   -Observe for SIRS/Sepsis Syndrome.    Prophylaxis:  -DVT prophylaxis with apixaban  -SCD's    Disposition:  - Floor for disposition to [facility] tomorrow

## 2019-12-19 NOTE — PROGRESS NOTE ADULT - SUBJECTIVE AND OBJECTIVE BOX
CTICU  CRITICAL  CARE  attending     Hand off received 					   Pertinent clinical, laboratory, radiographic, hemodynamic, echocardiographic, respiratory data, microbiologic data and chart were reviewed and analyzed frequently throughout the course of the day and night  Patient seen and examined with CTS/ SH attending at bedside  Pt is a 69y , Male, HEALTH ISSUES - PROBLEM Dx:  Adjustment disorder with disturbance of conduct  Leukocytosis, unspecified type: Leukocytosis, unspecified type  Hypernatremia: Hypernatremia  Bacterial pneumonia: Bacterial pneumonia  Fever, postprocedural: Fever, postprocedural  FELY (acute kidney injury): FELY (acute kidney injury)      , FAMILY HISTORY:  PAST MEDICAL & SURGICAL HISTORY:  Benign prostatic hypertrophy without lower urinary tract symptoms: BPH (benign prostatic hypertrophy)  Ulcerative colitis: Ulcerative colitis  States following surgery of eye and adnexa: straightened right eye  Other postprocedural status: History of hernia repair  Hemorrhoids: Hemorrhoids  Acute appendicitis with generalized peritonitis: Ruptured appendix    Patient is a 69y old  Male who presents with a chief complaint of Aortic dissection (19 Dec 2019 15:12)      14 system review was unremarkable    Vital signs, hemodynamic and respiratory parameters were reviewed from the bedside nursing flowsheet.  ICU Vital Signs Last 24 Hrs  T(C): 37.1 (19 Dec 2019 13:45), Max: 37.2 (18 Dec 2019 21:41)  T(F): 98.7 (19 Dec 2019 13:45), Max: 98.9 (18 Dec 2019 21:41)  HR: 84 (19 Dec 2019 12:48) (72 - 84)  BP: 122/74 (19 Dec 2019 12:48) (107/61 - 139/75)  BP(mean): 94 (19 Dec 2019 12:48) (79 - 100)  ABP: --  ABP(mean): --  RR: 18 (19 Dec 2019 12:48) (13 - 37)  SpO2: 99% (19 Dec 2019 12:48) (89% - 99%)    Adult Advanced Hemodynamics Last 24 Hrs  CVP(mm Hg): --  CVP(cm H2O): --  CO: --  CI: --  PA: --  PA(mean): --  PCWP: --  SVR: --  SVRI: --  PVR: --  PVRI: --,     Intake and output was reviewed and the fluid balance was calculated  Daily     Daily   I&O's Summary    18 Dec 2019 07:01  -  19 Dec 2019 07:00  --------------------------------------------------------  IN: 111 mL / OUT: 2095 mL / NET: -1984 mL        All lines and drain sites were assessed  Glycemic trend was reviewedCAPILLARY BLOOD GLUCOSE        No acute change in mental status  Auscultation of the chest reveals equal bs  Abdomen is soft  Extremities are warm and well perfused  Wounds appear clean and unremarkable  Antibiotics are periop    labs  CBC Full  -  ( 19 Dec 2019 02:44 )  WBC Count : 8.99 K/uL  RBC Count : 3.39 M/uL  Hemoglobin : 9.4 g/dL  Hematocrit : 29.9 %  Platelet Count - Automated : 190 K/uL  Mean Cell Volume : 88.2 fl  Mean Cell Hemoglobin : 27.7 pg  Mean Cell Hemoglobin Concentration : 31.4 gm/dL  Auto Neutrophil # : x  Auto Lymphocyte # : x  Auto Monocyte # : x  Auto Eosinophil # : x  Auto Basophil # : x  Auto Neutrophil % : x  Auto Lymphocyte % : x  Auto Monocyte % : x  Auto Eosinophil % : x  Auto Basophil % : x    12-19    134<L>  |  98  |  15  ----------------------------<  94  4.4   |  26  |  1.13    Ca    9.2      19 Dec 2019 02:44  Phos  3.7     12-19  Mg     2.2     12-19    TPro  5.2<L>  /  Alb  3.1<L>  /  TBili  0.3  /  DBili  x   /  AST  18  /  ALT  15  /  AlkPhos  73  12-18    PT/INR - ( 19 Dec 2019 02:44 )   PT: 15.3 sec;   INR: 1.34          PTT - ( 19 Dec 2019 02:44 )  PTT:40.2 sec  The current medications were reviewed   MEDICATIONS  (STANDING):  aMIOdarone    Tablet 200 milliGRAM(s) Oral daily  apixaban 2.5 milliGRAM(s) Oral every 12 hours  aspirin  chewable 81 milliGRAM(s) Oral daily  buDESOnide    Inhalation Suspension 0.5 milliGRAM(s) Inhalation every 12 hours  chlorhexidine 2% Cloths 1 Application(s) Topical daily  diltiazem    Tablet 30 milliGRAM(s) Oral every 6 hours  diVALproex Sprinkle 250 milliGRAM(s) Oral every 12 hours  lidocaine   Patch 1 Patch Transdermal daily  pantoprazole    Tablet 40 milliGRAM(s) Oral before breakfast  sertraline 50 milliGRAM(s) Oral daily  sodium chloride 0.9% lock flush 3 milliLiter(s) IV Push every 8 hours  sodium chloride 0.9%. 1000 milliLiter(s) (10 mL/Hr) IV Continuous <Continuous>    MEDICATIONS  (PRN):  acetaminophen    Suspension .. 650 milliGRAM(s) Oral every 6 hours PRN Mild Pain (1 - 3)  bisacodyl Suppository 10 milliGRAM(s) Rectal daily PRN Constipation  simethicone 80 milliGRAM(s) Chew three times a day PRN Gas       PROBLEM LIST/ ASSESSMENT:  HEALTH ISSUES - PROBLEM Dx:  Adjustment disorder with disturbance of conduct  Leukocytosis, unspecified type: Leukocytosis, unspecified type  Hypernatremia: Hypernatremia  Bacterial pneumonia: Bacterial pneumonia  Fever, postprocedural: Fever, postprocedural  FELY (acute kidney injury): FELY (acute kidney injury)      ,   Patient is a 69y old  Male who presents with a chief complaint of Aortic dissection (19 Dec 2019 15:12)     s/p cardiac surgery              My plan includes :  close hemodynamic, ventilatory and drain monitoring and management per post op routine    Monitor for arrhythmias and monitor parameters for organ perfusion  beta blockade not administered due to hemodynamic instability and bradycardia  monitor neurologic status  Head of the bed should remain elevated to 45 deg .   chest PT and IS will be encouraged  monitor adequacy of oxygenation and ventilation and attempt to wean oxygen  antibiotic regimen will be tailored to the clinical, laboratory and microbiologic data  Nutritional goals will be met using po eventually , ensure adequate caloric intake and montior the same  Stress ulcer and VTE prophylaxis will be achieved    Glycemic control is satisfactory  Electrolytes have been repleted as necessary and wound care has been carried out. Pain control has been achieved.   agressive physical therapy and early mobility and ambulation goals will be met   The family was updated about the course and plan  CRITICAL CARE TIME personally provided by me  in evaluation and management, reassessments, review and interpretation of labs and x-rays, ventilator and hemodynamic management, formulating a plan and coordinating care: ___90____ MIN.  Time does not include procedural time.  CTICU ATTENDING     					    Jaret Hebert MD

## 2019-12-19 NOTE — SWALLOW BEDSIDE ASSESSMENT ADULT - SWALLOW EVAL: DIAGNOSIS
Pt presents with mild oral phase deficits characterized by lingual residue with solids. Suspect improved pharyngeal phase of swallow as there were NO overt s/s of aspiration with thin liquids. Of important note, pt was sensate to aspiration during FEES. Consequently, recommend a liquid diet upgrade to thin with continued recommendations for mech. soft solids.
Suspect at least mild pharyngeal dysphagia characterized by frequent overt s/s of aspiration with liquid trials. Based on pt's clinical presentation and increased respiratory requirements, recommend continued NPO with alternate means of nutrition, medication, hydration pending FEES results.

## 2019-12-19 NOTE — SWALLOW BEDSIDE ASSESSMENT ADULT - COMMENTS
FEES completed on 12/3. At that time, a diet of mech. soft solids and HTL was recommended. Per the report, "Pt presents with moderate pharyngeal dysphagia characterized by reduced bolus control, delayed initiation, reduced pharyngeal swallow efficiency and airway protection, and poor pharyngeal clearance. Penetration with suspected aspiration occurred during the swallow with penetration/aspiration after the swallow with thin and NTL. Pt was sensate to aspiration AEB immediate throat clearing. Vallecular residue with solids was largely reduced with the use of a liquid wash."

## 2019-12-19 NOTE — SWALLOW BEDSIDE ASSESSMENT ADULT - CONSISTENCIES ADMINISTERED
thin liquid/solid
nectar thick/puree/thin liquid/2 ice chips, 3 tsp thins, 5 tsp NTL + 3 cup sips, 4 tsp puree

## 2019-12-19 NOTE — DISCHARGE NOTE PROVIDER - NSDCCPTREATMENT_GEN_ALL_CORE_FT
PRINCIPAL PROCEDURE  Procedure: Repair, aortic arch, using frozen elephant trunk technique  Findings and Treatment:       SECONDARY PROCEDURE  Procedure: Aortic root replacement  Findings and Treatment: Biological Bentall, 23mm Magna in 32mm Graft    Procedure: Repair, aortic arch, using frozen elephant trunk technique  Findings and Treatment:

## 2019-12-19 NOTE — DISCHARGE NOTE PROVIDER - HOSPITAL COURSE
69 year old male, with PMHx of Ulcerative Colitis and Prostate surgery @ [facility], BIBA to [facility] ED on 11/23/19 complaining of back pain, found to be profoundly hypotensive SBP 40s and cyanotic. He was started on IV levophed peripherally and emergently rushed to CT scan which confirmed Type A aoric dissection. CT surgery was called, arterial line was placed and patient was stabilized on Levophed and Phenylephrine with marginal SBP 80-90s. Code fusion was called and patient received 3u prbc in ED. He was intubated in the ED for hypoxia with cyanosis and emergently brought to the OR for salvage Type A dissection repair. He underwent Type A dissection repair, Aortic root replacement/reconstruction, Cabrol reconstruction of coronary ostia, Ascending and hemiarch replacement with frozen elephant trunk. Intraop patient received 6u PRBC, 4 FFP, 10 cryo, 1000 FEIBA. He was transferred to CT ICU post operatively stable on multiple gtts (Levo/Vaso/Epi). He continued to remain stable, weaned off pressors/inotropes and underwent early mobility with PT. He required prolonged intubation 2/2 hypoxia and was eventually extubated on POD#8. Post extubation patient was noted to have expressive aphasia, CT head performed revealed bilateral frontal infarcts and watershed areas. Neuro was consulted and followed patient throughout his admission. CVA likely preoperative due to profound hypotension on arrival, due to CVA patient with agitation and delirium post operatively requiring Psych consult. Patient refusing basic nursing care, medications and PT/OT. ICU course prolonged due to this agitation and refusal of care. Patient was found to have pericardial effusion s/p IR drainage on POD#13. Patient required intubation for procedure 2/2 agitation and was extubated the next day. He had a L pigtail placed on POD#14 abd POD#21. Post operatively patient also had atrial fibrillation and was started on heparin gtt which was bridged to eliquis and patient remained in NSR. He was also found  to have LUE DVT for which he will have a repeat duplex in 3-6 months and will continue his NOAC. He was denied from acute neuro rehab due to refusal to participate with PT or OT. He continued to remain stable and was transferred to  from CT ICU on POD#26. AS per Dr. Deluna patient is ready for discharge to [facility] on POD ___ 69 year old male, with PMHx of Ulcerative Colitis and Prostate surgery @ [facility], BIBA to [facility] ED on 11/23/19 complaining of back pain, found to be profoundly hypotensive SBP 40s and cyanotic. He was started on IV levophed peripherally and emergently rushed to CT scan which confirmed Type A aoric dissection. CT surgery was called, arterial line was placed and patient was stabilized on Levophed and Phenylephrine with marginal SBP 80-90s. Code fusion was called and patient received 3u prbc in ED. He was intubated in the ED for hypoxia with cyanosis and emergently brought to the OR for salvage Type A dissection repair. He underwent Type A dissection repair, Aortic root replacement/reconstruction, Cabrol reconstruction of coronary ostia, Ascending and hemiarch replacement with frozen elephant trunk. Intraop patient received 6u PRBC, 4 FFP, 10 cryo, 1000 FEIBA. He was transferred to CT ICU post operatively stable on multiple gtts (Levo/Vaso/Epi). He continued to remain stable, weaned off pressors/inotropes and underwent early mobility with PT. He required prolonged intubation 2/2 hypoxia and was eventually extubated on POD#8. Post extubation patient was noted to have expressive aphasia, CT head performed revealed bilateral frontal infarcts and watershed areas. Neuro was consulted and followed patient throughout his admission. CVA likely preoperative due to profound hypotension on arrival, due to CVA patient with agitation and delirium post operatively requiring Psych consult. Patient refusing basic nursing care, medications and PT/OT. ICU course prolonged due to this agitation and refusal of care. Patient was found to have pericardial effusion s/p IR drainage on POD#13. Patient required intubation for procedure 2/2 agitation and was extubated the next day. He had a L pigtail placed on POD#14 abd POD#21. Post operatively patient also had atrial fibrillation and was started on heparin gtt which was bridged to eliquis and patient remained in NSR. He was also found  to have LUE DVT for which he will have a repeat duplex in 3-6 months and will continue his NOAC. He was denied from acute neuro rehab due to refusal to participate with PT or OT. He continued to remain stable and was transferred to  from CT ICU on POD#26. AS per Dr. Deluna patient is ready for discharge to [facility] on POD #32. 69 year old male, with PMHx of Ulcerative Colitis and Prostate surgery @ [facility], BIBA to [facility] ED on 11/23/19 complaining of back pain, found to be profoundly hypotensive SBP 40s and cyanotic. He was started on IV levophed peripherally and emergently rushed to CT scan which confirmed Type A aoric dissection. CT surgery was called, arterial line was placed and patient was stabilized on Levophed and Phenylephrine with marginal SBP 80-90s. Code fusion was called and patient received 3u prbc in ED. He was intubated in the ED for hypoxia with cyanosis and emergently brought to the OR for salvage Type A dissection repair. He underwent Type A dissection repair, Aortic root replacement/reconstruction, Cabrol reconstruction of coronary ostia, Ascending and hemiarch replacement with frozen elephant trunk. Intraop patient received 6u PRBC, 4 FFP, 10 cryo, 1000 FEIBA. He was transferred to CT ICU post operatively stable on multiple gtts (Levo/Vaso/Epi). He continued to remain stable, weaned off pressors/inotropes and underwent early mobility with PT. He required prolonged intubation 2/2 hypoxia and was eventually extubated on POD#8. Post extubation patient was noted to have expressive aphasia, CT head performed revealed bilateral frontal infarcts and watershed areas. Neuro was consulted and followed patient throughout his admission. CVA likely preoperative due to profound hypotension on arrival, due to CVA patient with agitation and delirium post operatively requiring Psych consult. Patient refusing basic nursing care, medications and PT/OT. ICU course prolonged due to this agitation and refusal of care. Patient was found to have pericardial effusion s/p IR drainage on POD#13. Patient required intubation for procedure 2/2 agitation and was extubated the next day. He had a L pigtail placed on POD#14 and POD#21. Post operatively patient also had atrial fibrillation and was started on heparin gtt which was bridged to eliquis and patient remained in NSR. He was also found  to have LUE DVT for which he will have a repeat duplex in 3-6 months and will continue his NOAC. He was denied from acute neuro rehab due to refusal to participate with PT or OT. He continued to remain stable and was transferred to  from CT ICU on POD#26. AS per Dr. Deluna patient is ready for discharge to [facility] on POD #32.

## 2019-12-19 NOTE — DISCHARGE NOTE PROVIDER - NSDCCPCAREPLAN_GEN_ALL_CORE_FT
PRINCIPAL DISCHARGE DIAGNOSIS  Diagnosis: Dissection of aorta, unspecified portion of aorta  Assessment and Plan of Treatment:

## 2019-12-19 NOTE — DISCHARGE NOTE PROVIDER - NSDCACTIVITY_GEN_ALL_CORE
Showering allowed/No heavy lifting/straining/Do not make important decisions/Stairs allowed/Walking - Indoors allowed/Do not drive or operate machinery/Walking - Outdoors allowed

## 2019-12-19 NOTE — DISCHARGE NOTE PROVIDER - NSDCHHNEEDSERVICE_GEN_ALL_CORE
Medication teaching and assessment/Rehabilitation services/Wound care and assessment/Teaching and training/Observation and assessment

## 2019-12-19 NOTE — SWALLOW BEDSIDE ASSESSMENT ADULT - SLP GENERAL OBSERVATIONS
Unable to elicit speech on command. Pt shook his head and refused to speak. However, pt did produce spontaneous speech at time which was appropriate. He required verbal prompting to encourage participation.
Pt inconsistently responding to simple questions with verbalizations. Unless prompted otherwise, pt communicating via head nods. Pt requiring 6L of supplemental O2 via nasal cannula.

## 2019-12-19 NOTE — DISCHARGE NOTE PROVIDER - CARE PROVIDER_API CALL
Yogesh Deluna)  Surgery; Thoracic and Cardiac Surgery  [address] [place] [address]  Phone: [phone]  Fax: [phone]  Follow Up Time:     Ashtyn Arroyo)  Neurology; Vascular Neurology  [address], [address] [place] [address]  Phone: [phone]  Fax: [phone]  Follow Up Time: Yogesh Deluna)  Surgery; Thoracic and Cardiac Surgery  [address] [place] [address]  Phone: [phone]  Fax: [phone]  Follow Up Time:     Samaria Pearce)  Neurology; Vascular Neurology  [address], [address] [place] [address]  Phone: [phone]  Fax: [phone]  Follow Up Time: Yogesh Deluna)  Surgery; Thoracic and Cardiac Surgery  [address] [place] [address]  Phone: [phone]  Fax: [phone]  Follow Up Time:     Samaria Pearce)  Neurology; Vascular Neurology  [address], [address] [place] [address]  Phone: [phone]  Fax: [phone]  Follow Up Time:     Valarie Harrington)  Psychiatry; Psychosomatic Medicine  [address] [address]  Phone: [phone]  Fax: [phone]  Follow Up Time:

## 2019-12-19 NOTE — SWALLOW BEDSIDE ASSESSMENT ADULT - NS SPL SWALLOW CLINIC TRIAL FT
Oral phase was significant for mild-moderate lingual residue. Pt reduced, but did not clear, residue with the use of multiple liquid washes. No clinical overt s/s of aspiration noted across trials. However, unable to consistently assess vocal quality d/t poor participation. Laryngeal elevation consistently palpated, and swallow appeared timely.

## 2019-12-19 NOTE — DISCHARGE NOTE PROVIDER - CARE PROVIDERS DIRECT ADDRESSES
,[email or web address],[email or web address]

## 2019-12-19 NOTE — PROGRESS NOTE ADULT - SUBJECTIVE AND OBJECTIVE BOX
CTICU  CRITICAL  CARE  attending     Hand off received 					   Pertinent clinical, laboratory, radiographic, hemodynamic, echocardiographic, respiratory data, microbiologic data and chart were reviewed and analyzed frequently throughout the course of the day and night  Patient seen and examined with CTS/ SH attending at bedside  Pt is a 69y , Male, HEALTH ISSUES - PROBLEM Dx:  Adjustment disorder with disturbance of conduct  Leukocytosis, unspecified type: Leukocytosis, unspecified type  Hypernatremia: Hypernatremia  Bacterial pneumonia: Bacterial pneumonia  Fever, postprocedural: Fever, postprocedural  FELY (acute kidney injury): FELY (acute kidney injury)      , FAMILY HISTORY:  PAST MEDICAL & SURGICAL HISTORY:  Benign prostatic hypertrophy without lower urinary tract symptoms: BPH (benign prostatic hypertrophy)  Ulcerative colitis: Ulcerative colitis  States following surgery of eye and adnexa: straightened right eye  Other postprocedural status: History of hernia repair  Hemorrhoids: Hemorrhoids  Acute appendicitis with generalized peritonitis: Ruptured appendix    Patient is a 69y old  Male who presents with a chief complaint of Aortic dissection (18 Dec 2019 22:27)      14 system review was unremarkable    Vital signs, hemodynamic and respiratory parameters were reviewed from the bedside nursing flowsheet.  ICU Vital Signs Last 24 Hrs  T(C): 36.9 (19 Dec 2019 05:01), Max: 37.2 (18 Dec 2019 21:41)  T(F): 98.4 (19 Dec 2019 05:01), Max: 98.9 (18 Dec 2019 21:41)  HR: 76 (19 Dec 2019 07:00) (70 - 84)  BP: 126/74 (19 Dec 2019 05:00) (107/61 - 148/84)  BP(mean): 92 (19 Dec 2019 05:00) (79 - 113)  ABP: 138/74 (18 Dec 2019 12:30) (114/62 - 138/74)  ABP(mean): 98 (18 Dec 2019 12:30) (80 - 98)  RR: 15 (19 Dec 2019 07:00) (12 - 37)  SpO2: 93% (19 Dec 2019 07:00) (89% - 99%)    Adult Advanced Hemodynamics Last 24 Hrs  CVP(mm Hg): --  CVP(cm H2O): --  CO: --  CI: --  PA: --  PA(mean): --  PCWP: --  SVR: --  SVRI: --  PVR: --  PVRI: --,     Intake and output was reviewed and the fluid balance was calculated  Daily     Daily   I&O's Summary    18 Dec 2019 07:01  -  19 Dec 2019 07:00  --------------------------------------------------------  IN: 111 mL / OUT: 2095 mL / NET: -1984 mL        All lines and drain sites were assessed  Glycemic trend was reviewedCAPILLARY BLOOD GLUCOSE        No acute change in mental status  Auscultation of the chest reveals equal bs  Abdomen is soft  Extremities are warm and well perfused  Wounds appear clean and unremarkable  Antibiotics are periop    labs  CBC Full  -  ( 19 Dec 2019 02:44 )  WBC Count : 8.99 K/uL  RBC Count : 3.39 M/uL  Hemoglobin : 9.4 g/dL  Hematocrit : 29.9 %  Platelet Count - Automated : 190 K/uL  Mean Cell Volume : 88.2 fl  Mean Cell Hemoglobin : 27.7 pg  Mean Cell Hemoglobin Concentration : 31.4 gm/dL  Auto Neutrophil # : x  Auto Lymphocyte # : x  Auto Monocyte # : x  Auto Eosinophil # : x  Auto Basophil # : x  Auto Neutrophil % : x  Auto Lymphocyte % : x  Auto Monocyte % : x  Auto Eosinophil % : x  Auto Basophil % : x    12-19    134<L>  |  98  |  15  ----------------------------<  94  4.4   |  26  |  1.13    Ca    9.2      19 Dec 2019 02:44  Phos  3.7     12-19  Mg     2.2     12-19    TPro  5.2<L>  /  Alb  3.1<L>  /  TBili  0.3  /  DBili  x   /  AST  18  /  ALT  15  /  AlkPhos  73  12-18    PT/INR - ( 19 Dec 2019 02:44 )   PT: 15.3 sec;   INR: 1.34          PTT - ( 19 Dec 2019 02:44 )  PTT:40.2 sec  The current medications were reviewed   MEDICATIONS  (STANDING):  aMIOdarone    Tablet 200 milliGRAM(s) Oral daily  apixaban 2.5 milliGRAM(s) Oral every 12 hours  aspirin  chewable 81 milliGRAM(s) Oral daily  buDESOnide    Inhalation Suspension 0.5 milliGRAM(s) Inhalation every 12 hours  chlorhexidine 2% Cloths 1 Application(s) Topical daily  dextrose 50% Injectable 50 milliLiter(s) IV Push every 15 minutes  diltiazem    Tablet 30 milliGRAM(s) Oral every 6 hours  diVALproex Sprinkle 250 milliGRAM(s) Oral every 12 hours  lidocaine   Patch 1 Patch Transdermal daily  pantoprazole    Tablet 40 milliGRAM(s) Oral before breakfast  sertraline 50 milliGRAM(s) Oral daily  sodium chloride 0.9%. 1000 milliLiter(s) (10 mL/Hr) IV Continuous <Continuous>    MEDICATIONS  (PRN):  acetaminophen    Suspension .. 650 milliGRAM(s) Oral every 6 hours PRN Mild Pain (1 - 3)  bisacodyl Suppository 10 milliGRAM(s) Rectal daily PRN Constipation  simethicone 80 milliGRAM(s) Chew three times a day PRN Gas       PROBLEM LIST/ ASSESSMENT:  HEALTH ISSUES - PROBLEM Dx:  Adjustment disorder with disturbance of conduct  Leukocytosis, unspecified type: Leukocytosis, unspecified type  Hypernatremia: Hypernatremia  Bacterial pneumonia: Bacterial pneumonia  Fever, postprocedural: Fever, postprocedural  FELY (acute kidney injury): FELY (acute kidney injury)      ,   Patient is a 69y old  Male who presents with a chief complaint of Aortic dissection (18 Dec 2019 22:27)     s/p cardiac surgery              My plan includes :  close hemodynamic, ventilatory and drain monitoring and management per post op routine    Monitor for arrhythmias and monitor parameters for organ perfusion  beta blockade not administered due to hemodynamic instability and bradycardia  monitor neurologic status  Head of the bed should remain elevated to 45 deg .   chest PT and IS will be encouraged  monitor adequacy of oxygenation and ventilation and attempt to wean oxygen  antibiotic regimen will be tailored to the clinical, laboratory and microbiologic data  Nutritional goals will be met using po eventually , ensure adequate caloric intake and montior the same  Stress ulcer and VTE prophylaxis will be achieved    Glycemic control is satisfactory  Electrolytes have been repleted as necessary and wound care has been carried out. Pain control has been achieved.   agressive physical therapy and early mobility and ambulation goals will be met   The family was updated about the course and plan  CRITICAL CARE TIME personally provided by me  in evaluation and management, reassessments, review and interpretation of labs and x-rays, ventilator and hemodynamic management, formulating a plan and coordinating care: ___90____ MIN.  Time does not include procedural time.  CTICU ATTENDING     					    Jaret Hebert MD

## 2019-12-20 PROCEDURE — 71045 X-RAY EXAM CHEST 1 VIEW: CPT | Mod: 26

## 2019-12-20 RX ADMIN — PANTOPRAZOLE SODIUM 40 MILLIGRAM(S): 20 TABLET, DELAYED RELEASE ORAL at 07:00

## 2019-12-20 RX ADMIN — AMIODARONE HYDROCHLORIDE 200 MILLIGRAM(S): 400 TABLET ORAL at 05:34

## 2019-12-20 RX ADMIN — Medication 30 MILLIGRAM(S): at 01:00

## 2019-12-20 RX ADMIN — SODIUM CHLORIDE 3 MILLILITER(S): 9 INJECTION INTRAMUSCULAR; INTRAVENOUS; SUBCUTANEOUS at 21:00

## 2019-12-20 RX ADMIN — Medication 325 MILLIGRAM(S): at 15:10

## 2019-12-20 RX ADMIN — Medication 30 MILLIGRAM(S): at 15:01

## 2019-12-20 RX ADMIN — Medication 30 MILLIGRAM(S): at 05:34

## 2019-12-20 RX ADMIN — SERTRALINE 50 MILLIGRAM(S): 25 TABLET, FILM COATED ORAL at 15:10

## 2019-12-20 RX ADMIN — CHLORHEXIDINE GLUCONATE 1 APPLICATION(S): 213 SOLUTION TOPICAL at 05:34

## 2019-12-20 RX ADMIN — Medication 1 MILLIGRAM(S): at 15:02

## 2019-12-20 RX ADMIN — DIVALPROEX SODIUM 250 MILLIGRAM(S): 500 TABLET, DELAYED RELEASE ORAL at 05:34

## 2019-12-20 RX ADMIN — SODIUM CHLORIDE 3 MILLILITER(S): 9 INJECTION INTRAMUSCULAR; INTRAVENOUS; SUBCUTANEOUS at 05:32

## 2019-12-20 RX ADMIN — APIXABAN 2.5 MILLIGRAM(S): 2.5 TABLET, FILM COATED ORAL at 05:34

## 2019-12-20 RX ADMIN — SODIUM CHLORIDE 3 MILLILITER(S): 9 INJECTION INTRAMUSCULAR; INTRAVENOUS; SUBCUTANEOUS at 13:01

## 2019-12-20 RX ADMIN — Medication 81 MILLIGRAM(S): at 15:06

## 2019-12-20 NOTE — PROGRESS NOTE ADULT - SUBJECTIVE AND OBJECTIVE BOX
Patient discussed on morning rounds with Dr. Deluna    Operation / Date: 11/23/19 repair Type A dissection- aortic root replacement/reconstruction (biological bentall 23 mm Magna in 32 mm graft), Carbol reconstruction of coronary ostia, replacement ascending aorta/hemiarch, frozen elephant trunk    SUBJECTIVE ASSESSMENT:  69y Male assessed at bedside today. He was willing to speak to me today and says his pain is tolerable. He denies chest pain and SOB. He had a bowel movement yesterday.     Vital Signs Last 24 Hrs  T(C): 37.2 (20 Dec 2019 09:35), Max: 37.7 (20 Dec 2019 05:01)  T(F): 99 (20 Dec 2019 09:35), Max: 99.8 (20 Dec 2019 05:01)  HR: 78 (20 Dec 2019 13:00) (70 - 86)  BP: 91/66 (20 Dec 2019 13:00) (91/66 - 147/95)  BP(mean): 73 (20 Dec 2019 13:00) (73 - 110)  RR: 18 (20 Dec 2019 13:00) (18 - 20)  SpO2: 94% (20 Dec 2019 13:00) (91% - 95%)  I&O's Detail    19 Dec 2019 07:01  -  20 Dec 2019 07:00  --------------------------------------------------------  IN:    Oral Fluid: 1010 mL  Total IN: 1010 mL    OUT:  Total OUT: 0 mL    Total NET: 1010 mL    CHEST TUBE: No.   DIETER DRAIN:  No.  EPICARDIAL WIRES: No.  TIE DOWNS: No. Removed today.   MCNALLY: No.    PHYSICAL EXAM:  General: Patient is well appearing in NAD laying in bed. Appears comfortable.   Neurological: Unable to assess orientation, patient refused to answer questions. Moving bilateral upper and lower extremities.   Cardiovascular: RRR, no murmurs, rubs, gallops. S1/S2 auscultated  Respiratory: Clear to auscultation anterior lung fields  Gastrointestinal: +BS, NT/ND  Extremities: No peripheral edema or calf tenderness. Full strength and ROM in bilateral upper extremities. Patient unwilling to test strength/ROM in lower extremities.   Vascular: 2+ bilateral distal pulses  Incision Sites: MSI clean, dry, intact. No sternal click.     LABS:                        9.4    8.99  )-----------( 190      ( 19 Dec 2019 02:44 )             29.9       COUMADIN: No.    PT/INR - ( 19 Dec 2019 02:44 )   PT: 15.3 sec;   INR: 1.34          PTT - ( 19 Dec 2019 02:44 )  PTT:40.2 sec    12-19    134<L>  |  98  |  15  ----------------------------<  94  4.4   |  26  |  1.13    Ca    9.2      19 Dec 2019 02:44  Phos  3.7     12-19  Mg     2.2     12-19      MEDICATIONS  (STANDING):  aMIOdarone    Tablet 200 milliGRAM(s) Oral daily  apixaban 2.5 milliGRAM(s) Oral every 12 hours  aspirin  chewable 81 milliGRAM(s) Oral daily  buDESOnide    Inhalation Suspension 0.5 milliGRAM(s) Inhalation every 12 hours  chlorhexidine 2% Cloths 1 Application(s) Topical daily  diltiazem    Tablet 30 milliGRAM(s) Oral every 6 hours  diVALproex Sprinkle 250 milliGRAM(s) Oral every 12 hours  ferrous    sulfate 325 milliGRAM(s) Oral daily  folic acid 1 milliGRAM(s) Oral daily  lidocaine   Patch 1 Patch Transdermal daily  pantoprazole    Tablet 40 milliGRAM(s) Oral before breakfast  sertraline 50 milliGRAM(s) Oral daily  sodium chloride 0.9% lock flush 3 milliLiter(s) IV Push every 8 hours  sodium chloride 0.9%. 1000 milliLiter(s) (10 mL/Hr) IV Continuous <Continuous>    MEDICATIONS  (PRN):  acetaminophen    Suspension .. 650 milliGRAM(s) Oral every 6 hours PRN Mild Pain (1 - 3)  bisacodyl Suppository 10 milliGRAM(s) Rectal daily PRN Constipation  simethicone 80 milliGRAM(s) Chew three times a day PRN Gas    RADIOLOGY & ADDITIONAL TESTS:  CXR 12/20: Improved from prior CXR. No pleural effusions. No consolidations, PTX.    Assessment/Plan:  69 y.o male with a PMHx of colitis and prostate surgery @[facility] who was brought to [facility] ED via ambulance with back pain and profound hypotension on 11/23/19. In the ED the patient was hypotensive with SBP 40 and cyanotic and he was found to have a Type A aortic dissection. CT surgery was consulted and he was emergently brought to the OR for salvage Type A dissection repair with Dr. Deluna and Dr. Latif. He underwent an aortic root replacement/reconstruction (biological Bentall-23mm Magna in 32 mm graft), Carbol reconstruction of coronary ostia, replacement ascending aorta/hemiarch, frozen elephant trunk. He was brought to the CTICU post operatively. His postoperative course was complicated by prolonged intubation secondary to hypoxia (extubated POD 8), atrial fibrillation/RVR given amio load and started on heparin gtt, and left cephalic DVT with extension to the subclavian. After extubation the patient was found to have expressive aphasia with a head CT showing bilateral frontal infarct and watershed areas with left posterior watershed secondary to cardiac arrest. Neurology was consulted and proceeded to follow for his admission. He was started on modafanil, zoloft, and depakote as per neurology (Dr. Arroyo) recommendations. The patient became agitated with delirium and refused nursing care and medications while in the CTICU and psychiatry was consulted. He was found to have pericardial effusion s/p IR drainage on POD#13. Heparin was transitioned to eliquis for left upper extremity clot. He was transferred to the floor on POD#26 in NSR (on amiodarone and diltiazem) and remained stable awaiting disposition today.    Neurovascular:   - Patient continues to have expressive aphasia secondary to CVA, however participated in more conversation today  - Neurology following, continue to follow recommendations   - Continue depakote 250 mg q12 per neuro recommendations  - Continue tylenol suspension PRN for pain control     Cardiovascular:   - POD27 s/p aortic root replacement/reconstruction (biological Bentall-23mm Magna in 32 mm graft), Carbol reconstruction of coronary ostia, replacement ascending aorta/hemiarch, frozen elephant trunk  - Pt HD stable on the floor with HR controlled to 70-86  - Post operative atrial fibrillation now in sinus rhythm, continue amiodarone 200 daily and diltiazem 30 mg q6  - BP controlled today 91//95  - Continue apixaban 2.5 mg q12 for LUE clot and atrial fibrillation    Respiratory:   - 02 Sat = 94% on RA.  -Encourage C+DB and Use of IS 10x / hr while awake.  - CXR 12/20: Improved from prior CXR. No pleural effusions. No consolidations, no PTX.  - Postoperative pleural effusion s/p pigtails POD14 and 21    GI:   - Speech and swallow evaluated yesterday, approved soft mechanical diet with thin liquids  - Continue GI PPX with protonix   - Continue simethicone 80 mg TID for gas   - Continue bisacodyl suppository PRN for constipation     Renal / :  - BUN/Cr stable yesterday at 15/1.13. Patient refused labs.   - FELY postoperatively with peak of Cr 2.4, now resolved   -Continue to monitor renal function.  -Monitor I/O's.    Endocrine:    - No hx of diabetes or thyroid disease   -A1c. 4.9  -TSH. 0.859    Hematologic:  - H&H stable yesterday at 9.4/29.9. No signs of active bleeding. Persistent anemia, initiated ferrous sulfate and folic acid. Pt refused labs this morning.   - Continue apixaban 2.5 q12 hrs    ID:  -Pt remained afebrile with no elevation in WBC and no signs of infection   -Observe for SIRS/Sepsis Syndrome.    Prophylaxis:  -DVT prophylaxis with apixaban  -SCD's    Disposition:  - Floor for disposition to [facility] tomorrow Patient discussed on morning rounds with Dr. Deluna    Operation / Date: 11/23/19 repair Type A dissection- aortic root replacement/reconstruction (biological bentall 23 mm Magna in 32 mm graft), Carbol reconstruction of coronary ostia, replacement ascending aorta/hemiarch, frozen elephant trunk    SUBJECTIVE ASSESSMENT:  69y Male assessed at bedside today. He was willing to speak to me today and says his pain is tolerable. He denies chest pain and SOB. He had a bowel movement yesterday.     Vital Signs Last 24 Hrs  T(C): 37.2 (20 Dec 2019 09:35), Max: 37.7 (20 Dec 2019 05:01)  T(F): 99 (20 Dec 2019 09:35), Max: 99.8 (20 Dec 2019 05:01)  HR: 78 (20 Dec 2019 13:00) (70 - 86)  BP: 91/66 (20 Dec 2019 13:00) (91/66 - 147/95)  BP(mean): 73 (20 Dec 2019 13:00) (73 - 110)  RR: 18 (20 Dec 2019 13:00) (18 - 20)  SpO2: 94% (20 Dec 2019 13:00) (91% - 95%)  I&O's Detail    19 Dec 2019 07:01  -  20 Dec 2019 07:00  --------------------------------------------------------  IN:    Oral Fluid: 1010 mL  Total IN: 1010 mL    OUT:  Total OUT: 0 mL    Total NET: 1010 mL    CHEST TUBE: No.   DIETER DRAIN:  No.  EPICARDIAL WIRES: No.  TIE DOWNS: No. Removed today.   MCNALLY: No.    PHYSICAL EXAM:  General: Patient is well appearing in NAD laying in bed. Appears comfortable.   Neurological: Unable to assess orientation, patient refused to answer questions. Moving bilateral upper and lower extremities.   Cardiovascular: RRR, no murmurs, rubs, gallops. S1/S2 auscultated  Respiratory: Clear to auscultation anterior lung fields  Gastrointestinal: +BS, NT/ND  Extremities: No peripheral edema or calf tenderness. Full strength and ROM in bilateral upper extremities. Patient unwilling to test strength/ROM in lower extremities.   Vascular: 2+ bilateral distal pulses  Incision Sites: MSI clean, dry, intact. No sternal click.     LABS:                        9.4    8.99  )-----------( 190      ( 19 Dec 2019 02:44 )             29.9       COUMADIN: No.    PT/INR - ( 19 Dec 2019 02:44 )   PT: 15.3 sec;   INR: 1.34          PTT - ( 19 Dec 2019 02:44 )  PTT:40.2 sec    12-19    134<L>  |  98  |  15  ----------------------------<  94  4.4   |  26  |  1.13    Ca    9.2      19 Dec 2019 02:44  Phos  3.7     12-19  Mg     2.2     12-19      MEDICATIONS  (STANDING):  aMIOdarone    Tablet 200 milliGRAM(s) Oral daily  apixaban 2.5 milliGRAM(s) Oral every 12 hours  aspirin  chewable 81 milliGRAM(s) Oral daily  buDESOnide    Inhalation Suspension 0.5 milliGRAM(s) Inhalation every 12 hours  chlorhexidine 2% Cloths 1 Application(s) Topical daily  diltiazem    Tablet 30 milliGRAM(s) Oral every 6 hours  diVALproex Sprinkle 250 milliGRAM(s) Oral every 12 hours  ferrous    sulfate 325 milliGRAM(s) Oral daily  folic acid 1 milliGRAM(s) Oral daily  lidocaine   Patch 1 Patch Transdermal daily  pantoprazole    Tablet 40 milliGRAM(s) Oral before breakfast  sertraline 50 milliGRAM(s) Oral daily  sodium chloride 0.9% lock flush 3 milliLiter(s) IV Push every 8 hours  sodium chloride 0.9%. 1000 milliLiter(s) (10 mL/Hr) IV Continuous <Continuous>    MEDICATIONS  (PRN):  acetaminophen    Suspension .. 650 milliGRAM(s) Oral every 6 hours PRN Mild Pain (1 - 3)  bisacodyl Suppository 10 milliGRAM(s) Rectal daily PRN Constipation  simethicone 80 milliGRAM(s) Chew three times a day PRN Gas    RADIOLOGY & ADDITIONAL TESTS:  CXR 12/20: Improved from prior CXR. No pleural effusions. No consolidations, PTX.    Assessment/Plan:  69 y.o male with a PMHx of colitis and prostate surgery @[facility] who was brought to [facility] ED via ambulance with back pain and profound hypotension on 11/23/19. In the ED the patient was hypotensive with SBP 40 and cyanotic and he was found to have a Type A aortic dissection. CT surgery was consulted and he was emergently brought to the OR for salvage Type A dissection repair with Dr. Deluna and Dr. Latif. He underwent an aortic root replacement/reconstruction (biological Bentall-23mm Magna in 32 mm graft), Carbol reconstruction of coronary ostia, replacement ascending aorta/hemiarch, frozen elephant trunk. He was brought to the CTICU post operatively. His postoperative course was complicated by prolonged intubation secondary to hypoxia (extubated POD 8), atrial fibrillation/RVR given amio load and started on heparin gtt, and left cephalic DVT with extension to the subclavian. After extubation the patient was found to have expressive aphasia with a head CT showing bilateral frontal infarct and watershed areas with left posterior watershed secondary to cardiac arrest. Neurology was consulted and proceeded to follow for his admission. He was started on modafanil, zoloft, and depakote as per neurology (Dr. Arroyo) recommendations. The patient became agitated with delirium and refused nursing care and medications while in the CTICU and psychiatry was consulted. He was found to have pericardial effusion s/p IR drainage on POD#13. Heparin was transitioned to eliquis for left upper extremity clot. He was transferred to the floor on POD#26 in NSR (on amiodarone and diltiazem) and remained stable awaiting disposition today.    Neurovascular:   - Patient continues to have expressive aphasia secondary to CVA, however participated in more conversation today  - Neurology following, continue to follow recommendations   - Continue depakote 250 mg q12 per neuro recommendations  - Continue tylenol suspension PRN for pain control     Cardiovascular:   - POD27 s/p aortic root replacement/reconstruction (biological Bentall-23mm Magna in 32 mm graft), Carbol reconstruction of coronary ostia, replacement ascending aorta/hemiarch, frozen elephant trunk  - Pt HD stable on the floor with HR controlled to 70-86  - Post operative atrial fibrillation now in sinus rhythm, continue amiodarone 200 daily and diltiazem 30 mg q6  - BP controlled today 91//95  - Continue apixaban 2.5 mg q12 for LUE clot and atrial fibrillation    Respiratory:   - 02 Sat = 94% on RA.  -Encourage C+DB and Use of IS 10x / hr while awake.  - CXR 12/20: Improved from prior CXR. No pleural effusions. No consolidations, no PTX.  - Postoperative pleural effusion s/p pigtails POD14 and 21    GI:   - Speech and swallow evaluated yesterday, approved soft mechanical diet with thin liquids  - Continue GI PPX with protonix   - Continue simethicone 80 mg TID for gas   - Continue bisacodyl suppository PRN for constipation     Renal / :  - BUN/Cr stable yesterday at 15/1.13. Patient refused labs.   - FELY postoperatively with peak of Cr 2.4, now resolved   -Continue to monitor renal function.  -Monitor I/O's.    Endocrine:    - No hx of diabetes or thyroid disease   -A1c. 4.9  -TSH. 0.859    Hematologic:  - H&H stable yesterday at 9.4/29.9. No signs of active bleeding. Persistent anemia, initiated ferrous sulfate and folic acid. Pt refused labs this morning.   - Continue apixaban 2.5 q12 hrs    ID:  -Pt remained afebrile with no elevation in WBC and no signs of infection   -Observe for SIRS/Sepsis Syndrome.    Prophylaxis:  -DVT prophylaxis with apixaban  -SCD's    Disposition:  - Floor pending disposition to [facility]

## 2019-12-21 LAB
ANION GAP SERPL CALC-SCNC: 11 MMOL/L — SIGNIFICANT CHANGE UP (ref 5–17)
BUN SERPL-MCNC: 15 MG/DL — SIGNIFICANT CHANGE UP (ref 7–23)
CALCIUM SERPL-MCNC: 9.3 MG/DL — SIGNIFICANT CHANGE UP (ref 8.4–10.5)
CHLORIDE SERPL-SCNC: 100 MMOL/L — SIGNIFICANT CHANGE UP (ref 96–108)
CO2 SERPL-SCNC: 26 MMOL/L — SIGNIFICANT CHANGE UP (ref 22–31)
CREAT SERPL-MCNC: 0.88 MG/DL — SIGNIFICANT CHANGE UP (ref 0.5–1.3)
GLUCOSE SERPL-MCNC: 106 MG/DL — HIGH (ref 70–99)
HCT VFR BLD CALC: 31.7 % — LOW (ref 39–50)
HGB BLD-MCNC: 9.9 G/DL — LOW (ref 13–17)
MAGNESIUM SERPL-MCNC: 2 MG/DL — SIGNIFICANT CHANGE UP (ref 1.6–2.6)
MCHC RBC-ENTMCNC: 27.6 PG — SIGNIFICANT CHANGE UP (ref 27–34)
MCHC RBC-ENTMCNC: 31.2 GM/DL — LOW (ref 32–36)
MCV RBC AUTO: 88.3 FL — SIGNIFICANT CHANGE UP (ref 80–100)
NRBC # BLD: 0 /100 WBCS — SIGNIFICANT CHANGE UP (ref 0–0)
PLATELET # BLD AUTO: 181 K/UL — SIGNIFICANT CHANGE UP (ref 150–400)
POTASSIUM SERPL-MCNC: 4.3 MMOL/L — SIGNIFICANT CHANGE UP (ref 3.5–5.3)
POTASSIUM SERPL-SCNC: 4.3 MMOL/L — SIGNIFICANT CHANGE UP (ref 3.5–5.3)
RBC # BLD: 3.59 M/UL — LOW (ref 4.2–5.8)
RBC # FLD: 15.7 % — HIGH (ref 10.3–14.5)
SODIUM SERPL-SCNC: 137 MMOL/L — SIGNIFICANT CHANGE UP (ref 135–145)
WBC # BLD: 8.81 K/UL — SIGNIFICANT CHANGE UP (ref 3.8–10.5)
WBC # FLD AUTO: 8.81 K/UL — SIGNIFICANT CHANGE UP (ref 3.8–10.5)

## 2019-12-21 PROCEDURE — 71045 X-RAY EXAM CHEST 1 VIEW: CPT | Mod: 26

## 2019-12-21 RX ADMIN — DIVALPROEX SODIUM 250 MILLIGRAM(S): 500 TABLET, DELAYED RELEASE ORAL at 06:59

## 2019-12-21 RX ADMIN — CHLORHEXIDINE GLUCONATE 1 APPLICATION(S): 213 SOLUTION TOPICAL at 06:02

## 2019-12-21 RX ADMIN — Medication 30 MILLIGRAM(S): at 17:14

## 2019-12-21 RX ADMIN — APIXABAN 2.5 MILLIGRAM(S): 2.5 TABLET, FILM COATED ORAL at 05:47

## 2019-12-21 RX ADMIN — Medication 325 MILLIGRAM(S): at 13:10

## 2019-12-21 RX ADMIN — Medication 1 MILLIGRAM(S): at 13:10

## 2019-12-21 RX ADMIN — DIVALPROEX SODIUM 250 MILLIGRAM(S): 500 TABLET, DELAYED RELEASE ORAL at 17:14

## 2019-12-21 RX ADMIN — SODIUM CHLORIDE 3 MILLILITER(S): 9 INJECTION INTRAMUSCULAR; INTRAVENOUS; SUBCUTANEOUS at 13:30

## 2019-12-21 RX ADMIN — Medication 30 MILLIGRAM(S): at 13:10

## 2019-12-21 RX ADMIN — SODIUM CHLORIDE 3 MILLILITER(S): 9 INJECTION INTRAMUSCULAR; INTRAVENOUS; SUBCUTANEOUS at 21:35

## 2019-12-21 RX ADMIN — APIXABAN 2.5 MILLIGRAM(S): 2.5 TABLET, FILM COATED ORAL at 17:14

## 2019-12-21 RX ADMIN — AMIODARONE HYDROCHLORIDE 200 MILLIGRAM(S): 400 TABLET ORAL at 05:47

## 2019-12-21 RX ADMIN — Medication 81 MILLIGRAM(S): at 13:10

## 2019-12-21 RX ADMIN — Medication 30 MILLIGRAM(S): at 05:46

## 2019-12-21 RX ADMIN — LIDOCAINE 1 PATCH: 4 CREAM TOPICAL at 13:10

## 2019-12-21 RX ADMIN — LIDOCAINE 1 PATCH: 4 CREAM TOPICAL at 21:35

## 2019-12-21 RX ADMIN — SERTRALINE 50 MILLIGRAM(S): 25 TABLET, FILM COATED ORAL at 13:11

## 2019-12-21 RX ADMIN — Medication 30 MILLIGRAM(S): at 02:00

## 2019-12-21 NOTE — PROGRESS NOTE ADULT - SUBJECTIVE AND OBJECTIVE BOX
Patient discussed on morning rounds with Dr. Crawley     Operation / Date:  11/23/19 - Repair Acute Type A Aortic Dissection    SUBJECTIVE ASSESSMENT:  69y Male resting in bed.  No overnight events.  Intermittently non-compliant with medications.  Refuses to get OOB.  Refuses examination.  Pt's wife at bedside         Vital Signs Last 24 Hrs  T(C): 36.9 (21 Dec 2019 10:30), Max: 37.3 (20 Dec 2019 14:10)  T(F): 98.5 (21 Dec 2019 10:30), Max: 99.2 (20 Dec 2019 14:10)  HR: 76 (21 Dec 2019 05:00) (74 - 82)  BP: 125/83 (21 Dec 2019 05:00) (91/66 - 133/84)  BP(mean): 100 (21 Dec 2019 05:00) (73 - 100)  RR: 20 (21 Dec 2019 05:00) (18 - 20)  SpO2: 88% (21 Dec 2019 05:00) (88% - 95%)  I&O's Detail    20 Dec 2019 07:01  -  21 Dec 2019 07:00  --------------------------------------------------------  IN:    Oral Fluid: 300 mL  Total IN: 300 mL    OUT:  Total OUT: 0 mL    Total NET: 300 mL          CHEST TUBE:  No.   DIETER DRAIN:  No.  EPICARDIAL WIRES: No.  TIE DOWNS: No.  MCNALLY: No.    PHYSICAL EXAM:    General: NAD    Neurological: A&O x 3 no focal defects     Cardiovascular: RRR no M/R/G    Respiratory: CTA bilaterally     Gastrointestinal: will not allow examination     Extremities:  +1 edema in lower extremities     Vascular: extremities warm, well perfused     Incision Sites: clean, dry, healed    LABS:                        9.9    8.81  )-----------( 181      ( 21 Dec 2019 06:21 )             31.7           12-21    137  |  100  |  15  ----------------------------<  106<H>  4.3   |  26  |  0.88    Ca    9.3      21 Dec 2019 06:21  Mg     2.0     12-21            MEDICATIONS  (STANDING):  aMIOdarone    Tablet 200 milliGRAM(s) Oral daily  apixaban 2.5 milliGRAM(s) Oral every 12 hours  aspirin  chewable 81 milliGRAM(s) Oral daily  buDESOnide    Inhalation Suspension 0.5 milliGRAM(s) Inhalation every 12 hours  chlorhexidine 2% Cloths 1 Application(s) Topical daily  diltiazem    Tablet 30 milliGRAM(s) Oral every 6 hours  diVALproex Sprinkle 250 milliGRAM(s) Oral every 12 hours  ferrous    sulfate 325 milliGRAM(s) Oral daily  folic acid 1 milliGRAM(s) Oral daily  lidocaine   Patch 1 Patch Transdermal daily  pantoprazole    Tablet 40 milliGRAM(s) Oral before breakfast  sertraline 50 milliGRAM(s) Oral daily  sodium chloride 0.9% lock flush 3 milliLiter(s) IV Push every 8 hours  sodium chloride 0.9%. 1000 milliLiter(s) (10 mL/Hr) IV Continuous <Continuous>    MEDICATIONS  (PRN):  acetaminophen    Suspension .. 650 milliGRAM(s) Oral every 6 hours PRN Mild Pain (1 - 3)  bisacodyl Suppository 10 milliGRAM(s) Rectal daily PRN Constipation  simethicone 80 milliGRAM(s) Chew three times a day PRN Gas        RADIOLOGY & ADDITIONAL TESTS:

## 2019-12-21 NOTE — PROGRESS NOTE ADULT - ASSESSMENT
69 y.o male with a PMHx of colitis and prostate surgery @[facility] who was brought to [facility] ED via ambulance with back pain and profound hypotension on 11/23/19. In the ED the patient was hypotensive with SBP 40 and cyanotic and he was found to have a Type A aortic dissection. CT surgery was consulted and he was emergently brought to the OR for salvage Type A dissection repair with Dr. Deluna and Dr. Latif. He underwent an aortic root replacement/reconstruction (biological Bentall-23mm Magna in 32 mm graft), Carbol reconstruction of coronary ostia, replacement ascending aorta/hemiarch, frozen elephant trunk. He was brought to the CTICU post operatively. His postoperative course was complicated by prolonged intubation secondary to hypoxia (extubated POD 8), atrial fibrillation/RVR given amio load and started on heparin gtt, and left cephalic DVT with extension to the subclavian. After extubation the patient was found to have expressive aphasia with a head CT showing bilateral frontal infarct and watershed areas with left posterior watershed secondary to cardiac arrest. Neurology was consulted and proceeded to follow for his admission. He was started on modafanil, zoloft, and depakote as per neurology (Dr. Arroyo) recommendations. The patient became agitated with delirium and refused nursing care and medications while in the CTICU and psychiatry was consulted. He was found to have pericardial effusion s/p IR drainage on POD#13. Heparin was transitioned to eliquis for left upper extremity clot. He was transferred to the floor on POD#26 in NSR (on amiodarone and diltiazem) and remained stable awaiting disposition.    Neurovascular:   - Patient continues to have expressive aphasia secondary to CVA, however participated in more conversation today  - Neurology following, continue to follow recommendations   - Continue depakote 250 mg q12 per neuro recommendations  - Continue tylenol suspension PRN for pain control   - Zoloft 50mg po daily   - Lidocaine patch PRN    Cardiovascular:   - POD28 s/p aortic root replacement/reconstruction (biological Bentall-23mm Magna in 32 mm graft), Carbol reconstruction of coronary ostia, replacement ascending aorta/hemiarch, frozen elephant trunk  - Pt HD stable on the floor with HR controlled to 70-86, normal sinus rhythm  - Post operative atrial fibrillation now in sinus rhythm, continue amiodarone 200 daily and diltiazem 30 mg q6  - BP controlled  - Continue apixaban 2.5 mg q12 for LUE clot and atrial fibrillation    Respiratory:   - 02 Sat = 94% on RA.  -Encourage C+DB and Use of IS 10x / hr while awake.  - CXR 12/20: Improved from prior CXR. No pleural effusions. No consolidations, no PTX.  - Postoperative pleural effusion s/p pigtails POD14 and 21    GI:   - Speech and swallow evaluated , approved soft mechanical diet with thin liquids  - Continue GI PPX with protonix   - Continue simethicone 80 mg TID for gas   - Continue bisacodyl suppository PRN for constipation     Renal / :  - BUN/Cr reasonable  - FELY postoperatively with peak of Cr 2.4, now resolved   -Continue to monitor renal function.  -Monitor I/O's.    Endocrine:    - No hx of diabetes or thyroid disease   -A1c. 4.9  -TSH. 0.859    Hematologic:  - H&H stable yesterday   - Iron/Folate Supplement   - Continue apixaban 2.5 q12 hrs    ID:  -Pt remained afebrile with no elevation in WBC and no signs of infection   -Observe for SIRS/Sepsis Syndrome.    Prophylaxis:  -DVT prophylaxis with apixaban  -SCD's    Disposition:  - Floor pending disposition to [facility]

## 2019-12-22 RX ADMIN — SERTRALINE 50 MILLIGRAM(S): 25 TABLET, FILM COATED ORAL at 13:32

## 2019-12-22 RX ADMIN — DIVALPROEX SODIUM 250 MILLIGRAM(S): 500 TABLET, DELAYED RELEASE ORAL at 18:23

## 2019-12-22 RX ADMIN — LIDOCAINE 1 PATCH: 4 CREAM TOPICAL at 13:32

## 2019-12-22 RX ADMIN — SODIUM CHLORIDE 3 MILLILITER(S): 9 INJECTION INTRAMUSCULAR; INTRAVENOUS; SUBCUTANEOUS at 13:33

## 2019-12-22 RX ADMIN — Medication 30 MILLIGRAM(S): at 13:32

## 2019-12-22 RX ADMIN — CHLORHEXIDINE GLUCONATE 1 APPLICATION(S): 213 SOLUTION TOPICAL at 06:28

## 2019-12-22 RX ADMIN — Medication 30 MILLIGRAM(S): at 06:28

## 2019-12-22 RX ADMIN — Medication 81 MILLIGRAM(S): at 13:32

## 2019-12-22 RX ADMIN — Medication 30 MILLIGRAM(S): at 01:00

## 2019-12-22 RX ADMIN — AMIODARONE HYDROCHLORIDE 200 MILLIGRAM(S): 400 TABLET ORAL at 06:28

## 2019-12-22 RX ADMIN — APIXABAN 2.5 MILLIGRAM(S): 2.5 TABLET, FILM COATED ORAL at 18:23

## 2019-12-22 RX ADMIN — DIVALPROEX SODIUM 250 MILLIGRAM(S): 500 TABLET, DELAYED RELEASE ORAL at 06:29

## 2019-12-22 RX ADMIN — APIXABAN 2.5 MILLIGRAM(S): 2.5 TABLET, FILM COATED ORAL at 06:28

## 2019-12-22 RX ADMIN — SODIUM CHLORIDE 3 MILLILITER(S): 9 INJECTION INTRAMUSCULAR; INTRAVENOUS; SUBCUTANEOUS at 06:22

## 2019-12-22 RX ADMIN — Medication 325 MILLIGRAM(S): at 13:32

## 2019-12-22 RX ADMIN — LIDOCAINE 1 PATCH: 4 CREAM TOPICAL at 18:12

## 2019-12-22 RX ADMIN — Medication 30 MILLIGRAM(S): at 18:23

## 2019-12-22 RX ADMIN — Medication 1 MILLIGRAM(S): at 13:32

## 2019-12-22 RX ADMIN — LIDOCAINE 1 PATCH: 4 CREAM TOPICAL at 01:10

## 2019-12-22 RX ADMIN — PANTOPRAZOLE SODIUM 40 MILLIGRAM(S): 20 TABLET, DELAYED RELEASE ORAL at 06:29

## 2019-12-22 NOTE — PROGRESS NOTE ADULT - SUBJECTIVE AND OBJECTIVE BOX
Patient discussed on morning rounds with Dr. Crawley    Operation / Date:  11/23/19 - repair type A aortic dissection    SUBJECTIVE ASSESSMENT:  69y Male resting in bed.  No overnight events.  Pt removed his midline IV.  Currently has no IV access.  Pt and wife educated on importance of IV access.  Patient refusing insertion of IV.  Wife attempted to reason with patient, but patient still refusing.  Initially complied but on attempted IV insertion patient became combative and almost hit nurse.  Patient and wife educated on importance of IV access and risks in event of emergency.  Pt still declining.    Pt has no other complaints.  Intermittently answering questions.  Would not allow me to examine him today.  Refused lab draws this AM        Vital Signs Last 24 Hrs  T(C): 37.4 (22 Dec 2019 14:00), Max: 37.5 (21 Dec 2019 21:52)  T(F): 99.4 (22 Dec 2019 14:00), Max: 99.5 (21 Dec 2019 21:52)  HR: 84 (22 Dec 2019 13:52) (82 - 84)  BP: 100/63 (22 Dec 2019 13:52) (100/63 - 112/78)  BP(mean): 73 (22 Dec 2019 13:52) (73 - 89)  RR: 20 (22 Dec 2019 13:52) (20 - 20)  SpO2: 95% (22 Dec 2019 13:52) (93% - 99%)  I&O's Detail    21 Dec 2019 07:01  -  22 Dec 2019 07:00  --------------------------------------------------------  IN:    Oral Fluid: 480 mL  Total IN: 480 mL    OUT:    Voided: 350 mL  Total OUT: 350 mL    Total NET: 130 mL      22 Dec 2019 07:01  -  22 Dec 2019 15:23  --------------------------------------------------------  IN:    Oral Fluid: 600 mL  Total IN: 600 mL    OUT:    Voided: 400 mL  Total OUT: 400 mL    Total NET: 200 mL          CHEST TUBE:  No. .   DIETER DRAIN:  No.  EPICARDIAL WIRES: No.  TIE DOWNS: /No.  MCNALLY: /No.    PHYSICAL EXAM: unable to complete, patient refused     General:     Neurological:    Cardiovascular:    Respiratory:    Gastrointestinal:    Extremities:    Vascular:    Incision Sites:    LABS:                        9.9    8.81  )-----------( 181      ( 21 Dec 2019 06:21 )             31.7         12-21    137  |  100  |  15  ----------------------------<  106<H>  4.3   |  26  |  0.88    Ca    9.3      21 Dec 2019 06:21  Mg     2.0     12-21            MEDICATIONS  (STANDING):  aMIOdarone    Tablet 200 milliGRAM(s) Oral daily  apixaban 2.5 milliGRAM(s) Oral every 12 hours  aspirin  chewable 81 milliGRAM(s) Oral daily  buDESOnide    Inhalation Suspension 0.5 milliGRAM(s) Inhalation every 12 hours  chlorhexidine 2% Cloths 1 Application(s) Topical daily  diltiazem    Tablet 30 milliGRAM(s) Oral every 6 hours  diVALproex Sprinkle 250 milliGRAM(s) Oral every 12 hours  ferrous    sulfate 325 milliGRAM(s) Oral daily  folic acid 1 milliGRAM(s) Oral daily  lidocaine   Patch 1 Patch Transdermal daily  pantoprazole    Tablet 40 milliGRAM(s) Oral before breakfast  sertraline 50 milliGRAM(s) Oral daily  sodium chloride 0.9% lock flush 3 milliLiter(s) IV Push every 8 hours  sodium chloride 0.9%. 1000 milliLiter(s) (10 mL/Hr) IV Continuous <Continuous>    MEDICATIONS  (PRN):  acetaminophen    Suspension .. 650 milliGRAM(s) Oral every 6 hours PRN Mild Pain (1 - 3)  bisacodyl Suppository 10 milliGRAM(s) Rectal daily PRN Constipation  simethicone 80 milliGRAM(s) Chew three times a day PRN Gas        RADIOLOGY & ADDITIONAL TESTS:

## 2019-12-22 NOTE — PROVIDER CONTACT NOTE (OTHER) - SITUATION
Patient removed right upper arm midline. RN attempted to insert new peripheral IV line and patient refusing, Alex CASTRO notified and spoke with patient.

## 2019-12-22 NOTE — PROVIDER CONTACT NOTE (OTHER) - ASSESSMENT
Patient made aware of importance of IV access for emergency purposes per Alex CASTRO. Patient became increasingly agitated and combative toward RN and Alex PA at bedside. Dr. Carlin made aware of situation. Patient does not have IV access and providers aware.

## 2019-12-22 NOTE — PROGRESS NOTE ADULT - ASSESSMENT
69 y.o male with a PMHx of colitis and prostate surgery @[facility] who was brought to [facility] ED via ambulance with back pain and profound hypotension on 11/23/19. In the ED the patient was hypotensive with SBP 40 and cyanotic and he was found to have a Type A aortic dissection. CT surgery was consulted and he was emergently brought to the OR for salvage Type A dissection repair with Dr. Deluna and Dr. Latif. He underwent an aortic root replacement/reconstruction (biological Bentall-23mm Magna in 32 mm graft), Carbol reconstruction of coronary ostia, replacement ascending aorta/hemiarch, frozen elephant trunk. He was brought to the CTICU post operatively. His postoperative course was complicated by prolonged intubation secondary to hypoxia (extubated POD 8), atrial fibrillation/RVR given amio load and started on heparin gtt, and left cephalic DVT with extension to the subclavian. After extubation the patient was found to have expressive aphasia with a head CT showing bilateral frontal infarct and watershed areas with left posterior watershed secondary to cardiac arrest. Neurology was consulted and proceeded to follow for his admission. He was started on modafanil, zoloft, and depakote as per neurology (Dr. Arroyo) recommendations. The patient became agitated with delirium and refused nursing care and medications while in the CTICU and psychiatry was consulted. He was found to have pericardial effusion s/p IR drainage on POD#13. Heparin was transitioned to eliquis for left upper extremity clot. He was transferred to the floor on POD#26 in NSR (on amiodarone and diltiazem) and remained stable awaiting disposition.    Neurovascular:   - Patient continues to have expressive aphasia secondary to CVA, waxes and wanes depending on how patient will participate in exam daily  - Neurology following, continue to follow recommendations   - Continue Depakote 250 mg q12 per neuro recommendations  - Continue Tylenol suspension PRN for pain control   - Zoloft 50mg po daily   - Lidocaine patch PRN    Cardiovascular:   - POD28 s/p aortic root replacement/reconstruction (biological Bentall-23mm Magna in 32 mm graft), Carbol reconstruction of coronary ostia, replacement ascending aorta/hemiarch, frozen elephant trunk  - Pt HD stable on the floor with HR controlled to 70-86, normal sinus rhythm  - Post operative atrial fibrillation now in sinus rhythm, continue amiodarone 200 daily and diltiazem 30 mg q6  - BP controlled  - Continue apixaban 2.5 mg q12 for LUE clot and atrial fibrillation    Respiratory:   - 02 Sat = 94% on RA.  -Encourage C+DB and Use of IS 10x / hr while awake.  - CXR 12/20: Improved from prior CXR. No pleural effusions. No consolidations, no PTX.  - Postoperative pleural effusion s/p pigtails POD14 and 21    GI:   - Speech and swallow evaluated , approved soft mechanical diet with thin liquids  - Continue GI PPX with Protonix   - Continue simethicone 80 mg TID for gas   - Continue bisacodyl suppository PRN for constipation     Renal / :  - BUN/Cr reasonable  - FELY postoperatively with peak of Cr 2.4, now resolved   -Continue to monitor renal function.  -Monitor I/O's.    Endocrine:    - No hx of diabetes or thyroid disease   -A1c. 4.9  -TSH. 0.859    Hematologic:  - H&H stable yesterday   - Iron/Folate Supplement   - Continue apixaban 2.5 q12 hrs    ID:  -Pt remained afebrile with no elevation in WBC and no signs of infection   -Observe for SIRS/Sepsis Syndrome.    Prophylaxis:  -DVT prophylaxis with apixaban  -SCD's    Disposition:  - Floor pending disposition to [facility]

## 2019-12-23 RX ADMIN — APIXABAN 2.5 MILLIGRAM(S): 2.5 TABLET, FILM COATED ORAL at 06:06

## 2019-12-23 RX ADMIN — Medication 30 MILLIGRAM(S): at 00:04

## 2019-12-23 RX ADMIN — Medication 0.5 MILLIGRAM(S): at 18:28

## 2019-12-23 RX ADMIN — APIXABAN 2.5 MILLIGRAM(S): 2.5 TABLET, FILM COATED ORAL at 18:28

## 2019-12-23 RX ADMIN — LIDOCAINE 1 PATCH: 4 CREAM TOPICAL at 23:32

## 2019-12-23 RX ADMIN — Medication 325 MILLIGRAM(S): at 11:39

## 2019-12-23 RX ADMIN — LIDOCAINE 1 PATCH: 4 CREAM TOPICAL at 18:28

## 2019-12-23 RX ADMIN — SERTRALINE 50 MILLIGRAM(S): 25 TABLET, FILM COATED ORAL at 11:39

## 2019-12-23 RX ADMIN — LIDOCAINE 1 PATCH: 4 CREAM TOPICAL at 00:49

## 2019-12-23 RX ADMIN — DIVALPROEX SODIUM 250 MILLIGRAM(S): 500 TABLET, DELAYED RELEASE ORAL at 18:28

## 2019-12-23 RX ADMIN — Medication 30 MILLIGRAM(S): at 11:39

## 2019-12-23 RX ADMIN — Medication 81 MILLIGRAM(S): at 11:39

## 2019-12-23 RX ADMIN — Medication 1 MILLIGRAM(S): at 11:39

## 2019-12-23 RX ADMIN — LIDOCAINE 1 PATCH: 4 CREAM TOPICAL at 11:39

## 2019-12-23 RX ADMIN — SODIUM CHLORIDE 3 MILLILITER(S): 9 INJECTION INTRAMUSCULAR; INTRAVENOUS; SUBCUTANEOUS at 21:52

## 2019-12-23 RX ADMIN — Medication 30 MILLIGRAM(S): at 18:28

## 2019-12-23 NOTE — CHART NOTE - NSCHARTNOTEFT_GEN_A_CORE
Admitting Diagnosis:   Patient is a 69y old  Male who presents with a chief complaint of Aortic dissection (23 Dec 2019 08:21)      PAST MEDICAL & SURGICAL HISTORY:  Benign prostatic hypertrophy without lower urinary tract symptoms: BPH (benign prostatic hypertrophy)  Ulcerative colitis: Ulcerative colitis  States following surgery of eye and adnexa: straightened right eye  Other postprocedural status: History of hernia repair  Hemorrhoids: Hemorrhoids  Acute appendicitis with generalized peritonitis: Ruptured appendix      Current Nutrition Order: [facility] soft + thin liquids        PO Intake: Good (%) [   ]  Fair (50-75%) [ x  ] Poor (<25%) [   ]    GI Issues: + flatus, no noted n/v/d/c    Pain: denies     Skin Integrity: surgical incision to chest    Labs: refusing         CAPILLARY BLOOD GLUCOSE          Medications:  MEDICATIONS  (STANDING):  aMIOdarone    Tablet 200 milliGRAM(s) Oral daily  apixaban 2.5 milliGRAM(s) Oral every 12 hours  aspirin  chewable 81 milliGRAM(s) Oral daily  buDESOnide    Inhalation Suspension 0.5 milliGRAM(s) Inhalation every 12 hours  chlorhexidine 2% Cloths 1 Application(s) Topical daily  diltiazem    Tablet 30 milliGRAM(s) Oral every 6 hours  diVALproex Sprinkle 250 milliGRAM(s) Oral every 12 hours  ferrous    sulfate 325 milliGRAM(s) Oral daily  folic acid 1 milliGRAM(s) Oral daily  lidocaine   Patch 1 Patch Transdermal daily  pantoprazole    Tablet 40 milliGRAM(s) Oral before breakfast  sertraline 50 milliGRAM(s) Oral daily  sodium chloride 0.9% lock flush 3 milliLiter(s) IV Push every 8 hours  sodium chloride 0.9%. 1000 milliLiter(s) (10 mL/Hr) IV Continuous <Continuous>    MEDICATIONS  (PRN):  acetaminophen    Suspension .. 650 milliGRAM(s) Oral every 6 hours PRN Mild Pain (1 - 3)  bisacodyl Suppository 10 milliGRAM(s) Rectal daily PRN Constipation  simethicone 80 milliGRAM(s) Chew three times a day PRN Gas      Weight:   83.9kg (11/23)  91.8kg (12/11)     Weight Change: please continue to trend     Nutrition Focused Physical Exam: Completed [   ]  Not Pertinent [  x ]    Estimated energy needs:   Height: 6'0", IBW 178lbs+/-10%, %%, ABW 185lbs, BMI 25.1   ABW 11/23 (84.1kg) used for calculations as pt between % of IBW.   Nutrient needs based on [facility] standards of care for maintenance in older adults.   Needs adjusted for age, post-op healing, inflammatory state  2102-2523kcal/day (25-30kcal/kg)  101-118g pro/day (1.2-1.4g pro/kg)  Fluids per team     Subjective:   68yo M w/ PMH ulcerative colitis, BIBA to [facility] w/ c/o back pain and profound hypotension. Was emergently taken to the OR for salvage Type A dissection repair. Pt now s/p aortic arch repair and ascending arch replacement, Cabrol reconstruction of coronary ostia, replacement of ascending aorta/maynor arch, and frozen elephant trunk (11/23). Nephrology consulted for FELY. Was successfully extubated 12/1. Post op course c/b expressive aphasia s/p extubation; concern for stroke etiology 2/2 hypoperfusion. 12/5 Pt was found to have pericardial effusion s/p IR drainage 12/6 requiring urgent reintubation for procedure- extubated 12/7 to [facility].  Pt continues to be intermittently agitated and combative with staff- prolonging medical course 2/2 refusing treatments/PT/OT. Pt is now ready for acute rehab, pending response from facilities, accepted to [facility], stepped down to 9L while awaits insurance approval.  Liquid consistency upgraded to thins from honey thick. Upon exam pt resting in bed unwilling to respond to questioning, continues to close eyes during assessment, nodded head to tolerating diet/ fair intake, unable to illicit further information as on cell phone, not wanting to participate in interview/ assessment. Per report refusing u/s and other interventions, not cooperative. Accepted with bed available at [facility], now pending auth, possible DC prior to holiday. Will continue to follow per protocol.     Previous Nutrition Diagnosis:   Increased nutrient needs RT increased demand for kcal/pro AEB s/p aortic arch repair and ascending arch replacement, Cabrol reconstruction of coronary ostia, replacement of ascending aorta/maynor arch, and frozen elephant trunk    Active [  x ]  Resolved [   ]    If resolved, new PES:     Goal: Pt to consistently meet % of estimated needs PO     Recommendations:  1. Continue on current diet order  2. Consistency per SLP recs  3. Monitor for s/s intolerance, issues chewing/swallowing  4. Obtain updated weight.   5. Appreciate support and encouragement provided at meal time  6. MVI daily, not meeting 100% RDIs w/ continued poor PO intake.   7. Honor food preferences     Education: refused     Risk Level: High [   ] Moderate [ x  ] Low [   ]

## 2019-12-23 NOTE — PROGRESS NOTE ADULT - SUBJECTIVE AND OBJECTIVE BOX
Patient discussed on morning rounds with Dr. Deluna    Operation / Date: 11/23/19 - repair type A aortic dissection    SUBJECTIVE ASSESSMENT:  69y Male seen and examined. Did speak intermittently, but just " Im tired". Patient not cooperative on interview. Patient nodding head yes when asked if he feels well. Nods head yes to having a bowel movement today and ambulating yesterday. Saw him perform IS, pulling 1500cc.  Refusing physical exam, and meds (did take eliquis this morning per RN). Refusing IV placement.  Refused bedside u/s. Nods no to fever, chest pain, palpitations, SOB, abdominal pain, n/v.    Vital Signs Last 24 Hrs  T(C): 36.7 (23 Dec 2019 05:13), Max: 37.4 (22 Dec 2019 14:00)  T(F): 98.1 (23 Dec 2019 05:13), Max: 99.4 (22 Dec 2019 14:00)  HR: 79 (23 Dec 2019 04:30) (76 - 84)  BP: 122/76 (23 Dec 2019 04:30) (100/63 - 122/76)  BP(mean): 93 (23 Dec 2019 04:30) (73 - 93)  RR: 18 (23 Dec 2019 04:30) (18 - 20)  SpO2: 94% (23 Dec 2019 04:30) (94% - 99%)  I&O's Detail    22 Dec 2019 07:01  -  23 Dec 2019 07:00  --------------------------------------------------------  IN:    Oral Fluid: 600 mL  Total IN: 600 mL    OUT:    Voided: 900 mL  Total OUT: 900 mL    Total NET: -300 mL    **Refused physical exam- would not let me pull down the sheet had laying over him***  CHEST TUBE:  Yes/no  DIETER DRAIN:  Yes/No.  EPICARDIAL WIRES: Yes/No.  TIE DOWNS: Yes/No.  MCNALLY: Yes/No.    PHYSICAL EXAM: **Refused physical exam- would not let me pull down the sheet had laying over him***    General:  NAD.    Neurological:    Cardiovascular:    Respiratory:    Gastrointestinal:    Extremities:    Vascular:    Incision Sites:    LABS:      COUMADIN:  No                    MEDICATIONS  (STANDING):  aMIOdarone    Tablet 200 milliGRAM(s) Oral daily  apixaban 2.5 milliGRAM(s) Oral every 12 hours  aspirin  chewable 81 milliGRAM(s) Oral daily  buDESOnide    Inhalation Suspension 0.5 milliGRAM(s) Inhalation every 12 hours  chlorhexidine 2% Cloths 1 Application(s) Topical daily  diltiazem    Tablet 30 milliGRAM(s) Oral every 6 hours  diVALproex Sprinkle 250 milliGRAM(s) Oral every 12 hours  ferrous    sulfate 325 milliGRAM(s) Oral daily  folic acid 1 milliGRAM(s) Oral daily  lidocaine   Patch 1 Patch Transdermal daily  pantoprazole    Tablet 40 milliGRAM(s) Oral before breakfast  sertraline 50 milliGRAM(s) Oral daily  sodium chloride 0.9% lock flush 3 milliLiter(s) IV Push every 8 hours  sodium chloride 0.9%. 1000 milliLiter(s) (10 mL/Hr) IV Continuous <Continuous>    MEDICATIONS  (PRN):  acetaminophen    Suspension .. 650 milliGRAM(s) Oral every 6 hours PRN Mild Pain (1 - 3)  bisacodyl Suppository 10 milliGRAM(s) Rectal daily PRN Constipation  simethicone 80 milliGRAM(s) Chew three times a day PRN Gas        RADIOLOGY & ADDITIONAL TESTS:

## 2019-12-23 NOTE — PROGRESS NOTE ADULT - ASSESSMENT
69 year old M, PMHx of colitis and prostate surgery @[facility] who was brought to [facility] ED via ambulance with back pain and profound hypotension on 11/23/19. In the ED the patient was hypotensive with SBP 40 and cyanotic and he was found to have a Type A aortic dissection. CT surgery was consulted and he was emergently brought to the OR for salvage Type A dissection repair with Dr. Deluna and Dr. Latif. He underwent an aortic root replacement/reconstruction (biological Bentall-23mm Magna in 32 mm graft), Carbol reconstruction of coronary ostia, replacement ascending aorta/hemiarch, frozen elephant trunk. He was brought to the CTICU post operatively. His postoperative course was complicated by prolonged intubation secondary to hypoxia (extubated POD 8), atrial fibrillation/RVR given amio load and started on heparin gtt, and left cephalic DVT with extension to the subclavian. After extubation the patient was found to have expressive aphasia with a head CT showing bilateral frontal infarct and watershed areas with left posterior watershed secondary to cardiac arrest. Neurology was consulted and proceeded to follow for his admission. He was started on modafanil, zoloft, and depakote as per neurology (Dr. Arroyo) recommendations. The patient became agitated with delirium and refused nursing care and medications while in the CTICU and psychiatry was consulted. He was found to have pericardial effusion s/p IR drainage on POD#13. Heparin was transitioned to eliquis for left upper extremity clot. He was transferred to the floor on POD#26 in NSR (on amiodarone and diltiazem) and remained stable awaiting disposition. Currently POD30, intermittently refusing meds/lab draws and not cooperative, awaiting bed at [facility].     Neurovascular:   - Patient continues to have expressive aphasia secondary to CVA, this morning refused exam.  - Neurology following, continue to follow recommendations   - Continue Depakote 250 mg q12 per neuro recommendations  - Continue Tylenol suspension PRN for pain control   - Zoloft 50mg po daily   - Lidocaine patch PRN    Cardiovascular: HD stable, HR controlled.   - POD30 s/p aortic root replacement/reconstruction (biological Bentall-23mm Magna in 32 mm graft), Carbol reconstruction of coronary ostia, replacement ascending aorta/hemiarch, frozen elephant trunk  - Post operative atrial fibrillation now in sinus rhythm, continue amiodarone 200 daily and diltiazem 30 mg q6  - BP controlled  - Continue apixaban 2.5 mg q12 for LUE clot and atrial fibrillation    Respiratory:   - 02 Sat = 94% on RA.  -Encourage C+DB and Use of IS 10x / hr while awake.  - CXR 12/21: b/l pleural effusions- patient refusing bedside u/s- stable on room air.  - Postoperative pleural effusion s/p pigtails POD14 and 21    GI:   - Speech and swallow evaluated , approved soft mechanical diet with thin liquids  - Continue GI PPX with Protonix   - Continue simethicone 80 mg TID for gas   - Continue bisacodyl suppository PRN for constipation     Renal / : refusing lab draws, 12/21 15/0.88  - FELY postoperatively with peak of Cr 2.4, now resolved   -Continue to monitor renal function.  -Monitor I/O's.    Endocrine:    - No hx of diabetes or thyroid disease   -A1c. 4.9  -TSH. 0.859    Hematologic:  - H&H 12/21 9.9/31.7  - Iron/Folate Supplement   - Continue apixaban 2.5 q12 hrs    ID:  -Pt remained afebrile with no elevation in WBC and no signs of infection   -Observe for SIRS/Sepsis Syndrome.    Prophylaxis:  -DVT prophylaxis with apixaban  -SCD's    Disposition:  - Pending [facility] bed, f/u AM labs/CXR.

## 2019-12-24 PROCEDURE — 71045 X-RAY EXAM CHEST 1 VIEW: CPT | Mod: 26

## 2019-12-24 PROCEDURE — 74018 RADEX ABDOMEN 1 VIEW: CPT | Mod: 26

## 2019-12-24 RX ORDER — MULTIVIT WITH MIN/MFOLATE/K2 340-15/3 G
1 POWDER (GRAM) ORAL ONCE
Refills: 0 | Status: COMPLETED | OUTPATIENT
Start: 2019-12-24 | End: 2019-12-24

## 2019-12-24 RX ORDER — METOCLOPRAMIDE HCL 10 MG
5 TABLET ORAL ONCE
Refills: 0 | Status: COMPLETED | OUTPATIENT
Start: 2019-12-24 | End: 2019-12-24

## 2019-12-24 RX ORDER — ONDANSETRON 8 MG/1
4 TABLET, FILM COATED ORAL ONCE
Refills: 0 | Status: COMPLETED | OUTPATIENT
Start: 2019-12-24 | End: 2019-12-24

## 2019-12-24 RX ORDER — METOCLOPRAMIDE HCL 10 MG
10 TABLET ORAL ONCE
Refills: 0 | Status: COMPLETED | OUTPATIENT
Start: 2019-12-24 | End: 2019-12-24

## 2019-12-24 RX ADMIN — AMIODARONE HYDROCHLORIDE 200 MILLIGRAM(S): 400 TABLET ORAL at 05:24

## 2019-12-24 RX ADMIN — LIDOCAINE 1 PATCH: 4 CREAM TOPICAL at 19:43

## 2019-12-24 RX ADMIN — SODIUM CHLORIDE 3 MILLILITER(S): 9 INJECTION INTRAMUSCULAR; INTRAVENOUS; SUBCUTANEOUS at 20:22

## 2019-12-24 RX ADMIN — DIVALPROEX SODIUM 250 MILLIGRAM(S): 500 TABLET, DELAYED RELEASE ORAL at 05:24

## 2019-12-24 RX ADMIN — Medication 1 BOTTLE: at 13:22

## 2019-12-24 RX ADMIN — APIXABAN 2.5 MILLIGRAM(S): 2.5 TABLET, FILM COATED ORAL at 17:21

## 2019-12-24 RX ADMIN — Medication 0.5 MILLIGRAM(S): at 17:22

## 2019-12-24 RX ADMIN — Medication 30 MILLIGRAM(S): at 05:23

## 2019-12-24 RX ADMIN — CHLORHEXIDINE GLUCONATE 1 APPLICATION(S): 213 SOLUTION TOPICAL at 05:28

## 2019-12-24 RX ADMIN — Medication 10 MILLIGRAM(S): at 19:43

## 2019-12-24 RX ADMIN — Medication 5 MILLIGRAM(S): at 17:22

## 2019-12-24 RX ADMIN — LIDOCAINE 1 PATCH: 4 CREAM TOPICAL at 13:22

## 2019-12-24 RX ADMIN — Medication 30 MILLIGRAM(S): at 12:07

## 2019-12-24 RX ADMIN — PANTOPRAZOLE SODIUM 40 MILLIGRAM(S): 20 TABLET, DELAYED RELEASE ORAL at 05:24

## 2019-12-24 RX ADMIN — DIVALPROEX SODIUM 250 MILLIGRAM(S): 500 TABLET, DELAYED RELEASE ORAL at 17:21

## 2019-12-24 RX ADMIN — Medication 30 MILLIGRAM(S): at 17:21

## 2019-12-24 RX ADMIN — SODIUM CHLORIDE 3 MILLILITER(S): 9 INJECTION INTRAMUSCULAR; INTRAVENOUS; SUBCUTANEOUS at 05:28

## 2019-12-24 RX ADMIN — Medication 81 MILLIGRAM(S): at 12:07

## 2019-12-24 RX ADMIN — Medication 325 MILLIGRAM(S): at 12:07

## 2019-12-24 RX ADMIN — SERTRALINE 50 MILLIGRAM(S): 25 TABLET, FILM COATED ORAL at 12:07

## 2019-12-24 RX ADMIN — APIXABAN 2.5 MILLIGRAM(S): 2.5 TABLET, FILM COATED ORAL at 05:24

## 2019-12-24 RX ADMIN — Medication 1 MILLIGRAM(S): at 12:07

## 2019-12-24 RX ADMIN — SIMETHICONE 80 MILLIGRAM(S): 80 TABLET, CHEWABLE ORAL at 10:50

## 2019-12-24 NOTE — PROGRESS NOTE ADULT - SUBJECTIVE AND OBJECTIVE BOX
Patient discussed on morning rounds with Dr. Latif    Operation / Date: 11/23/19 - repair type A aortic dissection    SUBJECTIVE ASSESSMENT:  69y Male patient seen and examined. Patient speaking today more than yesterday. Reports he feels well, he reports he did walk yesterday and when asked why, he said he was tired. I saw him pull 1500cc on the IS.  He is having BMs. He refused blood draw this morning. He said that I can examine him, then refused any contact.  Denies fever, chest pain, palpitations, SOB, abdominal pain, n/v.    Vital Signs Last 24 Hrs  T(C): 37.1 (24 Dec 2019 05:01), Max: 37.6 (24 Dec 2019 01:01)  T(F): 98.7 (24 Dec 2019 05:01), Max: 99.6 (24 Dec 2019 01:01)  HR: 84 (24 Dec 2019 05:25) (76 - 94)  BP: 126/73 (24 Dec 2019 05:25) (102/59 - 128/74)  BP(mean): 93 (24 Dec 2019 05:25) (75 - 95)  RR: 18 (24 Dec 2019 05:25) (17 - 18)  SpO2: 92% (24 Dec 2019 05:25) (91% - 98%)  I&O's Detail    23 Dec 2019 07:01  -  24 Dec 2019 07:00  --------------------------------------------------------  IN:    Oral Fluid: 120 mL  Total IN: 120 mL    OUT:    Voided: 200 mL  Total OUT: 200 mL    Total NET: -80 mL          CHEST TUBE:  No   DIETER DRAIN: No  EPICARDIAL WIRES: No  TIE DOWNS: No.  MCNALLY: No.    PHYSICAL EXAM: ***Patient only allowed for inspection, and palpitation to lower legs****    General: Lying in bed, NAD    Neurological:    Cardiovascular:    Respiratory:    Gastrointestinal: NT/ND    Extremities: WWP, no edema, no calf tenderness, 2+ peripheral pulses b/    Incision Sites: MSI: CDI, no drainage or erythema observed. CT sites: CDI    LABS:      COUMADIN:  NO                MEDICATIONS  (STANDING):  aMIOdarone    Tablet 200 milliGRAM(s) Oral daily  apixaban 2.5 milliGRAM(s) Oral every 12 hours  aspirin  chewable 81 milliGRAM(s) Oral daily  buDESOnide    Inhalation Suspension 0.5 milliGRAM(s) Inhalation every 12 hours  chlorhexidine 2% Cloths 1 Application(s) Topical daily  diltiazem    Tablet 30 milliGRAM(s) Oral every 6 hours  diVALproex Sprinkle 250 milliGRAM(s) Oral every 12 hours  ferrous    sulfate 325 milliGRAM(s) Oral daily  folic acid 1 milliGRAM(s) Oral daily  lidocaine   Patch 1 Patch Transdermal daily  pantoprazole    Tablet 40 milliGRAM(s) Oral before breakfast  sertraline 50 milliGRAM(s) Oral daily  sodium chloride 0.9% lock flush 3 milliLiter(s) IV Push every 8 hours  sodium chloride 0.9%. 1000 milliLiter(s) (10 mL/Hr) IV Continuous <Continuous>    MEDICATIONS  (PRN):  acetaminophen    Suspension .. 650 milliGRAM(s) Oral every 6 hours PRN Mild Pain (1 - 3)  bisacodyl Suppository 10 milliGRAM(s) Rectal daily PRN Constipation  simethicone 80 milliGRAM(s) Chew three times a day PRN Gas        RADIOLOGY & ADDITIONAL TESTS: Patient discussed on morning rounds with Dr. Latif    Operation / Date: 11/23/19 - repair type A aortic dissection    SUBJECTIVE ASSESSMENT:  69y Male patient seen and examined. Patient speaking today more than yesterday. Reports he feels well, he reports he did walk yesterday and when asked why, he said he was tired. I saw him pull 1500cc on the IS.  He is having BMs. He refused blood draw this morning. He said that I can examine him, then refused any contact.  Denies fever, chest pain, palpitations, SOB, abdominal pain, n/v.    Vital Signs Last 24 Hrs  T(C): 37.1 (24 Dec 2019 05:01), Max: 37.6 (24 Dec 2019 01:01)  T(F): 98.7 (24 Dec 2019 05:01), Max: 99.6 (24 Dec 2019 01:01)  HR: 84 (24 Dec 2019 05:25) (76 - 94)  BP: 126/73 (24 Dec 2019 05:25) (102/59 - 128/74)  BP(mean): 93 (24 Dec 2019 05:25) (75 - 95)  RR: 18 (24 Dec 2019 05:25) (17 - 18)  SpO2: 92% (24 Dec 2019 05:25) (91% - 98%)  I&O's Detail    23 Dec 2019 07:01  -  24 Dec 2019 07:00  --------------------------------------------------------  IN:    Oral Fluid: 120 mL  Total IN: 120 mL    OUT:    Voided: 200 mL  Total OUT: 200 mL    Total NET: -80 mL          CHEST TUBE:  No   DIETER DRAIN: No  EPICARDIAL WIRES: No  TIE DOWNS: No.  MCNALLY: No.    PHYSICAL EXAM: ***Patient only allowed for inspection, and palpitation to lower legs****    General: Lying in bed, NAD    Neurological:    Cardiovascular:    Respiratory:    Gastrointestinal: NT/ND    Extremities: WWP, no edema, no calf tenderness, 2+ peripheral pulses b/    Incision Sites: MSI: CDI, no drainage or erythema observed. CT sites: CDI    Skin: Petechial rash noted on torso and upper arms.    LABS:      COUMADIN:  NO                MEDICATIONS  (STANDING):  aMIOdarone    Tablet 200 milliGRAM(s) Oral daily  apixaban 2.5 milliGRAM(s) Oral every 12 hours  aspirin  chewable 81 milliGRAM(s) Oral daily  buDESOnide    Inhalation Suspension 0.5 milliGRAM(s) Inhalation every 12 hours  chlorhexidine 2% Cloths 1 Application(s) Topical daily  diltiazem    Tablet 30 milliGRAM(s) Oral every 6 hours  diVALproex Sprinkle 250 milliGRAM(s) Oral every 12 hours  ferrous    sulfate 325 milliGRAM(s) Oral daily  folic acid 1 milliGRAM(s) Oral daily  lidocaine   Patch 1 Patch Transdermal daily  pantoprazole    Tablet 40 milliGRAM(s) Oral before breakfast  sertraline 50 milliGRAM(s) Oral daily  sodium chloride 0.9% lock flush 3 milliLiter(s) IV Push every 8 hours  sodium chloride 0.9%. 1000 milliLiter(s) (10 mL/Hr) IV Continuous <Continuous>    MEDICATIONS  (PRN):  acetaminophen    Suspension .. 650 milliGRAM(s) Oral every 6 hours PRN Mild Pain (1 - 3)  bisacodyl Suppository 10 milliGRAM(s) Rectal daily PRN Constipation  simethicone 80 milliGRAM(s) Chew three times a day PRN Gas        RADIOLOGY & ADDITIONAL TESTS:

## 2019-12-24 NOTE — PROGRESS NOTE ADULT - ASSESSMENT
69 year old M, PMHx of colitis and prostate surgery @[facility] who was brought to [facility] ED via ambulance with back pain and profound hypotension on 11/23/19. In the ED the patient was hypotensive with SBP 40 and cyanotic and he was found to have a Type A aortic dissection. CT surgery was consulted and he was emergently brought to the OR for salvage Type A dissection repair with Dr. Deluna and Dr. Latif. He underwent an aortic root replacement/reconstruction (biological Bentall-23mm Magna in 32 mm graft), Carbol reconstruction of coronary ostia, replacement ascending aorta/hemiarch, frozen elephant trunk. He was brought to the CTICU post operatively. His postoperative course was complicated by prolonged intubation secondary to hypoxia (extubated POD 8), atrial fibrillation/RVR given amio load and started on heparin gtt, and left cephalic DVT with extension to the subclavian. After extubation the patient was found to have expressive aphasia with a head CT showing bilateral frontal infarct and watershed areas with left posterior watershed secondary to cardiac arrest. Neurology was consulted and proceeded to follow for his admission. He was started on modafanil, zoloft, and depakote as per neurology (Dr. Arroyo) recommendations. The patient became agitated with delirium and refused nursing care and medications while in the CTICU and psychiatry was consulted. He was found to have pericardial effusion s/p IR drainage on POD#13. Heparin was transitioned to eliquis for left upper extremity clot. He was transferred to the floor on POD#26 in NSR (on amiodarone and diltiazem) and remained stable awaiting disposition. Currently POD30, intermittently refusing meds/lab draws and not cooperative, awaiting bed at [facility].     Neurovascular:   - Patient continues to have expressive aphasia secondary to CVA, this morning refused exam.  - Neurology following, continue to follow recommendations   - Continue Depakote 250 mg q12 per neuro recommendations  - Continue Tylenol suspension PRN for pain control   - Zoloft 50mg po daily   - Lidocaine patch PRN    Cardiovascular: HD stable, HR controlled.   - POD31 s/p aortic root replacement/reconstruction (biological Bentall-23mm Magna in 32 mm graft), Carbol reconstruction of coronary ostia, replacement ascending aorta/hemiarch, frozen elephant trunk  - Post operative atrial fibrillation now in sinus rhythm, continue amiodarone 200 daily and diltiazem 30 mg q6  - BP controlled  - Continue apixaban 2.5 mg q12 for LUE clot and atrial fibrillation    Respiratory:   - 02 Sat = 94% on RA.  -Encourage C+DB and Use of IS 10x / hr while awake.  - CXR 12/21: b/l pleural effusions- patient refusing bedside u/s- stable on room air.  - Postoperative pleural effusion s/p pigtails POD14 and 21    GI:   - Speech and swallow evaluated , approved soft mechanical diet with thin liquids  - Continue GI PPX with Protonix   - Continue simethicone 80 mg TID for gas   - Continue bisacodyl suppository PRN for constipation     Renal / : refusing lab draws, 12/21 15/0.88  - FELY postoperatively with peak of Cr 2.4, now resolved   -Continue to monitor renal function.  -Monitor I/O's.    Endocrine:    - No hx of diabetes or thyroid disease   -A1c. 4.9  -TSH. 0.859    Hematologic:  - H&H 12/21 9.9/31.7  - Iron/Folate Supplement   - Continue apixaban 2.5 q12 hrs    ID:  -Pt remained afebrile with no elevation in WBC and no signs of infection   -Observe for SIRS/Sepsis Syndrome.    Prophylaxis:  -DVT prophylaxis with apixaban  -SCD's    Disposition:  - Has [facility] bed, needs authorization however patient refusing to work with PT for PT note to authorize acceptance. Dr. Anup alvares.

## 2019-12-25 PROCEDURE — 74176 CT ABD & PELVIS W/O CONTRAST: CPT | Mod: 26

## 2019-12-25 RX ORDER — MULTIVIT WITH MIN/MFOLATE/K2 340-15/3 G
1 POWDER (GRAM) ORAL ONCE
Refills: 0 | Status: COMPLETED | OUTPATIENT
Start: 2019-12-25 | End: 2019-12-25

## 2019-12-25 RX ORDER — POLYETHYLENE GLYCOL 3350 17 G/17G
17 POWDER, FOR SOLUTION ORAL DAILY
Refills: 0 | Status: DISCONTINUED | OUTPATIENT
Start: 2019-12-25 | End: 2019-12-26

## 2019-12-25 RX ORDER — SENNA PLUS 8.6 MG/1
2 TABLET ORAL AT BEDTIME
Refills: 0 | Status: DISCONTINUED | OUTPATIENT
Start: 2019-12-25 | End: 2019-12-26

## 2019-12-25 RX ADMIN — Medication 30 MILLIGRAM(S): at 00:24

## 2019-12-25 RX ADMIN — SODIUM CHLORIDE 3 MILLILITER(S): 9 INJECTION INTRAMUSCULAR; INTRAVENOUS; SUBCUTANEOUS at 07:14

## 2019-12-25 RX ADMIN — Medication 1 MILLIGRAM(S): at 12:34

## 2019-12-25 RX ADMIN — LIDOCAINE 1 PATCH: 4 CREAM TOPICAL at 01:00

## 2019-12-25 RX ADMIN — AMIODARONE HYDROCHLORIDE 200 MILLIGRAM(S): 400 TABLET ORAL at 07:20

## 2019-12-25 RX ADMIN — Medication 650 MILLIGRAM(S): at 18:28

## 2019-12-25 RX ADMIN — Medication 0.5 MILLIGRAM(S): at 18:26

## 2019-12-25 RX ADMIN — Medication 30 MILLIGRAM(S): at 07:20

## 2019-12-25 RX ADMIN — Medication 325 MILLIGRAM(S): at 12:34

## 2019-12-25 RX ADMIN — Medication 30 MILLIGRAM(S): at 18:26

## 2019-12-25 RX ADMIN — Medication 81 MILLIGRAM(S): at 12:34

## 2019-12-25 RX ADMIN — Medication 0.5 MILLIGRAM(S): at 07:21

## 2019-12-25 RX ADMIN — POLYETHYLENE GLYCOL 3350 17 GRAM(S): 17 POWDER, FOR SOLUTION ORAL at 12:34

## 2019-12-25 RX ADMIN — APIXABAN 2.5 MILLIGRAM(S): 2.5 TABLET, FILM COATED ORAL at 18:26

## 2019-12-25 RX ADMIN — APIXABAN 2.5 MILLIGRAM(S): 2.5 TABLET, FILM COATED ORAL at 07:57

## 2019-12-25 RX ADMIN — CHLORHEXIDINE GLUCONATE 1 APPLICATION(S): 213 SOLUTION TOPICAL at 07:37

## 2019-12-25 RX ADMIN — SIMETHICONE 80 MILLIGRAM(S): 80 TABLET, CHEWABLE ORAL at 14:59

## 2019-12-25 RX ADMIN — Medication 30 MILLIGRAM(S): at 12:34

## 2019-12-25 RX ADMIN — DIVALPROEX SODIUM 250 MILLIGRAM(S): 500 TABLET, DELAYED RELEASE ORAL at 18:25

## 2019-12-25 RX ADMIN — LIDOCAINE 1 PATCH: 4 CREAM TOPICAL at 22:40

## 2019-12-25 RX ADMIN — SENNA PLUS 2 TABLET(S): 8.6 TABLET ORAL at 22:40

## 2019-12-25 RX ADMIN — DIVALPROEX SODIUM 250 MILLIGRAM(S): 500 TABLET, DELAYED RELEASE ORAL at 07:20

## 2019-12-25 RX ADMIN — Medication 650 MILLIGRAM(S): at 19:01

## 2019-12-25 RX ADMIN — SERTRALINE 50 MILLIGRAM(S): 25 TABLET, FILM COATED ORAL at 12:34

## 2019-12-25 RX ADMIN — PANTOPRAZOLE SODIUM 40 MILLIGRAM(S): 20 TABLET, DELAYED RELEASE ORAL at 07:20

## 2019-12-25 NOTE — PROGRESS NOTE ADULT - SUBJECTIVE AND OBJECTIVE BOX
Patient discussed on morning rounds with Dr. Gomez/ Dr. Hebert     Operation / Date: 11/23/19 - repair type A aortic dissection    SUBJECTIVE ASSESSMENT:  69y Male seen at the bedside he states he may have had a BM yesterday (per nurse he had a large bm. He states that he has to use the bathroom but is refusing to go at this time for reasons unclear. Per nursing pt got out of bed to chair today.         Vital Signs Last 24 Hrs  T(C): 36.7 (25 Dec 2019 16:57), Max: 36.7 (25 Dec 2019 16:57)  T(F): 98.1 (25 Dec 2019 16:57), Max: 98.1 (25 Dec 2019 16:57)  HR: 80 (25 Dec 2019 16:57) (68 - 80)  BP: 129/77 (25 Dec 2019 16:57) (99/56 - 133/85)  BP(mean): 73 (25 Dec 2019 09:07) (73 - 99)  RR: 16 (25 Dec 2019 13:25) (15 - 16)  SpO2: 96% (25 Dec 2019 13:25) (95% - 96%)  I&O's Detail    24 Dec 2019 07:01  -  25 Dec 2019 07:00  --------------------------------------------------------  IN:    Oral Fluid: 580 mL  Total IN: 580 mL    OUT:    Voided: 900 mL  Total OUT: 900 mL    Total NET: -320 mL          CHEST TUBE: no  DIETER DRAIN:  No.  EPICARDIAL WIRES: No.  TIE DOWNS: No.  MCNALLY: No.    PHYSICAL EXAM:    General: Sitting in bed, NAD     Neurological: Alert and oriented, no focal defecits    Cardiovascular: unable to assess, pt refused    Respiratory: non labored breathing, chest expansion symmetric, unable to assess, pt refused    Gastrointestinal: abd soft, non distended    Extremities: WWP, no peripheral edema     Incision Sites: CD&I,  no erythema, no evidence of dehiscence appreciated          COUMADIN: no                    MEDICATIONS  (STANDING):  aMIOdarone    Tablet 200 milliGRAM(s) Oral daily  apixaban 2.5 milliGRAM(s) Oral every 12 hours  aspirin  chewable 81 milliGRAM(s) Oral daily  buDESOnide    Inhalation Suspension 0.5 milliGRAM(s) Inhalation every 12 hours  chlorhexidine 2% Cloths 1 Application(s) Topical daily  diltiazem    Tablet 30 milliGRAM(s) Oral every 6 hours  diVALproex Sprinkle 250 milliGRAM(s) Oral every 12 hours  ferrous    sulfate 325 milliGRAM(s) Oral daily  folic acid 1 milliGRAM(s) Oral daily  lidocaine   Patch 1 Patch Transdermal daily  pantoprazole    Tablet 40 milliGRAM(s) Oral before breakfast  polyethylene glycol 3350 17 Gram(s) Oral daily  senna 2 Tablet(s) Oral at bedtime  sertraline 50 milliGRAM(s) Oral daily  sodium chloride 0.9% lock flush 3 milliLiter(s) IV Push every 8 hours  sodium chloride 0.9%. 1000 milliLiter(s) (10 mL/Hr) IV Continuous <Continuous>    MEDICATIONS  (PRN):  acetaminophen    Suspension .. 650 milliGRAM(s) Oral every 6 hours PRN Mild Pain (1 - 3)  bisacodyl Suppository 10 milliGRAM(s) Rectal daily PRN Constipation  simethicone 80 milliGRAM(s) Chew three times a day PRN Gas        RADIOLOGY & ADDITIONAL TESTS:  < from: CT Abdomen and Pelvis No Cont (12.25.19 @ 02:06) >  Impression:  No bowel obstruction.    Moderate stool burden with distention of the rectum.    Degenerative disc disease L5-S1      < end of copied text >

## 2019-12-25 NOTE — PROGRESS NOTE ADULT - ASSESSMENT
69 year old M, PMHx of colitis and prostate surgery @[facility] who was brought to [facility] ED via ambulance with back pain and profound hypotension on 11/23/19. In the ED the patient was hypotensive with SBP 40 and cyanotic and he was found to have a Type A aortic dissection. CT surgery was consulted and he was emergently brought to the OR for salvage Type A dissection repair with Dr. Deluna and Dr. Latif. He underwent an aortic root replacement/reconstruction (biological Bentall-23mm Magna in 32 mm graft), Carbol reconstruction of coronary ostia, replacement ascending aorta/hemiarch, frozen elephant trunk. He was brought to the CTICU post operatively. His postoperative course was complicated by prolonged intubation secondary to hypoxia (extubated POD 8), atrial fibrillation/RVR given amio load and started on heparin gtt, and left cephalic DVT with extension to the subclavian. After extubation the patient was found to have expressive aphasia with a head CT showing bilateral frontal infarct and watershed areas with left posterior watershed secondary to cardiac arrest. Neurology was consulted and proceeded to follow for his admission. He was started on modafanil, zoloft, and depakote as per neurology (Dr. Arroyo) recommendations. The patient became agitated with delirium and refused nursing care and medications while in the CTICU and psychiatry was consulted. He was found to have pericardial effusion s/p IR drainage on POD#13. Heparin was transitioned to eliquis for left upper extremity clot. He was transferred to the floor on POD#26 in NSR (on amiodarone and diltiazem) and remained stable awaiting disposition. Currently POD32, intermittently refusing meds/lab draws and not cooperative, awaiting [facility].     Neurovascular:   - CT scan showed b/l frontal watershed subacute infarcts. left posterior watershed  - Neurology, continue to follow recommendations   - Continue Depakote 250 mg q12 per neuro recommendations  - Continue Zoloft 50mg po daily   - Continue Tylenol suspension PRN for pain control   - Lidocaine patch PRN    Cardiovascular: HD stable, HR controlled.   - POD32 s/p aortic root replacement/reconstruction (biological Bentall-23mm Magna in 32 mm graft), Carbol reconstruction of coronary ostia, replacement ascending aorta/hemiarch, frozen elephant trunk  - Post operative atrial fibrillation now in sinus rhythm, continue amiodarone 200 daily and diltiazem 30 mg q6  - BP controlled  - Continue apixaban 2.5 mg q12h for LUE clot and atrial fibrillation  - Continue ASA 81mg daily     Respiratory: Sp02 Sat 96% on RA.  -Encourage C+DB and Use of IS 10x / hr while awake.  - CXR 12/24 showing possible R atelectasis vs pleural effusion, refused US yesterday. Asymptomatic and on room air. Continue IS, ambulate if pt agreeable   - Postoperative pleural effusion s/p pigtails POD14 and 21    GI: Constipation  - Abd pain yesterday with constipation, AXR showed dilated bowel, CT scan overnight showed stool, no obstruction  - Per nursing pt had large BM yesterday  - Continue bowel regimen  - Speech and swallow evaluated , approved soft mechanical diet with thin liquids  - Continue GI PPX with Protonix   - Continue simethicone 80 mg TID for gas     Renal / : refusing lab draws, 12/21 15/0.88  - FELY postoperatively with peak of Cr 2.4, now resolved   -Continue to monitor renal function.  -Monitor I/O's.    Endocrine:    - No hx of diabetes or thyroid disease   -A1c. 4.9  -TSH. 0.859    Hematologic:  - H&H 12/21 9.9/31.7  - Iron/Folate Supplement   - Continue apixaban 2.5 q12 hrs  DVT prophylaxis with apixaban, SCDs     ID:  -Pt remained afebrile with no elevation in WBC and no signs of infection   -Observe for SIRS/Sepsis Syndrome.    Disposition:  - Has [facility] bed, needs authorization however patient refusing to work with PT for PT note to authorize acceptance. Dr. Hebert aware.

## 2019-12-26 ENCOUNTER — TRANSCRIPTION ENCOUNTER (OUTPATIENT)
Age: 69
End: 2019-12-26

## 2019-12-26 VITALS — TEMPERATURE: 98 F

## 2019-12-26 PROCEDURE — 36415 COLL VENOUS BLD VENIPUNCTURE: CPT

## 2019-12-26 PROCEDURE — C1769: CPT

## 2019-12-26 PROCEDURE — 97530 THERAPEUTIC ACTIVITIES: CPT

## 2019-12-26 PROCEDURE — 87205 SMEAR GRAM STAIN: CPT

## 2019-12-26 PROCEDURE — 36430 TRANSFUSION BLD/BLD COMPNT: CPT

## 2019-12-26 PROCEDURE — 84100 ASSAY OF PHOSPHORUS: CPT

## 2019-12-26 PROCEDURE — 93005 ELECTROCARDIOGRAM TRACING: CPT | Mod: XU

## 2019-12-26 PROCEDURE — 84132 ASSAY OF SERUM POTASSIUM: CPT

## 2019-12-26 PROCEDURE — 83735 ASSAY OF MAGNESIUM: CPT

## 2019-12-26 PROCEDURE — 92610 EVALUATE SWALLOWING FUNCTION: CPT

## 2019-12-26 PROCEDURE — 85384 FIBRINOGEN ACTIVITY: CPT

## 2019-12-26 PROCEDURE — 85730 THROMBOPLASTIN TIME PARTIAL: CPT

## 2019-12-26 PROCEDURE — 74176 CT ABD & PELVIS W/O CONTRAST: CPT

## 2019-12-26 PROCEDURE — 82962 GLUCOSE BLOOD TEST: CPT

## 2019-12-26 PROCEDURE — C1768: CPT

## 2019-12-26 PROCEDURE — 82553 CREATINE MB FRACTION: CPT

## 2019-12-26 PROCEDURE — 70450 CT HEAD/BRAIN W/O DYE: CPT

## 2019-12-26 PROCEDURE — C1729: CPT

## 2019-12-26 PROCEDURE — P9045: CPT

## 2019-12-26 PROCEDURE — P9012: CPT

## 2019-12-26 PROCEDURE — 84436 ASSAY OF TOTAL THYROXINE: CPT

## 2019-12-26 PROCEDURE — 97110 THERAPEUTIC EXERCISES: CPT

## 2019-12-26 PROCEDURE — 84443 ASSAY THYROID STIM HORMONE: CPT

## 2019-12-26 PROCEDURE — 82977 ASSAY OF GGT: CPT

## 2019-12-26 PROCEDURE — 99291 CRITICAL CARE FIRST HOUR: CPT | Mod: 25

## 2019-12-26 PROCEDURE — 80053 COMPREHEN METABOLIC PANEL: CPT

## 2019-12-26 PROCEDURE — 85027 COMPLETE CBC AUTOMATED: CPT

## 2019-12-26 PROCEDURE — 76930: CPT

## 2019-12-26 PROCEDURE — 94003 VENT MGMT INPAT SUBQ DAY: CPT

## 2019-12-26 PROCEDURE — 92526 ORAL FUNCTION THERAPY: CPT

## 2019-12-26 PROCEDURE — 80307 DRUG TEST PRSMV CHEM ANLYZR: CPT

## 2019-12-26 PROCEDURE — 88305 TISSUE EXAM BY PATHOLOGIST: CPT

## 2019-12-26 PROCEDURE — 94002 VENT MGMT INPAT INIT DAY: CPT

## 2019-12-26 PROCEDURE — P9017: CPT

## 2019-12-26 PROCEDURE — 80202 ASSAY OF VANCOMYCIN: CPT

## 2019-12-26 PROCEDURE — 84480 ASSAY TRIIODOTHYRONINE (T3): CPT

## 2019-12-26 PROCEDURE — 36600 WITHDRAWAL OF ARTERIAL BLOOD: CPT | Mod: XU

## 2019-12-26 PROCEDURE — 83036 HEMOGLOBIN GLYCOSYLATED A1C: CPT

## 2019-12-26 PROCEDURE — 71275 CT ANGIOGRAPHY CHEST: CPT

## 2019-12-26 PROCEDURE — 93321 DOPPLER ECHO F-UP/LMTD STD: CPT

## 2019-12-26 PROCEDURE — P9047: CPT

## 2019-12-26 PROCEDURE — 87070 CULTURE OTHR SPECIMN AEROBIC: CPT

## 2019-12-26 PROCEDURE — P9016: CPT

## 2019-12-26 PROCEDURE — 87075 CULTR BACTERIA EXCEPT BLOOD: CPT

## 2019-12-26 PROCEDURE — C1889: CPT

## 2019-12-26 PROCEDURE — 86923 COMPATIBILITY TEST ELECTRIC: CPT

## 2019-12-26 PROCEDURE — 94640 AIRWAY INHALATION TREATMENT: CPT

## 2019-12-26 PROCEDURE — 92523 SPEECH SOUND LANG COMPREHEN: CPT

## 2019-12-26 PROCEDURE — 86900 BLOOD TYPING SEROLOGIC ABO: CPT

## 2019-12-26 PROCEDURE — 85610 PROTHROMBIN TIME: CPT

## 2019-12-26 PROCEDURE — 97116 GAIT TRAINING THERAPY: CPT

## 2019-12-26 PROCEDURE — 92612 ENDOSCOPY SWALLOW (FEES) VID: CPT

## 2019-12-26 PROCEDURE — 87186 SC STD MICRODIL/AGAR DIL: CPT

## 2019-12-26 PROCEDURE — 93306 TTE W/DOPPLER COMPLETE: CPT

## 2019-12-26 PROCEDURE — 83605 ASSAY OF LACTIC ACID: CPT

## 2019-12-26 PROCEDURE — 82550 ASSAY OF CK (CPK): CPT

## 2019-12-26 PROCEDURE — 86803 HEPATITIS C AB TEST: CPT

## 2019-12-26 PROCEDURE — 74018 RADEX ABDOMEN 1 VIEW: CPT

## 2019-12-26 PROCEDURE — P9035: CPT

## 2019-12-26 PROCEDURE — 86901 BLOOD TYPING SEROLOGIC RH(D): CPT

## 2019-12-26 PROCEDURE — 82803 BLOOD GASES ANY COMBINATION: CPT

## 2019-12-26 PROCEDURE — 83690 ASSAY OF LIPASE: CPT

## 2019-12-26 PROCEDURE — 80048 BASIC METABOLIC PNL TOTAL CA: CPT

## 2019-12-26 PROCEDURE — 31500 INSERT EMERGENCY AIRWAY: CPT

## 2019-12-26 PROCEDURE — 82330 ASSAY OF CALCIUM: CPT

## 2019-12-26 PROCEDURE — 97161 PT EVAL LOW COMPLEX 20 MIN: CPT

## 2019-12-26 PROCEDURE — C1874: CPT

## 2019-12-26 PROCEDURE — 71045 X-RAY EXAM CHEST 1 VIEW: CPT

## 2019-12-26 PROCEDURE — 74174 CTA ABD&PLVS W/CONTRAST: CPT

## 2019-12-26 PROCEDURE — 70498 CT ANGIOGRAPHY NECK: CPT

## 2019-12-26 PROCEDURE — 84484 ASSAY OF TROPONIN QUANT: CPT

## 2019-12-26 PROCEDURE — 33010: CPT

## 2019-12-26 PROCEDURE — 93971 EXTREMITY STUDY: CPT

## 2019-12-26 PROCEDURE — 84295 ASSAY OF SERUM SODIUM: CPT

## 2019-12-26 PROCEDURE — 82150 ASSAY OF AMYLASE: CPT

## 2019-12-26 PROCEDURE — 86920 COMPATIBILITY TEST SPIN: CPT

## 2019-12-26 PROCEDURE — 86850 RBC ANTIBODY SCREEN: CPT

## 2019-12-26 PROCEDURE — 85025 COMPLETE CBC W/AUTO DIFF WBC: CPT

## 2019-12-26 RX ORDER — DILTIAZEM HCL 120 MG
1 CAPSULE, EXT RELEASE 24 HR ORAL
Qty: 0 | Refills: 0 | DISCHARGE
Start: 2019-12-26

## 2019-12-26 RX ORDER — SENNA PLUS 8.6 MG/1
2 TABLET ORAL
Qty: 0 | Refills: 0 | DISCHARGE
Start: 2019-12-26

## 2019-12-26 RX ORDER — DIVALPROEX SODIUM 500 MG/1
2 TABLET, DELAYED RELEASE ORAL
Qty: 0 | Refills: 0 | DISCHARGE
Start: 2019-12-26

## 2019-12-26 RX ORDER — PANTOPRAZOLE SODIUM 20 MG/1
1 TABLET, DELAYED RELEASE ORAL
Qty: 0 | Refills: 0 | DISCHARGE
Start: 2019-12-26

## 2019-12-26 RX ORDER — FOLIC ACID 0.8 MG
1 TABLET ORAL
Qty: 0 | Refills: 0 | DISCHARGE
Start: 2019-12-26

## 2019-12-26 RX ORDER — HYDROCORTISONE 1 %
1 OINTMENT (GRAM) TOPICAL
Qty: 1 | Refills: 0
Start: 2019-12-26 | End: 2020-01-01

## 2019-12-26 RX ORDER — ACETAMINOPHEN 500 MG
20.31 TABLET ORAL
Qty: 0 | Refills: 0 | DISCHARGE
Start: 2019-12-26

## 2019-12-26 RX ORDER — FERROUS SULFATE 325(65) MG
1 TABLET ORAL
Qty: 30 | Refills: 0
Start: 2019-12-26 | End: 2020-01-24

## 2019-12-26 RX ORDER — AMIODARONE HYDROCHLORIDE 400 MG/1
1 TABLET ORAL
Qty: 0 | Refills: 0 | DISCHARGE
Start: 2019-12-26

## 2019-12-26 RX ORDER — SIMETHICONE 80 MG/1
1 TABLET, CHEWABLE ORAL
Qty: 0 | Refills: 0 | DISCHARGE
Start: 2019-12-26

## 2019-12-26 RX ORDER — BUDESONIDE, MICRONIZED 100 %
0.5 POWDER (GRAM) MISCELLANEOUS
Qty: 0 | Refills: 0 | DISCHARGE
Start: 2019-12-26

## 2019-12-26 RX ORDER — ATORVASTATIN CALCIUM 80 MG/1
1 TABLET, FILM COATED ORAL
Qty: 30 | Refills: 0
Start: 2019-12-26 | End: 2020-01-24

## 2019-12-26 RX ORDER — POLYETHYLENE GLYCOL 3350 17 G/17G
17 POWDER, FOR SOLUTION ORAL
Qty: 0 | Refills: 0 | DISCHARGE
Start: 2019-12-26

## 2019-12-26 RX ORDER — SULFADIAZINE
0 POWDER (GRAM) MISCELLANEOUS
Qty: 0 | Refills: 0 | DISCHARGE

## 2019-12-26 RX ORDER — ASPIRIN/CALCIUM CARB/MAGNESIUM 324 MG
1 TABLET ORAL
Qty: 0 | Refills: 0 | DISCHARGE
Start: 2019-12-26

## 2019-12-26 RX ORDER — SERTRALINE 25 MG/1
1 TABLET, FILM COATED ORAL
Qty: 0 | Refills: 0 | DISCHARGE
Start: 2019-12-26

## 2019-12-26 RX ORDER — ATORVASTATIN CALCIUM 80 MG/1
80 TABLET, FILM COATED ORAL AT BEDTIME
Refills: 0 | Status: DISCONTINUED | OUTPATIENT
Start: 2019-12-26 | End: 2019-12-26

## 2019-12-26 RX ORDER — APIXABAN 2.5 MG/1
1 TABLET, FILM COATED ORAL
Qty: 0 | Refills: 0 | DISCHARGE
Start: 2019-12-26

## 2019-12-26 RX ADMIN — Medication 0.5 MILLIGRAM(S): at 07:20

## 2019-12-26 RX ADMIN — PANTOPRAZOLE SODIUM 40 MILLIGRAM(S): 20 TABLET, DELAYED RELEASE ORAL at 07:20

## 2019-12-26 RX ADMIN — LIDOCAINE 1 PATCH: 4 CREAM TOPICAL at 11:28

## 2019-12-26 RX ADMIN — DIVALPROEX SODIUM 250 MILLIGRAM(S): 500 TABLET, DELAYED RELEASE ORAL at 07:21

## 2019-12-26 RX ADMIN — Medication 30 MILLIGRAM(S): at 00:32

## 2019-12-26 RX ADMIN — Medication 30 MILLIGRAM(S): at 07:20

## 2019-12-26 RX ADMIN — SERTRALINE 50 MILLIGRAM(S): 25 TABLET, FILM COATED ORAL at 12:41

## 2019-12-26 RX ADMIN — SODIUM CHLORIDE 3 MILLILITER(S): 9 INJECTION INTRAMUSCULAR; INTRAVENOUS; SUBCUTANEOUS at 14:38

## 2019-12-26 RX ADMIN — Medication 325 MILLIGRAM(S): at 12:41

## 2019-12-26 RX ADMIN — Medication 30 MILLIGRAM(S): at 12:41

## 2019-12-26 RX ADMIN — Medication 1 MILLIGRAM(S): at 12:41

## 2019-12-26 RX ADMIN — AMIODARONE HYDROCHLORIDE 200 MILLIGRAM(S): 400 TABLET ORAL at 07:20

## 2019-12-26 RX ADMIN — POLYETHYLENE GLYCOL 3350 17 GRAM(S): 17 POWDER, FOR SOLUTION ORAL at 12:41

## 2019-12-26 RX ADMIN — APIXABAN 2.5 MILLIGRAM(S): 2.5 TABLET, FILM COATED ORAL at 07:20

## 2019-12-26 RX ADMIN — Medication 81 MILLIGRAM(S): at 12:41

## 2019-12-26 RX ADMIN — LIDOCAINE 1 PATCH: 4 CREAM TOPICAL at 07:05

## 2019-12-26 NOTE — PROGRESS NOTE ADULT - PROVIDER SPECIALTY LIST ADULT
CT Surgery
Critical Care
Electrophysiology
Infectious Disease
Intervent Radiology
Intervent Radiology
Nephrology
Neurology
Critical Care

## 2019-12-26 NOTE — PROGRESS NOTE ADULT - REASON FOR ADMISSION
Aortic dissection
Acute Sung Type A dissection
Aortic dissection

## 2019-12-26 NOTE — DISCHARGE NOTE NURSING/CASE MANAGEMENT/SOCIAL WORK - NSDCFUADDAPPT_GEN_ALL_CORE_FT
- Please follow up with Dr. Deluna upon discharge from rehab.  The office is located at [facility], [facility], 4th floor. Call us with any questions #[phone]. Our office will reach out to you with an appointment.     - Please also follow up with neurology, Dr. Pearce, upon discharge from rehab. Our office will reach out to you with this appointment.     - Please also follow up with psychiatry, Dr. Harrington, upon discharge from rehab

## 2019-12-26 NOTE — DISCHARGE NOTE NURSING/CASE MANAGEMENT/SOCIAL WORK - NSDCPEPTCAREGIVEDUMATLIST _GEN_ALL_CORE
82 y/o man with PMH of bladder ca s/p bilateral nephrostomy tubes placed about 5-6 weeks ago, DM2, and lung cancer s/p thoracotomy (no chemo or XRT) more than 15 years ago with cure, was admitted today with weakness and possible sepsis.   He is diagnosed with bladder ca few months ago, not a surgical candidate, on 1/3 was supposed to start XRT but due to fever and UTI and bacteremia he was admitted.     Sepsis  UTI  Nephrostomy tubes  History of lung ca  CKD  DM2  Bladder ca    - Blood culture with enterobacter and cronobacter on 1/3  - UA with more than 50 WBC and UC with enterobacter and E coli  - All sensitive to ceftriaxone and ciprofloxacin   - CXR negative   - Afebrile  - WBC form 24k improved to 10k  - RVP negative   - Continue ceftriaxone 2 gm daily  - Most likely today will be discharge, Repeat blood culture pending from 1/8.   - can be switched to po ciprofloxacin 500mg q12h to complete 2 weeks of treatment, which would be 1/21  - Repeat blood cultures will be checked by me and primary team.    Will sign off please call with any question. Apixaban/Eliquis

## 2019-12-26 NOTE — DISCHARGE NOTE NURSING/CASE MANAGEMENT/SOCIAL WORK - PATIENT PORTAL LINK FT
You can access the FollowMyHealth Patient Portal offered by [facility] by registering at the following website: [email or web address]. By joining [employer]’s FollowMyHealth portal, you will also be able to view your health information using other applications (apps) compatible with our system.

## 2019-12-26 NOTE — PROGRESS NOTE ADULT - SUBJECTIVE AND OBJECTIVE BOX
Patient discussed on morning rounds with Dr. 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 Deluna    SUBJECTIVE ASSESSMENT:  69y Male assessed at bedside today. The patient states he feels okay. He denies chest pain, SOB. He denies a BM today (prior note states he had a large BM on 12/24 as per RN).     Vital Signs Last 24 Hrs  T(C): 36.7 (26 Dec 2019 05:18), Max: 36.7 (25 Dec 2019 16:57)  T(F): 98.1 (26 Dec 2019 05:18), Max: 98.1 (25 Dec 2019 16:57)  HR: 75 (26 Dec 2019 12:34) (70 - 80)  BP: 118/68 (26 Dec 2019 12:34) (101/59 - 131/76)  BP(mean): 90 (25 Dec 2019 21:00) (90 - 90)  RR: 16 (26 Dec 2019 12:34) (12 - 16)  SpO2: 97% (26 Dec 2019 09:05) (95% - 98%)    EPICARDIAL WIRES REMOVED: Yes  TIE DOWNS REMOVED: Yes    PHYSICAL EXAM:  General: Patient is well appearing in NAD laying in bed. Appears comfortable.   Neurological: No focal deficit noted. Moving bilateral upper and lower extremities.   Cardiovascular: RRR, no murmurs, rubs, gallops. S1/S2 auscultated  Respiratory: Clear to auscultation anterior lung fields  Gastrointestinal: +BS, NT/ND  Extremities: No peripheral edema or calf tenderness. Full strength and ROM in bilateral upper extremities, able to lift bilateral legs against gravity.   Vascular: 2+ bilateral distal pulses  Incision Sites: MSI clean, dry, intact. No sternal click.   Integument: Diffuse petechial rash on upper extremities and back.     LABS:      COUMADIN:  No.      Discharge CXR:  < from: Xray Chest 1 View- PORTABLE-Routine (12.24.19 @ 06:09) >  Findings/  impression: Heart size within normal limits, status post median sternotomy, aortic valve replacement and aortic vascular stent.. Lung pathology, resolving.    Discharge ECHO:  < from: Echocardiogram (12.07.19 @ 12:18) >  CONCLUSIONS:     1. Patient was tachycardic during the study.   2. Normal left and right ventricular size and function.   3. The posterior leaflet of the mitral valve is flail. Severe,   anteriorly directed mitral regurgitation.   4. Status post cabrol procedure (bioprosthetic aortic valve and   ascending aorta replacement).   5. Small pericardial effusion without echocardiographic evidence of   cardiac tamponade physiology.   6. Compared to the previous TTE performed on 12/6/2019, the pericardial   effusion is smaller, post drainage. Patient discussed on morning rounds with Dr. 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 Deluna    SUBJECTIVE ASSESSMENT:  69y Male assessed at bedside today. The patient states he feels okay. He denies chest pain, SOB. He denies a BM today (prior note states he had a large BM on 12/24 as per RN).     Vital Signs Last 24 Hrs  T(C): 36.7 (26 Dec 2019 05:18), Max: 36.7 (25 Dec 2019 16:57)  T(F): 98.1 (26 Dec 2019 05:18), Max: 98.1 (25 Dec 2019 16:57)  HR: 75 (26 Dec 2019 12:34) (70 - 80)  BP: 118/68 (26 Dec 2019 12:34) (101/59 - 131/76)  BP(mean): 90 (25 Dec 2019 21:00) (90 - 90)  RR: 16 (26 Dec 2019 12:34) (12 - 16)  SpO2: 97% (26 Dec 2019 09:05) (95% - 98%)    EPICARDIAL WIRES REMOVED: Yes  TIE DOWNS REMOVED: Yes    PHYSICAL EXAM:  General: Patient is well appearing in NAD laying in bed. Appears comfortable.   Neurological: No focal deficit noted. Moving bilateral upper and lower extremities.   Cardiovascular: RRR, no murmurs, rubs, gallops. S1/S2 auscultated  Respiratory: Clear to auscultation anterior lung fields  Gastrointestinal: +BS, NT/ND  Extremities: No peripheral edema or calf tenderness. Full strength and ROM in bilateral upper extremities, able to lift bilateral legs against gravity.   Vascular: 2+ bilateral distal pulses  Incision Sites: MSI clean, dry, intact. No sternal click. R groin incision clean, dry, intact, no hematoma.   Integument: Diffuse petechial rash on upper extremities and back.     LABS:      COUMADIN:  No.      Discharge CXR:  < from: Xray Chest 1 View- PORTABLE-Routine (12.24.19 @ 06:09) >  Findings/  impression: Heart size within normal limits, status post median sternotomy, aortic valve replacement and aortic vascular stent.. Lung pathology, resolving.    Discharge ECHO:  < from: Echocardiogram (12.07.19 @ 12:18) >  CONCLUSIONS:     1. Patient was tachycardic during the study.   2. Normal left and right ventricular size and function.   3. The posterior leaflet of the mitral valve is flail. Severe,   anteriorly directed mitral regurgitation.   4. Status post cabrol procedure (bioprosthetic aortic valve and   ascending aorta replacement).   5. Small pericardial effusion without echocardiographic evidence of   cardiac tamponade physiology.   6. Compared to the previous TTE performed on 12/6/2019, the pericardial   effusion is smaller, post drainage. Patient discussed on morning rounds with Dr. 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 Deluna    SUBJECTIVE ASSESSMENT:  69y Male assessed at bedside today. The patient states he feels okay. He denies chest pain, SOB. He denies a BM today (prior note states he had a large BM on 12/24 as per RN).     HOSPITAL COURSE:  69 year old male, with PMHx of Ulcerative Colitis and Prostate surgery @ [facility], BIBA to [facility] ED on 11/23/19 complaining of back pain, found to be profoundly hypotensive SBP 40s and cyanotic. He was started on IV levophed peripherally and emergently rushed to CT scan which confirmed Type A aoric dissection. CT surgery was called, arterial line was placed and patient was stabilized on Levophed and Phenylephrine with marginal SBP 80-90s. Code fusion was called and patient received 3u prbc in ED. He was intubated in the ED for hypoxia with cyanosis and emergently brought to the OR for salvage Type A dissection repair. He underwent Type A dissection repair, Aortic root replacement/reconstruction, Cabrol reconstruction of coronary ostia, Ascending and hemiarch replacement with frozen elephant trunk. Intraop patient received 6u PRBC, 4 FFP, 10 cryo, 1000 FEIBA. He was transferred to CT ICU post operatively stable on multiple gtts (Levo/Vaso/Epi). He continued to remain stable, weaned off pressors/inotropes and underwent early mobility with PT. He required prolonged intubation 2/2 hypoxia and was eventually extubated on POD#8. Post extubation patient was noted to have expressive aphasia, CT head performed revealed bilateral frontal infarcts and watershed areas. Neuro was consulted and followed patient throughout his admission. CVA likely preoperative due to profound hypotension on arrival, due to CVA patient with agitation and delirium post operatively requiring Psych consult. Patient refusing basic nursing care, medications and PT/OT. ICU course prolonged due to this agitation and refusal of care. Patient was found to have pericardial effusion s/p IR drainage on POD#13. Patient required intubation for procedure 2/2 agitation and was extubated the next day. He had a L pigtail placed on POD#14 and POD#21. Post operatively patient also had atrial fibrillation and was started on heparin gtt which was bridged to eliquis and patient remained in NSR. He was also found  to have LUE DVT for which he will have a repeat duplex in 3-6 months and will continue his NOAC. He was denied from acute neuro rehab due to refusal to participate with PT or OT. He continued to remain stable and was transferred to  from CT ICU on POD#26. AS per Dr. Deluna patient is ready for discharge to [facility] [place] on POD #32.       Vital Signs Last 24 Hrs  T(C): 36.7 (26 Dec 2019 05:18), Max: 36.7 (25 Dec 2019 16:57)  T(F): 98.1 (26 Dec 2019 05:18), Max: 98.1 (25 Dec 2019 16:57)  HR: 75 (26 Dec 2019 12:34) (70 - 80)  BP: 118/68 (26 Dec 2019 12:34) (101/59 - 131/76)  BP(mean): 90 (25 Dec 2019 21:00) (90 - 90)  RR: 16 (26 Dec 2019 12:34) (12 - 16)  SpO2: 97% (26 Dec 2019 09:05) (95% - 98%)    EPICARDIAL WIRES REMOVED: Yes  TIE DOWNS REMOVED: Yes    PHYSICAL EXAM:  General: Patient is well appearing in NAD laying in bed. Appears comfortable.   Neurological: No focal deficit noted. Moving bilateral upper and lower extremities.   Cardiovascular: RRR, no murmurs, rubs, gallops. S1/S2 auscultated  Respiratory: Clear to auscultation anterior lung fields  Gastrointestinal: +BS, NT/ND  Extremities: No peripheral edema or calf tenderness. Full strength and ROM in bilateral upper extremities, able to lift bilateral legs against gravity.   Vascular: 2+ bilateral distal pulses  Incision Sites: MSI clean, dry, intact. No sternal click. R groin incision clean, dry, intact, no hematoma.   Integument: Diffuse petechial rash on upper extremities and back.     LABS:      COUMADIN:  No.      Discharge CXR:  < from: Xray Chest 1 View- PORTABLE-Routine (12.24.19 @ 06:09) >  Findings/  impression: Heart size within normal limits, status post median sternotomy, aortic valve replacement and aortic vascular stent.. Lung pathology, resolving.    Discharge ECHO:  < from: Echocardiogram (12.07.19 @ 12:18) >  CONCLUSIONS:     1. Patient was tachycardic during the study.   2. Normal left and right ventricular size and function.   3. The posterior leaflet of the mitral valve is flail. Severe,   anteriorly directed mitral regurgitation.   4. Status post cabrol procedure (bioprosthetic aortic valve and   ascending aorta replacement).   5. Small pericardial effusion without echocardiographic evidence of   cardiac tamponade physiology.   6. Compared to the previous TTE performed on 12/6/2019, the pericardial   effusion is smaller, post drainage.     Med Reconciliation:  Medication Reconciliation Status	Admission Reconciliation is Completed Discharge Reconciliation is Completed	  Discharge Medications	acetaminophen 160 mg/5 mL oral suspension: 20.31 milliliter(s) orally every 6 hours, As needed, Mild Pain (1 - 3) Ala-Kevin 1% topical cream: Apply topically to affected area every 12 hours  amiodarone 200 mg oral tablet: 1 tab(s) orally once a day apixaban 2.5 mg oral tablet: 1 tab(s) orally every 12 hours aspirin 81 mg oral tablet, chewable: 1 tab(s) orally once a day budesonide: 0.5 milligram(s) inhaled every 12 hours dilTIAZem 30 mg oral tablet: 1 tab(s) orally every 6 hours divalproex sodium 125 mg oral delayed release capsule: 2 cap(s) orally every 12 hours FeroSul 325 mg (65 mg elemental iron) oral tablet: 1 tab(s) orally once a day  Flomax 0.4 mg oral capsule: 1 cap(s) orally once a day folic acid 1 mg oral tablet: 1 tab(s) orally once a day Lipitor 80 mg oral tablet: 1 tab(s) orally once a day (at bedtime) pantoprazole 40 mg oral delayed release tablet: 1 tab(s) orally once a day (before a meal) polyethylene glycol 3350 oral powder for reconstitution: 17 gram(s) orally once a day senna oral tablet: 2 tab(s) orally once a day (at bedtime) sertraline 50 mg oral tablet: 1 tab(s) orally once a day simethicone 80 mg oral tablet, chewable: 1 tab(s) orally 3 times a day, As needed, Gas	  		     Care Plan/Procedures:  Goal(s)	To get better and follow your care plan as instructed.	  Discharge Diagnoses, Assessment and Plan of Treatment	PRINCIPAL DISCHARGE DIAGNOSIS Diagnosis: Dissection of aorta, unspecified portion of aorta Assessment and Plan of Treatment:	  Discharge Procedures, Findings and Treatment	PRINCIPAL PROCEDURE Procedure: Repair, aortic arch, using frozen elephant trunk technique Findings and Treatment:    SECONDARY PROCEDURE Procedure: Aortic root replacement Findings and Treatment: Biological Bentall, 23mm Magna in 32mm Graft  Procedure: Repair, aortic arch, using frozen elephant trunk technique Findings and Treatment:	     Follow Up:  Care Providers for Follow up (PCP/Outpatient Provider)	Yogesh Deluna) Surgery; Thoracic and Cardiac Surgery [address] [place] [address] Phone: [phone] Fax: [phone] Follow Up Time:   Samaria Pearce) Neurology; Vascular Neurology [address], [address] [place] [address] Phone: [phone] Fax: [phone] Follow Up Time:   Valarie Harrington) Psychiatry; Psychosomatic Medicine [address] [place] [address] Phone: [phone] Fax: [phone] Follow Up Time:	  		  Patient's Scheduled Appointments	BECKI IGLESIAS ; 01/02/2020 ; [facility] [address] [place] [address]	  		  Additional Scheduled Appointments	- Please follow up with Dr. Deluna upon discharge from rehab.  The office is located at [facility], [address] [facility]. Call us with any questions #[phone]. Our office will reach out to you with an appointment.   - Please also follow up with neurology, Dr. Pearce, upon discharge from rehab. Our office will reach out to you with this appointment.   - Please also follow up with psychiatry, Dr. Harrington, upon discharge from rehab	  		  Diet Instructions	Mechanical soft, thin liquids 	  Activity	Do not drive or operate machinery, Do not make important decisions, No heavy lifting/straining, Showering allowed, Stairs allowed, Walking - Indoors allowed, Walking - Outdoors allowed	  Additional Instructions	-Walk daily as tolerated and use your incentive spirometer every hour.  -No driving or strenuous activity/exercise for 6 weeks, or until cleared by your surgeon.  -Gently clean your incisions with anti-bacterial soap and water, pat dry.  You may leave them open to air.  -Call your doctor if you have shortness of breath, chest pain not relieved by pain medication, dizziness, fever >101.5, or increased redness or drainage from incisions.

## 2019-12-27 ENCOUNTER — INBOUND DOCUMENT (OUTPATIENT)
Age: 69
End: 2019-12-27

## 2020-01-02 ENCOUNTER — APPOINTMENT (OUTPATIENT)
Dept: NEUROLOGY | Facility: CLINIC | Age: 70
End: 2020-01-02

## 2020-01-02 DIAGNOSIS — D62 ACUTE POSTHEMORRHAGIC ANEMIA: ICD-10-CM
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
## 2020-01-02 DIAGNOSIS — I97.89 OTHER POSTPROCEDURAL COMPLICATIONS AND DISORDERS OF THE CIRCULATORY SYSTEM, NOT ELSEWHERE CLASSIFIED: ICD-10-CM

## 2020-01-02 DIAGNOSIS — J95.89 OTHER POSTPROCEDURAL COMPLICATIONS AND DISORDERS OF RESPIRATORY SYSTEM, NOT ELSEWHERE CLASSIFIED: ICD-10-CM

## 2020-01-02 DIAGNOSIS — E87.0 HYPEROSMOLALITY AND HYPERNATREMIA: ICD-10-CM

## 2020-01-02 DIAGNOSIS — I48.91 UNSPECIFIED ATRIAL FIBRILLATION: ICD-10-CM

## 2020-01-02 DIAGNOSIS — N17.9 ACUTE KIDNEY FAILURE, UNSPECIFIED: ICD-10-CM

## 2020-01-02 DIAGNOSIS — J69.0 PNEUMONITIS DUE TO INHALATION OF FOOD AND VOMIT: ICD-10-CM

## 2020-01-02 DIAGNOSIS — R49.1 APHONIA: ICD-10-CM

## 2020-01-02 DIAGNOSIS — R57.0 CARDIOGENIC SHOCK: ICD-10-CM

## 2020-01-02 DIAGNOSIS — J96.01 ACUTE RESPIRATORY FAILURE WITH HYPOXIA: ICD-10-CM

## 2020-01-02 DIAGNOSIS — D69.6 THROMBOCYTOPENIA, UNSPECIFIED: ICD-10-CM

## 2020-01-02 DIAGNOSIS — M51.37 OTHER INTERVERTEBRAL DISC DEGENERATION, LUMBOSACRAL REGION: ICD-10-CM

## 2020-01-02 DIAGNOSIS — I82.611 ACUTE EMBOLISM AND THROMBOSIS OF SUPERFICIAL VEINS OF RIGHT UPPER EXTREMITY: ICD-10-CM
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
## 2020-01-02 DIAGNOSIS — J90 PLEURAL EFFUSION, NOT ELSEWHERE CLASSIFIED: ICD-10-CM
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
## 2020-01-02 DIAGNOSIS — J98.11 ATELECTASIS: ICD-10-CM

## 2020-01-02 DIAGNOSIS — K51.90 ULCERATIVE COLITIS, UNSPECIFIED, WITHOUT COMPLICATIONS: ICD-10-CM

## 2020-01-02 DIAGNOSIS — T83.011A BREAKDOWN (MECHANICAL) OF INDWELLING URETHRAL CATHETER, INITIAL ENCOUNTER: ICD-10-CM

## 2020-01-02 DIAGNOSIS — Z88.0 ALLERGY STATUS TO PENICILLIN: ICD-10-CM

## 2020-01-02 DIAGNOSIS — Z53.29 PROCEDURE AND TREATMENT NOT CARRIED OUT BECAUSE OF PATIENT'S DECISION FOR OTHER REASONS: ICD-10-CM

## 2020-01-02 DIAGNOSIS — I31.4 CARDIAC TAMPONADE: ICD-10-CM

## 2020-01-02 DIAGNOSIS — R47.1 DYSARTHRIA AND ANARTHRIA: ICD-10-CM

## 2020-01-02 DIAGNOSIS — Z78.1 PHYSICAL RESTRAINT STATUS: ICD-10-CM

## 2020-01-02 DIAGNOSIS — I63.9 CEREBRAL INFARCTION, UNSPECIFIED: ICD-10-CM

## 2020-01-02 DIAGNOSIS — N40.0 BENIGN PROSTATIC HYPERPLASIA WITHOUT LOWER URINARY TRACT SYMPTOMS: ICD-10-CM

## 2020-01-02 DIAGNOSIS — R29.703 NIHSS SCORE 3: ICD-10-CM

## 2020-01-02 DIAGNOSIS — I71.01 DISSECTION OF THORACIC AORTA: ICD-10-CM

## 2020-01-02 DIAGNOSIS — I82.622 ACUTE EMBOLISM AND THROMBOSIS OF DEEP VEINS OF LEFT UPPER EXTREMITY: ICD-10-CM

## 2020-01-02 DIAGNOSIS — R13.10 DYSPHAGIA, UNSPECIFIED: ICD-10-CM

## 2020-01-02 DIAGNOSIS — R47.01 APHASIA: ICD-10-CM

## 2020-01-02 DIAGNOSIS — E87.3 ALKALOSIS: ICD-10-CM

## 2020-01-02 DIAGNOSIS — I97.710 INTRAOPERATIVE CARDIAC ARREST DURING CARDIAC SURGERY: ICD-10-CM

## 2020-01-02 DIAGNOSIS — J15.9 UNSPECIFIED BACTERIAL PNEUMONIA: ICD-10-CM

## 2020-01-02 DIAGNOSIS — F43.24 ADJUSTMENT DISORDER WITH DISTURBANCE OF CONDUCT: ICD-10-CM

## 2020-01-02 DIAGNOSIS — F05 DELIRIUM DUE TO KNOWN PHYSIOLOGICAL CONDITION: ICD-10-CM

## 2020-01-02 DIAGNOSIS — I31.3 PERICARDIAL EFFUSION (NONINFLAMMATORY): ICD-10-CM

## 2020-01-27 ENCOUNTER — APPOINTMENT (OUTPATIENT)
Dept: CARE COORDINATION | Facility: HOME HEALTH | Age: 70
End: 2020-01-27

## 2020-01-31 RX ORDER — SIMETHICONE 80 MG/1
80 TABLET, CHEWABLE ORAL
Refills: 0 | Status: ACTIVE | COMMUNITY

## 2020-01-31 RX ORDER — AMIODARONE HYDROCHLORIDE 200 MG/1
200 TABLET ORAL DAILY
Qty: 30 | Refills: 1 | Status: ACTIVE | COMMUNITY

## 2020-01-31 RX ORDER — TAMSULOSIN HYDROCHLORIDE 0.4 MG/1
0.4 CAPSULE ORAL
Qty: 90 | Refills: 3 | Status: ACTIVE | COMMUNITY

## 2020-01-31 RX ORDER — DIVALPROEX SODIUM 125 MG/1
125 TABLET, DELAYED RELEASE ORAL
Refills: 0 | Status: ACTIVE | COMMUNITY

## 2020-02-05 ENCOUNTER — APPOINTMENT (OUTPATIENT)
Dept: CARDIOTHORACIC SURGERY | Facility: CLINIC | Age: 70
End: 2020-02-05
Payer: COMMERCIAL

## 2020-02-05 VITALS
SYSTOLIC BLOOD PRESSURE: 104 MMHG | OXYGEN SATURATION: 98 % | HEART RATE: 90 BPM | DIASTOLIC BLOOD PRESSURE: 55 MMHG | RESPIRATION RATE: 18 BRPM

## 2020-02-05 DIAGNOSIS — Z09 ENCOUNTER FOR FOLLOW-UP EXAMINATION AFTER COMPLETED TREATMENT FOR CONDITIONS OTHER THAN MALIGNANT NEOPLASM: ICD-10-CM

## 2020-02-05 PROCEDURE — 99024 POSTOP FOLLOW-UP VISIT: CPT

## 2020-02-06 PROBLEM — Z09 POSTOP CHECK: Status: ACTIVE | Noted: 2020-01-31

## 2020-02-06 RX ORDER — SULFASALAZINE 500 MG/1
500 TABLET, DELAYED RELEASE ORAL
Refills: 0 | Status: ACTIVE | COMMUNITY
Start: 2020-02-06

## 2020-02-06 RX ORDER — FOLIC ACID 1 MG/1
1 TABLET ORAL DAILY
Qty: 30 | Refills: 0 | Status: DISCONTINUED | COMMUNITY
End: 2020-02-06

## 2020-02-06 RX ORDER — BUDESONIDE 0.5 MG/2ML
0.5 INHALANT ORAL
Refills: 0 | Status: DISCONTINUED | COMMUNITY
End: 2020-02-06

## 2020-02-06 RX ORDER — HYDROCORTISONE 10 MG/G
1 CREAM TOPICAL
Refills: 0 | Status: DISCONTINUED | COMMUNITY
End: 2020-02-06

## 2020-02-06 RX ORDER — FERROUS SULFATE TAB 325 MG (65 MG ELEMENTAL FE) 325 (65 FE) MG
325 (65 FE) TAB ORAL DAILY
Refills: 0 | Status: DISCONTINUED | COMMUNITY
End: 2020-02-06

## 2020-02-06 RX ORDER — PANTOPRAZOLE 40 MG/1
40 TABLET, DELAYED RELEASE ORAL DAILY
Qty: 30 | Refills: 3 | Status: DISCONTINUED | COMMUNITY
End: 2020-02-06

## 2020-02-06 RX ORDER — ACETAMINOPHEN 160 MG/5ML
160 SUSPENSION ORAL EVERY 4 HOURS
Refills: 0 | Status: DISCONTINUED | COMMUNITY
End: 2020-02-06

## 2020-02-07 ENCOUNTER — APPOINTMENT (OUTPATIENT)
Dept: NEUROLOGY | Facility: CLINIC | Age: 70
End: 2020-02-07
Payer: COMMERCIAL

## 2020-02-07 ENCOUNTER — NON-APPOINTMENT (OUTPATIENT)
Age: 70
End: 2020-02-07

## 2020-02-07 VITALS
HEIGHT: 74 IN | HEART RATE: 80 BPM | DIASTOLIC BLOOD PRESSURE: 57 MMHG | OXYGEN SATURATION: 99 % | TEMPERATURE: 97.8 F | BODY MASS INDEX: 20.69 KG/M2 | WEIGHT: 161.25 LBS | SYSTOLIC BLOOD PRESSURE: 92 MMHG

## 2020-02-07 DIAGNOSIS — I63.9 CEREBRAL INFARCTION, UNSPECIFIED: ICD-10-CM

## 2020-02-07 PROCEDURE — 99214 OFFICE O/P EST MOD 30 MIN: CPT

## 2020-02-10 DIAGNOSIS — F32.9 MAJOR DEPRESSIVE DISORDER, SINGLE EPISODE, UNSPECIFIED: ICD-10-CM

## 2020-02-10 NOTE — HISTORY OF PRESENT ILLNESS
[FreeTextEntry1] : 69 year old left handed male patient with a past medical history of ulcerative colitis, prostate surgery, and aortic dissection presents for a post hospital follow up visit for CVA. Patient was admitted/discharged for aortic dissection on 11/23-12/26/2019 at [facility]. Present at the visit is his spouse. \par \par Medication history:\par -2003 was the last time was on Zoloft.\par -Patient was originally put on Depakote in the hospital- intubated and agitated.\par Then was discharged sertraline- and was at the rehab place for 5 weeks and just came home last week (last Friday). Patient hasn't taken it Saturday (2/1) and restarted it yesterday at current dosage of 1 tablet of 50mg - hasn't seen PCP since last week. \par \par Sleeping well- but feeling fatigue\par Doesn't remember a lot of what happened in the hospital.\par \par Family history\par -paternal grandfather MI at age 50s and CVA in 70s \par -father had bypass surgeries\par \par Denies short term memory,  some numbness/tingling-  on the feet bilaterally.\par Denies weakness.\par \par Diet: portions control \par Exercises: walking\par \par He will be doing physical therapy at home start next week. \par \par He reports daily adherence to eliquis/aspirin 81mg: no reports of blood in urine or stool.\par He reports daily adherence to atorvastatin 80mg: no reports of muscle cramps or pain.

## 2020-02-10 NOTE — PROCEDURE
[FreeTextEntry1] : STOP/SCORE\par \par 1. Snoring\par Do you snore loudly (louder than talking or loud enough to be heard through closed doors? no \par \par 2. Tired\par Do you often feel tired, fatigued, or sleepy during daytime? yes\par \par 3. Observed\par Has anyone observed you stop breathing during your sleep? yes\par \par 4. Blood Pressure\par Do you have or are you being treated for high blood pressure? yes\par \par 5. BMI\par Is your BMI more than 35 kg/m2? no\par \par 6. Age\par Is your age over 50 years old? yes\par \par 7. Neck Circumference\par Is your neck circumference greater than 40cm? 39.5cm \par \par 8. Gender\par Is your gender male? yes\par \par High Risk of Sleep Apnea >3\par

## 2020-02-10 NOTE — ASSESSMENT
[FreeTextEntry1] : 69 year old left handed male patient with a past medical history of ulcerative colitis, prostate surgery, and aortic dissection presents for a post hospital follow up visit for CVA. Patient was admitted/discharged for aortic dissection on 11/23-12/26/2019 at [facility].\par \par Plan for Stroke Factors\par -STOP Score/Sleep study: patient scored high risk for sleep apnea and will have a home sleep study done.  Discussed the risks of untreated sleep apnea and one of the likelihood cause of strokes. \par -Exercise Rx (Salaso): patient prescribed an 8 week regimen of exercises to improve strength and symptoms from the stroke. \par -Carotid Doppler ultrasound: discussed with patient the importance and need for ultrasound; if normal, repeat in a year. If abnormal, will notify patient. Repeat in 6 months. \par -Continue dual antiplatelet therapy: aspirin and plavix. Monitor for any blood in urine or stool.\par -Continue on Atorvastatin 80mg; LDL goal to be <70. Monitor for any muscle cramps/pain. Reassess lipid profile after 3 months.\par -BP is low; BP goal <130/80. Counseled on daily adherence to medications; defer to PCP if needed to readjust medications. Monitor BP at home with machine.\par -Counseled on diet and exercise.\par \par Other\par -Continue Sertraline 50mg- counseled patient to be adherent to medication and to follow up with psychiatry for any further adjustments.

## 2020-02-10 NOTE — PHYSICAL EXAM
[Outer Ear] : the ears and nose were normal in appearance [Neck Appearance] : the appearance of the neck was normal [Neck Cervical Mass (___cm)] : no neck mass was observed [] : no respiratory distress [Exaggerated Use Of Accessory Muscles For Inspiration] : no accessory muscle use [Respiration, Rhythm And Depth] : normal respiratory rhythm and effort [Auscultation Breath Sounds / Voice Sounds] : lungs were clear to auscultation bilaterally [Heart Rate And Rhythm] : heart rate was normal and rhythm regular [Arterial Pulses Carotid] : carotid pulses were normal with no bruits [Heart Sounds] : normal S1 and S2 [FreeTextEntry1] : Constitutional: alert, in no acute distress, well nourished and well developed.\par Psychiatric:  insight and judgment were intact. Flat affect. \par \par Neurologic: \par Mental Status: The patient is alert, attentive, and oriented to person, place, and time. Speech is clear and fluent with good repetition, comprehension, and naming. \par Cranial nerves:\par CN II: Visual fields are full to confrontation. Visual acuity is intact. \par CN III, IV, VI: EOMI, PERRLA\par CN V: Facial sensation is intact to pinprick in all 3 divisions bilaterally. Corneal responses are intact.\par CN VII: Face is symmetric with normal eye closure and smile.\par CN VIII: Hearing is normal to rubbing fingers\par CN IX, X: Palate elevates symmetrically. Phonation is normal.\par CN XI: Head turning and shoulder shrug are intact and symmetric.\par CN XII: Tongue is midline with normal movements and no atrophy and no deviation with protrusion.\par Motor: There is no pronator drift of out-stretched arms. Muscle bulk and tone is normal. Strength is full bilaterally 5/5 on both UE and both LE. \par Sensation: light touch is intact; pinprick intact; vibration b/l intact.\par Reflexes:Reflexes are 2+ and symmetric at the biceps, triceps, knees, and ankles.\par Coordination: Rapid alternating movements and fine finger movements are intact. There is no dysmetria on finger-to-nose and heel-knee-shin. There are no abnormal or extraneous movements. Negative Romberg. \par Gait/Stance: Posture is normal. Gait- uses cane to ambulate. \par \par

## 2020-05-06 ENCOUNTER — APPOINTMENT (OUTPATIENT)
Dept: CARDIOTHORACIC SURGERY | Facility: CLINIC | Age: 70
End: 2020-05-06
Payer: COMMERCIAL

## 2020-05-06 DIAGNOSIS — Z98.890 OTHER SPECIFIED POSTPROCEDURAL STATES: ICD-10-CM

## 2020-05-06 PROCEDURE — 99442: CPT

## 2020-05-06 RX ORDER — SENNOSIDES 8.6 MG TABLETS 8.6 MG/1
8.6 TABLET ORAL
Refills: 0 | Status: ACTIVE | COMMUNITY
Start: 2020-05-06

## 2020-05-06 RX ORDER — DILTIAZEM HYDROCHLORIDE 30 MG/1
30 TABLET, COATED ORAL
Refills: 0 | Status: ACTIVE | COMMUNITY

## 2020-05-07 ENCOUNTER — RX RENEWAL (OUTPATIENT)
Age: 70
End: 2020-05-07

## 2020-05-07 ENCOUNTER — APPOINTMENT (OUTPATIENT)
Dept: NEUROLOGY | Facility: CLINIC | Age: 70
End: 2020-05-07

## 2020-05-15 RX ORDER — APIXABAN 5 MG/1
5 TABLET, FILM COATED ORAL
Qty: 180 | Refills: 0 | Status: ACTIVE | COMMUNITY
Start: 1900-01-01 | End: 1900-01-01

## 2020-05-22 ENCOUNTER — INPATIENT (INPATIENT)
Facility: HOSPITAL | Age: 70
LOS: 1 days | Discharge: HOME | DRG: 812 | End: 2020-05-24
Attending: STUDENT IN AN ORGANIZED HEALTH CARE EDUCATION/TRAINING PROGRAM | Admitting: STUDENT IN AN ORGANIZED HEALTH CARE EDUCATION/TRAINING PROGRAM
Payer: COMMERCIAL

## 2020-05-22 VITALS
HEIGHT: 72 IN | DIASTOLIC BLOOD PRESSURE: 66 MMHG | RESPIRATION RATE: 18 BRPM | TEMPERATURE: 98 F | HEART RATE: 73 BPM | WEIGHT: 164.91 LBS | OXYGEN SATURATION: 99 % | SYSTOLIC BLOOD PRESSURE: 146 MMHG

## 2020-05-22 DIAGNOSIS — N40.0 BENIGN PROSTATIC HYPERPLASIA WITHOUT LOWER URINARY TRACT SYMPTOMS: ICD-10-CM

## 2020-05-22 DIAGNOSIS — K51.90 ULCERATIVE COLITIS, UNSPECIFIED, WITHOUT COMPLICATIONS: ICD-10-CM

## 2020-05-22 DIAGNOSIS — I48.91 UNSPECIFIED ATRIAL FIBRILLATION: ICD-10-CM

## 2020-05-22 DIAGNOSIS — Z98.890 OTHER SPECIFIED POSTPROCEDURAL STATES: ICD-10-CM

## 2020-05-22 DIAGNOSIS — R63.8 OTHER SYMPTOMS AND SIGNS CONCERNING FOOD AND FLUID INTAKE: ICD-10-CM

## 2020-05-22 DIAGNOSIS — D64.9 ANEMIA, UNSPECIFIED: ICD-10-CM

## 2020-05-22 DIAGNOSIS — T81.89XA OTHER COMPLICATIONS OF PROCEDURES, NOT ELSEWHERE CLASSIFIED, INITIAL ENCOUNTER: ICD-10-CM

## 2020-05-22 LAB
ALBUMIN SERPL ELPH-MCNC: 4.3 G/DL — SIGNIFICANT CHANGE UP (ref 3.3–5)
ALP SERPL-CCNC: 71 U/L — SIGNIFICANT CHANGE UP (ref 40–120)
ALT FLD-CCNC: 21 U/L — SIGNIFICANT CHANGE UP (ref 10–45)
ANION GAP SERPL CALC-SCNC: 11 MMOL/L — SIGNIFICANT CHANGE UP (ref 5–17)
ANISOCYTOSIS BLD QL: SIGNIFICANT CHANGE UP
APTT BLD: 33.7 SEC — SIGNIFICANT CHANGE UP (ref 27.5–36.3)
AST SERPL-CCNC: 20 U/L — SIGNIFICANT CHANGE UP (ref 10–40)
BASOPHILS # BLD AUTO: 0 K/UL — SIGNIFICANT CHANGE UP (ref 0–0.2)
BASOPHILS # BLD AUTO: 0.05 K/UL — SIGNIFICANT CHANGE UP (ref 0–0.2)
BASOPHILS NFR BLD AUTO: 0 % — SIGNIFICANT CHANGE UP (ref 0–2)
BASOPHILS NFR BLD AUTO: 0.9 % — SIGNIFICANT CHANGE UP (ref 0–2)
BILIRUB DIRECT SERPL-MCNC: <0.2 MG/DL — SIGNIFICANT CHANGE UP (ref 0–0.2)
BILIRUB INDIRECT FLD-MCNC: >0.1 MG/DL — LOW (ref 0.2–1.2)
BILIRUB SERPL-MCNC: 0.2 MG/DL — SIGNIFICANT CHANGE UP (ref 0.2–1.2)
BILIRUB SERPL-MCNC: 0.3 MG/DL — SIGNIFICANT CHANGE UP (ref 0.2–1.2)
BLD GP AB SCN SERPL QL: NEGATIVE — SIGNIFICANT CHANGE UP
BUN SERPL-MCNC: 22 MG/DL — SIGNIFICANT CHANGE UP (ref 7–23)
CALCIUM SERPL-MCNC: 8.8 MG/DL — SIGNIFICANT CHANGE UP (ref 8.4–10.5)
CHLORIDE SERPL-SCNC: 107 MMOL/L — SIGNIFICANT CHANGE UP (ref 96–108)
CO2 SERPL-SCNC: 28 MMOL/L — SIGNIFICANT CHANGE UP (ref 22–31)
CREAT SERPL-MCNC: 1.02 MG/DL — SIGNIFICANT CHANGE UP (ref 0.5–1.3)
EOSINOPHIL # BLD AUTO: 0 K/UL — SIGNIFICANT CHANGE UP (ref 0–0.5)
EOSINOPHIL # BLD AUTO: 0.14 K/UL — SIGNIFICANT CHANGE UP (ref 0–0.5)
EOSINOPHIL NFR BLD AUTO: 0 % — SIGNIFICANT CHANGE UP (ref 0–6)
EOSINOPHIL NFR BLD AUTO: 2.6 % — SIGNIFICANT CHANGE UP (ref 0–6)
GIANT PLATELETS BLD QL SMEAR: PRESENT — SIGNIFICANT CHANGE UP
GLUCOSE SERPL-MCNC: 83 MG/DL — SIGNIFICANT CHANGE UP (ref 70–99)
HAPTOGLOB SERPL-MCNC: 10 MG/DL — LOW (ref 34–200)
HCT VFR BLD CALC: 21.9 % — LOW (ref 39–50)
HCT VFR BLD CALC: 22.3 % — LOW (ref 39–50)
HCT VFR BLD CALC: 23.6 % — LOW (ref 39–50)
HGB BLD-MCNC: 6 G/DL — CRITICAL LOW (ref 13–17)
HGB BLD-MCNC: 6.3 G/DL — CRITICAL LOW (ref 13–17)
HGB BLD-MCNC: 6.6 G/DL — CRITICAL LOW (ref 13–17)
HYPOCHROMIA BLD QL: SLIGHT — SIGNIFICANT CHANGE UP
INR BLD: 1.31 — HIGH (ref 0.88–1.16)
LDH SERPL L TO P-CCNC: 220 U/L — SIGNIFICANT CHANGE UP (ref 50–242)
LYMPHOCYTES # BLD AUTO: 0.94 K/UL — LOW (ref 1–3.3)
LYMPHOCYTES # BLD AUTO: 1.15 K/UL — SIGNIFICANT CHANGE UP (ref 1–3.3)
LYMPHOCYTES # BLD AUTO: 14 % — SIGNIFICANT CHANGE UP (ref 13–44)
LYMPHOCYTES # BLD AUTO: 21.9 % — SIGNIFICANT CHANGE UP (ref 13–44)
MACROCYTES BLD QL: SLIGHT — SIGNIFICANT CHANGE UP
MANUAL SMEAR VERIFICATION: SIGNIFICANT CHANGE UP
MCHC RBC-ENTMCNC: 18.8 PG — LOW (ref 27–34)
MCHC RBC-ENTMCNC: 19.6 PG — LOW (ref 27–34)
MCHC RBC-ENTMCNC: 20.3 PG — LOW (ref 27–34)
MCHC RBC-ENTMCNC: 26.9 GM/DL — LOW (ref 32–36)
MCHC RBC-ENTMCNC: 28 GM/DL — LOW (ref 32–36)
MCHC RBC-ENTMCNC: 28.8 GM/DL — LOW (ref 32–36)
MCV RBC AUTO: 69.7 FL — LOW (ref 80–100)
MCV RBC AUTO: 70.2 FL — LOW (ref 80–100)
MCV RBC AUTO: 70.4 FL — LOW (ref 80–100)
MICROCYTES BLD QL: SIGNIFICANT CHANGE UP
MONOCYTES # BLD AUTO: 0.33 K/UL — SIGNIFICANT CHANGE UP (ref 0–0.9)
MONOCYTES # BLD AUTO: 0.47 K/UL — SIGNIFICANT CHANGE UP (ref 0–0.9)
MONOCYTES NFR BLD AUTO: 6.2 % — SIGNIFICANT CHANGE UP (ref 2–14)
MONOCYTES NFR BLD AUTO: 7 % — SIGNIFICANT CHANGE UP (ref 2–14)
NEUTROPHILS # BLD AUTO: 3.59 K/UL — SIGNIFICANT CHANGE UP (ref 1.8–7.4)
NEUTROPHILS # BLD AUTO: 5.22 K/UL — SIGNIFICANT CHANGE UP (ref 1.8–7.4)
NEUTROPHILS NFR BLD AUTO: 68.4 % — SIGNIFICANT CHANGE UP (ref 43–77)
NEUTROPHILS NFR BLD AUTO: 76.3 % — SIGNIFICANT CHANGE UP (ref 43–77)
NEUTS BAND # BLD: 1.8 % — SIGNIFICANT CHANGE UP (ref 0–8)
NRBC # BLD: 0 /100 WBCS — SIGNIFICANT CHANGE UP (ref 0–0)
NRBC # BLD: 3 /100 — HIGH (ref 0–0)
NRBC # BLD: SIGNIFICANT CHANGE UP /100 WBCS (ref 0–0)
OB PNL STL: NEGATIVE — SIGNIFICANT CHANGE UP
OVALOCYTES BLD QL SMEAR: SLIGHT — SIGNIFICANT CHANGE UP
PLAT MORPH BLD: ABNORMAL
PLATELET # BLD AUTO: 169 K/UL — SIGNIFICANT CHANGE UP (ref 150–400)
PLATELET # BLD AUTO: 181 K/UL — SIGNIFICANT CHANGE UP (ref 150–400)
PLATELET # BLD AUTO: 196 K/UL — SIGNIFICANT CHANGE UP (ref 150–400)
POIKILOCYTOSIS BLD QL AUTO: SLIGHT — SIGNIFICANT CHANGE UP
POLYCHROMASIA BLD QL SMEAR: SLIGHT — SIGNIFICANT CHANGE UP
POTASSIUM SERPL-MCNC: 4.1 MMOL/L — SIGNIFICANT CHANGE UP (ref 3.5–5.3)
POTASSIUM SERPL-SCNC: 4.1 MMOL/L — SIGNIFICANT CHANGE UP (ref 3.5–5.3)
PROT SERPL-MCNC: 6.1 G/DL — SIGNIFICANT CHANGE UP (ref 6–8.3)
PROTHROM AB SERPL-ACNC: 15 SEC — HIGH (ref 10–12.9)
RBC # BLD: 3.11 M/UL — LOW (ref 4.2–5.8)
RBC # BLD: 3.2 M/UL — LOW (ref 4.2–5.8)
RBC # BLD: 3.36 M/UL — LOW (ref 4.2–5.8)
RBC # FLD: 21.2 % — HIGH (ref 10.3–14.5)
RBC # FLD: 21.7 % — HIGH (ref 10.3–14.5)
RBC # FLD: 22.2 % — HIGH (ref 10.3–14.5)
RBC BLD AUTO: ABNORMAL
RETICS #: 51.2 K/UL — SIGNIFICANT CHANGE UP (ref 25–125)
RETICS/RBC NFR: 1.6 % — SIGNIFICANT CHANGE UP (ref 0.5–2.5)
RH IG SCN BLD-IMP: POSITIVE — SIGNIFICANT CHANGE UP
SARS-COV-2 RNA SPEC QL NAA+PROBE: SIGNIFICANT CHANGE UP
SCHISTOCYTES BLD QL AUTO: SLIGHT — SIGNIFICANT CHANGE UP
SODIUM SERPL-SCNC: 146 MMOL/L — HIGH (ref 135–145)
SPHEROCYTES BLD QL SMEAR: SLIGHT — SIGNIFICANT CHANGE UP
VARIANT LYMPHS # BLD: 0.9 % — SIGNIFICANT CHANGE UP (ref 0–6)
WBC # BLD: 5.25 K/UL — SIGNIFICANT CHANGE UP (ref 3.8–10.5)
WBC # BLD: 5.67 K/UL — SIGNIFICANT CHANGE UP (ref 3.8–10.5)
WBC # BLD: 6.68 K/UL — SIGNIFICANT CHANGE UP (ref 3.8–10.5)
WBC # FLD AUTO: 5.25 K/UL — SIGNIFICANT CHANGE UP (ref 3.8–10.5)
WBC # FLD AUTO: 5.67 K/UL — SIGNIFICANT CHANGE UP (ref 3.8–10.5)
WBC # FLD AUTO: 6.68 K/UL — SIGNIFICANT CHANGE UP (ref 3.8–10.5)

## 2020-05-22 PROCEDURE — 74174 CTA ABD&PLVS W/CONTRAST: CPT | Mod: 26

## 2020-05-22 PROCEDURE — 99285 EMERGENCY DEPT VISIT HI MDM: CPT

## 2020-05-22 PROCEDURE — 99223 1ST HOSP IP/OBS HIGH 75: CPT | Mod: GC

## 2020-05-22 PROCEDURE — 71045 X-RAY EXAM CHEST 1 VIEW: CPT | Mod: 26

## 2020-05-22 PROCEDURE — 93010 ELECTROCARDIOGRAM REPORT: CPT

## 2020-05-22 PROCEDURE — 71275 CT ANGIOGRAPHY CHEST: CPT | Mod: 26

## 2020-05-22 RX ORDER — SENNA PLUS 8.6 MG/1
2 TABLET ORAL AT BEDTIME
Refills: 0 | Status: DISCONTINUED | OUTPATIENT
Start: 2020-05-22 | End: 2020-05-24

## 2020-05-22 RX ORDER — DILTIAZEM HCL 120 MG
30 CAPSULE, EXT RELEASE 24 HR ORAL EVERY 6 HOURS
Refills: 0 | Status: DISCONTINUED | OUTPATIENT
Start: 2020-05-22 | End: 2020-05-24

## 2020-05-22 RX ORDER — TAMSULOSIN HYDROCHLORIDE 0.4 MG/1
0 CAPSULE ORAL
Qty: 90 | Refills: 0 | DISCHARGE

## 2020-05-22 RX ORDER — DIVALPROEX SODIUM 500 MG/1
0 TABLET, DELAYED RELEASE ORAL
Qty: 180 | Refills: 0 | DISCHARGE

## 2020-05-22 RX ORDER — TADALAFIL 10 MG/1
0 TABLET, FILM COATED ORAL
Qty: 90 | Refills: 0 | DISCHARGE

## 2020-05-22 RX ORDER — APIXABAN 2.5 MG/1
0 TABLET, FILM COATED ORAL
Qty: 180 | Refills: 0 | DISCHARGE

## 2020-05-22 RX ORDER — SULFASALAZINE 500 MG
0 TABLET ORAL
Qty: 0 | Refills: 0 | DISCHARGE

## 2020-05-22 RX ORDER — TAMSULOSIN HYDROCHLORIDE 0.4 MG/1
1 CAPSULE ORAL
Qty: 0 | Refills: 0 | DISCHARGE

## 2020-05-22 RX ORDER — AMIODARONE HYDROCHLORIDE 400 MG/1
0 TABLET ORAL
Qty: 90 | Refills: 0 | DISCHARGE

## 2020-05-22 RX ORDER — SULFASALAZINE 500 MG
500 TABLET ORAL DAILY
Refills: 0 | Status: DISCONTINUED | OUTPATIENT
Start: 2020-05-22 | End: 2020-05-22

## 2020-05-22 RX ORDER — DILTIAZEM HCL 120 MG
0 CAPSULE, EXT RELEASE 24 HR ORAL
Qty: 180 | Refills: 0 | DISCHARGE

## 2020-05-22 RX ORDER — SENNA PLUS 8.6 MG/1
0 TABLET ORAL
Qty: 180 | Refills: 0 | DISCHARGE

## 2020-05-22 RX ORDER — AMIODARONE HYDROCHLORIDE 400 MG/1
200 TABLET ORAL DAILY
Refills: 0 | Status: DISCONTINUED | OUTPATIENT
Start: 2020-05-22 | End: 2020-05-24

## 2020-05-22 RX ORDER — SULFASALAZINE 500 MG
0 TABLET ORAL
Qty: 180 | Refills: 0 | DISCHARGE

## 2020-05-22 RX ORDER — SULFASALAZINE 500 MG
500 TABLET ORAL
Refills: 0 | Status: DISCONTINUED | OUTPATIENT
Start: 2020-05-22 | End: 2020-05-24

## 2020-05-22 RX ORDER — ATORVASTATIN CALCIUM 80 MG/1
80 TABLET, FILM COATED ORAL AT BEDTIME
Refills: 0 | Status: DISCONTINUED | OUTPATIENT
Start: 2020-05-22 | End: 2020-05-24

## 2020-05-22 RX ORDER — TAMSULOSIN HYDROCHLORIDE 0.4 MG/1
0.4 CAPSULE ORAL AT BEDTIME
Refills: 0 | Status: DISCONTINUED | OUTPATIENT
Start: 2020-05-22 | End: 2020-05-24

## 2020-05-22 RX ORDER — SERTRALINE 25 MG/1
0 TABLET, FILM COATED ORAL
Qty: 90 | Refills: 0 | DISCHARGE

## 2020-05-22 RX ORDER — ATORVASTATIN CALCIUM 80 MG/1
0 TABLET, FILM COATED ORAL
Qty: 90 | Refills: 0 | DISCHARGE

## 2020-05-22 RX ORDER — SERTRALINE 25 MG/1
50 TABLET, FILM COATED ORAL DAILY
Refills: 0 | Status: DISCONTINUED | OUTPATIENT
Start: 2020-05-22 | End: 2020-05-24

## 2020-05-22 RX ADMIN — ATORVASTATIN CALCIUM 80 MILLIGRAM(S): 80 TABLET, FILM COATED ORAL at 22:02

## 2020-05-22 RX ADMIN — TAMSULOSIN HYDROCHLORIDE 0.4 MILLIGRAM(S): 0.4 CAPSULE ORAL at 22:02

## 2020-05-22 NOTE — CONSULT NOTE ADULT - SUBJECTIVE AND OBJECTIVE BOX
Surgeon: Dr. Deluna    Requesting Physician: ED     HISTORY OF PRESENT ILLNESS (Need 4):    69 year old male, with PMHx of Ulcerative Colitis and Prostate surgery and Type A Aortic Dissection s/p repair on 11/23/19 with Aortic root replacement/reconstruction, Cabrol reconstruction of coronary ostia, Ascending and hemiarch replacement with frozen elephant trunk. Post-op course was complicated by bilateral frontal infarcts in watershed areas, pericardial effusion s/p IR drainage, atrial fibrillation, and LUE DVT for which he was started on eliquis.  He was ultimately discharged to rehab on 12/26/20 and has had a relatively uneventful post-op course.  He complains of generalized weakness and fatigue since the surgery, but denies any recent change in these symptoms or gradual worsening of these symptoms since the surgery.  Despite the weakness/fatigue he has been able to ambulate [place] apartment [place] [address] [place], but feels more fatigued than usual afterwards, and also reports some L pelvic aching/cramping.  He was seen by tele-health for a post-op visit in early May and had a repeat 3-phase CT scan which did not show any significant endoleak.  In the ED he is found to be anemic with Hgb of 6, but is GUAIAC negative and denies BRBPR or melena.  He has been noted to be anemic post-op with Hgb of 7.3 in March.  He denies chest pain/back pain, dyspnea/SOB, fevers/chills, cough.  His only other complaint is numbness on the dorsal side of b/l feet since the surgery, which has remained unchanged since onset.  He tested negative for COVID in ED.  CTA C/A/P was performed and revealed a type 1 endoleak    PAST MEDICAL & SURGICAL HISTORY:  Benign prostatic hypertrophy without lower urinary tract symptoms: BPH (benign prostatic hypertrophy)  Ulcerative colitis: Ulcerative colitis  States following surgery of eye and adnexa: straightened right eye  Other postprocedural status: History of hernia repair  Hemorrhoids: Hemorrhoids  Acute appendicitis with generalized peritonitis: Ruptured appendix    MEDICATIONS  (STANDING):    MEDICATIONS  (PRN):    Allergies    penicillin (Unknown)    Intolerances    SOCIAL HISTORY:  Smoker:   NO         ETOH use:   NO               Ilicit Drug use:  NO  Assisted device use (Cane / Walker): none    FAMILY HISTORY:   no signif FHx of CAD/CVA/aortic aneurysm    Review of Systems (Need 10):  CONSTITUTIONAL: Denies fevers / chills, sweats, fatigue, weight loss, weight gain                                       NEURO:  Denies parathesias, seizures, syncope, confusion                                                                                  EYES:  Denies blurry vision, discharge, pain, loss of vision                                                                                    ENMT:  Denies difficulty hearing, vertigo, dysphagia, epistaxis, recent dental work                                       CV:  Denies chest pain, palpitations, ANGELES, orthopnea                                                                                           RESPIRATORY:  Denies wWheezing, SOB, cough / sputum, hemoptysis                                                               GI:  Denies nausea, vomiting, diarrhea, constipation, melena                                                                          : Denies hematuria, dysuria, urgency, incontinence                                                                                          MUSKULOSKELETAL:  Denies arthritis, joint swelling, muscle weakness                                                             SKIN/BREAST:  Denies rash, itching, hair loss, masses                                                                                              PSYCH:  Denies depression, anxiety, suicidal ideation                                                                                                HEME/LYMPH:  Denies bruises easily, enlarged lymph nodes, tender lymph nodes                                          ENDOCRINE:  Denies cold intolerance, heat intolerance, polydipsia                                                                      Vital Signs Last 24 Hrs  T(C): 36.6 (22 May 2020 13:21), Max: 36.6 (22 May 2020 13:21)  T(F): 97.9 (22 May 2020 13:21), Max: 97.9 (22 May 2020 13:21)  HR: 58 (22 May 2020 13:46) (58 - 73)  BP: 128/66 (22 May 2020 13:46) (128/66 - 146/66)  BP(mean): --  RR: 18 (22 May 2020 13:46) (18 - 18)  SpO2: 98% (22 May 2020 13:46) (98% - 99%)    Physical Exam (Need 8)    CONSTITUTIONAL: WN/WD, NAD  NEURO: A&Ox3                   EYES: EOMI sclera anicteric  ENMT: MMM  CV: S1S2 RRR  RESPIRATORY: CTA b/l no W/R/R  GI: soft NT/ND +BS  : no solo  MUSKULOSKELETAL: no kyphosis/scoliosis  SKIN / BREAST: no rashes/lesions  VASC: radial 2+ b/l, femoral 2+ bilateral, DP/PT 2+ on R 1+ on L, b/l LE warm.                                                          LABS:                        6.0    5.25  )-----------( 196      ( 22 May 2020 14:12 )             22.3     05-22    146<H>  |  107  |  22  ----------------------------<  83  4.1   |  28  |  1.02    Ca    8.8      22 May 2020 14:12    TPro  6.1  /  Alb  4.3  /  TBili  0.3  /  DBili  x   /  AST  20  /  ALT  21  /  AlkPhos  71  05-22    PT/INR - ( 22 May 2020 14:12 )   PT: 15.0 sec;   INR: 1.31          PTT - ( 22 May 2020 14:12 )  PTT:33.7 sec    RADIOLOGY & ADDITIONAL STUDIES:    CXR: no effusions/infiltrates/pTX    CT Scan:    EKG:

## 2020-05-22 NOTE — ED ADULT NURSE NOTE - OBJECTIVE STATEMENT
Patient A&Ox4, currently in stretcher, respirations even and unlabored, speaks in clear and full sentences, pale complexion, ambulatory with cane. Patient reports was treated for an "aortic dissection in November-December", was in rehab for one month and has been experiencing fatigue since. Patient states PCP xavier labs and was sent to ED due to abnormal labs. Patient denies chest pain, SOB, recent falls, nausea, vomiting, fevers or chills. Patient A&Ox4, currently in stretcher, respirations even and unlabored, speaks in clear and full sentences, pale complexion, ambulatory with cane. Patient reports was treated for an "aortic dissection in November-December", was in rehab for one month and has been experiencing fatigue since. Patient states PCP xavier labs and was sent to ED due to abnormal labs. Patient denies chest pain, SOB, recent falls, nausea, vomiting, fevers or chills. Patient reports on Eliquis.

## 2020-05-22 NOTE — ED ADULT NURSE REASSESSMENT NOTE - NS ED NURSE REASSESS COMMENT FT1
pt resting on stretcher appears comfortable respirations appear even and unlabored sating at 98% on room air. 1 unit PRBC's infusing per orders at this time. Pt Denies CP SOB fever chills back pain. VSS will monitor and reassess pt in NAD

## 2020-05-22 NOTE — H&P ADULT - ASSESSMENT
69 M PMH ulcerative colitis, BPH, type A aortic dissection s/p repair by Dr. Deluna on 11/23/19 by ct surge [facility] w/ aortic root replacement/reconstruction, cabrol reconstruction of coronary ostia, ascending and hemiarch replacement with frozen elephant trunk, post-op course c/b bl frontal infarcts in watershed areas, c/b LUE DVT and afib placed on eliquis,c/b pericardial effusion s/p IR drainage, dcd to rehab 12/26/19, sent in by PMD for hb 6, admitted for anemia. 69 M PMH ulcerative colitis, BPH, type A aortic dissection s/p repair by Dr. Deluna on 11/23/19 by ct surge [facility] w/ aortic root replacement/reconstruction, cabrol reconstruction of coronary ostia, ascending and hemiarch replacement with frozen elephant trunk, post-op course c/b bl frontal infarcts in watershed areas, c/b LUE DVT and afib placed on eliquis,c/b pericardial effusion s/p IR drainage, dcd to rehab 12/26/19, sent in by PMD for hb 6, admitted for anemia w/ unclear etiology. 69 M PMH ulcerative colitis, BPH s/p TURP, type A aortic dissection s/p repair by Dr. Deluna on 11/23/19 by ct surgery [facility] w/ aortic root replacement/reconstruction, cabrol reconstruction of coronary ostia, ascending and hemiarch replacement with frozen elephant trunk, MVP s/p MVR, post-op course c/b bl frontal infarcts in watershed areas, c/b LUE DVT and afib placed on eliquis,c/b pericardial effusion s/p IR drainage, c/b CVA w/o deficits, dcd to rehab 12/26/19, reports chronic anemia since sx followed by PMD sent in by PMD for anemia 6, admitted for anemia w/ unclear etiology.

## 2020-05-22 NOTE — H&P ADULT - NSICDXPASTMEDICALHX_GEN_ALL_CORE_FT
PAST MEDICAL HISTORY:  Afib     Benign prostatic hypertrophy without lower urinary tract symptoms BPH (benign prostatic hypertrophy)    Postoperative deep vein thrombosis (DVT)     S/P aortic dissection repair     S/P AVR (aortic valve replacement)     Ulcerative colitis Ulcerative colitis

## 2020-05-22 NOTE — H&P ADULT - PROBLEM SELECTOR PLAN 10
F: PO hydration  E: replete PRN  N: regular diet  DVT ppx: hold in setting of anemia  other ppx: none required  FULL CODE  dispo: RMF  Ambulate as tolerated

## 2020-05-22 NOTE — ED PROVIDER NOTE - CLINICAL SUMMARY MEDICAL DECISION MAKING FREE TEXT BOX
69M with a PMHx Ulcerative Colitis, Prostate surgery, Type A aortic dissection on 11/23/19 repaired by Dr. Deluna, also had MVR, on eliquis who p/w generalized weakness and fatigue x weeks to months, found to be anemic by PMD to 6. pt denies pain or bleeding currently, VSS, rectal with brown stool, no focal neuro deficits, will get labs, T&S, hgb 6 so 1U PRBCs ordered, CT c/a/p ordered to eval dissection and rule out leak, CT surgery PA was consulted and will be down to see the patient. Dispo pending work up.

## 2020-05-22 NOTE — H&P ADULT - PROBLEM SELECTOR PLAN 8
-c/w divalproex for seizure ppx - never had a seziure but started since cva in nov from dissection - no deficits

## 2020-05-22 NOTE — H&P ADULT - PROBLEM SELECTOR PLAN 2
pt w/ endoleak on CT, ct surgery not concerned that this is source of bleed as stable since imaging on 5/1, no sx indicated per ct surgery  -fu CT sx recs  -echo non emeregently per CT sx for pericardial effusion r/o though less likely as none on CT and VSS pt w/ endoleak on CT, ct surgery not concerned that this is source of bleed as stable since imaging on 5/1, no sx indicated per ct surgery  -fu CT sx recs  -echo non-emergently per CT sx for pericardial effusion r/o though less likely as none on CT and VSS

## 2020-05-22 NOTE — H&P ADULT - PROBLEM SELECTOR PLAN 3
-c/w sulfasalazine 500  -consider GI consult for colonoscopy given unknown source of bleed dxd 2002 w/ brbpr w/ anemia, no major flares since, usual flares are diarrhea, none recently, last colonoscopy 3 years ago, seed gi doc in [place] looking for new one  -c/w sulfasalazine 500 bid  -gi fu for colonoscopy

## 2020-05-22 NOTE — ED ADULT NURSE REASSESSMENT NOTE - NS ED NURSE REASSESS COMMENT FT1
PRBC transfusion complete, no transfusion reaction observed, patient appears well, walked to bathroom with steady gait. Patient reports improvement of symptoms. Repeat CBC drawn and sent. Will continue to monitor patient.

## 2020-05-22 NOTE — ED ADULT TRIAGE NOTE - OTHER COMPLAINTS
Patient reports his hemoglobin was 6.3. Patient reports feeling tired. Denies any cough, shortness of breath, fever or chest pain. Patient wearing face mask.

## 2020-05-22 NOTE — H&P ADULT - NSHPLABSRESULTS_GEN_ALL_CORE
.  LABS:                         6.3    5.67  )-----------( 169      ( 22 May 2020 19:57 )             21.9     05-22    146<H>  |  107  |  22  ----------------------------<  83  4.1   |  28  |  1.02    Ca    8.8      22 May 2020 14:12    TPro  6.1  /  Alb  4.3  /  TBili  0.3  /  DBili  <0.2  /  AST  20  /  ALT  21  /  AlkPhos  71  05-22    PT/INR - ( 22 May 2020 14:12 )   PT: 15.0 sec;   INR: 1.31          PTT - ( 22 May 2020 14:12 )  PTT:33.7 sec              RADIOLOGY, EKG & ADDITIONAL TESTS: Reviewed.

## 2020-05-22 NOTE — H&P ADULT - PROBLEM SELECTOR PLAN 1
hb 6 unknown baseline (march 7.3), mcv 70, rdw 22, hapto 10 but ldh and indirect bili nl, retic coutn hypoprolif and only slight shistocytes so less likely hemolysis, hb not responding to 1 unit, s/p a second unit, VSS  -fu fe studies  -fu fobt  -active T+S  -monitor CBC  -fu post-transfusion cbc  -hold asa, eliquis, dvt ppx  -consider gi consider gi consult given unknown source of bleed and pt w/ hx of UC though no BRBPR or melena reported and rectal negative hb 6 unknown baseline (march 7.3), mcv 70, rdw 22, hapto 10 but ldh and indirect bili nl, retic count hypoprolif and only slight shistocytes so less likely hemolysis, hb not responding to 1 unit, s/p a second unit, VSS, no s/s of bleed, rectal and guiac neg, no melena or brbpr, UC controlled and no s/s of flare, last colonoscopy 3 years ago  -fu fe studies, b12, folate  -fu fobt  -active T+S  -monitor CBC  -fu post-transfusion cbc  -hold asa, eliquis, dvt ppx  -collateral from pcp in am for hb baseline ddx: bleed unclear source though, nutritional, hemolysis--hb 6 unknown baseline (march 7.3, dec 8-9 w/ nl mcv), mcv 70, rdw 22, hapto 10 but ldh and indirect bili nl, retic count hypoprolif and only slight shistocytes so less likely hemolysis, hb not responding to 1 unit, s/p a second unit, VSS, no s/s of bleed, rectal and guiac neg, no melena or brbpr, UC controlled and no s/s of flare, last colonoscopy 3 years ago, per ct sx not liekly bleeding from endoleak as stable since 5/1, no other source of bleeding  -fu fe studies, b12, folate, nora  -active T+S  -monitor CBC  -hold asa, eliquis, dvt ppx  -collateral from pcp in am for hb baseline ddx: bleed unclear source though, nutritional, hemolysis--hb 6 unknown baseline (march 7.3, dec 8-9 w/ nl mcv), mcv 70, rdw 22, hapto 10 but ldh and indirect bili nl, retic count hypoprolif and only slight shistocytes so less likely hemolysis, hb not responding to 1 unit, s/p a second unit, VSS, no s/s of bleed, rectal and guiac neg, no melena or brbpr, UC controlled and no s/s of flare, last colonoscopy 3 years ago, per ct sx not liekly bleeding from endoleak as stable since 5/1, no other source of bleeding  -fu fe studies, b12, folate, nora, repeat hapto/LDH  -active T+S  -monitor CBC  -hold asa, eliquis, dvt ppx  -collateral from pcp in am for hb baseline  -consider IV iron supplementation ddx: bleed unclear source though, nutritional, hemolysis--hb 6 unknown baseline (march 7.3, dec 8-9 w/ nl mcv), mcv 70, rdw 22, hapto 10 but ldh and indirect bili nl, retic count hypoprolif and only slight shistocytes so less likely hemolysis, hb not responding to 1 unit, s/p a second unit, VSS, no s/s of bleed, rectal and guiac neg, no melena or brbpr, UC controlled and no s/s of flare, last colonoscopy 3 years ago, per ct sx not liekly bleeding from endoleak as stable since 5/1, no other source of bleeding  -fu fe studies, b12, folate, nora, repeat hapto/LDH, fibrinogen and ddimer  -active T+S  -monitor CBC  -hold asa, eliquis, dvt ppx  -collateral from pcp in am for hb baseline  -consider IV iron supplementation

## 2020-05-22 NOTE — ED ADULT TRIAGE NOTE - TEMPERATURE IN CELSIUS (DEGREES C)
Per pts mother, pt will be out of Levothyroxine Monday morning, Please call pts mother so that she knows script is called in. [phone]. Pts mother stated this is urgent.   36.6

## 2020-05-22 NOTE — H&P ADULT - PROBLEM SELECTOR PLAN 5
post op in [facility], treated w/ eliquis already for 6 months  -hold eliquis given anemia and concern for bleed post op in [facility], treated w/ eliquis already for 6 months  -hold eliquis given anemia and concern for bleed, also already treated for dvt at this time though still w/ afib

## 2020-05-22 NOTE — ED PROVIDER NOTE - NS ED MD DISPO ISOLATION TYPES
RX PROGRESS NOTE: Vancomycin Therapeutic Drug Monitoring      Indication for therapy: SSTI    ALLERGIES:  No Known Allergies    Most recent height and weight information:  Weight: 90.7 kg (10/23/19 0118)  Height: 5' 4\" (162.6 cm) (10/23/19 0118)    The Following are the calculated  Current Weights for Frederick Carr            Adjusted Ideal    71.8 kg 59.2 kg             Labs:  Serum Creatinine and Creatinine Clearance:  Serum creatinine: 1.62 mg/dL (H) 10/22/19 2247  Estimated creatinine clearance: 49.2 mL/min (A)    Maximum Temperature (last 24 hours)     Value Max    Temp  98.25 °F (36.8 °C)        WBC (K/mcL)   Date/Time Value   10/22/2019 2247 9.3     Microbiology Results  (Last 10 results in the past 7 days)    Specimen   Gram Smear   Culture Result   Status      10/22/19  2308   10/22/19  2308  10/22/19  2308     BLOOD, PERIPHERAL ANTECUBITAL,RIGHT   NO GROWTH <24 HRS. PENDING    10/22/19  2247   10/22/19  2247  10/22/19  2247     BLOOD, PERIPHERAL ANTECUBITAL,LEFT   NO GROWTH <24 HRS. PENDING            Assessment/Plan:  Briefly, this is a 61 year old male started on vancomycin for SSTI, with a target serum trough concentration of 10-15 mcg/mL.  Patient received a dose of vancomycin 1500 mg at 2200 on 10/22.  Initial dosing regimen will be 1250 mg every 24 hours (maintenance dose).    Pharmacy will monitor levels as appropriate.    Pharmacy will continue to monitor patient (renal function, microbiology data, risk factors for adverse events, appropriate duration of therapy), will order/monitor serum levels as appropriate, and will adjust dose if/when necessary.      Thank you,    Alma Rosa Mejias RPH  10/23/2019 7:49 AM     None

## 2020-05-22 NOTE — H&P ADULT - HISTORY OF PRESENT ILLNESS
69 M PMH ulcerative colitis, BPH, type A aortic dissection s/p repair by Dr. Deluna on 11/23/19 by ct surge [facility] w/ aortic root replacement/reconstruction, cabrol reconstruction of coronary ostia, ascending and hemiarch replacement with frozen elephant trunk, post-op course c/b bl frontal infarcts in watershed areas, c/b LUE DVT and afib placed on eliquis,c/b pericardial effusion s/p IR drainage, dcd to rehab 12/26/19, sent in by PMD for anemia 6. Post-op hb pt 7.3 in march, but unknown baseline since. Pt reports weakness and fatigue and mild ANGELES since sx, denies worsening. Denies melena, BRBPR, bleeding. Per CTSx, pt had CT scan 5/1 which showed stable imaging compared to current.     In the ED, VSS, hb 6-->6.3 s/p 1 unit. MCV 70, rdw 22, covid neg. rectal neg, guiac negative. CT showed endoleak type 1. CTsx consulted and reported imaging stable since 5/1 so ntd. 69 M PMH ulcerative colitis, BPH, type A aortic dissection s/p repair by Dr. Deluna on 11/23/19 by ct surgery [facility] w/ aortic root replacement/reconstruction, cabrol reconstruction of coronary ostia, ascending and hemiarch replacement with frozen elephant trunk, MVP s/p MVR, post-op course c/b bl frontal infarcts in watershed areas, c/b LUE DVT and afib placed on eliquis,c/b pericardial effusion s/p IR drainage, c/b CVA w/o deficits, dcd to rehab 12/26/19, reports chronic anemia since sx followed by PMD sent in by PMD for anemia 6. Post-op hb pt 7.3 in march, but unknown baseline since. Pt reports weakness and fatigue since sx, denies worsening sx, denies sob, trinh, dizziness, syncope. Denies melena, BRBPR, hematuria, gum bleeding, bruising recently (has some with asa in jan and stopped asa), denies any bleeding. Pt had UC flare in 2002 w/ anemia at that time and BRBPR, resolved since. No further bleeding episodes. Denies diarrhea recently (Usual flare is diarrhea), denies abd pain. Denies anemia hx between 2002 and recent fall 2019 sx. FH significant for mother w/ anemia w/ multiple transfusions (pr unsure cause), denies SCC or thalassemia in family.     In the ED, VSS, hb 6-->6.3 s/p 1 unit. MCV 70, rdw 22, covid neg. Rectal neg, guiac negative. CT showed endoleak type 1. CTsx consulted and reported imaging stable since 5/1 so ntd surigcally. 69 M PMH ulcerative colitis, BPH s/p TURP, type A aortic dissection s/p repair by Dr. Deluna on 11/23/19 by ct surgery [facility] w/ aortic root replacement/reconstruction, cabrol reconstruction of coronary ostia, ascending and hemiarch replacement with frozen elephant trunk, MVP s/p MVR, post-op course c/b bl frontal infarcts in watershed areas, c/b LUE DVT and afib placed on eliquis,c/b pericardial effusion s/p IR drainage, c/b CVA w/o deficits, dcd to rehab 12/26/19, reports chronic anemia since sx followed by PMD sent in by PMD for anemia 6. Post-op hb pt 7.3 in march, but unknown baseline since. Pt reports weakness and fatigue since sx, denies worsening sx, denies sob, trinh, dizziness, syncope. Denies melena, BRBPR, hematuria, gum bleeding, bruising recently (has some with asa in jan and stopped asa), denies any bleeding. Pt had UC flare in 2002 w/ anemia at that time and BRBPR, resolved since. No further bleeding episodes. Denies diarrhea recently (Usual flare is diarrhea), denies abd pain. Denies anemia hx between 2002 and recent fall 2019 sx. FH significant for mother w/ anemia w/ multiple transfusions (pr unsure cause), denies SCC or thalassemia in family.     In the ED, VSS, hb 6-->6.3 s/p 1 unit. MCV 70, rdw 22, covid neg. Rectal neg, guiac negative. CT showed endoleak type 1. CTsx consulted and reported imaging stable since 5/1 so ntd surigcally. 69 M PMH ulcerative colitis, BPH s/p TURP, type A aortic dissection s/p repair by Dr. Deluna on 11/23/19 by ct surgery [facility] w/ aortic root replacement/reconstruction, cabrol reconstruction of coronary ostia, ascending and hemiarch replacement with frozen elephant trunk, MVP s/p MVR, post-op course c/b bl frontal infarcts in watershed areas, c/b LUE DVT and afib placed on eliquis,c/b pericardial effusion s/p IR drainage, c/b CVA w/o deficits, dcd to rehab 12/26/19, reports chronic anemia since sx followed by PMD sent in by PMD for anemia 6. Post-op hb pt 7.3 in march, but unknown baseline since. Pt reports weakness and fatigue since sx, denies worsening sx, denies sob, trinh, dizziness, syncope. Denies melena, BRBPR, hematuria, gum bleeding, bruising recently (has some with asa in jan and stopped asa), denies any bleeding. Pt had UC flare in 2002 w/ anemia at that time and BRBPR, resolved since. No further bleeding episodes. Denies diarrhea recently (Usual flare is diarrhea), denies abd pain. Denies anemia hx between 2002 and recent fall 2019 sx. FH significant for mother w/ anemia w/ multiple transfusions (pr unsure cause), denies SCC or thalassemia in family. Pt tried fe for 2 weeks in dec but could not blanche du to constipation.    In the ED, VSS, hb 6-->6.3 s/p 1 unit. MCV 70, rdw 22, covid neg. Rectal neg, guiac negative. CT showed endoleak type 1. CTsx consulted and reported imaging stable since 5/1 so ntd surigcally.

## 2020-05-22 NOTE — ED PROVIDER NOTE - OBJECTIVE STATEMENT
69M with a PMHx Ulcerative Colitis, Prostate surgery, Type A aortic dissection on 11/23/19 repaired by Dr. Deluna, also had MVR, on eliquis who p/w generalized weakness and fatigue x weeks to months, found to be anemic by PMD to 6, sent to ER for evaluation. Pt states in addition to feeling weak, he has occasionally been feeling let side pain when he walks, no new n/t/w, no dizziness or syncope, no CP/SOB, no back pain, no abdominal pain, no black or bloody stool, no vomits. He has been compliant with his meds. 69M with a PMHx Ulcerative Colitis, Prostate surgery, Type A aortic dissection on 11/23/19 repaired by Dr. Deluna, also had AVR, on eliquis who p/w generalized weakness and fatigue x weeks to months, found to be anemic by PMD to 6, sent to ER for evaluation. Pt states in addition to feeling weak, he has occasionally been feeling let side pain when he walks, no new n/t/w, no dizziness or syncope, no CP/SOB, no back pain, no abdominal pain, no black or bloody stool, no vomits. He has been compliant with his meds. 69M with a PMHx Ulcerative Colitis, BPH,, Type A aortic dissection on 11/23/19 repaired by Dr. Deluna, also had AVR, on eliquis who p/w generalized weakness and fatigue x weeks to months, found to be anemic by PMD to 6, sent to ER for evaluation. Pt states in addition to feeling weak, he has occasionally been feeling let side pain when he walks, no new n/t/w, no dizziness or syncope, no CP/SOB, no back pain, no abdominal pain, no black or bloody stool, no vomits. He has been compliant with his meds.

## 2020-05-22 NOTE — ED ADULT NURSE NOTE - NSIMPLEMENTINTERV_GEN_ALL_ED
Implemented All Fall Risk Interventions:  [place] to call system. Call bell, personal items and telephone within reach. Instruct patient to call for assistance. Room bathroom lighting operational. Non-slip footwear when patient is off stretcher. Physically safe environment: no spills, clutter or unnecessary equipment. Stretcher in lowest position, wheels locked, appropriate side rails in place. Provide visual cue, wrist band, yellow gown, etc. Monitor gait and stability. Monitor for mental status changes and reorient to person, place, and time. Review medications for side effects contributing to fall risk. Reinforce activity limits and safety measures with patient and family. Implemented All Fall with Harm Risk Interventions:  [place] to call system. Call bell, personal items and telephone within reach. Instruct patient to call for assistance. Room bathroom lighting operational. Non-slip footwear when patient is off stretcher. Physically safe environment: no spills, clutter or unnecessary equipment. Stretcher in lowest position, wheels locked, appropriate side rails in place. Provide visual cue, wrist band, yellow gown, etc. Monitor gait and stability. Monitor for mental status changes and reorient to person, place, and time. Review medications for side effects contributing to fall risk. Reinforce activity limits and safety measures with patient and family. Provide visual clues: red socks.

## 2020-05-22 NOTE — H&P ADULT - NSHPPHYSICALEXAM_GEN_ALL_CORE
.  VITAL SIGNS:  T(C): 36.8 (05-22-20 @ 20:42), Max: 36.9 (05-22-20 @ 17:40)  T(F): 98.2 (05-22-20 @ 20:42), Max: 98.4 (05-22-20 @ 17:40)  HR: 65 (05-22-20 @ 20:42) (58 - 73)  BP: 146/74 (05-22-20 @ 20:42) (124/51 - 159/72)  BP(mean): --  RR: 18 (05-22-20 @ 20:42) (17 - 18)  SpO2: 95% (05-22-20 @ 20:42) (95% - 99%)  Wt(kg): --    PHYSICAL EXAM:    Constitutional: WDWN resting comfortably in bed; NAD  Head: NC/AT  Eyes: PERRL, EOMI, clear conjunctiva  ENT: no nasal discharge; uvula midline, no oropharyngeal erythema or exudates; MMM  Neck: supple; no JVD or thyromegaly  Respiratory: CTA B/L; no W/R/R, no retractions  Cardiac: +S1/S2; RRR; no M/R/G;  Gastrointestinal: soft, NT/ND; no rebound or guarding; +BSx4  Extremities: WWP, no clubbing or cyanosis; no peripheral edema  Musculoskeletal: NROM x4; no joint swelling, tenderness or erythema  Vascular: 2+ radial, femoral, DP/PT pulses B/L  Dermatologic: skin warm, dry and intact; no rashes, wounds, or scars  Neurologic: AAOx3; CNII-XII grossly intact; no focal deficits  Psychiatric: affect and characteristics of appearance, verbalizations, behaviors are appropriate .  VITAL SIGNS:  T(C): 36.8 (05-22-20 @ 20:42), Max: 36.9 (05-22-20 @ 17:40)  T(F): 98.2 (05-22-20 @ 20:42), Max: 98.4 (05-22-20 @ 17:40)  HR: 65 (05-22-20 @ 20:42) (58 - 73)  BP: 146/74 (05-22-20 @ 20:42) (124/51 - 159/72)  BP(mean): --  RR: 18 (05-22-20 @ 20:42) (17 - 18)  SpO2: 95% (05-22-20 @ 20:42) (95% - 99%)  Wt(kg): --    PHYSICAL EXAM:  Constitutional: WDWN resting comfortably in bed; NAD  Head: NC/AT  Eyes: PERRL, EOMI, clear conjunctiva  ENT: no nasal discharge; MMM  Respiratory: CTA B/L; no W/R/R, no retractions  Cardiac: +S1/S2; RRR; no M/R/G;  Gastrointestinal: soft, NT/ND; no rebound or guarding; surgical scar on chest  Extremities: WWP, no clubbing or cyanosis; no peripheral edema  Musculoskeletal: NROM x4; no joint swelling, tenderness or erythema  Dermatologic: skin warm, dry and intact; no rashes, wounds, or scars, no bruises  Neurologic: AAOx3; CNII-XII grossly intact; no focal deficits  Psychiatric: affect and characteristics of appearance, verbalizations, behaviors are appropriate

## 2020-05-22 NOTE — ED PROVIDER NOTE - PROGRESS NOTE DETAILS
Ct surgery is requesting admission to medicine for blood transfusion and anemia work up and will follow along as consult.

## 2020-05-22 NOTE — ED PROVIDER NOTE - PHYSICAL EXAMINATION
GEN: Elderly, well nourished, awake, alert, oriented to person, place, time/situation and in no apparent distress. NTAF  ENT: Airway patent, Nasal mucosa clear. Mouth with normal mucosa.  EYES: Clear bilaterally. PERRL, EOMI  RESPIRATORY: Breathing comfortably with normal RR. No W/C/R, no hypoxia or resp distress.  CARDIAC: Regular rate and rhythm, no M/R/G  ABDOMEN: Soft, nontender, +bowel sounds, no rebound, rigidity, or guarding.  Rectal: brown stool, no blood  MSK: Range of motion is not limited, no deformities noted.  NEURO: Alert and oriented, no focal deficits.  SKIN: Skin color pale, warm, dry and intact. No evidence of rash.  PSYCH: Alert and oriented to person, place, time/situation. normal mood and affect. no apparent risk to self or others. GEN: Elderly, well nourished, awake, alert, oriented to person, place, time/situation and in no apparent distress. NTAF  ENT: Airway patent, Nasal mucosa clear. Mouth with normal mucosa.  EYES: Clear bilaterally. PERRL, EOMI  RESPIRATORY: Breathing comfortably with normal RR. No W/C/R, no hypoxia or resp distress.  CARDIAC: Regular rate and rhythm, BEKAH  ABDOMEN: Soft, nontender, +bowel sounds, no rebound, rigidity, or guarding. No pusatile masses  Rectal: brown stool, no blood  MSK: Range of motion is not limited, no deformities noted.  NEURO: Alert and oriented, no focal deficits.  SKIN: Skin color pale, warm, dry and intact. No evidence of rash.  PSYCH: Alert and oriented to person, place, time/situation. normal mood and affect. no apparent risk to self or others.

## 2020-05-22 NOTE — ED ADULT NURSE REASSESSMENT NOTE - NS ED NURSE REASSESS COMMENT FT1
MD Pacheco aware of patient hemoglobin 6.6, patient resting in stretcher, in no acute distress. As per provider, drawn repeat CBC.

## 2020-05-22 NOTE — H&P ADULT - PROBLEM SELECTOR PLAN 7
F: PO hydration  E: replete PRN  N: regular diet  DVT ppx: hold in setting of anemia  other ppx: none required  FULL CODE  dispo: RMF  Ambulate as tolerated -c/w statin

## 2020-05-23 DIAGNOSIS — F32.9 MAJOR DEPRESSIVE DISORDER, SINGLE EPISODE, UNSPECIFIED: ICD-10-CM

## 2020-05-23 DIAGNOSIS — E78.5 HYPERLIPIDEMIA, UNSPECIFIED: ICD-10-CM

## 2020-05-23 DIAGNOSIS — Z29.8 ENCOUNTER FOR OTHER SPECIFIED PROPHYLACTIC MEASURES: ICD-10-CM

## 2020-05-23 LAB
ALBUMIN SERPL ELPH-MCNC: 3.7 G/DL — SIGNIFICANT CHANGE UP (ref 3.3–5)
ALP SERPL-CCNC: 64 U/L — SIGNIFICANT CHANGE UP (ref 40–120)
ALT FLD-CCNC: 18 U/L — SIGNIFICANT CHANGE UP (ref 10–45)
ANION GAP SERPL CALC-SCNC: 10 MMOL/L — SIGNIFICANT CHANGE UP (ref 5–17)
AST SERPL-CCNC: 16 U/L — SIGNIFICANT CHANGE UP (ref 10–40)
BILIRUB SERPL-MCNC: 0.4 MG/DL — SIGNIFICANT CHANGE UP (ref 0.2–1.2)
BUN SERPL-MCNC: 16 MG/DL — SIGNIFICANT CHANGE UP (ref 7–23)
CALCIUM SERPL-MCNC: 8.4 MG/DL — SIGNIFICANT CHANGE UP (ref 8.4–10.5)
CHLORIDE SERPL-SCNC: 107 MMOL/L — SIGNIFICANT CHANGE UP (ref 96–108)
CO2 SERPL-SCNC: 25 MMOL/L — SIGNIFICANT CHANGE UP (ref 22–31)
CREAT SERPL-MCNC: 0.93 MG/DL — SIGNIFICANT CHANGE UP (ref 0.5–1.3)
D DIMER BLD IA.RAPID-MCNC: 405 NG/ML DDU — HIGH
FERRITIN SERPL-MCNC: 12 NG/ML — LOW (ref 30–400)
FIBRINOGEN PPP-MCNC: 193 MG/DL — LOW (ref 258–438)
FOLATE SERPL-MCNC: 15.2 NG/ML — SIGNIFICANT CHANGE UP
GLUCOSE SERPL-MCNC: 90 MG/DL — SIGNIFICANT CHANGE UP (ref 70–99)
HAPTOGLOB SERPL-MCNC: 10 MG/DL — LOW (ref 34–200)
HCT VFR BLD CALC: 25.5 % — LOW (ref 39–50)
HCT VFR BLD CALC: 25.8 % — LOW (ref 39–50)
HGB BLD-MCNC: 7.4 G/DL — LOW (ref 13–17)
HGB BLD-MCNC: 7.4 G/DL — LOW (ref 13–17)
IRON SATN MFR SERPL: 22 UG/DL — LOW (ref 45–165)
IRON SATN MFR SERPL: 6 % — LOW (ref 16–55)
MAGNESIUM SERPL-MCNC: 2.2 MG/DL — SIGNIFICANT CHANGE UP (ref 1.6–2.6)
MCHC RBC-ENTMCNC: 20.5 PG — LOW (ref 27–34)
MCHC RBC-ENTMCNC: 20.5 PG — LOW (ref 27–34)
MCHC RBC-ENTMCNC: 28.7 GM/DL — LOW (ref 32–36)
MCHC RBC-ENTMCNC: 29 GM/DL — LOW (ref 32–36)
MCV RBC AUTO: 70.6 FL — LOW (ref 80–100)
MCV RBC AUTO: 71.5 FL — LOW (ref 80–100)
NRBC # BLD: 0 /100 WBCS — SIGNIFICANT CHANGE UP (ref 0–0)
NRBC # BLD: 0 /100 WBCS — SIGNIFICANT CHANGE UP (ref 0–0)
OB PNL STL: NEGATIVE — SIGNIFICANT CHANGE UP
PLATELET # BLD AUTO: 162 K/UL — SIGNIFICANT CHANGE UP (ref 150–400)
PLATELET # BLD AUTO: 181 K/UL — SIGNIFICANT CHANGE UP (ref 150–400)
POTASSIUM SERPL-MCNC: 3.9 MMOL/L — SIGNIFICANT CHANGE UP (ref 3.5–5.3)
POTASSIUM SERPL-SCNC: 3.9 MMOL/L — SIGNIFICANT CHANGE UP (ref 3.5–5.3)
PROT SERPL-MCNC: 5.5 G/DL — LOW (ref 6–8.3)
RBC # BLD: 3.61 M/UL — LOW (ref 4.2–5.8)
RBC # BLD: 3.61 M/UL — LOW (ref 4.2–5.8)
RBC # FLD: 23.2 % — HIGH (ref 10.3–14.5)
RBC # FLD: 23.3 % — HIGH (ref 10.3–14.5)
SODIUM SERPL-SCNC: 142 MMOL/L — SIGNIFICANT CHANGE UP (ref 135–145)
TIBC SERPL-MCNC: 366 UG/DL — SIGNIFICANT CHANGE UP (ref 220–430)
TSH SERPL-MCNC: 1.88 UIU/ML — SIGNIFICANT CHANGE UP (ref 0.35–4.94)
UIBC SERPL-MCNC: 344 UG/DL — SIGNIFICANT CHANGE UP (ref 110–370)
VIT B12 SERPL-MCNC: 505 PG/ML — SIGNIFICANT CHANGE UP (ref 232–1245)
WBC # BLD: 5.67 K/UL — SIGNIFICANT CHANGE UP (ref 3.8–10.5)
WBC # BLD: 6.18 K/UL — SIGNIFICANT CHANGE UP (ref 3.8–10.5)
WBC # FLD AUTO: 5.67 K/UL — SIGNIFICANT CHANGE UP (ref 3.8–10.5)
WBC # FLD AUTO: 6.18 K/UL — SIGNIFICANT CHANGE UP (ref 3.8–10.5)

## 2020-05-23 PROCEDURE — 93971 EXTREMITY STUDY: CPT | Mod: 26,LT

## 2020-05-23 PROCEDURE — 93306 TTE W/DOPPLER COMPLETE: CPT | Mod: 26

## 2020-05-23 PROCEDURE — 99233 SBSQ HOSP IP/OBS HIGH 50: CPT | Mod: GC

## 2020-05-23 PROCEDURE — 71045 X-RAY EXAM CHEST 1 VIEW: CPT | Mod: 26

## 2020-05-23 RX ORDER — IRON SUCROSE 20 MG/ML
100 INJECTION, SOLUTION INTRAVENOUS ONCE
Refills: 0 | Status: DISCONTINUED | OUTPATIENT
Start: 2020-05-23 | End: 2020-05-23

## 2020-05-23 RX ORDER — SENNA PLUS 8.6 MG/1
2 TABLET ORAL AT BEDTIME
Refills: 0 | Status: DISCONTINUED | OUTPATIENT
Start: 2020-05-23 | End: 2020-05-23

## 2020-05-23 RX ORDER — POLYETHYLENE GLYCOL 3350 17 G/17G
17 POWDER, FOR SOLUTION ORAL
Refills: 0 | Status: DISCONTINUED | OUTPATIENT
Start: 2020-05-23 | End: 2020-05-24

## 2020-05-23 RX ORDER — IRON SUCROSE 20 MG/ML
200 INJECTION, SOLUTION INTRAVENOUS ONCE
Refills: 0 | Status: COMPLETED | OUTPATIENT
Start: 2020-05-23 | End: 2020-05-23

## 2020-05-23 RX ADMIN — Medication 30 MILLIGRAM(S): at 06:01

## 2020-05-23 RX ADMIN — POLYETHYLENE GLYCOL 3350 17 GRAM(S): 17 POWDER, FOR SOLUTION ORAL at 19:36

## 2020-05-23 RX ADMIN — IRON SUCROSE 110 MILLIGRAM(S): 20 INJECTION, SOLUTION INTRAVENOUS at 11:16

## 2020-05-23 RX ADMIN — SERTRALINE 50 MILLIGRAM(S): 25 TABLET, FILM COATED ORAL at 11:16

## 2020-05-23 RX ADMIN — TAMSULOSIN HYDROCHLORIDE 0.4 MILLIGRAM(S): 0.4 CAPSULE ORAL at 21:50

## 2020-05-23 RX ADMIN — Medication 500 MILLIGRAM(S): at 06:01

## 2020-05-23 RX ADMIN — ATORVASTATIN CALCIUM 80 MILLIGRAM(S): 80 TABLET, FILM COATED ORAL at 21:50

## 2020-05-23 RX ADMIN — AMIODARONE HYDROCHLORIDE 200 MILLIGRAM(S): 400 TABLET ORAL at 06:01

## 2020-05-23 RX ADMIN — Medication 30 MILLIGRAM(S): at 01:02

## 2020-05-23 RX ADMIN — Medication 5 MILLIGRAM(S): at 21:50

## 2020-05-23 RX ADMIN — Medication 500 MILLIGRAM(S): at 19:36

## 2020-05-23 RX ADMIN — Medication 30 MILLIGRAM(S): at 11:15

## 2020-05-23 RX ADMIN — Medication 30 MILLIGRAM(S): at 19:36

## 2020-05-23 RX ADMIN — Medication 500 MILLIGRAM(S): at 01:02

## 2020-05-23 RX ADMIN — SENNA PLUS 2 TABLET(S): 8.6 TABLET ORAL at 21:50

## 2020-05-23 NOTE — PROGRESS NOTE ADULT - SUBJECTIVE AND OBJECTIVE BOX
Pt seen and examined by me at bedside this AM  pt ambulatory   reports self-discontinued for ASA 81, on eliquis  was told to be anemia once back around 2002, otherwise no reported hx of anemia  hx of UC, currently in remission, no complaints, however has not f/u with GI for repeat scope for 2-3 years, already has referral from PMD    VSS   comfortable  +S1/S2; +murmur at apex and RUSB  +bs/nt/nd  no edema of LES  labs reviewed     a/p:  1. Anemia s/p 2uPRBC  2. hx of type A dissection s/p repair  3. hx of LUE DVT  4. ? Pafib    -hgb stable for now, f/u with CTS final recs for ? endoleak  -obtain collateral information from PMD on baseline hgb  -repeat LUE doppler, EKG on admission SR, on amiodarone  -rest of sunrise ordered reviewed.     Above d/w resident

## 2020-05-23 NOTE — DISCHARGE NOTE PROVIDER - PROVIDER TOKENS
PROVIDER:[TOKEN:[[id number]],FOLLOWUP:[1 week],ESTABLISHEDPATIENT:[T]] PROVIDER:[TOKEN:[[id number]],FOLLOWUP:[1 week],ESTABLISHEDPATIENT:[T]],PROVIDER:[TOKEN:[[id number]],FOLLOWUP:[Routine],ESTABLISHEDPATIENT:[T]]

## 2020-05-23 NOTE — DISCHARGE NOTE PROVIDER - NSDCCPCAREPLAN_GEN_ALL_CORE_FT
PRINCIPAL DISCHARGE DIAGNOSIS  Diagnosis: Anemia  Assessment and Plan of Treatment: Anemia is the medical term for when a person has too few red blood cells. Red blood cells are the cells in your blood that carry oxygen. If you have too few red blood cells, your body does not get all the oxygen it needs. Most people with anemia have no symptoms. They find out they have it after their doctor does blood tests for another reason. People who do have symptoms might feel tired or weak, especially if they try to exercise or have headaches. If you experience these symptoms you should see your primary care provider for further evaluation. PRINCIPAL DISCHARGE DIAGNOSIS  Diagnosis: Anemia  Assessment and Plan of Treatment: Anemia is the medical term for when a person has too few red blood cells. Red blood cells are the cells in your blood that carry oxygen. If you have too few red blood cells, your body does not get all the oxygen it needs. Most people with anemia have no symptoms. They find out they have it after their doctor does blood tests for another reason. People who do have symptoms might feel tired or weak, especially if they try to exercise or have headaches. If you experience these symptoms you should see your primary care provider for further evaluation.  You were treated with blood transfusions and IV iron infusions during this admission. After discharge, please DISCONTINUE your blood thinner Eliquis as your prior clots have now resolved.  Please follow up with your primary doctor within 1 week of discharge to schedule a colonoscopy outpatient.      SECONDARY DISCHARGE DIAGNOSES  Diagnosis: Atrial fibrillation  Assessment and Plan of Treatment: You have a known diagnosis of atrial fibrillation prior to your admission, for which you are on Amiodarone and Diltiazem. Please follow up with your primary care physician (and/or cardiologist) within 1 week of discharge to reassess whether you need to remain on these medications.

## 2020-05-23 NOTE — DISCHARGE NOTE PROVIDER - CARE PROVIDER_API CALL
AKBAR HAWK  [id number]  [address] [place] [address]  Phone: ([phone]  Fax: ([phone]  Established Patient  Follow Up Time: 1 week AKBAR HAWK  [id number]  [address] [place] [address]  Phone: ()-  Fax: ()-  Established Patient  Follow Up Time: 1 week    Yogesh Deluna  SURGERY  [address] [place] [address]  Phone: [phone]  Fax: [phone]  Established Patient  Follow Up Time: Routine

## 2020-05-23 NOTE — DISCHARGE NOTE PROVIDER - HOSPITAL COURSE
#Discharge: do not delete    69M [facility] ulcerative colitis, BPH s/p TURP, type A aortic dissection s/p repair, MVP s/p MVR, post-op course c/b bl frontal infarcts in watershed areas, c/b LUE DVT and afib placed on eliquis, c/b pericardial effusion s/p IR drainage, c/b CVA w/o deficits, discharged to rehab 12/26/19. Now reporting chronic anemia and sent in by PMD for anemia 6, admitted for anemia w/ unclear etiology.         Problem List/Main Diagnoses:    #Anemia    #Ulcerative Colitis    #Atrial Fibrillation    #Post-operative DVT    #Aortic Dissection Repair    #BPH    #HLD    #Seizure Prophylaxis    #Depression    #Constipation         Inpatient treatment course:             New medications:         Labs to be followed outpatient:         Exam to be followed outpatient: #Discharge: do not delete    69M [facility] ulcerative colitis, BPH s/p TURP, type A aortic dissection s/p repair, MVP s/p MVR, post-op course c/b bl frontal infarcts in watershed areas, c/b LUE DVT and afib placed on eliquis, c/b pericardial effusion s/p IR drainage, c/b CVA w/o deficits, discharged to rehab 12/26/19. Now reporting chronic anemia and sent in by PMD for anemia 6, admitted for anemia w/ unclear etiology.         Problem List/Main Diagnoses:    #Anemia    #Ulcerative Colitis    #Post-operative DVT    #Aortic Dissection Repair    #BPH    #HLD    #Seizure Prophylaxis    #Depression    #Constipation         Inpatient treatment course:     Patient admitted with symptomatic anemia. Was transfused 2 units of blood with improvement in hemoglobin from 6 > 7.4 (which is around his baseline per collateral obtained from PCP). Started on Venofer for iron deficiency. Source of anemia unclear. Patient had previously had a baseline hemoglobin of 13 prior to aortic dissection repair. Seen by CT surgery this admission and did not think anemia was from endoleak. ECHO this admission notable for severely dilated RA, moderate MR jet, moderate LVH and EF 73% without diastolic dysfunction. LUE clot seen on previous admission re-evaluated this admission. Doppler showing interval resolution of clot***.        New medications: ***    Labs to be followed outpatient: basic labs, especially CBC    Exam to be followed outpatient: basic exam    Other: colonoscopy #Discharge: do not delete    69M [facility] ulcerative colitis, BPH s/p TURP, type A aortic dissection s/p repair, MVP s/p MVR, post-op course c/b bl frontal infarcts in watershed areas, c/b LUE DVT and afib placed on eliquis, c/b pericardial effusion s/p IR drainage, c/b CVA w/o deficits, discharged to rehab 12/26/19. Now reporting chronic anemia and sent in by PMD for anemia 6, admitted for anemia w/ unclear etiology.         Problem List/Main Diagnoses:    #Anemia    #Ulcerative Colitis    #Post-operative DVT    #Aortic Dissection Repair    #BPH    #HLD    #Seizure Prophylaxis    #Depression    #Constipation         Inpatient treatment course:     Patient admitted with symptomatic anemia. Was transfused 2 units of blood with improvement in hemoglobin from 6 > 7.4 (which is around his baseline per collateral obtained from PCP). Was given IV Venofer x 2 doses for iron deficiency. Source of anemia unclear. Patient had previously had a baseline hemoglobin of 13 prior to aortic dissection repair. Seen by CT surgery this admission and did not think anemia was from endoleak. ECHO this admission notable for severely dilated RA, moderate MR jet, moderate LVH and EF 73% without diastolic dysfunction. LUE clot seen on previous admission re-evaluated this admission. Doppler showing interval resolution of clot. Patient's Eliquis was DISCONTINUED.        New medications: DISCONTINUE Eliquis    Labs to be followed outpatient: basic labs, especially CBC    Exam to be followed outpatient: basic exam    Other: colonoscopy

## 2020-05-23 NOTE — DISCHARGE NOTE PROVIDER - NSDCCPTREATMENT_GEN_ALL_CORE_FT
PRINCIPAL PROCEDURE  Procedure: Venous duplex scan of left upper extremity  Findings and Treatment: IMPRESSION:   No deep venous thrombosis of the left upper extremity. Previously seen cephalic and basilic vein thrombosis is no longer visualized.

## 2020-05-23 NOTE — DISCHARGE NOTE PROVIDER - NSDCMRMEDTOKEN_GEN_ALL_CORE_FT
amiodarone 200 mg oral tablet: 1 tab(s) orally once a day  apixaban 5 mg oral tablet: 1 tab(s) orally 2 times a day  dilTIAZem 30 mg oral tablet: 1 tab(s) orally every 6 hours  divalproex sodium 125 mg oral delayed release capsule: 2 cap(s) orally every 12 hours  Flomax 0.4 mg oral capsule: 1 cap(s) orally once a day  Lipitor 80 mg oral tablet: 1 tab(s) orally once a day (at bedtime)  senna oral tablet: 2 tab(s) orally once a day (at bedtime)  sertraline 50 mg oral tablet: 1 tab(s) orally once a day  SULFASALAZINE 500 MG TABLET: bid amiodarone 200 mg oral tablet: 1 tab(s) orally once a day  dilTIAZem 30 mg oral tablet: 1 tab(s) orally every 6 hours  divalproex sodium 125 mg oral delayed release capsule: 2 cap(s) orally every 12 hours  Flomax 0.4 mg oral capsule: 1 cap(s) orally once a day  Lipitor 80 mg oral tablet: 1 tab(s) orally once a day (at bedtime)  senna oral tablet: 2 tab(s) orally once a day (at bedtime)  sertraline 50 mg oral tablet: 1 tab(s) orally once a day  SULFASALAZINE 500 MG TABLET: bid amiodarone 200 mg oral tablet: 1 tab(s) orally once a day  Aspirin Enteric Coated 81 mg oral delayed release tablet: 1 tab(s) orally once a day  dilTIAZem 30 mg oral tablet: 1 tab(s) orally every 6 hours  divalproex sodium 125 mg oral delayed release capsule: 2 cap(s) orally every 12 hours  Flomax 0.4 mg oral capsule: 1 cap(s) orally once a day  Lipitor 80 mg oral tablet: 1 tab(s) orally once a day (at bedtime)  senna oral tablet: 2 tab(s) orally once a day (at bedtime)  sertraline 50 mg oral tablet: 1 tab(s) orally once a day  SULFASALAZINE 500 MG TABLET: bid

## 2020-05-24 ENCOUNTER — TRANSCRIPTION ENCOUNTER (OUTPATIENT)
Age: 70
End: 2020-05-24

## 2020-05-24 VITALS
RESPIRATION RATE: 18 BRPM | OXYGEN SATURATION: 95 % | SYSTOLIC BLOOD PRESSURE: 120 MMHG | HEART RATE: 68 BPM | DIASTOLIC BLOOD PRESSURE: 61 MMHG | TEMPERATURE: 98 F

## 2020-05-24 LAB
ANION GAP SERPL CALC-SCNC: 11 MMOL/L — SIGNIFICANT CHANGE UP (ref 5–17)
BUN SERPL-MCNC: 14 MG/DL — SIGNIFICANT CHANGE UP (ref 7–23)
CALCIUM SERPL-MCNC: 8.7 MG/DL — SIGNIFICANT CHANGE UP (ref 8.4–10.5)
CHLORIDE SERPL-SCNC: 107 MMOL/L — SIGNIFICANT CHANGE UP (ref 96–108)
CO2 SERPL-SCNC: 25 MMOL/L — SIGNIFICANT CHANGE UP (ref 22–31)
CREAT SERPL-MCNC: 1.06 MG/DL — SIGNIFICANT CHANGE UP (ref 0.5–1.3)
GLUCOSE SERPL-MCNC: 84 MG/DL — SIGNIFICANT CHANGE UP (ref 70–99)
HCT VFR BLD CALC: 26.7 % — LOW (ref 39–50)
HGB BLD-MCNC: 7.5 G/DL — LOW (ref 13–17)
MAGNESIUM SERPL-MCNC: 2.2 MG/DL — SIGNIFICANT CHANGE UP (ref 1.6–2.6)
MCHC RBC-ENTMCNC: 20.3 PG — LOW (ref 27–34)
MCHC RBC-ENTMCNC: 28.1 GM/DL — LOW (ref 32–36)
MCV RBC AUTO: 72.2 FL — LOW (ref 80–100)
NRBC # BLD: 0 /100 WBCS — SIGNIFICANT CHANGE UP (ref 0–0)
PLATELET # BLD AUTO: 183 K/UL — SIGNIFICANT CHANGE UP (ref 150–400)
POTASSIUM SERPL-MCNC: 3.7 MMOL/L — SIGNIFICANT CHANGE UP (ref 3.5–5.3)
POTASSIUM SERPL-SCNC: 3.7 MMOL/L — SIGNIFICANT CHANGE UP (ref 3.5–5.3)
RBC # BLD: 3.7 M/UL — LOW (ref 4.2–5.8)
RBC # FLD: 23.5 % — HIGH (ref 10.3–14.5)
SODIUM SERPL-SCNC: 143 MMOL/L — SIGNIFICANT CHANGE UP (ref 135–145)
WBC # BLD: 6.75 K/UL — SIGNIFICANT CHANGE UP (ref 3.8–10.5)
WBC # FLD AUTO: 6.75 K/UL — SIGNIFICANT CHANGE UP (ref 3.8–10.5)

## 2020-05-24 PROCEDURE — 36430 TRANSFUSION BLD/BLD COMPNT: CPT

## 2020-05-24 PROCEDURE — 80053 COMPREHEN METABOLIC PANEL: CPT

## 2020-05-24 PROCEDURE — 84443 ASSAY THYROID STIM HORMONE: CPT

## 2020-05-24 PROCEDURE — P9016: CPT

## 2020-05-24 PROCEDURE — 71045 X-RAY EXAM CHEST 1 VIEW: CPT

## 2020-05-24 PROCEDURE — 85384 FIBRINOGEN ACTIVITY: CPT

## 2020-05-24 PROCEDURE — 83540 ASSAY OF IRON: CPT

## 2020-05-24 PROCEDURE — 82247 BILIRUBIN TOTAL: CPT

## 2020-05-24 PROCEDURE — 82248 BILIRUBIN DIRECT: CPT

## 2020-05-24 PROCEDURE — 82607 VITAMIN B-12: CPT

## 2020-05-24 PROCEDURE — 86901 BLOOD TYPING SEROLOGIC RH(D): CPT

## 2020-05-24 PROCEDURE — 86850 RBC ANTIBODY SCREEN: CPT

## 2020-05-24 PROCEDURE — 83735 ASSAY OF MAGNESIUM: CPT

## 2020-05-24 PROCEDURE — 85045 AUTOMATED RETICULOCYTE COUNT: CPT

## 2020-05-24 PROCEDURE — 99285 EMERGENCY DEPT VISIT HI MDM: CPT | Mod: 25

## 2020-05-24 PROCEDURE — 86923 COMPATIBILITY TEST ELECTRIC: CPT

## 2020-05-24 PROCEDURE — 99239 HOSP IP/OBS DSCHRG MGMT >30: CPT | Mod: GC

## 2020-05-24 PROCEDURE — 36415 COLL VENOUS BLD VENIPUNCTURE: CPT

## 2020-05-24 PROCEDURE — 93005 ELECTROCARDIOGRAM TRACING: CPT | Mod: 76

## 2020-05-24 PROCEDURE — 82746 ASSAY OF FOLIC ACID SERUM: CPT

## 2020-05-24 PROCEDURE — 93308 TTE F-UP OR LMTD: CPT

## 2020-05-24 PROCEDURE — 85610 PROTHROMBIN TIME: CPT

## 2020-05-24 PROCEDURE — 80048 BASIC METABOLIC PNL TOTAL CA: CPT

## 2020-05-24 PROCEDURE — 85730 THROMBOPLASTIN TIME PARTIAL: CPT

## 2020-05-24 PROCEDURE — 83010 ASSAY OF HAPTOGLOBIN QUANT: CPT

## 2020-05-24 PROCEDURE — 93971 EXTREMITY STUDY: CPT

## 2020-05-24 PROCEDURE — 83615 LACTATE (LD) (LDH) ENZYME: CPT

## 2020-05-24 PROCEDURE — 85025 COMPLETE CBC W/AUTO DIFF WBC: CPT

## 2020-05-24 PROCEDURE — 85027 COMPLETE CBC AUTOMATED: CPT

## 2020-05-24 PROCEDURE — 85379 FIBRIN DEGRADATION QUANT: CPT

## 2020-05-24 PROCEDURE — 82728 ASSAY OF FERRITIN: CPT

## 2020-05-24 PROCEDURE — 83550 IRON BINDING TEST: CPT

## 2020-05-24 RX ORDER — POTASSIUM CHLORIDE 20 MEQ
20 PACKET (EA) ORAL ONCE
Refills: 0 | Status: COMPLETED | OUTPATIENT
Start: 2020-05-24 | End: 2020-05-24

## 2020-05-24 RX ORDER — ASPIRIN/CALCIUM CARB/MAGNESIUM 324 MG
1 TABLET ORAL
Qty: 0 | Refills: 0 | DISCHARGE

## 2020-05-24 RX ORDER — IRON SUCROSE 20 MG/ML
200 INJECTION, SOLUTION INTRAVENOUS ONCE
Refills: 0 | Status: COMPLETED | OUTPATIENT
Start: 2020-05-24 | End: 2020-05-24

## 2020-05-24 RX ORDER — APIXABAN 2.5 MG/1
1 TABLET, FILM COATED ORAL
Qty: 0 | Refills: 0 | DISCHARGE

## 2020-05-24 RX ADMIN — Medication 30 MILLIGRAM(S): at 06:11

## 2020-05-24 RX ADMIN — SERTRALINE 50 MILLIGRAM(S): 25 TABLET, FILM COATED ORAL at 11:22

## 2020-05-24 RX ADMIN — Medication 20 MILLIEQUIVALENT(S): at 10:10

## 2020-05-24 RX ADMIN — POLYETHYLENE GLYCOL 3350 17 GRAM(S): 17 POWDER, FOR SOLUTION ORAL at 06:11

## 2020-05-24 RX ADMIN — Medication 30 MILLIGRAM(S): at 11:23

## 2020-05-24 RX ADMIN — IRON SUCROSE 110 MILLIGRAM(S): 20 INJECTION, SOLUTION INTRAVENOUS at 11:22

## 2020-05-24 RX ADMIN — Medication 30 MILLIGRAM(S): at 00:00

## 2020-05-24 RX ADMIN — AMIODARONE HYDROCHLORIDE 200 MILLIGRAM(S): 400 TABLET ORAL at 06:11

## 2020-05-24 RX ADMIN — Medication 500 MILLIGRAM(S): at 06:11

## 2020-05-24 NOTE — DISCHARGE NOTE NURSING/CASE MANAGEMENT/SOCIAL WORK - PATIENT PORTAL LINK FT
You can access the FollowMyHealth Patient Portal offered by [facility] by registering at the following website: [email or web address]. By joining [facility]’s FollowMyHealth portal, you will also be able to view your health information using other applications (apps) compatible with our system.

## 2020-05-24 NOTE — CHART NOTE - NSCHARTNOTEFT_GEN_A_CORE
Pt case discussed with Dr. Deluna. On 11/23/19 pt underwent Type A dissection repair involving aortic root replacement/reconstruction (biological bentall 23 mm Magna in 32 mm graft), Carbol reconstruction of coronary ostia, replacement ascending aorta/hemiarch, frozen elephant trunk. No MVR was preformed on this patient. Echo findings on 5/23 of MVP with severe MR are stable and consistent with pts prior echocardiogram in December 2019. Pt is being followed by Dr. Latif as an outpatient for mitral disease and for further post-operative care. He currently exhibits no signs or symptoms of fluid overload or CHF. He will need to fully recover from his aortic procedure before any further surgical intervention is considered. Pt can continue to follow up at an outpatient after discharge. Pt case discussed with Dr. Deluna. On 11/23/19 pt underwent Type A dissection repair involving aortic root replacement/reconstruction (biological bentall 23 mm Magna in 32 mm graft), Carbol reconstruction of coronary ostia, replacement ascending aorta/hemiarch, frozen elephant trunk. No MVR was preformed on this patient. Echo findings on 5/23 of MVP with severe MR are stable and consistent with pts prior echocardiogram in December 2019. Pt is being followed by Dr. Latif as an outpatient for mitral disease and for further post-operative care. He currently exhibits no signs or symptoms of fluid overload or CHF. He will need to fully recover from his aortic procedure before any further surgical intervention is considered. Pt can continue to follow up as an outpatient after discharge.

## 2020-05-27 DIAGNOSIS — D64.9 ANEMIA, UNSPECIFIED: ICD-10-CM

## 2020-05-27 DIAGNOSIS — K51.90 ULCERATIVE COLITIS, UNSPECIFIED, WITHOUT COMPLICATIONS: ICD-10-CM

## 2020-05-27 DIAGNOSIS — Z95.828 PRESENCE OF OTHER VASCULAR IMPLANTS AND GRAFTS: ICD-10-CM

## 2020-05-27 DIAGNOSIS — K59.00 CONSTIPATION, UNSPECIFIED: ICD-10-CM

## 2020-05-27 DIAGNOSIS — F32.9 MAJOR DEPRESSIVE DISORDER, SINGLE EPISODE, UNSPECIFIED: ICD-10-CM

## 2020-05-27 DIAGNOSIS — N40.0 BENIGN PROSTATIC HYPERPLASIA WITHOUT LOWER URINARY TRACT SYMPTOMS: ICD-10-CM

## 2020-05-27 DIAGNOSIS — Z88.0 ALLERGY STATUS TO PENICILLIN: ICD-10-CM

## 2020-05-27 DIAGNOSIS — Z86.73 PERSONAL HISTORY OF TRANSIENT ISCHEMIC ATTACK (TIA), AND CEREBRAL INFARCTION WITHOUT RESIDUAL DEFICITS: ICD-10-CM

## 2020-05-27 DIAGNOSIS — I48.91 UNSPECIFIED ATRIAL FIBRILLATION: ICD-10-CM

## 2020-05-27 DIAGNOSIS — Z86.718 PERSONAL HISTORY OF OTHER VENOUS THROMBOSIS AND EMBOLISM: ICD-10-CM

## 2020-05-27 DIAGNOSIS — Z95.2 PRESENCE OF PROSTHETIC HEART VALVE: ICD-10-CM

## 2020-05-27 DIAGNOSIS — E78.5 HYPERLIPIDEMIA, UNSPECIFIED: ICD-10-CM

## 2020-05-27 DIAGNOSIS — Z79.01 LONG TERM (CURRENT) USE OF ANTICOAGULANTS: ICD-10-CM

## 2020-05-27 DIAGNOSIS — T82.330S LEAKAGE OF AORTIC (BIFURCATION) GRAFT (REPLACEMENT), SEQUELA: ICD-10-CM

## 2020-08-05 ENCOUNTER — RX RENEWAL (OUTPATIENT)
Age: 70
End: 2020-08-05

## 2020-08-05 RX ORDER — ATORVASTATIN CALCIUM 80 MG/1
80 TABLET, FILM COATED ORAL
Qty: 90 | Refills: 3 | Status: ACTIVE | COMMUNITY
Start: 2020-05-07 | End: 1900-01-01

## 2020-08-05 RX ORDER — SERTRALINE HYDROCHLORIDE 50 MG/1
50 TABLET, FILM COATED ORAL DAILY
Qty: 90 | Refills: 3 | Status: ACTIVE | COMMUNITY
Start: 2020-08-05 | End: 1900-01-01

## 2021-07-01 NOTE — ED PROVIDER NOTE - CRITICAL CARE ATTESTATION
I have personally provided the amount of critical care time documented below excluding time spent on separate procedures Muscle Hinge Flap Text: The defect edges were debeveled with a #15 scalpel blade.  Given the size, depth and location of the defect and the proximity to free margins a muscle hinge flap was deemed most appropriate.  Using a sterile surgical marker, an appropriate hinge flap was drawn incorporating the defect. The area thus outlined was incised with a #15 scalpel blade.  The skin margins were undermined to an appropriate distance in all directions utilizing iris scissors.

## 2023-07-16 NOTE — PROGRESS NOTE BEHAVIORAL HEALTH - MOOD
Depressed/Irritable
Irritable/Depressed
Depressed/Irritable
Irritable/Depressed
Other
Depressed/Irritable
no

## 2023-08-23 NOTE — ED PROVIDER NOTE - CARE PLAN
Principal Discharge DX:	Dissection of aorta, unspecified portion of aorta Cimetidine Counseling:  I discussed with the patient the risks of Cimetidine including but not limited to gynecomastia, headache, diarrhea, nausea, drowsiness, arrhythmias, pancreatitis, skin rashes, psychosis, bone marrow suppression and kidney toxicity.

## 2024-06-13 NOTE — ED PROVIDER NOTE - INTERPRETATION
SW/CM Discharge Plan  Informed patient is ready for discharge.  Patient to be picked up by [facility] , ETA 16:50  . Patient/interested person has been counseled for post hospitalization care.  Patient agrees and understands goals and plan. Initial implementation of the patient’s discharge plan has been arranged, including any devices/equipment needed for discharge. Discharge plan communicated to Pts MD Ramy and Floor RN     Patient’s discharge destination is  Home with PHF appointment and Home Health Services.    Selected Continued Care - Admitted Since 6/8/2024       Home Medical Care Coordination complete.      Service Provider Selected Services Address Phone Fax    [facility] -  Home Health Services [address] [place] [address], [address] [phone] -- --                     .   abnormal